# Patient Record
Sex: MALE | Race: WHITE | NOT HISPANIC OR LATINO | Employment: OTHER | URBAN - METROPOLITAN AREA
[De-identification: names, ages, dates, MRNs, and addresses within clinical notes are randomized per-mention and may not be internally consistent; named-entity substitution may affect disease eponyms.]

---

## 2017-02-06 DIAGNOSIS — F41.9 ANXIETY: Primary | ICD-10-CM

## 2017-02-07 DIAGNOSIS — I10 HYPERTENSION: ICD-10-CM

## 2017-02-07 DIAGNOSIS — E78.5 HYPERLIPIDEMIA: Primary | ICD-10-CM

## 2017-02-07 RX ORDER — ATORVASTATIN CALCIUM 40 MG/1
40 TABLET, FILM COATED ORAL DAILY
Qty: 90 TABLET | Refills: 3 | Status: SHIPPED | OUTPATIENT
Start: 2017-02-07 | End: 2017-12-28

## 2017-02-07 RX ORDER — DIAZEPAM 5 MG
5 TABLET ORAL EVERY 6 HOURS PRN
Qty: 30 TABLET | Refills: 3 | COMMUNITY
Start: 2017-02-07 | End: 2017-12-17

## 2017-02-07 RX ORDER — LISINOPRIL 5 MG/1
5 TABLET ORAL 2 TIMES DAILY
Qty: 180 TABLET | Refills: 3 | Status: ON HOLD | OUTPATIENT
Start: 2017-02-07 | End: 2017-02-27

## 2017-02-22 ENCOUNTER — CARE COORDINATION (OUTPATIENT)
Dept: SLEEP MEDICINE | Facility: CLINIC | Age: 82
End: 2017-02-22

## 2017-02-23 DIAGNOSIS — I25.10 CORONARY ARTERY DISEASE INVOLVING NATIVE CORONARY ARTERY OF NATIVE HEART WITHOUT ANGINA PECTORIS: ICD-10-CM

## 2017-02-23 DIAGNOSIS — I71.40 ABDOMINAL AORTIC ANEURYSM (AAA) WITHOUT RUPTURE (H): Primary | ICD-10-CM

## 2017-02-23 DIAGNOSIS — Z95.1 S/P CABG (CORONARY ARTERY BYPASS GRAFT): Primary | ICD-10-CM

## 2017-02-23 DIAGNOSIS — Z85.46 HISTORY OF PROSTATE CANCER: ICD-10-CM

## 2017-02-23 DIAGNOSIS — I71.40 ABDOMINAL AORTIC ANEURYSM (AAA) WITHOUT RUPTURE (H): ICD-10-CM

## 2017-02-23 NOTE — PROGRESS NOTES
I have called in to refill his temazepam 15mg po qhs.  He is doing well.  No side effects. Continued efficacy.

## 2017-02-26 ENCOUNTER — HOSPITAL ENCOUNTER (EMERGENCY)
Facility: CLINIC | Age: 82
Discharge: HOME | DRG: 854 | End: 2017-02-26
Attending: EMERGENCY MEDICINE | Admitting: EMERGENCY MEDICINE
Payer: COMMERCIAL

## 2017-02-26 VITALS
SYSTOLIC BLOOD PRESSURE: 121 MMHG | TEMPERATURE: 98.4 F | BODY MASS INDEX: 24.45 KG/M2 | HEIGHT: 63 IN | WEIGHT: 138 LBS | OXYGEN SATURATION: 96 % | DIASTOLIC BLOOD PRESSURE: 56 MMHG | RESPIRATION RATE: 20 BRPM

## 2017-02-26 DIAGNOSIS — K05.00 GINGIVITIS, ACUTE: ICD-10-CM

## 2017-02-26 RX ORDER — PENICILLIN V POTASSIUM 500 MG/1
500 TABLET, FILM COATED ORAL 4 TIMES DAILY
Qty: 40 TABLET | Refills: 0 | Status: ON HOLD | OUTPATIENT
Start: 2017-02-26 | End: 2017-03-10

## 2017-02-26 RX ORDER — HYDROCODONE BITARTRATE AND ACETAMINOPHEN 5; 325 MG/1; MG/1
1 TABLET ORAL EVERY 6 HOURS PRN
Qty: 15 TABLET | Refills: 0 | Status: ON HOLD | OUTPATIENT
Start: 2017-02-26 | End: 2017-02-27

## 2017-02-26 RX ORDER — PENICILLIN V POTASSIUM 500 MG/1
500 TABLET, FILM COATED ORAL 4 TIMES DAILY
Qty: 40 TABLET | Refills: 0 | Status: SHIPPED | OUTPATIENT
Start: 2017-02-26 | End: 2017-02-26

## 2017-02-26 ASSESSMENT — ENCOUNTER SYMPTOMS
FEVER: 0
TROUBLE SWALLOWING: 1
FACIAL SWELLING: 1

## 2017-02-26 NOTE — ED PROVIDER NOTES
"  History     Chief Complaint:  Facial Swelling    HPI:    Oscar Chaudhary is a 81 year old male with a history of CAD, CABG surgery, abdominal aortic aneurysm, and prostate cancer who presents with left-sided facial swelling. The patient reports that he was eating popcorn last night when he got a couple kernels stuck behind his back left molar. Although he tried to remove it, he was not able to secondary to pain and inability to see it himself. He used floss, water under pressure, and his tooth brush.This morning, he awoke with increased pain, significant swelling to the area, and difficulty swallowing. Secondary to pain, he has been unable to sleep, prompting his ED visit. He left a message with his dentist, but his dentist does not work on weekends. He denies any recent tooth fracture, dental intervention, chills or fever.     Allergies:  No known drug allergies      Medications:    Norco  Veetid  Valium  Lipitor  Lisinopril  Atenolol  Aldactone  Restoril  Aspirin  Omega-3 Fatty Acids  Multivitamin  Vitamin D    Past Medical History:    CABG  Myocardial infarction  Prostate cancer  Abdominal aortic aneurysm  CAD    Past Surgical History:    History reviewed. No pertinent surgical history.     Family History:    History reviewed. No pertinent family history.      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     Review of Systems   Constitutional: Negative for fever.   HENT: Positive for facial swelling and trouble swallowing. Negative for dental problem.    All other systems reviewed and are negative.      Physical Exam     Patient Vitals for the past 24 hrs:   BP Temp Temp src Heart Rate Resp SpO2 Height Weight   02/26/17 1058 121/56 98.4  F (36.9  C) Oral 107 20 96 % 1.6 m (5' 3\") 62.6 kg (138 lb)        Physical Exam   HENT:   Mouth/Throat:       :  Gen:  Pleasant, appears stated age.    Eye:   Sclera non-injected.  "     Face:  Obvious swelling of the left check and mandible.  Left cheek and mandible tender and slightly warm.   Submandibular swelling and tender lymphadenopathy.    Mouth: Minimal trismus.  Normal tongue elevation.  Sublingual tissue soft, non-tender.   Swelling posterior to tooth 17, no palpable abscess, tooth fracture.  There is a small amount of white material posterior to the  tooth.    Musculoskeletal:     Normal movement of all extremities without evidence for deficit.    Extremities:    No edema.  Atraumatic.      Skin:   Warm and dry.  No rash.    Neurologic:    Non-focal exam without asymmetric weakness or numbness.    Fluent speech.    Psychiatric:     Normal affect with appropriate interaction with examiner.        Emergency Department Course     Procedures:  Left inferior alveolar dental block  Performed by Angelina Farnsworth MD  Indication: pain control for severe dental pain  Procedure: after verbal informed consent was obtained 3cc of marcaine with epi was instilled at the angle of the mandible.  No complications and well tolerated.  Pain was mildly improved.    Emergency Department Course:  Past medical records, nursing notes, and vitals reviewed.  1218: I performed an exam of the patient and obtained history, as documented above.    1239: A dental block procedure was performed as noted above and I was able to clean the site.  I extracted a small amount of partially digested tissue/foot from behind the tooth.  I cleaned the area using 4-0 ethilon suture as floss, but did not notice any other foreign material.      1254: I rechecked the patient. Findings and plan explained to the Patient. Patient discharged home with instructions regarding supportive care, medications, and reasons to return. The importance of close follow-up was reviewed.      Impression & Plan      Medical Decision Making:  Oscar Chaudhary is a 81 year old male with a history of coronary artery disease and aortic aneurysm who presents  with painful left-sided facial swelling with pain behind the left, back molar. On examination, he is afebrile, though slightly tachycardia. He describes a foreign body sensation in the back of his left molar. I explored the area with using a suture as floss and also with forceps. I was able to get a small amount of partially digested material from behind the tooth. He tolerated that well. At this point, there does not appear to be a drainable abscess. At this point, he does some significant lymphadenopathy and facial swelling. I have recommended starting Penicillin and pain medication as needed. He plans on following up with his dentist tomorrow.     Diagnosis:    ICD-10-CM   1. Gingivitis, acute K05.00       Disposition:  Discharged to home with [place].    Discharge Medications:   Details   Hydrocodone-acetaminophen (NORCO) 5-325 MG per tablet Take 1 tablet by mouth every 6 hours as needed for moderate to severe pain or pain, Disp-15 tablet, R-0, Local Print     Veronica Conroy  2/26/2017    EMERGENCY DEPARTMENT    I, Veronica Conroy, am serving as a scribe at 12:18 PM on 2/26/2017 to document services personally performed by Angelina Farnsworth MD based on my observations and the provider's statements to me.       Angelina Farnsworth MD  02/26/17 8391

## 2017-02-26 NOTE — DISCHARGE INSTRUCTIONS
Discharge Instructions  Dental Pain    You have been seen today for a toothache. Your pain may be caused by an exposed nerve, an infection (pulpitis), a root abscess, or other problems. You will need to see a dentist for a solution to your tooth problem. Emergency Department care is only to help control your problem until you can see a dentist.  Today, we did not find any sign that your toothache was caused by a serious condition, but sometimes symptoms develop over time and cannot be found during an emergency visit, so it is very important that you follow up with your dentist.      Return to the Emergency Department if:    You develop a fever over 101 degrees Fahrenheit.    You can t open your mouth normally, can t move your tongue well, or can t swallow.    You have new or increased swelling of your face or neck.    You develop drainage of pus or foul smelling material from around your tooth.  What can I do to help myself?    Take any antibiotic the doctor may have prescribed for you today.    Avoid very hot or very cold foods as both can cause pain.    Make an appointment to see a dentist as soon as possible. If you wish, we can provide you with a list of low-cost dental clinics.   If you were given a prescription for medicine here today, be sure to read all of the information (including the package insert) that comes with your prescription.  This will include important information about the medicine, its side effects, and any warnings that you need to know about.  The pharmacist who fills the prescription can provide more information and answer questions you may have about the medicine.  If you have questions or concerns that the pharmacist cannot address, please call or return to the Emergency Department.   Opioid Medication Information    Pain medications are among the most commonly prescribed medicines, so we are including this information for all our patients. If you did not receive pain medication or get  a prescription for pain medicine, you can ignore it.     You may have been given a prescription for an opioid (narcotic) pain medicine and/or have received a pain medicine while here in the Emergency Department. These medicines can make you drowsy or impaired. You must not drive, operate dangerous equipment, or engage in any other dangerous activities while taking these medications. If you drive while taking these medications, you could be arrested for DUI, or driving under the influence. Do not drink any alcohol while you are taking these medications.     Opioid pain medications can cause addiction. If you have a history of chemical dependency of any type, you are at a higher risk of becoming addicted to pain medications.  Only take these prescribed medications to treat your pain when all other options have been tried. Take it for as short a time and as few doses as possible. Store your pain pills in a secure place, as they are frequently stolen and provide a dangerous opportunity for children or visitors in your house to start abusing these powerful medications. We will not replace any lost or stolen medicine.  As soon as your pain is better, you should flush all your remaining medication.     Many prescription pain medications contain Tylenol  (acetaminophen), including Vicodin , Tylenol #3 , Norco , Lortab , and Percocet .  You should not take any extra pills of Tylenol  if you are using these prescription medications or you can get very sick.  Do not ever take more than 3000 mg of acetaminophen in any 24 hour period.    All opioids tend to cause constipation. Drink plenty of water and eat foods that have a lot of fiber, such as fruits, vegetables, prune juice, apple juice and high fiber cereal.  Take a laxative if you don t move your bowels at least every other day. Miralax , Milk of Magnesia, Colace , or Senna  can be used to keep you regular.      Remember that you can always come back to the Emergency  Department if you are not able to see your regular doctor in the amount of time listed above, if you get any new symptoms, or if there is anything that worries you.

## 2017-02-26 NOTE — ED AVS SNAPSHOT
Emergency Department    [address] [place] [address]    Phone:  [phone]    Fax:  [phone]                                       Oscar Chaudhary   MRN: [id number]    Department:   Emergency Department   Date of Visit:  2/26/2017           After Visit Summary Signature Page     I have received my discharge instructions, and my questions have been answered. I have discussed any challenges I see with this plan with the nurse or doctor.    ..........................................................................................................................................  Patient/Patient Representative Signature      ..........................................................................................................................................  Patient Representative Print Name and Relationship to Patient    ..................................................               ................................................  Date                                            Time    ..........................................................................................................................................  Reviewed by Signature/Title    ...................................................              ..............................................  Date                                                            Time

## 2017-02-26 NOTE — ED AVS SNAPSHOT
Emergency Department    [address] [facility] [address]    Phone:  [phone]    Fax:  [phone]                                       Oscar Chaudhary   MRN: [id number]    Department:   Emergency Department   Date of Visit:  2/26/2017           Patient Information     Date Of Birth          1935        Your diagnoses for this visit were:     Gingivitis, acute        You were seen by Angelina Farnsworth MD.      Follow-up Information     Follow up with  Emergency Department.    Specialty:  EMERGENCY MEDICINE    Why:  immediately , If symptoms worsen    Contact information:    [address] [facility] [address] [phone]        Follow up with Milton Hollins MD.    Specialty:  Internal Medicine    Why:  As needed    Contact information:    [facility]  [address] [address] [phone]          Follow up with Your dentist In 1 day.    Why:  for a recheck of your symptoms        Discharge Instructions         Discharge Instructions  Dental Pain    You have been seen today for a toothache. Your pain may be caused by an exposed nerve, an infection (pulpitis), a root abscess, or other problems. You will need to see a dentist for a solution to your tooth problem. Emergency Department care is only to help control your problem until you can see a dentist.  Today, we did not find any sign that your toothache was caused by a serious condition, but sometimes symptoms develop over time and cannot be found during an emergency visit, so it is very important that you follow up with your dentist.      Return to the Emergency Department if:    You develop a fever over 101 degrees Fahrenheit.    You can t open your mouth normally, can t move your tongue well, or can t swallow.    You have new or increased swelling of your face or neck.    You develop drainage of pus or foul smelling material from around your tooth.  What can I do to help  myself?    Take any antibiotic the doctor may have prescribed for you today.    Avoid very hot or very cold foods as both can cause pain.    Make an appointment to see a dentist as soon as possible. If you wish, we can provide you with a list of low-cost dental clinics.   If you were given a prescription for medicine here today, be sure to read all of the information (including the package insert) that comes with your prescription.  This will include important information about the medicine, its side effects, and any warnings that you need to know about.  The pharmacist who fills the prescription can provide more information and answer questions you may have about the medicine.  If you have questions or concerns that the pharmacist cannot address, please call or return to the Emergency Department.   Opioid Medication Information    Pain medications are among the most commonly prescribed medicines, so we are including this information for all our patients. If you did not receive pain medication or get a prescription for pain medicine, you can ignore it.     You may have been given a prescription for an opioid (narcotic) pain medicine and/or have received a pain medicine while here in the Emergency Department. These medicines can make you drowsy or impaired. You must not drive, operate dangerous equipment, or engage in any other dangerous activities while taking these medications. If you drive while taking these medications, you could be arrested for DUI, or driving under the influence. Do not drink any alcohol while you are taking these medications.     Opioid pain medications can cause addiction. If you have a history of chemical dependency of any type, you are at a higher risk of becoming addicted to pain medications.  Only take these prescribed medications to treat your pain when all other options have been tried. Take it for as short a time and as few doses as possible. Store your pain pills in a secure place, as  they are frequently stolen and provide a dangerous opportunity for children or visitors in your house to start abusing these powerful medications. We will not replace any lost or stolen medicine.  As soon as your pain is better, you should flush all your remaining medication.     Many prescription pain medications contain Tylenol  (acetaminophen), including Vicodin , Tylenol #3 , Norco , Lortab , and Percocet .  You should not take any extra pills of Tylenol  if you are using these prescription medications or you can get very sick.  Do not ever take more than 3000 mg of acetaminophen in any 24 hour period.    All opioids tend to cause constipation. Drink plenty of water and eat foods that have a lot of fiber, such as fruits, vegetables, prune juice, apple juice and high fiber cereal.  Take a laxative if you don t move your bowels at least every other day. Miralax , Milk of Magnesia, Colace , or Senna  can be used to keep you regular.      Remember that you can always come back to the Emergency Department if you are not able to see your regular doctor in the amount of time listed above, if you get any new symptoms, or if there is anything that worries you.        Future Appointments        Provider Department Dept Phone Center    6/13/2017 8:30 AM [facility] VASCULAR ULTRASOUND ROOM 1 [facility] [phone] [facility]    6/13/2017 9:30 AM Jerrod Rangel MD [facility] [phone] [facility]      24 Hour Appointment Hotline       To make an appointment at any [facility], call [phone] ([phone]). If you don't have a family doctor or clinic, we will help you find one. [facility] clinics are conveniently located to serve the needs of you and your family.             Review of your medicines      START taking        Dose / Directions Last dose taken    HYDROcodone-acetaminophen 5-325 MG per tablet   Commonly known as:  NORCO   Dose:  1 tablet   Quantity:  15 tablet         Take 1 tablet by mouth every 6 hours as needed for moderate to severe pain or pain   Refills:  0        penicillin V potassium 500 MG tablet   Commonly known as:  VEETID   Dose:  500 mg   Quantity:  40 tablet        Take 1 tablet (500 mg) by mouth 4 times daily for 10 days   Refills:  0          Our records show that you are taking the medicines listed below. If these are incorrect, please call your family doctor or clinic.        Dose / Directions Last dose taken    aspirin 81 MG tablet   Dose:  1 tablet        Take 1 tablet by mouth daily.   Refills:  0        atenolol 25 MG tablet   Commonly known as:  TENORMIN   Dose:  25 mg   Quantity:  90 tablet        Take 1 tablet (25 mg) by mouth daily   Refills:  3        atorvastatin 40 MG tablet   Commonly known as:  LIPITOR   Dose:  40 mg   Quantity:  90 tablet        Take 1 tablet (40 mg) by mouth daily   Refills:  3        diazepam 5 MG tablet   Commonly known as:  VALIUM   Dose:  5 mg   Quantity:  30 tablet        Take 1 tablet (5 mg) by mouth every 6 hours as needed for anxiety   Refills:  3        fish oil-omega-3 fatty acids 1000 MG capsule   Dose:  1 g        Take 1 g by mouth daily.   Refills:  0        lisinopril 5 MG tablet   Commonly known as:  PRINIVIL/ZESTRIL   Dose:  5 mg   Quantity:  180 tablet        Take 1 tablet (5 mg) by mouth 2 times daily   Refills:  3        MULTIVITAL PO   Dose:  1 tablet        Take 1 tablet by mouth daily.   Refills:  0        spironolactone 25 MG tablet   Commonly known as:  ALDACTONE   Dose:  12.5 mg   Quantity:  45 tablet        Take 0.5 tablets (12.5 mg) by mouth daily   Refills:  3        temazepam 15 MG capsule   Commonly known as:  RESTORIL   Dose:  15 mg   Quantity:  30 capsule        Take 1 capsule (15 mg) by mouth nightly as needed   Refills:  3        vitamin D 1000 UNITS capsule   Dose:  1 capsule        Take 1 capsule by mouth daily.   Refills:  0                Prescriptions were sent or printed at these  locations (2 Prescriptions)                   [facility] [address] [facility] [employer] [facility] [place] - [address]   [address] [place] [address]    Telephone:  [phone]   Fax:  [phone]   Hours:                  E-Prescribed (1 of 2)         penicillin V potassium (VEETID) 500 MG tablet                 Printed at Department/Unit printer (1 of 2)         HYDROcodone-acetaminophen (NORCO) 5-325 MG per tablet                Orders Needing Specimen Collection     None      Pending Results     No orders found from 2/24/2017 to 2/27/2017.            Pending Culture Results     No orders found from 2/24/2017 to 2/27/2017.             Test Results from your hospital stay            Clinical Quality Measure: Blood Pressure Screening     Your blood pressure was checked while you were in the emergency department today. The last reading we obtained was  BP: 121/56 . Please read the guidelines below about what these numbers mean and what you should do about them.  If your systolic blood pressure (the top number) is less than 120 and your diastolic blood pressure (the bottom number) is less than 80, then your blood pressure is normal. There is nothing more that you need to do about it.  If your systolic blood pressure (the top number) is 120-139 or your diastolic blood pressure (the bottom number) is 80-89, your blood pressure may be higher than it should be. You should have your blood pressure rechecked within a year by a primary care provider.  If your systolic blood pressure (the top number) is 140 or greater or your diastolic blood pressure (the bottom number) is 90 or greater, you may have high blood pressure. High blood pressure is treatable, but if left untreated over time it can put you at risk for heart attack, stroke, or kidney failure. You should have your blood pressure rechecked by a primary care provider within the next 4 weeks.  If your provider in the emergency department today gave you specific instructions to follow-up  with your doctor or provider even sooner than that, you should follow that instruction and not wait for up to 4 weeks for your follow-up visit.        Thank you for choosing [facility]       Thank you for choosing [facility] for your care. Our goal is always to provide you with excellent care. Hearing back from our patients is one way we can continue to improve our services. Please take a few minutes to complete the written survey that you may receive in the mail after you visit with us. Thank you!        [employer] Information     [employer] gives you secure access to your electronic health record. If you see a primary care provider, you can also send messages to your care team and make appointments. If you have questions, please call your primary care clinic.  If you do not have a primary care provider, please call [phone] and they will assist you.        Care EveryWhere ID     This is your Care EveryWhere ID. This could be used by other organizations to access your [facility] medical records  [id number]        After Visit Summary       This is your record. Keep this with you and show to your community pharmacist(s) and doctor(s) at your next visit.

## 2017-02-27 ENCOUNTER — ANESTHESIA EVENT (OUTPATIENT)
Dept: SURGERY | Facility: CLINIC | Age: 82
DRG: 854 | End: 2017-02-27
Payer: COMMERCIAL

## 2017-02-27 ENCOUNTER — APPOINTMENT (OUTPATIENT)
Dept: GENERAL RADIOLOGY | Facility: CLINIC | Age: 82
DRG: 854 | End: 2017-02-27
Attending: ANESTHESIOLOGY
Payer: COMMERCIAL

## 2017-02-27 ENCOUNTER — APPOINTMENT (OUTPATIENT)
Dept: CT IMAGING | Facility: CLINIC | Age: 82
DRG: 854 | End: 2017-02-27
Attending: HOSPITALIST
Payer: COMMERCIAL

## 2017-02-27 ENCOUNTER — ANESTHESIA (OUTPATIENT)
Dept: SURGERY | Facility: CLINIC | Age: 82
DRG: 854 | End: 2017-02-27
Payer: COMMERCIAL

## 2017-02-27 ENCOUNTER — HOSPITAL ENCOUNTER (INPATIENT)
Facility: CLINIC | Age: 82
LOS: 11 days | Discharge: HOME | DRG: 854 | End: 2017-03-10
Attending: HOSPITALIST | Admitting: HOSPITALIST
Payer: COMMERCIAL

## 2017-02-27 ENCOUNTER — APPOINTMENT (OUTPATIENT)
Dept: GENERAL RADIOLOGY | Facility: CLINIC | Age: 82
DRG: 854 | End: 2017-02-27
Attending: INTERNAL MEDICINE
Payer: COMMERCIAL

## 2017-02-27 DIAGNOSIS — A49.1 INFECTION DUE TO SPECIES OF STREPTOCOCCUS MILLERI GROUP: ICD-10-CM

## 2017-02-27 DIAGNOSIS — A49.8 FUSOBACTERIUM INFECTION: Primary | ICD-10-CM

## 2017-02-27 DIAGNOSIS — L02.91 ABSCESS: ICD-10-CM

## 2017-02-27 LAB
ALBUMIN SERPL-MCNC: 3.5 G/DL (ref 3.4–5)
ALP SERPL-CCNC: 72 U/L (ref 40–150)
ALT SERPL W P-5'-P-CCNC: 24 U/L (ref 0–70)
ANION GAP SERPL CALCULATED.3IONS-SCNC: 8 MMOL/L (ref 3–14)
AST SERPL W P-5'-P-CCNC: 21 U/L (ref 0–45)
BACTERIA SPEC CULT: NORMAL
BASE DEFICIT BLDA-SCNC: 2.9 MMOL/L
BASOPHILS # BLD AUTO: 0 10E9/L (ref 0–0.2)
BASOPHILS NFR BLD AUTO: 0.1 %
BILIRUB SERPL-MCNC: 1.1 MG/DL (ref 0.2–1.3)
BUN SERPL-MCNC: 13 MG/DL (ref 7–30)
CALCIUM SERPL-MCNC: 8.9 MG/DL (ref 8.5–10.1)
CHLORIDE SERPL-SCNC: 96 MMOL/L (ref 94–109)
CO2 SERPL-SCNC: 25 MMOL/L (ref 20–32)
CREAT SERPL-MCNC: 0.74 MG/DL (ref 0.66–1.25)
DIFFERENTIAL METHOD BLD: ABNORMAL
EOSINOPHIL # BLD AUTO: 0 10E9/L (ref 0–0.7)
EOSINOPHIL NFR BLD AUTO: 0 %
ERYTHROCYTE [DISTWIDTH] IN BLOOD BY AUTOMATED COUNT: 15.7 % (ref 10–15)
GFR SERPL CREATININE-BSD FRML MDRD: ABNORMAL ML/MIN/1.7M2
GLUCOSE SERPL-MCNC: 104 MG/DL (ref 70–99)
HCO3 BLD-SCNC: 22 MMOL/L (ref 21–28)
HCT VFR BLD AUTO: 38 % (ref 40–53)
HGB BLD-MCNC: 13.9 G/DL (ref 13.3–17.7)
IMM GRANULOCYTES # BLD: 0.1 10E9/L (ref 0–0.4)
IMM GRANULOCYTES NFR BLD: 0.4 %
LACTATE BLD-SCNC: 1.5 MMOL/L (ref 0.7–2.1)
LYMPHOCYTES # BLD AUTO: 1.3 10E9/L (ref 0.8–5.3)
LYMPHOCYTES NFR BLD AUTO: 6.5 %
Lab: NORMAL
Lab: NORMAL
MCH RBC QN AUTO: 33.2 PG (ref 26.5–33)
MCHC RBC AUTO-ENTMCNC: 36.6 G/DL (ref 31.5–36.5)
MCV RBC AUTO: 91 FL (ref 78–100)
MICRO REPORT STATUS: NORMAL
MONOCYTES # BLD AUTO: 1.2 10E9/L (ref 0–1.3)
MONOCYTES NFR BLD AUTO: 5.7 %
NEUTROPHILS # BLD AUTO: 17.8 10E9/L (ref 1.6–8.3)
NEUTROPHILS NFR BLD AUTO: 87.3 %
NRBC # BLD AUTO: 0 10*3/UL
NRBC BLD AUTO-RTO: 0 /100
OXYHGB MFR BLD: 95 % (ref 92–100)
PCO2 BLD: 40 MM HG (ref 35–45)
PH BLD: 7.36 PH (ref 7.35–7.45)
PLATELET # BLD AUTO: 234 10E9/L (ref 150–450)
PO2 BLD: 82 MM HG (ref 80–105)
POTASSIUM SERPL-SCNC: 4 MMOL/L (ref 3.4–5.3)
PROT SERPL-MCNC: 7.4 G/DL (ref 6.8–8.8)
RBC # BLD AUTO: 4.19 10E12/L (ref 4.4–5.9)
SODIUM SERPL-SCNC: 129 MMOL/L (ref 133–144)
SPECIMEN SOURCE: NORMAL
WBC # BLD AUTO: 20.4 10E9/L (ref 4–11)

## 2017-02-27 PROCEDURE — S0028 INJECTION, FAMOTIDINE, 20 MG: HCPCS | Performed by: HOSPITALIST

## 2017-02-27 PROCEDURE — 25000132 ZZH RX MED GY IP 250 OP 250 PS 637: Performed by: ANESTHESIOLOGY

## 2017-02-27 PROCEDURE — 94002 VENT MGMT INPAT INIT DAY: CPT

## 2017-02-27 PROCEDURE — 99223 1ST HOSP IP/OBS HIGH 75: CPT | Mod: AI | Performed by: HOSPITALIST

## 2017-02-27 PROCEDURE — 88305 TISSUE EXAM BY PATHOLOGIST: CPT | Performed by: DENTIST

## 2017-02-27 PROCEDURE — 93010 ELECTROCARDIOGRAM REPORT: CPT | Performed by: INTERNAL MEDICINE

## 2017-02-27 PROCEDURE — 87076 CULTURE ANAEROBE IDENT EACH: CPT | Performed by: HOSPITALIST

## 2017-02-27 PROCEDURE — 87040 BLOOD CULTURE FOR BACTERIA: CPT | Performed by: HOSPITALIST

## 2017-02-27 PROCEDURE — 36415 COLL VENOUS BLD VENIPUNCTURE: CPT | Performed by: HOSPITALIST

## 2017-02-27 PROCEDURE — 00000146 ZZHCL STATISTIC GLUCOSE BY METER IP

## 2017-02-27 PROCEDURE — 88305 TISSUE EXAM BY PATHOLOGIST: CPT | Mod: 26 | Performed by: DENTIST

## 2017-02-27 PROCEDURE — 40000940 XR CHEST PORT 1 VW

## 2017-02-27 PROCEDURE — 25000128 H RX IP 250 OP 636: Performed by: NURSE ANESTHETIST, CERTIFIED REGISTERED

## 2017-02-27 PROCEDURE — 36000093 ZZH SURGERY LEVEL 4 1ST 30 MIN: Performed by: DENTIST

## 2017-02-27 PROCEDURE — 25000125 ZZHC RX 250: Performed by: INTERNAL MEDICINE

## 2017-02-27 PROCEDURE — 27210794 ZZH OR GENERAL SUPPLY STERILE: Performed by: DENTIST

## 2017-02-27 PROCEDURE — 87075 CULTR BACTERIA EXCEPT BLOOD: CPT | Performed by: HOSPITALIST

## 2017-02-27 PROCEDURE — 82805 BLOOD GASES W/O2 SATURATION: CPT | Performed by: INTERNAL MEDICINE

## 2017-02-27 PROCEDURE — 5A1955Z RESPIRATORY VENTILATION, GREATER THAN 96 CONSECUTIVE HOURS: ICD-10-PCS | Performed by: ANESTHESIOLOGY

## 2017-02-27 PROCEDURE — 87076 CULTURE ANAEROBE IDENT EACH: CPT | Performed by: DENTIST

## 2017-02-27 PROCEDURE — 85025 COMPLETE CBC W/AUTO DIFF WBC: CPT | Performed by: HOSPITALIST

## 2017-02-27 PROCEDURE — 25000128 H RX IP 250 OP 636: Performed by: HOSPITALIST

## 2017-02-27 PROCEDURE — 0CB40ZX EXCISION OF BUCCAL MUCOSA, OPEN APPROACH, DIAGNOSTIC: ICD-10-PCS | Performed by: DENTIST

## 2017-02-27 PROCEDURE — 25500064 ZZH RX 255 OP 636: Performed by: HOSPITALIST

## 2017-02-27 PROCEDURE — 40000275 ZZH STATISTIC RCP TIME EA 10 MIN

## 2017-02-27 PROCEDURE — 25000566 ZZH SEVOFLURANE, EA 15 MIN: Performed by: DENTIST

## 2017-02-27 PROCEDURE — 83605 ASSAY OF LACTIC ACID: CPT | Performed by: HOSPITALIST

## 2017-02-27 PROCEDURE — 37000009 ZZH ANESTHESIA TECHNICAL FEE, EACH ADDTL 15 MIN: Performed by: DENTIST

## 2017-02-27 PROCEDURE — 25000125 ZZHC RX 250: Performed by: HOSPITALIST

## 2017-02-27 PROCEDURE — 87070 CULTURE OTHR SPECIMN AEROBIC: CPT | Performed by: HOSPITALIST

## 2017-02-27 PROCEDURE — 71000015 ZZH RECOVERY PHASE 1 LEVEL 2 EA ADDTL HR: Performed by: DENTIST

## 2017-02-27 PROCEDURE — 25800025 ZZH RX 258: Performed by: ANESTHESIOLOGY

## 2017-02-27 PROCEDURE — 93005 ELECTROCARDIOGRAM TRACING: CPT

## 2017-02-27 PROCEDURE — 40000008 ZZH STATISTIC AIRWAY CARE

## 2017-02-27 PROCEDURE — 87186 SC STD MICRODIL/AGAR DIL: CPT | Performed by: DENTIST

## 2017-02-27 PROCEDURE — 87181 SC STD AGAR DILUTION PER AGT: CPT | Performed by: DENTIST

## 2017-02-27 PROCEDURE — 25000128 H RX IP 250 OP 636: Performed by: INTERNAL MEDICINE

## 2017-02-27 PROCEDURE — 87077 CULTURE AEROBIC IDENTIFY: CPT | Performed by: HOSPITALIST

## 2017-02-27 PROCEDURE — 70487 CT MAXILLOFACIAL W/DYE: CPT

## 2017-02-27 PROCEDURE — 87077 CULTURE AEROBIC IDENTIFY: CPT | Performed by: DENTIST

## 2017-02-27 PROCEDURE — 36600 WITHDRAWAL OF ARTERIAL BLOOD: CPT

## 2017-02-27 PROCEDURE — 25000132 ZZH RX MED GY IP 250 OP 250 PS 637: Performed by: DENTIST

## 2017-02-27 PROCEDURE — 87075 CULTR BACTERIA EXCEPT BLOOD: CPT | Performed by: DENTIST

## 2017-02-27 PROCEDURE — 37000008 ZZH ANESTHESIA TECHNICAL FEE, 1ST 30 MIN: Performed by: DENTIST

## 2017-02-27 PROCEDURE — 87070 CULTURE OTHR SPECIMN AEROBIC: CPT | Performed by: DENTIST

## 2017-02-27 PROCEDURE — 40000169 ZZH STATISTIC PRE-PROCEDURE ASSESSMENT I: Performed by: DENTIST

## 2017-02-27 PROCEDURE — 71000014 ZZH RECOVERY PHASE 1 LEVEL 2 FIRST HR: Performed by: DENTIST

## 2017-02-27 PROCEDURE — 80053 COMPREHEN METABOLIC PANEL: CPT | Performed by: HOSPITALIST

## 2017-02-27 PROCEDURE — 12000007 ZZH R&B INTERMEDIATE

## 2017-02-27 PROCEDURE — 36000063 ZZH SURGERY LEVEL 4 EA 15 ADDTL MIN: Performed by: DENTIST

## 2017-02-27 PROCEDURE — 25000125 ZZHC RX 250: Performed by: NURSE ANESTHETIST, CERTIFIED REGISTERED

## 2017-02-27 PROCEDURE — 0W9500Z DRAINAGE OF LOWER JAW WITH DRAINAGE DEVICE, OPEN APPROACH: ICD-10-PCS | Performed by: DENTIST

## 2017-02-27 PROCEDURE — 27210995 ZZH RX 272: Performed by: DENTIST

## 2017-02-27 RX ORDER — LIDOCAINE 40 MG/G
CREAM TOPICAL
Status: DISCONTINUED | OUTPATIENT
Start: 2017-02-27 | End: 2017-03-10 | Stop reason: HOSPADM

## 2017-02-27 RX ORDER — CHLORHEXIDINE GLUCONATE ORAL RINSE 1.2 MG/ML
10 SOLUTION DENTAL ONCE
Status: DISCONTINUED | OUTPATIENT
Start: 2017-02-27 | End: 2017-02-27 | Stop reason: HOSPADM

## 2017-02-27 RX ORDER — HYDROCODONE BITARTRATE AND ACETAMINOPHEN 5; 325 MG/1; MG/1
1-2 TABLET ORAL EVERY 4 HOURS PRN
Status: DISCONTINUED | OUTPATIENT
Start: 2017-02-27 | End: 2017-03-10 | Stop reason: HOSPADM

## 2017-02-27 RX ORDER — ONDANSETRON 4 MG/1
4 TABLET, ORALLY DISINTEGRATING ORAL EVERY 6 HOURS PRN
Status: DISCONTINUED | OUTPATIENT
Start: 2017-02-27 | End: 2017-03-10 | Stop reason: HOSPADM

## 2017-02-27 RX ORDER — POLYETHYLENE GLYCOL 3350 17 G/17G
17 POWDER, FOR SOLUTION ORAL DAILY PRN
Status: DISCONTINUED | OUTPATIENT
Start: 2017-02-27 | End: 2017-03-10 | Stop reason: HOSPADM

## 2017-02-27 RX ORDER — HYDROMORPHONE HYDROCHLORIDE 1 MG/ML
.3-.5 INJECTION, SOLUTION INTRAMUSCULAR; INTRAVENOUS; SUBCUTANEOUS EVERY 30 MIN PRN
Status: DISCONTINUED | OUTPATIENT
Start: 2017-02-27 | End: 2017-02-28

## 2017-02-27 RX ORDER — HYDROMORPHONE HYDROCHLORIDE 1 MG/ML
.3-.5 INJECTION, SOLUTION INTRAMUSCULAR; INTRAVENOUS; SUBCUTANEOUS
Status: DISCONTINUED | OUTPATIENT
Start: 2017-02-27 | End: 2017-02-27

## 2017-02-27 RX ORDER — ERTAPENEM 1 G/1
1 INJECTION, POWDER, LYOPHILIZED, FOR SOLUTION INTRAMUSCULAR; INTRAVENOUS EVERY 24 HOURS
Status: DISCONTINUED | OUTPATIENT
Start: 2017-02-27 | End: 2017-02-27

## 2017-02-27 RX ORDER — IOPAMIDOL 755 MG/ML
90 INJECTION, SOLUTION INTRAVASCULAR ONCE
Status: COMPLETED | OUTPATIENT
Start: 2017-02-27 | End: 2017-02-27

## 2017-02-27 RX ORDER — BENZOCAINE/MENTHOL 6 MG-10 MG
LOZENGE MUCOUS MEMBRANE PRN
Status: DISCONTINUED | OUTPATIENT
Start: 2017-02-27 | End: 2017-02-28 | Stop reason: HOSPADM

## 2017-02-27 RX ORDER — PIPERACILLIN SODIUM, TAZOBACTAM SODIUM 3; .375 G/15ML; G/15ML
3.38 INJECTION, POWDER, LYOPHILIZED, FOR SOLUTION INTRAVENOUS EVERY 6 HOURS
Status: DISCONTINUED | OUTPATIENT
Start: 2017-02-27 | End: 2017-03-06

## 2017-02-27 RX ORDER — FENTANYL CITRATE 50 UG/ML
INJECTION, SOLUTION INTRAMUSCULAR; INTRAVENOUS PRN
Status: DISCONTINUED | OUTPATIENT
Start: 2017-02-27 | End: 2017-02-27

## 2017-02-27 RX ORDER — ONDANSETRON 2 MG/ML
4 INJECTION INTRAMUSCULAR; INTRAVENOUS EVERY 6 HOURS PRN
Status: DISCONTINUED | OUTPATIENT
Start: 2017-02-27 | End: 2017-03-10 | Stop reason: HOSPADM

## 2017-02-27 RX ORDER — MAGNESIUM HYDROXIDE 1200 MG/15ML
LIQUID ORAL PRN
Status: DISCONTINUED | OUTPATIENT
Start: 2017-02-27 | End: 2017-02-28 | Stop reason: HOSPADM

## 2017-02-27 RX ORDER — ALBUTEROL SULFATE 90 UG/1
6 AEROSOL, METERED RESPIRATORY (INHALATION) EVERY 4 HOURS PRN
Status: DISCONTINUED | OUTPATIENT
Start: 2017-02-27 | End: 2017-03-10 | Stop reason: HOSPADM

## 2017-02-27 RX ORDER — PROPOFOL 10 MG/ML
5-50 INJECTION, EMULSION INTRAVENOUS CONTINUOUS
Status: DISCONTINUED | OUTPATIENT
Start: 2017-02-27 | End: 2017-02-28

## 2017-02-27 RX ORDER — PROPOFOL 10 MG/ML
5-75 INJECTION, EMULSION INTRAVENOUS CONTINUOUS
Status: DISCONTINUED | OUTPATIENT
Start: 2017-02-27 | End: 2017-02-27

## 2017-02-27 RX ORDER — PROPOFOL 10 MG/ML
INJECTION, EMULSION INTRAVENOUS PRN
Status: DISCONTINUED | OUTPATIENT
Start: 2017-02-27 | End: 2017-02-27

## 2017-02-27 RX ORDER — FENTANYL CITRATE 50 UG/ML
25-50 INJECTION, SOLUTION INTRAMUSCULAR; INTRAVENOUS
Status: DISCONTINUED | OUTPATIENT
Start: 2017-02-27 | End: 2017-03-02

## 2017-02-27 RX ORDER — CLINDAMYCIN PHOSPHATE 900 MG/50ML
900 INJECTION, SOLUTION INTRAVENOUS EVERY 8 HOURS
Status: DISCONTINUED | OUTPATIENT
Start: 2017-02-27 | End: 2017-02-27

## 2017-02-27 RX ORDER — SODIUM CHLORIDE, SODIUM LACTATE, POTASSIUM CHLORIDE, CALCIUM CHLORIDE 600; 310; 30; 20 MG/100ML; MG/100ML; MG/100ML; MG/100ML
INJECTION, SOLUTION INTRAVENOUS CONTINUOUS
Status: DISCONTINUED | OUTPATIENT
Start: 2017-02-27 | End: 2017-02-27 | Stop reason: HOSPADM

## 2017-02-27 RX ORDER — AMOXICILLIN 250 MG
1-2 CAPSULE ORAL 2 TIMES DAILY PRN
Status: DISCONTINUED | OUTPATIENT
Start: 2017-02-27 | End: 2017-03-10 | Stop reason: HOSPADM

## 2017-02-27 RX ORDER — CHLORHEXIDINE GLUCONATE ORAL RINSE 1.2 MG/ML
15 SOLUTION DENTAL EVERY 12 HOURS
Status: DISCONTINUED | OUTPATIENT
Start: 2017-02-27 | End: 2017-03-09

## 2017-02-27 RX ORDER — NALOXONE HYDROCHLORIDE 0.4 MG/ML
.1-.4 INJECTION, SOLUTION INTRAMUSCULAR; INTRAVENOUS; SUBCUTANEOUS
Status: DISCONTINUED | OUTPATIENT
Start: 2017-02-27 | End: 2017-03-10 | Stop reason: HOSPADM

## 2017-02-27 RX ORDER — SODIUM CHLORIDE 9 MG/ML
INJECTION, SOLUTION INTRAVENOUS CONTINUOUS
Status: DISCONTINUED | OUTPATIENT
Start: 2017-02-27 | End: 2017-03-01

## 2017-02-27 RX ORDER — NALOXONE HYDROCHLORIDE 0.4 MG/ML
.1-.4 INJECTION, SOLUTION INTRAMUSCULAR; INTRAVENOUS; SUBCUTANEOUS
Status: DISCONTINUED | OUTPATIENT
Start: 2017-02-27 | End: 2017-02-28

## 2017-02-27 RX ORDER — ACETAMINOPHEN 650 MG/1
650 SUPPOSITORY RECTAL EVERY 4 HOURS PRN
Status: DISCONTINUED | OUTPATIENT
Start: 2017-02-27 | End: 2017-03-10 | Stop reason: HOSPADM

## 2017-02-27 RX ORDER — ACETAMINOPHEN 325 MG/1
650 TABLET ORAL EVERY 4 HOURS PRN
Status: DISCONTINUED | OUTPATIENT
Start: 2017-02-27 | End: 2017-03-10 | Stop reason: HOSPADM

## 2017-02-27 RX ORDER — HYDROMORPHONE HYDROCHLORIDE 1 MG/ML
.3-.5 INJECTION, SOLUTION INTRAMUSCULAR; INTRAVENOUS; SUBCUTANEOUS
Status: DISCONTINUED | OUTPATIENT
Start: 2017-02-27 | End: 2017-02-28

## 2017-02-27 RX ORDER — BISACODYL 10 MG
10 SUPPOSITORY, RECTAL RECTAL DAILY PRN
Status: DISCONTINUED | OUTPATIENT
Start: 2017-02-27 | End: 2017-03-10 | Stop reason: HOSPADM

## 2017-02-27 RX ADMIN — PHENYLEPHRINE HYDROCHLORIDE 100 MCG: 10 INJECTION, SOLUTION INTRAMUSCULAR; INTRAVENOUS; SUBCUTANEOUS at 17:43

## 2017-02-27 RX ADMIN — FENTANYL CITRATE 50 MCG: 50 INJECTION, SOLUTION INTRAMUSCULAR; INTRAVENOUS at 18:02

## 2017-02-27 RX ADMIN — ERTAPENEM SODIUM 1 G: 1 INJECTION, POWDER, LYOPHILIZED, FOR SOLUTION INTRAMUSCULAR; INTRAVENOUS at 14:40

## 2017-02-27 RX ADMIN — PHENYLEPHRINE HYDROCHLORIDE 100 MCG: 10 INJECTION, SOLUTION INTRAMUSCULAR; INTRAVENOUS; SUBCUTANEOUS at 18:43

## 2017-02-27 RX ADMIN — PROPOFOL 200 MG: 10 INJECTION, EMULSION INTRAVENOUS at 17:34

## 2017-02-27 RX ADMIN — ROCURONIUM BROMIDE 30 MG: 10 INJECTION INTRAVENOUS at 18:03

## 2017-02-27 RX ADMIN — FENTANYL CITRATE 50 MCG: 50 INJECTION, SOLUTION INTRAMUSCULAR; INTRAVENOUS at 18:01

## 2017-02-27 RX ADMIN — PROPOFOL 30 MG: 10 INJECTION, EMULSION INTRAVENOUS at 19:33

## 2017-02-27 RX ADMIN — PROPOFOL 50 MCG/KG/MIN: 10 INJECTION, EMULSION INTRAVENOUS at 23:56

## 2017-02-27 RX ADMIN — SODIUM CHLORIDE, POTASSIUM CHLORIDE, SODIUM LACTATE AND CALCIUM CHLORIDE: 600; 310; 30; 20 INJECTION, SOLUTION INTRAVENOUS at 17:01

## 2017-02-27 RX ADMIN — PHENYLEPHRINE HYDROCHLORIDE 100 MCG: 10 INJECTION, SOLUTION INTRAMUSCULAR; INTRAVENOUS; SUBCUTANEOUS at 18:52

## 2017-02-27 RX ADMIN — FENTANYL CITRATE 50 MCG/HR: 50 INJECTION, SOLUTION INTRAMUSCULAR; INTRAVENOUS at 20:56

## 2017-02-27 RX ADMIN — MIDAZOLAM HYDROCHLORIDE 2 MG: 1 INJECTION, SOLUTION INTRAMUSCULAR; INTRAVENOUS at 19:33

## 2017-02-27 RX ADMIN — SODIUM CHLORIDE 60 ML: 9 INJECTION, SOLUTION INTRAVENOUS at 16:03

## 2017-02-27 RX ADMIN — PHENYLEPHRINE HYDROCHLORIDE 100 MCG: 10 INJECTION, SOLUTION INTRAMUSCULAR; INTRAVENOUS; SUBCUTANEOUS at 17:41

## 2017-02-27 RX ADMIN — SODIUM CHLORIDE, POTASSIUM CHLORIDE, SODIUM LACTATE AND CALCIUM CHLORIDE: 600; 310; 30; 20 INJECTION, SOLUTION INTRAVENOUS at 18:06

## 2017-02-27 RX ADMIN — PHENYLEPHRINE HYDROCHLORIDE 100 MCG: 10 INJECTION, SOLUTION INTRAMUSCULAR; INTRAVENOUS; SUBCUTANEOUS at 19:30

## 2017-02-27 RX ADMIN — SODIUM CHLORIDE: 9 INJECTION, SOLUTION INTRAVENOUS at 12:08

## 2017-02-27 RX ADMIN — PIPERACILLIN AND TAZOBACTAM 3.38 G: 3; .375 INJECTION, POWDER, FOR SOLUTION INTRAVENOUS at 21:20

## 2017-02-27 RX ADMIN — SODIUM CHLORIDE, POTASSIUM CHLORIDE, SODIUM LACTATE AND CALCIUM CHLORIDE: 600; 310; 30; 20 INJECTION, SOLUTION INTRAVENOUS at 17:24

## 2017-02-27 RX ADMIN — PROPOFOL 50 MCG/KG/MIN: 10 INJECTION, EMULSION INTRAVENOUS at 19:37

## 2017-02-27 RX ADMIN — LIDOCAINE HYDROCHLORIDE 3 ML: 40 INJECTION, SOLUTION RETROBULBAR; TOPICAL at 17:23

## 2017-02-27 RX ADMIN — IOPAMIDOL 90 ML: 755 INJECTION, SOLUTION INTRAVENOUS at 16:04

## 2017-02-27 RX ADMIN — SODIUM CHLORIDE 1000 ML: 9 INJECTION, SOLUTION INTRAVENOUS at 20:17

## 2017-02-27 RX ADMIN — FAMOTIDINE 20 MG: 10 INJECTION, SOLUTION INTRAVENOUS at 23:26

## 2017-02-27 RX ADMIN — FENTANYL CITRATE 100 MCG: 50 INJECTION, SOLUTION INTRAMUSCULAR; INTRAVENOUS at 17:46

## 2017-02-27 RX ADMIN — PHENYLEPHRINE HYDROCHLORIDE 100 MCG: 10 INJECTION, SOLUTION INTRAMUSCULAR; INTRAVENOUS; SUBCUTANEOUS at 17:49

## 2017-02-27 NOTE — CONSULTS
INFECTIOUS DISEASE CONSULTATION       DATE OF CONSULTATION:  02/27/2017       REQUESTING PHYSICIAN:  Dr. Ramos.      REASON FOR CONSULTATION:  I was asked by Dr. Ramos to assess odontogenic infection.      IMPRESSION:   1.  A 81-year-old male with coronary artery disease   2.  Carcinoma of the prostate.   3.  Admitted on this occasion with left facial swelling and possible early dental abscess.      RECOMMENDATIONS:   1.  Will switch to IV ertapenem for broad coverage.   2.  The patient is going to the operating room for I&D later today.      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  Two nights ago the patient was eating popcorn when he felt as though he got part of a kernel lodged in the back left molar.  He tried removing it using dental floss and water under pressure as well as tooth brushing, but was unsuccessful.  Yesterday he awakened with increasing pain and swelling in the left lower dental area.  He had difficulty sleeping and thus came to the Emergency Department.  There he underwent a dental block and was discharged home on oral Pen-Vee K.  This morning he saw Dr. Ramos in the office and had further left submandibular swelling without fever or chills.  He underwent a Panorex film which reportedly did not show any osteomyelitis but was admitted to the hospital for surgical debridement and IV antibiotic therapy.      PAST MEDICAL HISTORY:   1.  Coronary artery disease with previous bypass grafting.   2.  Abdominal aortic aneurysm.   3.  Carcinoma of the prostate.      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      ALLERGIES:  None known.      FAMILY HISTORY:  Noncontributory.      REVIEW OF SYSTEMS:  Completely reviewed and negative other than that described above.      PHYSICAL  EXAMINATION:   VITAL SIGNS:  Temperature 98, blood pressure 117/56, heart rate 107 and regular.   GENERAL:  Well-developed, well-nourished, alert and oriented, does not look acutely ill.   SKIN:  No rashes or nodules.   HEENT:  Eyes:  No subconjunctival hemorrhages or scleral icterus.  Oropharynx without erythema or exudate.  There is mild trismus.  There is mild to moderate left submandibular swelling with induration and tenderness.  No purulent drainage.   NECK:  Supple.   LUNGS:  Clear to auscultation, no use of accessory muscles of respiration.   COR:  S1, S2 normal, no S3, S4 or murmur.   ABDOMEN:  Soft, nontender, no mass or hepatosplenomegaly.   EXTREMITIES:  No calf tenderness or pedal edema.      For further details of the patient's history, please refer to the chart.  Thank you very much for this consultation.         MIRZA GARCIA MD             D: 2017 13:21   T: 2017 13:58   MT: NICK      Name:     ACRINE DOSHI   MRN:      [id number]        Account:       [id number]   :      1935           Consult Date:  2017      Document: [id number]       cc: Adam Ramos DDS

## 2017-02-27 NOTE — ANESTHESIA PREPROCEDURE EVALUATION
Procedure: Procedure(s):  COMBINED INCISION AND DRAINAGE MANDIBLE  OPEN REDUCTION INTERNAL FIXATION MANDIBLE  Preop diagnosis: [place]    Allergies   Allergen Reactions     Epinephrine-Lidocaine-Na Metabisulfite      Patient stated he is not allergic to lidocaine rather he cannot tolerate epinephrine     Local [Lidocaine]      Past Medical History   Diagnosis Date     AAA (abdominal aortic aneurysm) (H) 7/9/2012     CAD (coronary artery disease) 7/9/2012     h/o myocardial infarction 1976 5/31/2015     History of prostate cancer 2001 10/7/2013     S/P CABG (coronary artery bypass graft) 6/21/2001     S/P CABG (coronary artery bypass graft); 6/29/01 5/31/2015     History reviewed. No pertinent past surgical history.  Prior to Admission medications    Medication Sig Start Date End Date Taking? Authorizing Provider   LISINOPRIL PO Take 2.5 mg by mouth 2 times daily   Yes Unknown, Entered By History   penicillin V potassium (VEETID) 500 MG tablet Take 1 tablet (500 mg) by mouth 4 times daily for 10 days 2/26/17 3/8/17 Yes Angelina Farnsworth MD   diazepam (VALIUM) 5 MG tablet Take 1 tablet (5 mg) by mouth every 6 hours as needed for anxiety 2/7/17  Yes Jerrod Rangel MD   atorvastatin (LIPITOR) 40 MG tablet Take 1 tablet (40 mg) by mouth daily 2/7/17  Yes Jerrod Rangel MD   atenolol (TENORMIN) 25 MG tablet Take 1 tablet (25 mg) by mouth daily 7/6/16  Yes Jerrod Rangel MD   spironolactone (ALDACTONE) 25 MG tablet Take 0.5 tablets (12.5 mg) by mouth daily 6/8/16  Yes Jerrod Rangel MD   temazepam (RESTORIL) 15 MG capsule Take 1 capsule (15 mg) by mouth nightly as needed 8/17/15  Yes Dennys Healy MD   aspirin 81 MG tablet Take 1 tablet by mouth daily.   Yes Reported, Patient   fish oil-omega-3 fatty acids (FISH OIL) 1000 MG capsule Take 1 g by mouth daily.   Yes Reported, Patient   Multiple Vitamins-Minerals (MULTIVITAL PO) Take 1 tablet by mouth daily.   Yes Reported,  Patient   Cholecalciferol (VITAMIN D) 1000 UNITS capsule Take 1 capsule by mouth daily.   Yes Reported, Patient     Current Facility-Administered Medications Ordered in Epic   Medication Dose Route Frequency Last Rate Last Dose     [Auto Hold] naloxone (NARCAN) injection 0.1-0.4 mg  0.1-0.4 mg Intravenous Q2 Min PRN         [Auto Hold] lidocaine 1 % 1 mL  1 mL Other Q1H PRN         [Auto Hold] lidocaine (LMX4) cream   Topical Q1H PRN         [Auto Hold] sodium chloride (PF) 0.9% PF flush 3 mL  3 mL Intracatheter Q1H PRN         [Auto Hold] sodium chloride (PF) 0.9% PF flush 3 mL  3 mL Intracatheter Q8H   3 mL at 02/27/17 1220     0.9% sodium chloride infusion   Intravenous Continuous 125 mL/hr at 02/27/17 1632       [Auto Hold] acetaminophen (TYLENOL) tablet 650 mg  650 mg Oral Q4H PRN         [Auto Hold] acetaminophen (TYLENOL) Suppository 650 mg  650 mg Rectal Q4H PRN         [Auto Hold] HYDROcodone-acetaminophen (NORCO) 5-325 MG per tablet 1-2 tablet  1-2 tablet Oral Q4H PRN         [Auto Hold] HYDROmorphone (PF) (DILAUDID) injection 0.3-0.5 mg  0.3-0.5 mg Intravenous Q2H PRN         [Auto Hold] senna-docusate (SENOKOT-S;PERICOLACE) 8.6-50 MG per tablet 1-2 tablet  1-2 tablet Oral BID PRN         [Auto Hold] polyethylene glycol (MIRALAX/GLYCOLAX) Packet 17 g  17 g Oral Daily PRN         [Auto Hold] bisacodyl (DULCOLAX) Suppository 10 mg  10 mg Rectal Daily PRN         [Auto Hold] ondansetron (ZOFRAN-ODT) ODT tab 4 mg  4 mg Oral Q6H PRN        Or     [Auto Hold] ondansetron (ZOFRAN) injection 4 mg  4 mg Intravenous Q6H PRN         chlorhexidine (PERIDEX) 0.12 % solution 10 mL  10 mL Swish & Spit Once         No Pre Procedure Antibiotic Needed  1 each As instructed Continuous         lactated ringers infusion   Intravenous Continuous         [Auto Hold] ertapenem (INVanz) 1 g vial to attach to  mL bag  1 g Intravenous Q24H   1 g at 02/27/17 1440     No current [facility]-ordered outpatient prescriptions on file.      Wt Readings from Last 1 Encounters:   02/26/17 62.6 kg (138 lb)     Temp Readings from Last 1 Encounters:   02/27/17 36.2  C (97.1  F) (Temporal)     BP Readings from Last 6 Encounters:   02/27/17 (!) 133/124   02/26/17 121/56   06/14/16 114/67   06/02/15 97/57   05/12/14 116/63   04/25/14 109/59     Pulse Readings from Last 4 Encounters:   02/27/17 91   06/14/16 64   06/02/15 70   05/12/14 62     Resp Readings from Last 1 Encounters:   02/27/17 16     SpO2 Readings from Last 1 Encounters:   02/27/17 96%     Recent Labs   Lab Test  02/27/17   1228  06/14/16   0919   NA  129*  133   POTASSIUM  4.0  4.6   CHLORIDE  96  100   CO2  25  28   ANIONGAP  8  5   GLC  104*  100*   BUN  13  14   CR  0.74  0.82   MICKIE  8.9  8.8     Recent Labs   Lab Test  02/27/17   1228  06/14/16   0919   WBC  20.4*  9.7   HGB  13.9  13.1*   PLT  234  230       Anesthesia Evaluation     . Pt has had prior anesthetic.     No history of anesthetic complications     ROS/MED HX    ENT/Pulmonary:      (-) sleep apnea   Neurologic:       Cardiovascular:     (+) --CAD, -past MI,CABG-. : . CHF . . :. .       METS/Exercise Tolerance:     Hematologic:         Musculoskeletal:         GI/Hepatic:     (+) GERD      (-) liver disease   Renal/Genitourinary:      (-) renal disease   Endo:         Psychiatric:         Infectious Disease:         Malignancy:   (+) Malignancy History of Prostate          Other:               Physical Exam  Normal systems: dental    Airway   Mallampati: III  Neck ROM: limited  Comment: Significant airway swelling    Dental     Cardiovascular   Rhythm and rate: regular      Pulmonary    breath sounds clear to auscultation                    Anesthesia Plan      History & Physical Review  History and physical reviewed and following examination; no interval change.    ASA Status:  4 emergent.        Plan for General, ETT and Awake Technique          Postoperative Care      Consents  Anesthetic plan, risks, benefits and  alternatives discussed with:  Patient..                          .

## 2017-02-27 NOTE — IP AVS SNAPSHOT
MRN:[id number]                      After Visit Summary   2/27/2017    Oscar Chaudhary    MRN: [id number]           Thank you!     Thank you for choosing [facility] for your care. Our goal is always to provide you with excellent care. Hearing back from our patients is one way we can continue to improve our services. Please take a few minutes to complete the written survey that you may receive in the mail after you visit with us. Thank you!        Patient Information     Date Of Birth          1935        About your hospital stay     You were admitted on:  February 27, 2017 You last received care in the:  [place] [address] [facility]    You were discharged on:  March 10, 2017        Reason for your hospital stay       For treatment of a left neck abscess and infection.                  Who to Call     For medical emergencies, please call 911.  For non-urgent questions about your medical care, please call your primary care provider or clinic, [phone]  For questions related to your surgery, please call your surgery clinic        Attending Provider     Provider Carlos Campbell,  Internal Medicine       Primary Care Provider Office Phone # Fax #    Milton Hollins [phone]       [address] [address]        After Care Instructions     Activity       Your activity upon discharge: activity as tolerated            Diet       Follow this diet upon discharge: Orders Placed This Encounter      Combination Diet Mechanical/Dental Soft Diet; Thin Liquids (water, ice chips, juice, milk, gelatin, ice cream, etc) (NO STRAWS)  No restrictions for brushing teeth or oral cares.            Discharge Instructions                 Follow-up Appointments     Follow-up and recommended labs and tests        Follow up with primary care provider, Milton Hollins, within 7 days for hospital follow- up.  The following labs/tests  are recommended: CBC, BMP.                  Your next 10 appointments already scheduled     Mar 11, 2017  1:30 PM CST   Level 1 with SH INFUSION CHAIR 16   [facility] ([facility])    [facility] [place] [address]  [place] [address]   [phone]            Mar 12, 2017  1:30 PM CDT   Level 1 with SH INFUSION CHAIR 14   [facility] ([facility])    [facility] [place] [address]  [place] [address]   [phone]            Mar 13, 2017  2:30 PM CDT   Level 1 with SH INFUSION CHAIR 15   [facility] ([facility])    [facility] [place]  [address]  [place] [address]   [phone]            Mar 14, 2017  2:30 PM CDT   Level 1 with SH INFUSION CHAIR 17   [facility] ([facility])    [facility] [place]  [address]  [place] [address]   [phone]            Mar 15, 2017  3:00 PM CDT   Level 1 with  INFUSION CHAIR 7   [facility] ([facility])    [facility] [place]  [address]  [place] [address]   [phone]            Jun 13, 2017  8:30 AM CDT   US ABDOMINAL AORTIC LIMITED with [id number]   [facility] ([facility])    [address] [place] [address] [phone]           Please bring a list of your medicines (including vitamins, minerals and over-the-counter drugs). Also, tell your doctor about any allergies you may have. Wear comfortable clothes and leave your valuables at home.  Adults: No eating or drinking for 8 hours before the exam. You may take medicine with a small sip of water.  Children: - Children 6+ years: No food or drink for 6 hours before exam. - Children  1-5 years: No food or drink for 4 hours before exam. - Infants, breast-fed: may have breast milk up to 2 hours before exam. - Infants, formula: may have bottle until 4 hours before exam.  Please call the Imaging Department at your exam site with any questions.            Jun 13, 2017  9:30 AM CDT   (Arrive by 9:15 AM)   RETURN HEART FAILURE with Jerrod Rangel MD   [facility] ([facility])    [address]  3rd Floor  [facility] [address] [phone]              Further instructions from your care team       Placido Escalante DDS     [facility]  [address], [address]  [place] [address]  Clinic/On-call Phone: [phone]  Clinic Fax: [phone]     You have a post hospital clinic visit visit with Dr. Escalante on Monday March 13th @ 10:50 AM.     Pending Results     No orders found from 2/25/2017 to 2/28/2017.            Statement of Approval     Ordered          03/10/17 1045  I have reviewed and agree with all the recommendations and orders detailed in this document.  EFFECTIVE NOW     Approved and electronically signed by:  Jaime Tamayo MD           03/10/17 7167  I have reviewed and agree with all the recommendations and orders detailed in this document.     Approved and electronically signed by:  Laz Cooley MD             Admission Information     Date & Time Provider Department Dept. Phone    2/27/2017 Carlos Stahl [place] [address] [facility] [phone]      Your Vitals Were     Blood Pressure Pulse Temperature Respirations Weight Pulse Oximetry    110/52 74 96.8  F (36  C) (Oral) 18 60.8 kg (134 lb) 97%    BMI (Body Mass Index)                   23.74 kg/m2           MyChart Information     [employer] gives you secure access to your electronic health record. If you see a primary care provider, you can also send messages to your care team and make appointments. If you have  questions, please call your primary care clinic.  If you do not have a primary care provider, please call [phone] and they will assist you.        Care EveryWhere ID     This is your Care EveryWhere ID. This could be used by other organizations to access your [place] medical records  [id number]           Review of your medicines      START taking        Dose / Directions    acetaminophen 325 MG tablet   Commonly known as:  TYLENOL        Dose:  650 mg   Take 2 tablets (650 mg) by mouth every 4 hours as needed for mild pain   Quantity:  100 tablet   Refills:  0       ertapenem 1 GM injection   Commonly known as:  INVanz        Dose:  1 g   Inject 1 g into the vein every 24 hours for 14 days CBC with differential, creatinine, SGOT weekly while on this medication to be faxed to Dr. Philippe office.   Quantity:  140 mL   Refills:  0         CONTINUE these medicines which have NOT CHANGED        Dose / Directions    aspirin 81 MG tablet        Dose:  1 tablet   Take 1 tablet by mouth daily.   Refills:  0       atenolol 25 MG tablet   Commonly known as:  TENORMIN   Used for:  Other secondary hypertension        Dose:  25 mg   Take 1 tablet (25 mg) by mouth daily   Quantity:  90 tablet   Refills:  3       atorvastatin 40 MG tablet   Commonly known as:  LIPITOR   Used for:  Hyperlipidemia        Dose:  40 mg   Take 1 tablet (40 mg) by mouth daily   Quantity:  90 tablet   Refills:  3       diazepam 5 MG tablet   Commonly known as:  VALIUM   Used for:  Anxiety        Dose:  5 mg   Take 1 tablet (5 mg) by mouth every 6 hours as needed for anxiety   Quantity:  30 tablet   Refills:  3       fish oil-omega-3 fatty acids 1000 MG capsule        Dose:  1 g   Take 1 g by mouth daily.   Refills:  0       LISINOPRIL PO        Dose:  2.5 mg   Take 2.5 mg by mouth 2 times daily   Refills:  0       MULTIVITAL PO        Dose:  1 tablet   Take 1 tablet by mouth daily.   Refills:  0       spironolactone 25 MG tablet   Commonly known  as:  ALDACTONE   Used for:  LV dysfunction        Dose:  12.5 mg   Take 0.5 tablets (12.5 mg) by mouth daily   Quantity:  45 tablet   Refills:  3       temazepam 15 MG capsule   Commonly known as:  RESTORIL   Used for:  Insomnia, unspecified        Dose:  15 mg   Take 1 capsule (15 mg) by mouth nightly as needed   Quantity:  30 capsule   Refills:  3       vitamin D 1000 UNITS capsule        Dose:  1 capsule   Take 1 capsule by mouth daily.   Refills:  0         STOP taking     penicillin V potassium 500 MG tablet   Commonly known as:  VEETID                Where to get your medicines      These medications were sent to [facility] [place]  [place], [place] - [address]  [address] [facility] [address], [place] [address]     Phone:  [phone]     acetaminophen 325 MG tablet         Some of these will need a paper prescription and others can be bought over the counter. Ask your nurse if you have questions.     Bring a paper prescription for each of these medications     ertapenem 1 GM injection                Protect others around you: Learn how to safely use, store and throw away your medicines at www.disposemymeds.org.             Medication List: This is a list of all your medications and when to take them. Check marks below indicate your daily home schedule. Keep this list as a reference.      Medications           Morning Afternoon Evening Bedtime As Needed    acetaminophen 325 MG tablet   Commonly known as:  TYLENOL   Take 2 tablets (650 mg) by mouth every 4 hours as needed for mild pain   Last time this was given:  650 mg on 3/8/2017 11:10 PM                                aspirin 81 MG tablet   Take 1 tablet by mouth daily.                                atenolol 25 MG tablet   Commonly known as:  TENORMIN   Take 1 tablet (25 mg) by mouth daily                                atorvastatin 40 MG tablet   Commonly known as:  LIPITOR   Take 1 tablet (40 mg) by mouth daily   Last time this was given:  40 mg on  3/10/2017  9:23 AM                                diazepam 5 MG tablet   Commonly known as:  VALIUM   Take 1 tablet (5 mg) by mouth every 6 hours as needed for anxiety                                ertapenem 1 GM injection   Commonly known as:  INVanz   Inject 1 g into the vein every 24 hours for 14 days CBC with differential, creatinine, SGOT weekly while on this medication to be faxed to Dr. Philippe office.                                fish oil-omega-3 fatty acids 1000 MG capsule   Take 1 g by mouth daily.                                LISINOPRIL PO   Take 2.5 mg by mouth 2 times daily   Last time this was given:  2.5 mg on 3/10/2017  9:22 AM                                MULTIVITAL PO   Take 1 tablet by mouth daily.                                spironolactone 25 MG tablet   Commonly known as:  ALDACTONE   Take 0.5 tablets (12.5 mg) by mouth daily                                temazepam 15 MG capsule   Commonly known as:  RESTORIL   Take 1 capsule (15 mg) by mouth nightly as needed                                vitamin D 1000 UNITS capsule   Take 1 capsule by mouth daily.

## 2017-02-27 NOTE — PHARMACY-ADMISSION MEDICATION HISTORY
Admission medication history interview status for the 2/27/2017  admission is complete. See EPIC admission navigator for prior to admission medications     Medication history source reliability:Good    Actions taken by pharmacist (provider contacted, etc): interview with patient     Additional medication history information not noted on PTA med list :None    Medication reconciliation/reorder completed by provider prior to medication history? No    Time spent in this activity: 15 min    Prior to Admission medications    Medication Sig Last Dose Taking? Auth Provider   LISINOPRIL PO Take 2.5 mg by mouth 2 times daily 2/27/2017 at am Yes Unknown, Entered By History   penicillin V potassium (VEETID) 500 MG tablet Take 1 tablet (500 mg) by mouth 4 times daily for 10 days 2/27/2017 at am Yes Angelina Farnsworth MD   diazepam (VALIUM) 5 MG tablet Take 1 tablet (5 mg) by mouth every 6 hours as needed for anxiety prn Yes Jerrod Rangel MD   atorvastatin (LIPITOR) 40 MG tablet Take 1 tablet (40 mg) by mouth daily 2/26/2017 at hs Yes Jerrod Rangel MD   atenolol (TENORMIN) 25 MG tablet Take 1 tablet (25 mg) by mouth daily 2/27/2017 at am Yes Jerrod Rangel MD   spironolactone (ALDACTONE) 25 MG tablet Take 0.5 tablets (12.5 mg) by mouth daily 2/27/2017 at am Yes Jerrod Rangel MD   temazepam (RESTORIL) 15 MG capsule Take 1 capsule (15 mg) by mouth nightly as needed prn Yes Dennys Healy MD   aspirin 81 MG tablet Take 1 tablet by mouth daily. 2/27/2017 at am Yes Reported, Patient   fish oil-omega-3 fatty acids (FISH OIL) 1000 MG capsule Take 1 g by mouth daily. 2/27/2017 at am Yes Reported, Patient   Multiple Vitamins-Minerals (MULTIVITAL PO) Take 1 tablet by mouth daily. 2/27/2017 at am Yes Reported, Patient   Cholecalciferol (VITAMIN D) 1000 UNITS capsule Take 1 capsule by mouth daily. 2/27/2017 at am Yes Reported, Patient

## 2017-02-27 NOTE — PLAN OF CARE
Problem: Goal Outcome Summary  Goal: Goal Outcome Summary  Outcome: No Change  Patient moving well, cheek swollen, using swabs to help dry mouth.  Awaiting CT scan.

## 2017-02-27 NOTE — H&P
[facility]    History and Physical  Hospitalist       Date of Admission:  2/27/2017  Date of Service (when I saw the patient): 02/27/17    Assessment & Plan   Oscar Chaudhary is a 81 year old male who presents with abscess. Admitted for further evaluation and treatment.     Oral fracture and abscess: patient presenting with clinic with report of fracture as well as abscess formation.   - Ertapenem   - NS at 100cc/h   - Oral surgery consulted.    - ID consulted.    - Pain control.     Coronary artery disease and history of coronary artery bypass grafting: Patient with history of myocardial infarction as well as coronary artery disease aand abdominal aortic aneurysm.   - PTA Lipitor   - PTA Lisinopril.    - PTA Atenolol.    - PTA Spironolactone.    - PTA ASA 81mg QD.     DVT Prophylaxis: Pneumatic Compression Devices    Code: Full Code    Disposition: Inpatient.    Dr. Carlos Stahl D.O.  [facility] Hospitalist  Pager [phone]    Primary Care Physician   Milton Hollins    Chief Complaint   Oral abscess.     History is obtained from the patient and medical records.     History of Present Illness   Oscar Chaudhary is a 81 year old male who presents with abscess. Admitted for further evaluation and treatment. 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  Patient now with abscess formation seen in clinic and directly admitted.  Patient denies chest pain, shortness breath, abdominal pain, vomiting, however, does endorse left-sided facial pain.      Past Medical History    I have reviewed this patient's medical history and updated it with pertinent information if needed.   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  No past surgical history on file.    Prior to Admission Medications   Prior to Admission Medications   Prescriptions Last Dose Informant Patient Reported? Taking?   Cholecalciferol (VITAMIN D) 1000 UNITS capsule   Yes No   Sig: Take 1 capsule by mouth daily.   HYDROcodone-acetaminophen (NORCO) 5-325 MG per tablet   No No   Sig: Take 1 tablet by mouth every 6 hours as needed for moderate to severe pain or pain   Multiple Vitamins-Minerals (MULTIVITAL PO)   Yes No   Sig: Take 1 tablet by mouth daily.   aspirin 81 MG tablet   Yes No   Sig: Take 1 tablet by mouth daily.   atenolol (TENORMIN) 25 MG tablet   No No   Sig: Take 1 tablet (25 mg) by mouth daily   atorvastatin (LIPITOR) 40 MG tablet   No No   Sig: Take 1 tablet (40 mg) by mouth daily   diazepam (VALIUM) 5 MG tablet   Yes No   Sig: Take 1 tablet (5 mg) by mouth every 6 hours as needed for anxiety   fish oil-omega-3 fatty acids (FISH OIL) 1000 MG capsule   Yes No   Sig: Take 1 g by mouth daily.   lisinopril (PRINIVIL/ZESTRIL) 5 MG tablet   No No   Sig: Take 1 tablet (5 mg) by mouth 2 times daily   penicillin V potassium (VEETID) 500 MG tablet   No No   Sig: Take 1 tablet (500 mg) by mouth 4 times daily for 10 days   spironolactone (ALDACTONE) 25 MG tablet   No No   Sig: Take 0.5 tablets (12.5 mg) by mouth daily   temazepam (RESTORIL) 15 MG capsule   No No   Sig: Take 1 capsule (15 mg) by mouth nightly as needed      Facility-Administered Medications: None     Allergies   No Known Allergies    Social History   I have reviewed this patient's social history and updated it with pertinent information if needed. Oscar Chaudhary  reports that he has quit smoking. His smoking use included Cigarettes. He quit  after 15.00 years of use. He does not have any smokeless tobacco history on file. He reports that he does not drink alcohol or use illicit drugs.    Family History   I have reviewed this patient's family history and updated it with pertinent information if needed.   No family history on file.    Review of Systems   The 10 point Review of Systems is negative other than noted in the HPI or here.     Physical Exam   Temp: 98  F (36.7  C) Temp src: Axillary BP: 117/56 Pulse: 87   Resp: 16 SpO2: 95 % O2 Device: None (Room air)    Vital Signs with Ranges  Temp:  [98  F (36.7  C)] 98  F (36.7  C)  Pulse:  [87] 87  Resp:  [16] 16  BP: (117)/(56) 117/56  SpO2:  [95 %] 95 %  0 lbs 0 oz    GENERAL: Alert and oriented. NAD. Handling secretions at this time.   HEENT: Normocephalic. EOMI. No icterus or injection. Nares normal. Swelling to left side of face and cheek.   LUNGS: Clear to auscultation. No dyspnea at rest.   HEART: Regular rate. Extremities perfused.   ABDOMEN: Soft, nontender, and nondistended. Positive bowel sounds.   EXTREMITIES: No LE edema noted.   NEUROLOGIC: Moves extremities x4 on command. No acute focal neurologic abnormalities noted.     Data   Data reviewed today:  I personally reviewed both laboratory and imaging data.   No lab results found in last 7 days.    No results found for this or any previous visit (from the past 24 hour(s)).

## 2017-02-27 NOTE — IP AVS SNAPSHOT
[address], [address]    [facility] [address]    Phone:  [phone]                                       After Visit Summary   2/27/2017    Oscar Chaudhary    MRN: [id number]           After Visit Summary Signature Page     I have received my discharge instructions, and my questions have been answered. I have discussed any challenges I see with this plan with the nurse or doctor.    ..........................................................................................................................................  Patient/Patient Representative Signature      ..........................................................................................................................................  Patient Representative Print Name and Relationship to Patient    ..................................................               ................................................  Date                                            Time    ..........................................................................................................................................  Reviewed by Signature/Title    ...................................................              ..............................................  Date                                                            Time

## 2017-02-28 ENCOUNTER — APPOINTMENT (OUTPATIENT)
Dept: GENERAL RADIOLOGY | Facility: CLINIC | Age: 82
DRG: 854 | End: 2017-02-28
Attending: INTERNAL MEDICINE
Payer: COMMERCIAL

## 2017-02-28 ENCOUNTER — APPOINTMENT (OUTPATIENT)
Dept: PHYSICAL THERAPY | Facility: CLINIC | Age: 82
DRG: 854 | End: 2017-02-28
Attending: INTERNAL MEDICINE
Payer: COMMERCIAL

## 2017-02-28 LAB
ANION GAP SERPL CALCULATED.3IONS-SCNC: 8 MMOL/L (ref 3–14)
BUN SERPL-MCNC: 10 MG/DL (ref 7–30)
CALCIUM SERPL-MCNC: 7.4 MG/DL (ref 8.5–10.1)
CHLORIDE SERPL-SCNC: 105 MMOL/L (ref 94–109)
CO2 SERPL-SCNC: 24 MMOL/L (ref 20–32)
CREAT SERPL-MCNC: 0.75 MG/DL (ref 0.66–1.25)
ERYTHROCYTE [DISTWIDTH] IN BLOOD BY AUTOMATED COUNT: 15.9 % (ref 10–15)
GFR SERPL CREATININE-BSD FRML MDRD: ABNORMAL ML/MIN/1.7M2
GLUCOSE BLDC GLUCOMTR-MCNC: 108 MG/DL (ref 70–99)
GLUCOSE BLDC GLUCOMTR-MCNC: 80 MG/DL (ref 70–99)
GLUCOSE BLDC GLUCOMTR-MCNC: 83 MG/DL (ref 70–99)
GLUCOSE BLDC GLUCOMTR-MCNC: 95 MG/DL (ref 70–99)
GLUCOSE BLDC GLUCOMTR-MCNC: 95 MG/DL (ref 70–99)
GLUCOSE SERPL-MCNC: 85 MG/DL (ref 70–99)
HCT VFR BLD AUTO: 30.8 % (ref 40–53)
HGB BLD-MCNC: 11 G/DL (ref 13.3–17.7)
MCH RBC QN AUTO: 32.3 PG (ref 26.5–33)
MCHC RBC AUTO-ENTMCNC: 35.7 G/DL (ref 31.5–36.5)
MCV RBC AUTO: 90 FL (ref 78–100)
MRSA DNA SPEC QL NAA+PROBE: NORMAL
PLATELET # BLD AUTO: 159 10E9/L (ref 150–450)
POTASSIUM SERPL-SCNC: 3.8 MMOL/L (ref 3.4–5.3)
RBC # BLD AUTO: 3.41 10E12/L (ref 4.4–5.9)
SODIUM SERPL-SCNC: 137 MMOL/L (ref 133–144)
SPECIMEN SOURCE: NORMAL
WBC # BLD AUTO: 13.2 10E9/L (ref 4–11)

## 2017-02-28 PROCEDURE — 87640 STAPH A DNA AMP PROBE: CPT | Performed by: INTERNAL MEDICINE

## 2017-02-28 PROCEDURE — 25000132 ZZH RX MED GY IP 250 OP 250 PS 637: Performed by: INTERNAL MEDICINE

## 2017-02-28 PROCEDURE — 85027 COMPLETE CBC AUTOMATED: CPT | Performed by: HOSPITALIST

## 2017-02-28 PROCEDURE — 99291 CRITICAL CARE FIRST HOUR: CPT | Performed by: INTERNAL MEDICINE

## 2017-02-28 PROCEDURE — 97162 PT EVAL MOD COMPLEX 30 MIN: CPT | Mod: GP | Performed by: PHYSICAL THERAPIST

## 2017-02-28 PROCEDURE — 80048 BASIC METABOLIC PNL TOTAL CA: CPT | Performed by: HOSPITALIST

## 2017-02-28 PROCEDURE — 27210429 ZZH NUTRITION PRODUCT INTERMEDIATE LITER

## 2017-02-28 PROCEDURE — 25000125 ZZHC RX 250: Performed by: INTERNAL MEDICINE

## 2017-02-28 PROCEDURE — 40000008 ZZH STATISTIC AIRWAY CARE

## 2017-02-28 PROCEDURE — 40000275 ZZH STATISTIC RCP TIME EA 10 MIN

## 2017-02-28 PROCEDURE — 00000146 ZZHCL STATISTIC GLUCOSE BY METER IP

## 2017-02-28 PROCEDURE — 25000125 ZZHC RX 250: Performed by: HOSPITALIST

## 2017-02-28 PROCEDURE — 25000128 H RX IP 250 OP 636: Performed by: INTERNAL MEDICINE

## 2017-02-28 PROCEDURE — S0028 INJECTION, FAMOTIDINE, 20 MG: HCPCS | Performed by: HOSPITALIST

## 2017-02-28 PROCEDURE — 97530 THERAPEUTIC ACTIVITIES: CPT | Mod: GP | Performed by: PHYSICAL THERAPIST

## 2017-02-28 PROCEDURE — 40000193 ZZH STATISTIC PT WARD VISIT: Performed by: PHYSICAL THERAPIST

## 2017-02-28 PROCEDURE — 36415 COLL VENOUS BLD VENIPUNCTURE: CPT | Performed by: HOSPITALIST

## 2017-02-28 PROCEDURE — 94003 VENT MGMT INPAT SUBQ DAY: CPT

## 2017-02-28 PROCEDURE — 87641 MR-STAPH DNA AMP PROBE: CPT | Performed by: INTERNAL MEDICINE

## 2017-02-28 PROCEDURE — 20000003 ZZH R&B ICU

## 2017-02-28 PROCEDURE — 40000940 XR ABDOMEN PORT F1 VW

## 2017-02-28 RX ORDER — METOCLOPRAMIDE HYDROCHLORIDE 5 MG/ML
10 INJECTION INTRAMUSCULAR; INTRAVENOUS ONCE
Status: COMPLETED | OUTPATIENT
Start: 2017-02-28 | End: 2017-02-28

## 2017-02-28 RX ORDER — SODIUM CHLORIDE AND POTASSIUM CHLORIDE 150; 900 MG/100ML; MG/100ML
INJECTION, SOLUTION INTRAVENOUS CONTINUOUS
Status: DISCONTINUED | OUTPATIENT
Start: 2017-02-28 | End: 2017-03-01

## 2017-02-28 RX ORDER — PROPOFOL 10 MG/ML
5-50 INJECTION, EMULSION INTRAVENOUS CONTINUOUS
Status: DISCONTINUED | OUTPATIENT
Start: 2017-02-28 | End: 2017-03-06

## 2017-02-28 RX ADMIN — CHLORHEXIDINE GLUCONATE 15 ML: 1.2 RINSE ORAL at 19:38

## 2017-02-28 RX ADMIN — PIPERACILLIN AND TAZOBACTAM 3.38 G: 3; .375 INJECTION, POWDER, FOR SOLUTION INTRAVENOUS at 08:35

## 2017-02-28 RX ADMIN — PIPERACILLIN AND TAZOBACTAM 3.38 G: 3; .375 INJECTION, POWDER, FOR SOLUTION INTRAVENOUS at 03:57

## 2017-02-28 RX ADMIN — PROPOFOL 40 MCG/KG/MIN: 10 INJECTION, EMULSION INTRAVENOUS at 05:53

## 2017-02-28 RX ADMIN — PIPERACILLIN AND TAZOBACTAM 3.38 G: 3; .375 INJECTION, POWDER, FOR SOLUTION INTRAVENOUS at 14:56

## 2017-02-28 RX ADMIN — METOCLOPRAMIDE 10 MG: 5 INJECTION, SOLUTION INTRAMUSCULAR; INTRAVENOUS at 17:48

## 2017-02-28 RX ADMIN — PROPOFOL 10 MCG/KG/MIN: 10 INJECTION, EMULSION INTRAVENOUS at 18:45

## 2017-02-28 RX ADMIN — CHLORHEXIDINE GLUCONATE 15 ML: 1.2 RINSE ORAL at 11:50

## 2017-02-28 RX ADMIN — POTASSIUM CHLORIDE AND SODIUM CHLORIDE: 900; 150 INJECTION, SOLUTION INTRAVENOUS at 10:19

## 2017-02-28 RX ADMIN — PROPOFOL 20 MCG/KG/MIN: 10 INJECTION, EMULSION INTRAVENOUS at 10:20

## 2017-02-28 RX ADMIN — FAMOTIDINE 20 MG: 10 INJECTION, SOLUTION INTRAVENOUS at 10:48

## 2017-02-28 RX ADMIN — PIPERACILLIN AND TAZOBACTAM 3.38 G: 3; .375 INJECTION, POWDER, FOR SOLUTION INTRAVENOUS at 19:38

## 2017-02-28 RX ADMIN — CHLORHEXIDINE GLUCONATE 15 ML: 1.2 RINSE ORAL at 03:56

## 2017-02-28 NOTE — PLAN OF CARE
Problem: Goal Outcome Summary  Goal: Goal Outcome Summary  PT: Evaluation completed, treatment initiated. 82 yo male POD# 1 I & D of a submandibular abscess intubated for airway protection.  Pt is independent with all functional mobility at baseline, walks and does floor pilates for exercise, plays the piano, is very active. He resides in a house with multiple stairs. Daughter and Son at bedside during evaluation and offer subjective history. Today the pt demonstrates ability to follow 100% of commands, demonstrate antigravity strength with B UEs and LEs, completes sup<>sit with Max Ax2, plus one for vent management. Once upright able to adjust and scoot to the EOB with feet flat. Dangled ~ 5 minutes, stood and completed bedside gait with Min A x2. RN present to complete suctioning as pt with secretions and coughing with movement. At this time, pt may benefit from an Acute Rehab stay at discharge in order to improve activity tolerance, strength balance and independence with mobility. However, once the ET tube is removed he may recover quite quickly.

## 2017-02-28 NOTE — PROGRESS NOTES
Patient is stable intubated  on jelani transport vent due to no air supply in the PACU.ET-tube is pulled back 2 cm per  MD. It is now on 24@lip    Ventilation Mode: SIMV  FiO2 (%): 40 %  Rate Set (breaths/minute): 16 breaths/min  Tidal Volume Set (mL): 500 mL  PEEP (cm H2O): 5 cmH2O  Pressure Support (cm H2O): 0 cmH2O  Oxygen Concentration (%): 40 %  Resp: 16  WT will constitute to monitor   2/28/2017  Yasmany Anderson

## 2017-02-28 NOTE — PROGRESS NOTES
Report from Marcela FATIMA . Cares taken over at 0530. Pt is comfortable see vital signs.   WALKER FATIMA

## 2017-02-28 NOTE — PROGRESS NOTES
[facility]  Infectious Disease Progress Note          Assessment and Plan:   IMPRESSION:   1. A 81-year-old male with coronary artery disease   2. Carcinoma of the prostate.   3. Admitted on this occasion with left facial swelling.  S/p I and D 2/27 with drainage of L submandibular and lateral pharyngeal space abscesses.      RECOMMENDATIONS:   1. Cont  IV Zosyn for broad coverage.   2. Will f/u on surgical cxs.  No growth to date.  3. On vent for airway protection.            Interval History:   I and D yesterday.  Surgical findings noted.  Intubated for airway protection.  Afebrile.  Surgical cxs neg thus far.              Medications:       sodium chloride (PF)  3 mL Intracatheter Q8H     chlorhexidine  15 mL Mouth/Throat Q12H     piperacillin-tazobactam  3.375 g Intravenous Q6H     famotidine  20 mg Intravenous Q12H                  Physical Exam:   Blood pressure 105/50, pulse 91, temperature 97.5  F (36.4  C), temperature source Temporal, resp. rate 16, SpO2 97 %.  [unfilled]  Vital Signs with Ranges  Temp:  [97.1  F (36.2  C)-99.4  F (37.4  C)] 97.5  F (36.4  C)  Pulse:  [87-91] 91  Heart Rate:  [68-90] 68  Resp:  [10-16] 16  BP: ()/() 105/50  FiO2 (%):  [40 %-60 %] 40 %  SpO2:  [95 %-100 %] 97 %    Constitutional: Awake, alert, cooperative, no apparent distress   Lungs: Clear to auscultation bilaterally, no crackles or wheezing   Cardiovascular: Regular rate and rhythm, normal S1 and S2, and no murmur noted   Abdomen: Normal bowel sounds, soft, non-distended, non-tender   Skin: No rashes, no cyanosis, no edema   Other:           Data:   All microbiology laboratory data reviewed.  Recent Labs   Lab Test  02/28/17   0625  02/27/17   1228  06/14/16   0919   WBC  13.2*  20.4*  9.7   HGB  11.0*  13.9  13.1*   HCT  30.8*  38.0*  37.2*   MCV  90  91  91   PLT  159  234  230     Recent Labs   Lab Test  02/28/17   0625  02/27/17   1228  06/14/16   0919   CR  0.75  0.74  0.82     No  lab results found.

## 2017-02-28 NOTE — OP NOTE
DATE OF PROCEDURE:  02/27/2017       ASSISTANT:  Adam Ramos DDS       PREOPERATIVE DIAGNOSES:   1.  Left submandibular space abscess/cellulitis.   2.  Left lateral pharyngeal space cellulitis/abscess.   3.  Cystic lesion, left mandibular body associated with impacted tooth #17.      POSTOPERATIVE DIAGNOSES:     1.  Left submandibular space abscess/cellulitis.   2.  Left lateral pharyngeal space cellulitis/abscess.   3.  Cystic lesion, left mandibular body associated with impacted tooth #17.      PROCEDURES:   1.  Incision and drainage, left submandibular space abscess/cellulitis.   2.  Incision and drainage, left lateral pharyngeal space abscess/cellulitis.   3.  Incisional biopsy, lesion left mandibular body.      ANESTHESIA:  General anesthetic via oral endotracheal tube.      ESTIMATED BLOOD LOSS:  Less than 25 mL.       FLUID RECEIVED:  See anesthesia record.      COMPLICATIONS:  None.      INDICATIONS FOR PROCEDURE:  Oscar Chaudhary is a 81-year-old male who presented to Dr. Adam Ramos this morning for evaluation of left facial swelling, dysphasia, dystonia.  Upon clinical evaluation, he was found to have a left submandibular and left pharyngeal space abscess/cellulitis.  On radiographic evaluation, it was also noted that there was a possible fracture of the left mandibular body secondary to an existing cystic lesion associated with an impacted tooth #17.  Further CT study at [facility] revealed no evidence of mandibular fracture, however, confirmed left submandibular space abscess/cellulitis and left lateral pharyngeal space abscess/cellulitis.  It was decided that the most appropriate treatment would be exploration and biopsy of the lesion, left mandibular body, as well as incision and drainage of the involved spaces.  This was discussed with the patient in detail.  All questions were answered.  Written informed consent was obtained.      DESCRIPTION OF PROCEDURE:  The patient was taken to  [facility] operating room in obvious respiratory distress.  He underwent an awake fiberoptic oral intubation.  The endotracheal tube was secured in the usual fashion.  The patient was then prepped and draped in the standard fashion for oral and maxillofacial procedures.  A moistened throat pack was placed.  Utilizing a #15 blade, a full-thickness mucoperiosteal incision was incised and elevated along the distal buccal aspect of tooth #18 and extending posterior along the anterior aspect of the ascending ramus.  Flap was raised in a subperiosteal plane both on the buccal and lingual aspects of the mandible.  Upon evaluation, it was found that the cystic lesion in the left mandibular body was not the primary source of the infection.  It was decided that the most likely source of the infection was an acute pericoronitis associated with the impacted tooth.  A surgical handpiece was utilized to unroof the cystic lesion.  The cystic lesion was curetted and sent for histopathologic evaluation.  The area was irrigated thoroughly and a gauze dressing was placed.        Next, attention was turned to incision and drainage of the involved spaces.  A #15 blade was utilized to make a skin incision through platysma approximately 2 cm inferior to the inferior border of the left mandible.  Blunt dissection was carried to the inferior border of the mandible and then medially into the submandibular space.  Blunt dissection was then carried up into the lateral pharyngeal space.  Purulent drainage was encountered.  This was sent for Gram stain and culture with sensitivities.  An additional incision was made anterior to the first incision, again approximately 2 cm inferior to the inferior border of the mandible.  Incision was carried into skin and platysma.  Blunt dissection to the inferior border of the mandible and into the submandibular space was achieved.  Quarter-inch Penrose drains were placed into the submandibular  space and lateral pharyngeal space from both sites.  These were secured to the skin utilizing a 3-0 silk suture.  The sites were irrigated thoroughly with normal saline.  The oropharynx was suctioned thoroughly.  There was noted to be significant swelling within the pharynx, and it was decided that the patient would remain intubated and transported to the Surgical Intensive Care Unit for further observation and potential future treatment.  The patient was turned over to Anesthesia for transportation.         ARABELLA REDMAN DDS             D: 2017 20:08   T: 2017 10:22   MT: AMELIA#[id number]      Name:     CARINE DOSHI   MRN:      [id number]        Account:        [id number]   :      1935           Procedure Date: 2017      Document: [id number]

## 2017-02-28 NOTE — CONSULTS
[facility] [facility]  Comprehensive Daily ICU Note        Oscar Chaudhary MRN# [id number]   Age: 81 year old YOB: 1935     Date of Admission: 2017    Primary care provider: Milton Hollins     CODE STATUS: FULL      Problem List:   Left sided facial abscess - s/p i and d  Cystic lesion left mandibular body  Severe oropharyngeal swelling  Low UOP - improved         Treatment goals for next 24 hours:   Keep comfortable but as awake as possible  Spontaneous mode ventilation during the day    Awa Kingsley MD      Subjective/ Last 24 hours:   Events: Patient admitted for treatment of submandibular abscess from clinic.  Sent to OR for I and D.  Per anesthesiologist, his oropharyngeal space was extremely swollen.  Oral and Maxillofacial surgeon feels this swelling will continue for 3 to 4 days so will be intubated for the near future.  This morning looks comfortable and very heavily sedated.         Mechanical Ventilation/Vitalsigns/IsandOs:   Temp: 97.2  F (36.2  C) Temp  Min: 97.1  F (36.2  C)  Max: 99.4  F (37.4  C)  Resp: 15 Resp  Min: 10  Max: 16  SpO2: 97 % SpO2  Min: 95 %  Max: 100 %  Pulse: 91 Pulse  Min: 87  Max: 91  Heart Rate: 68 Heart Rate  Min: 68  Max: 90  BP: 104/56 Systolic (24hrs), Av , Min:91 , Max:135   Diastolic (24hrs), Av, Min:49, Max:124      Intake/Output Summary (Last 24 hours) at 17 0944  Last data filed at 17 0800   Gross per 24 hour   Intake          5544.41 ml   Output              870 ml   Net          4674.41 ml   Net for hospital stay: +1.3 L    Ventilation Mode: CMV/AC  FiO2 (%): 40 %  Rate Set (breaths/minute): 16 breaths/min  Tidal Volume Set (mL): 500 mL  PEEP (cm H2O): 5 cmH2O  Pressure Support (cm H2O): 0 cmH2O  Oxygen Concentration (%): 40 %  Resp: 15    Day since     Peak airway pressure: 20s  Auto-PEEP:  none         Physical Examination:     General: Stated age  HEENT: ET tube, bandage under chin with some serosanguinous  discharge, drains in place  Lungs: LCTAB  CVS: sternotomy scar  Abdomen: +BS, soft and non tender  Extremities/musculoskeletal: no edema  Neurology: sedated and unresponsive to voice  Skin: no abnormalities  Psychiatry: unable  Exam of Line sites:  No lines       Hospital Procedures   Maxillofacial CT scan  I and D left sided facial abscess  Intubation and mechanical ventilation  XRAYs         Feeding/Glucose:   IVF    Blood glucose/Insulin requirement last 24 hours: none         Medications:       sodium chloride (PF)  3 mL Intracatheter Q8H     chlorhexidine  15 mL Mouth/Throat Q12H     piperacillin-tazobactam  3.375 g Intravenous Q6H     famotidine  20 mg Intravenous Q12H        NaCl 100 mL/hr at 02/28/17 0817     fentaNYL 25 mcg/hr (02/28/17 0818)     propofol (DIPRIVAN) infusion 25 mcg/kg/min (02/28/17 0931)              Labs/Diagnostic studies:     EKG:  sinus    Echo:  Mildly reduced EF in 2014    ROUTINE ICU LABS (Last four results)  CMP    Recent Labs  Lab 02/28/17  0625 02/27/17  1228    129*   POTASSIUM 3.8 4.0   CHLORIDE 105 96   CO2 24 25   ANIONGAP 8 8   GLC 85 104*   BUN 10 13   CR 0.75 0.74   GFRESTIMATED >90Non  GFR Calc >90Non  GFR Calc   GFRESTBLACK >90African American GFR Calc >90African American GFR Calc   MICKIE 7.4* 8.9   PROTTOTAL  --  7.4   ALBUMIN  --  3.5   BILITOTAL  --  1.1   ALKPHOS  --  72   AST  --  21   ALT  --  24     CBC    Recent Labs  Lab 02/28/17  0625 02/27/17  1228   WBC 13.2* 20.4*   RBC 3.41* 4.19*   HGB 11.0* 13.9   HCT 30.8* 38.0*   MCV 90 91   MCH 32.3 33.2*   MCHC 35.7 36.6*   RDW 15.9* 15.7*    234     INRNo lab results found in last 7 days.  Arterial Blood Gas    Recent Labs  Lab 02/27/17 2117   PH 7.36   PCO2 40   PO2 82   HCO3 22       Other Lab Data:  Lactic acid: 1.5    Cultures:    Recent Labs  Lab 02/27/17 2000 02/27/17  1827 02/27/17  1814 02/27/17  1238 02/27/17  1228   CULT Canceled, Test credited Duplicate request   Canceled, Test creditedDuplicate request Canceled, Test credited Test reordered as correct code REORDERED AS AN ABSCESS CULTURE Canceled, Test credited Test reordered as correct code REORDERED AS AN ABSCESS CULTURE No growth after 17 hours No growth after 17 hours     Blood culture:  Invalid input(s): BC   Urine culture:  No results for input(s): URC in the last 168 hours.            Imaging:     CXR:  pending    CT:  Left sided abscess as noted         Assessment and Plan:     Summary:  Oscar Chaudhary is a 81 year old male admitted on 2/27/2017 for facial abscess and swelling.  I have personally reviewed the daily labs, imaging studies, cultures and discussed the case with referring physician and consulting physicians. My assessment and plan as follows:    Neurology and Psychiatry:  Sedated post surgery.  Expect he will have pain but is very heavily sedated right now  - Keep as awake as possible   Change Fentanyl infusion to 25 mcg/hour plus bumps.     Propofol infusion.  Goal RASS -1.      Pulmonary:   Intubated for airway protection.  63 inches tall.  Airway pressures good and pH normal on current settings.   - Pressure support during day as much as tolerated.    Cardiovascular system:   Known CAD with mildly reduced EF on TTE 3 years ago.  Looks to be euvolemic to dry right now.   - Hold BP meds for now. Hold ASA.  Resume when able    Renal/Electrolytes:  Low UOP improved..    - Maintenance IVF until on tube feeds.      ID:  Abscess. WBC much improved from yesterday. ID following along.  No culture results as of yet.   - Piperacillin/Tazobactam while awaiting cultures    GI/:  No acute issues  - Feeding tube placement.  RNs will check with oral surgeons first to see if this is OK.    Nutrition:   Tube feeds tomorrow    Musculoskeletal/Rheumatology:  No acute issues but at high risk of becoming deconditioned.   - PT, up in chair    Endocrine:   No acute issues  - Check glucoses x 48 hours    Heme/Onc:  No  acute issues    ICU prophylaxis:      DVT: mechanical for now     VAP: As above     Stress ulcer: Ranitidine     Restraints needed: n     Lines   Central Line/PICC - placed n needed: n   Arterial line - placed n needed: n   Zuniga catheter.  Needed: n       Prognosis:    guarded      Family update: by surgeons    Billing: total time spend providing critical care was 40 min, excluding procedure time.    Awa Kingsley MD  [phone]

## 2017-02-28 NOTE — PROVIDER NOTIFICATION
Called to surgeon; per  it is okay for nursing to do Peridex oral cares gently with soft swabs; if a feeding tube is indicated nursing may attempt placement of a nasoenteric tube but to be aware that the swelling may make this difficult/impossible at this time.

## 2017-02-28 NOTE — PROGRESS NOTES
02/28/17 1726   Quick Adds   Type of Visit Initial PT Evaluation   Living Environment   Living Arrangements house   Home Accessibility stairs within home;stairs to enter home   Transportation Available car   Self-Care   Usual Activity Tolerance excellent   Current Activity Tolerance moderate   Regular Exercise yes   Activity/Exercise Type walking  ([place]ates)   Equipment Currently Used at Home none   Functional Level Prior   Ambulation 0-->independent   Transferring 0-->independent   Toileting 0-->independent   Bathing 0-->independent   Dressing 0-->independent   Eating 0-->independent   Communication 0-->understands/communicates without difficulty   Swallowing 0-->swallows foods/liquids without difficulty   Cognition 0 - no cognition issues reported   Fall history within last six months no   Which of the above functional risks had a recent onset or change? eating;swallowing;dressing;bathing;transferring;ambulation  (functional activity tolerance)   General Information   Onset of Illness/Injury or Date of Surgery - Date 02/27/17   Referring Physician Awa Kingsley MD   Patient/Family Goals Statement to get home   Pertinent History of Current Problem (include personal factors and/or comorbidities that impact the POC) 82 yo male POD# 1 I & D of a submandibular abscess intubated for airway protection. PMH includes open heart surgery-per family   Precautions/Limitations fall precautions;oxygen therapy device and L/min  (Vented via ETT)   General Observations Supine in bed, agreeable to OOB activity trial.    Cognitive Status Examination   Orientation orientation to person, place and time   Level of Consciousness alert   Follows Commands and Answers Questions 100% of the time;able to follow multistep instructions   Personal Safety and Judgment intact   Cognitive Comment Unable to assess, no obvious impairment   Integumentary/Edema   Integumentary/Edema Comments L sided face and neck swelling, gauze/bandage  "around entire neck. ETT is very thin and small-required fiberoptic guidance per RN.   Posture    Posture Forward head position;Protracted shoulders   Range of Motion (ROM)   ROM Comment B LEs and UEs WFL, stiffenss in the trunk and shoulders noted with general movement, ? possible guarding secondary to presence of ETT   Strength   Strength Comments Demonstrates antigravity strength.   Bed Mobility   Bed Mobility Comments Sup>long sit able to do with Min A, sup>sit A x 3   Transfer Skills   Transfer Comments Sit<>Stand Min A x2   Gait   Gait Comments Bedside gait Min A x 2, L knee buckled x1 with Min A to recover.    Balance   Balance Comments Sitting balance good, able to support self without LOB, preference for forward lean-elbows on knees    Sensory Examination   Sensory Perception Comments Denies n/t   General Therapy Interventions   Planned Therapy Interventions balance training;bed mobility training;gait training;neuromuscular re-education;ROM;strengthening;stretching;transfer training;progressive activity/exercise   Clinical Impression   Criteria for Skilled Therapeutic Intervention yes, treatment indicated   PT Diagnosis Impaired functional activity tolerance   Influenced by the following impairments Faituge, breathing assist(vent), generalized weakness and impaired standing balance   Functional limitations due to impairments Decreased functional independence   Clinical Presentation Evolving/Changing   Clinical Presentation Rationale PLOF, current level of function, close medical management and requirement of the vent   Clinical Decision Making (Complexity) Moderate complexity   Therapy Frequency` daily   Predicted Duration of Therapy Intervention (days/wks) 1 week   Anticipated Discharge Disposition Acute Rehabilitation Facility   Risk & Benefits of therapy have been explained Yes   Patient, Family & other staff in agreement with plan of care Yes   [facility] AM-PAC TM \"6 Clicks\"   2016, Trustees of " "[facility], under license to [employer].  All rights reserved.   6 Clicks Short Forms Basic Mobility Inpatient Short Form   [facility] AM-PAC  \"6 Clicks\" V.2 Basic Mobility Inpatient Short Form   1. Turning from your back to your side while in a flat bed without using bedrails? 2 - A Lot   2. Moving from lying on your back to sitting on the side of a flat bed without using bedrails? 2 - A Lot   3. Moving to and from a bed to a chair (including a wheelchair)? 3 - A Little   4. Standing up from a chair using your arms (e.g., wheelchair, or bedside chair)? 3 - A Little   5. To walk in hospital room? 2 - A Lot   6. Climbing 3-5 steps with a railing? 2 - A Lot   Basic Mobility Raw Score (Score out of 24.Lower scores equate to lower levels of function) 14   Total Evaluation Time   Total Evaluation Time (Minutes) 10     "

## 2017-02-28 NOTE — BRIEF OP NOTE
[facility] Brief Operative Note    Pre-operative diagnosis: Left Submandibular, Left Lateral Pharyngeal Space Abscess/ Cellulitis;  Cystic lesion Left Mandibular body   Post-operative diagnosis same   Procedure: Incision and drainage left submandibular/Lateral pharyngeal space abcess/cellulitis, Incisional biopsy lesion left mandibular body.   Surgeon(s): Surgeon(s) and Role:     * Adam Ramos DDS - Primary     * Placido Escalante MD - Assisting   Estimated blood loss: 25cc   Specimens:   ID Type Source Tests Collected by Time Destination   A : LEFT SUBMANDIBULAR SPACE ABCESS Fluid Mandible ANAEROBIC BACTERIAL CULTURE, FLUID CULTURE AEROBIC BACTERIAL, CYTOLOGY NON GYN Adam Ramos DDS 2/27/2017  6:14 PM    B : LEFT SUBMANDIBULAR SPACE ABCESS # 2 Fluid Mandible ANAEROBIC BACTERIAL CULTURE, FLUID CULTURE AEROBIC BACTERIAL, CYTOLOGY NON GYN Adam Ramos DDS 2/27/2017  6:27 PM    C : CYST ON THE LEFT MANDIBLE Tissue Mandible SURGICAL PATHOLOGY EXAM Adam Ramos DDS 2/27/2017  6:55 PM       Findings: See dictation

## 2017-02-28 NOTE — ANESTHESIA CARE TRANSFER NOTE
Patient: Oscar Chaudhary    Procedure(s):  INCISIONAL BIOPSY AND DRAINAGE LEFT SUBMANDIBULAR ABCESS, OPEN DEBRIDEMENT, OPEN REDUCTION INTERNAL REDUCTION FIXATION OF LEFT MANDIBLE - Wound Class: II-Clean Contaminated   - Wound Class: II-Clean Contaminated    Diagnosis: Unnkown  Diagnosis Additional Information: No value filed.    Anesthesia Type:   General, ETT, Awake Technique     Note:  Airway :ETT  Patient transferred to:PACU        Vitals: (Last set prior to Anesthesia Care Transfer)    CRNA VITALS  2/27/2017 1903 - 2/27/2017 1944      2/27/2017             NIBP: (!)  87/50    Pulse: 91    NIBP Mean: 61    SpO2: 99 %    Resp Rate (observed): 22                Electronically Signed By: YIFAN Olivares CRNA  February 27, 2017  7:44 PM

## 2017-02-28 NOTE — ANESTHESIA POSTPROCEDURE EVALUATION
Patient: Oscar Chaudhary    Procedure(s):  INCISIONAL BIOPSY AND DRAINAGE LEFT SUBMANDIBULAR ABCESS, OPEN DEBRIDEMENT, OPEN REDUCTION INTERNAL REDUCTION FIXATION OF LEFT MANDIBLE - Wound Class: II-Clean Contaminated   - Wound Class: II-Clean Contaminated    Diagnosis:Unnkown  Diagnosis Additional Information: No value filed.    Anesthesia Type:  General, ETT, Awake Technique    Note:  Anesthesia Post Evaluation    Patient location during evaluation: PACU and Bedside  Patient participation: Unable to participate in evaluation secondary to underlying medical condition  Post-procedure mental status: asleep.  Pain management: unable to assess  Airway patency: patent  Cardiovascular status: acceptable  Respiratory status: acceptable and intubated  Hydration status: acceptable  PONV: unable to assess             Last vitals:  Vitals:    02/27/17 1500 02/27/17 1634 02/1935   BP: 135/68 (!) 133/124    Pulse: 91     Resp: 16     Temp: 37.4  C (99.4  F) 36.2  C (97.1  F) 36.3  C (97.4  F)   SpO2: 95% 96%          Electronically Signed By: Danica Nuñez  February 27, 2017  8:22 PM

## 2017-02-28 NOTE — CONSULTS
[facility]  Comprehensive Daily ICU Note        Oscar Chaudhary MRN# [id number]   Age: 81 year old YOB: 1935     Date of Admission: 2017    Primary care provider: Milton Hollins     CODE STATUS: FULL      Problem List:   Left sided facial abscess - s/p i and d  Cystic lesion left mandibular body  Severe oropharyngeal swelling         Treatment goals for next 24 hours:   Keep comfortable  Lung protective ventilation    Awa Kingsley MD        Subjective/ Last 24 hours:   Events: Patient admitted for treatment of submandibular abscess from clinic.  Sent to OR for I and D.  Per anesthesiologist, his oropharyngeal space was extremely swollen.  Oral and Maxillofacial surgeon feels this swelling will continue for 3 to 4 days so will be intubated for the near future.  Patient sedated and unable to provide history.   Past Medical History   Diagnosis Date     AAA (abdominal aortic aneurysm) (H) 2012     CAD (coronary artery disease) 2012     h/o myocardial infarction 1976 2015     History of prostate cancer 2001 10/7/2013     S/P CABG (coronary artery bypass graft) 2001     S/P CABG (coronary artery bypass graft); 01     Social History   Substance Use Topics     Smoking status: Former Smoker     Years: 15.00     Types: Cigarettes     Smokeless tobacco: Not on file      Comment: quit in the 70's/ 1-2 packs per day     Alcohol use No     Family History: non contributor  ROS: unable           Mechanical Ventilation/Vitalsigns/IsandOs:   Temp: 97.4  F (36.3  C) Temp  Min: 97.1  F (36.2  C)  Max: 99.4  F (37.4  C)  Resp: 16 Resp  Min: 16  Max: 16  SpO2: 96 % SpO2  Min: 95 %  Max: 100 %  Pulse: 91 Pulse  Min: 87  Max: 91  Heart Rate: 90 Heart Rate  Min: 90  Max: 90  BP: (!) 133/124   Systolic (24hrs), Av , Min:117 , Max:135   Diastolic (24hrs), Av, Min:56, Max:124      Intake/Output Summary (Last 24 hours) at 17  Last data filed at  02/27/17 1938   Gross per 24 hour   Intake             1500 ml   Output              200 ml   Net             1300 ml   Net for hospital stay: +1.3 L    Ventilation Mode: CMV/AC  FiO2 (%): 40 %  Rate Set (breaths/minute): 12 breaths/min  Tidal Volume Set (mL): 650 mL  PEEP (cm H2O): 5 cmH2O  Oxygen Concentration (%): 60 %  Resp: 16    Day since 2/27    Peak airway pressure: 20s  Auto-PEEP:  none         Physical Examination:     General: Stated age  HEENT: ET tube, drains  Lungs: LCTAB  CVS: sternotomy scar  Abdomen: +BS, soft and non tender  Extremities/musculoskeletal: no edema  Neurology: sedated  Skin: no abnormalities  Psychiatry: unable  Exam of Line sites:  No lines       Hospital Procedures   Maxillofacial CT scan  I and D left sided facial abscess  Intubation and mechanical ventilation         Feeding/Glucose:   IVF    Blood glucose/Insulin requirement last 24 hours: none         Medications:       [Auto Hold] sodium chloride (PF)  3 mL Intracatheter Q8H     [Auto Hold] ertapenem (INVanz) IV  1 g Intravenous Q24H     sodium chloride 0.9%  1,000 mL Intravenous Once     chlorhexidine  15 mL Mouth/Throat Q12H             Labs/Diagnostic studies:     EKG:  sinus    Echo:  Mildly reduced EF in 2014    ROUTINE ICU LABS (Last four results)  CMP  Recent Labs  Lab 02/27/17  1228   *   POTASSIUM 4.0   CHLORIDE 96   CO2 25   ANIONGAP 8   *   BUN 13   CR 0.74   GFRESTIMATED >90Non  GFR Calc   GFRESTBLACK >90African American GFR Calc   MICKIE 8.9   PROTTOTAL 7.4   ALBUMIN 3.5   BILITOTAL 1.1   ALKPHOS 72   AST 21   ALT 24     CBC  Recent Labs  Lab 02/27/17  1228   WBC 20.4*   RBC 4.19*   HGB 13.9   HCT 38.0*   MCV 91   MCH 33.2*   MCHC 36.6*   RDW 15.7*        INRNo lab results found in last 7 days.  Arterial Blood GasNo lab results found in last 7 days.    Other Lab Data:  Lactic acid: 1.5    Cultures:    Recent Labs  Lab 02/27/17  1238 02/27/17  1228   CULT Pending Pending     Blood  culture:  Invalid input(s): BC   Urine culture:  No results for input(s): URC in the last 168 hours.            Imaging:     CXR:  pending    CT:  Left sided abscess as noted         Assessment and Plan:     Summary:  Oscar Chaudhary is a 81 year old male admitted on 2/27/2017 for facial abscess and swelling.  I have personally reviewed the daily labs, imaging studies, cultures and discussed the case with referring physician and consulting physicians. My assessment and plan as follows:    Neurology and Psychiatry:  Sedated post surgery.  Expect he will have a lot of pain  - Fentanyl infusion with bumps as needed  - Propofol infusion. Goal RAAS -2    Pulmonary:   Intubated for airway protection.  63 inches tall  - Adjust ventilator to account for IBW.  Check ABG and adjust ventilator as necessary.     Cardiovascular system:   Known CAD with mildly reduced EF on TTE 3 years ago.  Looks to be euvolemic to dry right now.   - Hold BP meds for now. Hold ASA.  Resume when able    Renal/Electrolytes:  Low UOP.    - Bolus IVF and maintenance.      ID:  Abscess.  High WBC but not currently febrile and doesn't appear toxic.  - Piperacillin/Tazobactam  - Culture and broaden if necessary    GI/:  No acute issues  - Feeding tube tomorrow    Nutrition:   Tube feeds tomorrow    Musculoskeletal/Rheumatology:  No acute issues  - PT if able while on ventilator    Endocrine:   No acute issues  - Check glucoses x 48 hours    Heme/Onc:  No acute issues    ICU prophylaxis:      DVT: mechanical for now     VAP: As above     Stress ulcer: Ranitidine     Restraints needed: n     Lines   Central Line/PICC - placed n needed: n   Arterial line - placed n needed: n   Zuniga catheter.  Needed: n       Prognosis:    guarded      Family update: by surgeons    Billing: total time spend providing critical care was 50 min, excluding procedure time.    Awa Kingsley MD  [phone]

## 2017-02-28 NOTE — CONSULTS
"CLINICAL NUTRITION SERVICES  -  ASSESSMENT NOTE      Recommendations Ordered by Registered Dietitian (RD): Promote with Fiber at 60 mL/hr   Malnutrition: Patient does not meet two of the above criteria necessary for diagnosing malnutrition        REASON FOR ASSESSMENT  Oscar Chaudhary is a 81 year old male seen by Registered Dietitian for Provider Order - Registered Dietitian to Assess and Order TF per Medical Nutrition protocol  Screen - Reduced oral intake over the last month        NUTRITION HISTORY  - Information obtained from family member (Rojas).  - He states that patient has been eating well on a regular diet up until about two days prior to admit (when he developed the abscess).  No other nutritional concerns prior to admit.  No supplements prior to admit.   Patient was awake and alert during interview (despite being intubated) and was able to nod \"yes\" to above statements indicating confirmation/agreement.      CURRENT NUTRITION ORDERS  Diet Order:     NPO     Current Intake/Tolerance:  N/A      PHYSICAL FINDINGS  Observed  Severe facial swelling   Obtained from Chart/Interdisciplinary Team  None noted    ANTHROPOMETRICS  Height: 5'3\"  Weight: 62.6 kg or 138#(2/26)  BMI:  24.45 kg/m^2  Weight Status:  Normal BMI  IBW: 56.4 kg   % IBW: 111%  Weight History: Patient indicated that weight has been stable     LABS  Labs reviewed    MEDICATIONS  Propofol at 3.8 mL/hr= 100 kcal   IVF at 75 mL/hr     Dosing Weight 62.6 kg     ASSESSED NUTRITION NEEDS:  Estimated Energy Needs: 4960-1650 kcals (25-30 Kcal/Kg)  Justification: maintenance  Estimated Protein Needs: 75-95 grams protein (1.2-1.5 g pro/Kg)  Justification: post-op and hypercatabolism with acute illness  Estimated Fluid Needs: 3974-3241 mL (1 mL/Kcal)  Justification: maintenance    MALNUTRITION:  % Weight Loss:  None noted  % Intake:  Decreased intake does not meet criteria for malnutrition  Subcutaneous Fat Loss:  None observed  Muscle Loss:  None " observed  Fluid Retention:  None noted    Malnutrition Diagnosis: Patient does not meet two of the above criteria necessary for diagnosing malnutrition    NUTRITION DIAGNOSIS:  Inadequate protein-energy intake related to NPO, TF to start today as evidenced by meeting 0% protein and 6% energy needs via Propofol       NUTRITION INTERVENTIONS  Recommendations / Nutrition Prescription  Promote with Fiber at 60 mL/hr to provide:  1440 kcal (23 kcal per kg), 91 g protein (1.4 g per kg), 199 g CHO, 20 g fiber, 1195 mL H2O  Flushes 60 mL every 4 hours   Total with Propofol = 1540 kcal (25 kcal per kg)  .      Implementation  Nutrition education: Not appropriate at this time due to patient condition  EN Composition, EN Schedule, Feeding Tube Flush - TF orders entered into EPIC --> Begin Promote with Fiber at 20 mL/hr and increase every 8 hours by 20 mL to goal.  Flushes as above.   Collaboration and Referral of Nutrition care - Patient discussed today during interdisciplinary bedside rounds.  .      Nutrition Goals  Promote with Fiber at 60 mL/hr + Propofol will meet % needs  .      MONITORING AND EVALUATION:  Progress towards goals will be monitored and evaluated per protocol and Practice Guidelines    Chandrika Thibodeaux RD, LD, CNSC   Clinical Dietitian - [facility]

## 2017-02-28 NOTE — PROGRESS NOTES
Xcover,    Post surgery, px now intubated and sedated.  Likely duration of intubation will be more than 24 hours.  Intensivist aware who will take over care.  Will then sign off.  Will take back over care when extubated.      David Delatorre MD  [facility]   Pager: [phone]

## 2017-02-28 NOTE — OR NURSING
ABG result from 2117 after vent changes by Dr. Bassett reported to tele ICU RN and UOP of 280cc since going in to surgery at 1719.  Received at 1L bolus of 0.9NS at 2100.  UOP 75cc total after bolus.

## 2017-03-01 ENCOUNTER — APPOINTMENT (OUTPATIENT)
Dept: PHYSICAL THERAPY | Facility: CLINIC | Age: 82
DRG: 854 | End: 2017-03-01
Attending: INTERNAL MEDICINE
Payer: COMMERCIAL

## 2017-03-01 LAB
ANION GAP SERPL CALCULATED.3IONS-SCNC: 9 MMOL/L (ref 3–14)
BUN SERPL-MCNC: 11 MG/DL (ref 7–30)
CALCIUM SERPL-MCNC: 7.4 MG/DL (ref 8.5–10.1)
CHLORIDE SERPL-SCNC: 108 MMOL/L (ref 94–109)
CO2 SERPL-SCNC: 23 MMOL/L (ref 20–32)
COPATH REPORT: NORMAL
CREAT SERPL-MCNC: 0.73 MG/DL (ref 0.66–1.25)
ERYTHROCYTE [DISTWIDTH] IN BLOOD BY AUTOMATED COUNT: 15.8 % (ref 10–15)
GFR SERPL CREATININE-BSD FRML MDRD: ABNORMAL ML/MIN/1.7M2
GLUCOSE BLDC GLUCOMTR-MCNC: 118 MG/DL (ref 70–99)
GLUCOSE BLDC GLUCOMTR-MCNC: 132 MG/DL (ref 70–99)
GLUCOSE BLDC GLUCOMTR-MCNC: 149 MG/DL (ref 70–99)
GLUCOSE BLDC GLUCOMTR-MCNC: 149 MG/DL (ref 70–99)
GLUCOSE BLDC GLUCOMTR-MCNC: 153 MG/DL (ref 70–99)
GLUCOSE BLDC GLUCOMTR-MCNC: 156 MG/DL (ref 70–99)
GLUCOSE SERPL-MCNC: 114 MG/DL (ref 70–99)
HBA1C MFR BLD: 5.2 % (ref 4.3–6)
HCT VFR BLD AUTO: 30.4 % (ref 40–53)
HGB BLD-MCNC: 10.9 G/DL (ref 13.3–17.7)
INTERPRETATION ECG - MUSE: NORMAL
MAGNESIUM SERPL-MCNC: 2.3 MG/DL (ref 1.6–2.3)
MCH RBC QN AUTO: 32.4 PG (ref 26.5–33)
MCHC RBC AUTO-ENTMCNC: 35.9 G/DL (ref 31.5–36.5)
MCV RBC AUTO: 91 FL (ref 78–100)
PHOSPHATE SERPL-MCNC: 2.1 MG/DL (ref 2.5–4.5)
PLATELET # BLD AUTO: 179 10E9/L (ref 150–450)
POTASSIUM SERPL-SCNC: 3.8 MMOL/L (ref 3.4–5.3)
RBC # BLD AUTO: 3.36 10E12/L (ref 4.4–5.9)
SODIUM SERPL-SCNC: 140 MMOL/L (ref 133–144)
WBC # BLD AUTO: 9.9 10E9/L (ref 4–11)

## 2017-03-01 PROCEDURE — 36415 COLL VENOUS BLD VENIPUNCTURE: CPT | Performed by: INTERNAL MEDICINE

## 2017-03-01 PROCEDURE — 83735 ASSAY OF MAGNESIUM: CPT | Performed by: INTERNAL MEDICINE

## 2017-03-01 PROCEDURE — 97110 THERAPEUTIC EXERCISES: CPT | Mod: GP | Performed by: PHYSICAL THERAPIST

## 2017-03-01 PROCEDURE — 99291 CRITICAL CARE FIRST HOUR: CPT | Performed by: INTERNAL MEDICINE

## 2017-03-01 PROCEDURE — 83036 HEMOGLOBIN GLYCOSYLATED A1C: CPT | Performed by: INTERNAL MEDICINE

## 2017-03-01 PROCEDURE — 97116 GAIT TRAINING THERAPY: CPT | Mod: GP | Performed by: PHYSICAL THERAPIST

## 2017-03-01 PROCEDURE — 25000132 ZZH RX MED GY IP 250 OP 250 PS 637: Performed by: INTERNAL MEDICINE

## 2017-03-01 PROCEDURE — S0028 INJECTION, FAMOTIDINE, 20 MG: HCPCS | Performed by: HOSPITALIST

## 2017-03-01 PROCEDURE — 25000125 ZZHC RX 250: Performed by: HOSPITALIST

## 2017-03-01 PROCEDURE — 97530 THERAPEUTIC ACTIVITIES: CPT | Mod: GP | Performed by: PHYSICAL THERAPIST

## 2017-03-01 PROCEDURE — 27210429 ZZH NUTRITION PRODUCT INTERMEDIATE LITER

## 2017-03-01 PROCEDURE — 40000275 ZZH STATISTIC RCP TIME EA 10 MIN

## 2017-03-01 PROCEDURE — 40000193 ZZH STATISTIC PT WARD VISIT: Performed by: PHYSICAL THERAPIST

## 2017-03-01 PROCEDURE — 25000125 ZZHC RX 250: Performed by: INTERNAL MEDICINE

## 2017-03-01 PROCEDURE — 25000132 ZZH RX MED GY IP 250 OP 250 PS 637: Performed by: HOSPITALIST

## 2017-03-01 PROCEDURE — 20000003 ZZH R&B ICU

## 2017-03-01 PROCEDURE — 00000146 ZZHCL STATISTIC GLUCOSE BY METER IP

## 2017-03-01 PROCEDURE — 94003 VENT MGMT INPAT SUBQ DAY: CPT

## 2017-03-01 PROCEDURE — 25000128 H RX IP 250 OP 636: Performed by: INTERNAL MEDICINE

## 2017-03-01 PROCEDURE — 80048 BASIC METABOLIC PNL TOTAL CA: CPT | Performed by: INTERNAL MEDICINE

## 2017-03-01 PROCEDURE — 85027 COMPLETE CBC AUTOMATED: CPT | Performed by: INTERNAL MEDICINE

## 2017-03-01 PROCEDURE — 84100 ASSAY OF PHOSPHORUS: CPT | Performed by: INTERNAL MEDICINE

## 2017-03-01 PROCEDURE — 40000008 ZZH STATISTIC AIRWAY CARE

## 2017-03-01 RX ORDER — CARVEDILOL 3.12 MG/1
3.12 TABLET ORAL 2 TIMES DAILY WITH MEALS
Status: DISCONTINUED | OUTPATIENT
Start: 2017-03-01 | End: 2017-03-10 | Stop reason: HOSPADM

## 2017-03-01 RX ORDER — NICOTINE POLACRILEX 4 MG
15-30 LOZENGE BUCCAL
Status: DISCONTINUED | OUTPATIENT
Start: 2017-03-01 | End: 2017-03-10 | Stop reason: HOSPADM

## 2017-03-01 RX ORDER — LISINOPRIL 2.5 MG/1
2.5 TABLET ORAL DAILY
Status: DISCONTINUED | OUTPATIENT
Start: 2017-03-01 | End: 2017-03-10 | Stop reason: HOSPADM

## 2017-03-01 RX ORDER — HEPARIN SODIUM 5000 [USP'U]/.5ML
5000 INJECTION, SOLUTION INTRAVENOUS; SUBCUTANEOUS EVERY 8 HOURS
Status: DISCONTINUED | OUTPATIENT
Start: 2017-03-01 | End: 2017-03-10

## 2017-03-01 RX ORDER — FUROSEMIDE 10 MG/ML
10 INJECTION INTRAMUSCULAR; INTRAVENOUS ONCE
Status: COMPLETED | OUTPATIENT
Start: 2017-03-01 | End: 2017-03-01

## 2017-03-01 RX ORDER — DEXTROSE MONOHYDRATE 25 G/50ML
25-50 INJECTION, SOLUTION INTRAVENOUS
Status: DISCONTINUED | OUTPATIENT
Start: 2017-03-01 | End: 2017-03-10 | Stop reason: HOSPADM

## 2017-03-01 RX ADMIN — CHLORHEXIDINE GLUCONATE 15 ML: 1.2 RINSE ORAL at 20:07

## 2017-03-01 RX ADMIN — HEPARIN SODIUM 5000 UNITS: 10000 INJECTION, SOLUTION INTRAVENOUS; SUBCUTANEOUS at 18:44

## 2017-03-01 RX ADMIN — LISINOPRIL 2.5 MG: 2.5 TABLET ORAL at 16:43

## 2017-03-01 RX ADMIN — PIPERACILLIN AND TAZOBACTAM 3.38 G: 3; .375 INJECTION, POWDER, FOR SOLUTION INTRAVENOUS at 14:20

## 2017-03-01 RX ADMIN — CHLORHEXIDINE GLUCONATE 15 ML: 1.2 RINSE ORAL at 08:00

## 2017-03-01 RX ADMIN — FENTANYL CITRATE 25 MCG/HR: 50 INJECTION, SOLUTION INTRAMUSCULAR; INTRAVENOUS at 13:03

## 2017-03-01 RX ADMIN — PIPERACILLIN AND TAZOBACTAM 3.38 G: 3; .375 INJECTION, POWDER, FOR SOLUTION INTRAVENOUS at 08:16

## 2017-03-01 RX ADMIN — RANITIDINE HYDROCHLORIDE 150 MG: 150 SOLUTION ORAL at 11:39

## 2017-03-01 RX ADMIN — PROPOFOL 10 MCG/KG/MIN: 10 INJECTION, EMULSION INTRAVENOUS at 07:07

## 2017-03-01 RX ADMIN — POTASSIUM CHLORIDE AND SODIUM CHLORIDE: 900; 150 INJECTION, SOLUTION INTRAVENOUS at 02:34

## 2017-03-01 RX ADMIN — PIPERACILLIN AND TAZOBACTAM 3.38 G: 3; .375 INJECTION, POWDER, FOR SOLUTION INTRAVENOUS at 02:34

## 2017-03-01 RX ADMIN — FAMOTIDINE 20 MG: 10 INJECTION, SOLUTION INTRAVENOUS at 00:30

## 2017-03-01 RX ADMIN — FUROSEMIDE 10 MG: 10 INJECTION, SOLUTION INTRAVENOUS at 16:43

## 2017-03-01 RX ADMIN — RANITIDINE HYDROCHLORIDE 150 MG: 150 SOLUTION ORAL at 20:07

## 2017-03-01 RX ADMIN — PIPERACILLIN AND TAZOBACTAM 3.38 G: 3; .375 INJECTION, POWDER, FOR SOLUTION INTRAVENOUS at 20:07

## 2017-03-01 RX ADMIN — CARVEDILOL 3.12 MG: 3.12 TABLET, FILM COATED ORAL at 18:43

## 2017-03-01 RX ADMIN — PROPOFOL 10 MCG/KG/MIN: 10 INJECTION, EMULSION INTRAVENOUS at 19:00

## 2017-03-01 NOTE — PROGRESS NOTES
[facility]  Comprehensive Daily ICU Note        Oscar Chaudhary MRN# [id number]   Age: 81 year old YOB: 1935     Date of Admission: 2017    Primary care provider: Milton Hollins     CODE STATUS: FULL      Problem List:   Left sided facial abscess - s/p i and d  Cystic lesion left mandibular body  Severe oropharyngeal swelling  Low UOP - improved  Hyperglycemia         Treatment goals for next 24 hours:   Keep comfortable but as awake as possible  Spontaneous mode ventilation during the day.  Trial of extubation when OK'd by orgal surgeons    Awa Kingsley MD      Subjective/ Last 24 hours:   Events: Patient admitted for treatment of submandibular abscess from clinic.  Sent to OR for I and D.  Per anesthesiologist, his oropharyngeal space was extremely swollen.  Af of , Oral and Maxillofacial surgeon felt this swelling would continue for 3 to 4 days so would be intubated for a number of days. Note  was similar.            Mechanical Ventilation/Vitalsigns/IsandOs:   Temp: 98.2  F (36.8  C) Temp  Min: 98  F (36.7  C)  Max: 98.2  F (36.8  C)  Resp: 14 Resp  Min: 10  Max: 23  SpO2: 96 % SpO2  Min: 95 %  Max: 100 %    No Data Recorded  Heart Rate: 70 Heart Rate  Min: 67  Max: 94  BP: 126/56 Systolic (24hrs), Av , Min:102 , Max:137   Diastolic (24hrs), Av, Min:46, Max:64    Intake/Output Summary (Last 24 hours) at 17 1056  Last data filed at 17 0800   Gross per 24 hour   Intake           2384.5 ml   Output             1100 ml   Net           1284.5 ml   Net for hospital stay: +6.2 L    Ventilation Mode: PS  FiO2 (%): 30 %  Rate Set (breaths/minute): 16 breaths/min  Tidal Volume Set (mL): 500 mL  PEEP (cm H2O): 5 cmH2O  Pressure Support (cm H2O): 7 cmH2O  Oxygen Concentration (%): 30 %  Resp: 14    Day since     Peak airway pressure: 20s  Auto-PEEP:  none         Physical Examination:   General: Stated age, awake  HEENT: ET tube, bandage under chin  with some serosanguinous discharge, drains in place  Lungs: LCTAB  CVS: sternotomy scar  Abdomen: +BS, soft and non tender  Extremities/musculoskeletal: no edema  Neurology: awake and alert  Skin: no abnormalities  Psychiatry: unable  Exam of Line sites:  No lines   Hospital Procedures   Maxillofacial CT scan  I and D left sided facial abscess  Intubation and mechanical ventilation  XRAYs         Feeding/Glucose:   IVF    Blood glucose/Insulin requirement last 24 hours: none       Medications:       rantidine  150 mg Oral BID     insulin aspart  1-6 Units Subcutaneous Q4H     lidocaine   Topical Once     sodium chloride (PF)  3 mL Intracatheter Q8H     chlorhexidine  15 mL Mouth/Throat Q12H     piperacillin-tazobactam  3.375 g Intravenous Q6H        0.9% sodium chloride + KCl 20 mEq/L 75 mL/hr at 03/01/17 0234     propofol (DIPRIVAN) infusion 10 mcg/kg/min (03/01/17 0707)     IV fluid REPLACEMENT ONLY       NaCl 10 mL/hr at 02/28/17 2000     fentaNYL 25 mcg/hr (03/01/17 0600)              Labs/Diagnostic studies:     EKG:  sinus    Echo:  Mildly reduced EF in 2014    ROUTINE ICU LABS (Last four results)  CMP    Recent Labs  Lab 03/01/17  0425 02/28/17  0625 02/27/17  1228    137 129*   POTASSIUM 3.8 3.8 4.0   CHLORIDE 108 105 96   CO2 23 24 25   ANIONGAP 9 8 8   * 85 104*   BUN 11 10 13   CR 0.73 0.75 0.74   GFRESTIMATED >90Non  GFR Calc >90Non  GFR Calc >90Non  GFR Calc   GFRESTBLACK >90African American GFR Calc >90African American GFR Calc >90African American GFR Calc   MICKIE 7.4* 7.4* 8.9   MAG 2.3  --   --    PHOS 2.1*  --   --    PROTTOTAL  --   --  7.4   ALBUMIN  --   --  3.5   BILITOTAL  --   --  1.1   ALKPHOS  --   --  72   AST  --   --  21   ALT  --   --  24     CBC    Recent Labs  Lab 03/01/17  0425 02/28/17  0625 02/27/17  1228   WBC 9.9 13.2* 20.4*   RBC 3.36* 3.41* 4.19*   HGB 10.9* 11.0* 13.9   HCT 30.4* 30.8* 38.0*   MCV 91 90 91   MCH 32.4  32.3 33.2*   MCHC 35.9 35.7 36.6*   RDW 15.8* 15.9* 15.7*    159 234     INRNo lab results found in last 7 days.  Arterial Blood Gas    Recent Labs  Lab 02/27/17 2117   PH 7.36   PCO2 40   PO2 82   HCO3 22       Other Lab Data:  Lactic acid: 1.5    Cultures:    Recent Labs  Lab 02/27/17  2000 02/27/17  1827 02/27/17  1814 02/27/17  1238 02/27/17  1228   CULT Canceled, Test credited Duplicate request  Canceled, Test creditedDuplicate request Culture negative monitoring continues  Culture negative monitoring continues  Canceled, Test credited Test reordered as correct code REORDERED AS AN ABSCESS CULTURE Light growth Coagulase negative Staphylococcus Susceptibility testing not routinely doneCulture in progress*  Culture negative monitoring continues  Canceled, Test credited Test reordered as correct code REORDERED AS AN ABSCESS CULTURE No growth after 2 days No growth after 2 days     Blood culture:  Invalid input(s): BC   Urine culture:  No results for input(s): URC in the last 168 hours.            Imaging:     CXR:  pending    CT:  Left sided abscess as noted         Assessment and Plan:     Summary:  Oscar Chaudhary is a 81 year old male admitted on 2/27/2017 for facial abscess and swelling.  I have personally reviewed the daily labs, imaging studies, cultures and discussed the case with referring physician and consulting physicians. My assessment and plan as follows:    Neurology and Psychiatry:  Looks comfortable  - Keep as awake as possible   Decrease Fentanyl to 15 mcg/hr give bumps as necessary   Continue Propofol infusion at low dose      Pulmonary:   Intubated for airway protection - no acute respiratory issues.  63 inches tall.  Airway pressures good and pH normal on current settings.   - Pressure support during day as much as tolerated. Doing very well.   - Consideration of extubation when OK with surgeons.  Expect that he will have no problems from a pulmonary standpint.      Cardiovascular system:   Known CAD with mildly reduced EF on TTE 3 years ago. Now 6 liters up.    - Restart ACE at low dose.  Start Carvedilol at very low dose for now (instead of ACE)  - One dose Furosemide    Renal/Electrolytes:  Low UOP but appropriate for weight. Significantly volume up.    - Change to free water  - One dose Furosemide    ID:  Abscess. WBC much improved from yesterday. ID following along.  Staph and Strep in cultures.   - Piperacillin/Tazobactam while awaiting final culture results.     GI/:  No acute issues  - Feeding tube placement.  RNs will check with oral surgeons first to see if this is OK.    Nutrition:   Tube feeds and free water    Musculoskeletal/Rheumatology:  No acute issues but at high risk of becoming deconditioned.   - PT, up in chair    Endocrine:   Some small amount of hyperglycemia.  Hemoglobin A1C normal.    - Insulin sliding scale    Heme/Onc:  No acute issues  - Check CBC QOD    ICU prophylaxis:      DVT: will start heparin      VAP: As above     Stress ulcer: Ranitidine     Restraints needed: n     Lines   Central Line/PICC - placed n needed: n   Arterial line - placed n needed: n   Zuniga catheter.  Needed: n       Prognosis:  guarded      Family update: son bedside.    Billing: total time spend providing critical care was 40 min, excluding procedure time.    Awa Kingsley MD  [phone]

## 2017-03-01 NOTE — PROGRESS NOTES
[facility] ICU RESPIRATORY NOTE  Date of Admission: 2/27/17  Date of Intubation (most recent): 2/27/17  Reason for Mechanical Ventilation: Abcess  Number of Days on Mechanical Ventilation: 2  Met Criteria for Pressure Support Trial: Yes  Length of Pressure Support Trial: 7 hrs  Reason for Stopping Pressure Support Trial: Rest overnight  Reason for No Pressure Support Trial:    Significant Events Today:    ABG Results:     Recent Labs  Lab 02/27/17 2117   PH 7.36   PCO2 40   PO2 82   HCO3 22     Ventilation Mode: CMV/AC  FiO2 (%): 30 %  Rate Set (breaths/minute): 16 breaths/min  Tidal Volume Set (mL): 500 mL  PEEP (cm H2O): 5 cmH2O  Pressure Support (cm H2O): 7 cmH2O  Oxygen Concentration (%): 30 %  Resp: 16      ETT appearance on chest x-ray: in good position     Plan:  Continue weaning trials    Jamaal WADE

## 2017-03-01 NOTE — PROGRESS NOTES
[facility]  Infectious Disease Progress Note          Assessment and Plan:   IMPRESSION:   1. A 81-year-old male with coronary artery disease   2. Carcinoma of the prostate.   3. Admitted on this occasion with left facial swelling.  S/p I and D 2/27 with drainage of L submandibular and lateral pharyngeal space abscesses.      RECOMMENDATIONS:   1. Cont  IV Zosyn for broad coverage.   2. Will f/u on surgical cxs.  No growth to date.  3. On vent for airway protection.            Interval History:   Remains on vent.  Afebrile.  Blood and surgical cxs neg to date.              Medications:       lidocaine   Topical Once     sodium chloride (PF)  3 mL Intracatheter Q8H     chlorhexidine  15 mL Mouth/Throat Q12H     piperacillin-tazobactam  3.375 g Intravenous Q6H     famotidine  20 mg Intravenous Q12H                  Physical Exam:   Blood pressure 124/54, pulse 91, temperature 98.2  F (36.8  C), temperature source Oral, resp. rate 15, SpO2 97 %.  [unfilled]  Vital Signs with Ranges  Temp:  [97.2  F (36.2  C)-98.2  F (36.8  C)] 98.2  F (36.8  C)  Heart Rate:  [58-94] 76  Resp:  [10-20] 15  BP: (102-137)/(47-63) 124/54  FiO2 (%):  [30 %-40 %] 30 %  SpO2:  [97 %-100 %] 97 %    Constitutional: Sedated.  On vent.   Lungs: Clear to auscultation bilaterally, no crackles or wheezing   Cardiovascular: Regular rate and rhythm, normal S1 and S2, and no murmur noted   Abdomen: Normal bowel sounds, soft, non-distended, non-tender   Skin: No rashes, no cyanosis, no edema   Other: L submandibular swelling.          Data:   All microbiology laboratory data reviewed.  Recent Labs   Lab Test  03/01/17 0425 02/28/17   0625 02/27/17   1228   WBC  9.9  13.2*  20.4*   HGB  10.9*  11.0*  13.9   HCT  30.4*  30.8*  38.0*   MCV  91  90  91   PLT  179  159  234     Recent Labs   Lab Test  03/01/17 0425 02/28/17   0625  02/27/17   1228   CR  0.73  0.75  0.74     No lab results found.

## 2017-03-01 NOTE — PLAN OF CARE
Pt calm and cooperative with cares overnight.  No changes to vent, VSS.  Restraints removed, pt compliant.  Minimal bloody drainage suctioned during oral cares.  Family updated at bedside.  Will continue to monitor.

## 2017-03-01 NOTE — PROGRESS NOTES
[facility] ICU RESPIRATORY NOTE  Date of Admission:  2/27/17  Date of Intubation (most recent): 2/28/17  Reason for Mechanical Ventilation:  AW protection   Number of Days on Mechanical Ventilation: 2  Met Criteria for Pressure Support Trial:  Yes   Length of Pressure Support Trial:  PS 7/5 since 0750  Reason for Stopping Pressure Support Trial:  Pt still on PS  Reason for No Pressure Support Trial:     Significant Events Today:  None    ABG Results:    ETT appearance on chest x-ray:    Plan:  Pt to remain on full vent support

## 2017-03-01 NOTE — PROGRESS NOTES
ORAL AND MAXILLOFACIAL SURGERY PROGRESS NOTE      Oscar is seen today on a.m. rounds for evaluation status post multiple space head and neck drainage by my partners, Dr. Placido Escalante and Dr. Adam Ramos.  This was completed yesterday evening.        HISTORY OF PRESENT ILLNESS:  Oscar was initially seen by us for assessment of a cystic lesion back in 2010.  He elected against treatment.  Unfortunately, over the few days he developed infection in the oral cavity.  It is difficult to say whether this is coming from the cyst or perhaps soft tissue superficial to it near the distal aspect of the lower left second molar.  At any rate, there are periodontal pathogens, and I believe these were inadvertently irrigated, perhaps by his home irrigating device, into the submandibular and lateral pharyngeal compartment.  He was seen and assessed in the ER on Sunday and discharged for evaluation with OMS.  He was seen in our office yesterday and given the dysphasia and dysphonia.  We elected to take him directly to the OR for intubation and assessment of infection.  CT with contrast showed significant deviation of the airway, soft tissue edema, cellulitis and air emphysema, which is likely from the irrigating device that was used.        HOSPITAL COURSE:  He was taken to the operating room for incision.  This was approached transcutaneously.  The left submandibular and sublingual spaces were identified in conjunction with the lateral  pharyngeal compartment.  Multiple drains were placed and secured.  Exploration intraorally was then completed.  Upon flap reflection, the cystic cavity was not readily identifiable, and no pathologic fracture was known.  Biopsy was completed of the cyst as continued infection in the area certainly will compromise this region.  Pathology is pending.  This does emanate from an impacted wisdom tooth, but the tooth left in position for fear of creating inadvertent intraoperative fracture.   Typically, we prefer to control the infectious aspect of things first and then treat definitive treatment can be planned later.        He was admitted to the ICU postoperatively for airway protection.  Per report, This was a difficult intubation.  Dr. Joaquin Damon from Thoracic Surgery was present during intubation, but it was determined tracheostomy was not needed as intubation was successful.  Surgery went smoothly and without complication.  Unfortunately, they did not have availability in the ICU overnight, and thus he was recovering in PACU until this morning, which is his current condition.        PHYSICAL EXAMINATION:   GENERAL:  Intubated and sedated.  He is comfortably on sedation.  Chest x-ray confirms tube position, and that has been viewed overnight.     HEAD AND NECK:  Moderate to severe left-sided facial edema and submandibular swelling as noted.  Drains were secured in place.  The dressing is in place, catching serosanguineous fluid, which is beginning to put out.  Drains appear to be secure.     ORAL EXAM:  Occlusion is difficult to assess as the breathing tube is in place, but I would not expect any change given what was done.  Intraoral sutures remain in place.  The lateral pharyngeal aspect is completely edematous, and I would almost assume the soft tissue edema from I&D will create airway compromise would extubation occur.     CARDIOVASCULAR:  Regular rate and rhythm.    RESPIRATORY:  Lungs are clear and inflating to audible mechanical ventilation.        The remainder of his systemic exam is deferred to the ICU team.        LABORATORY VALUES:  His white count appears to be down this morning; I believe it is at 13 from 20.  Hemoglobin and platelets are normal.        ASSESSMENT:  Oscar Chaudhary is a 81-year-old white man with a severe facial infection encompassing primarily the lateral pharyngeal compartment.  This was the left submandibular compartment.  This is thought to be from an  odontogenic source, which typically is a combination of bacteria, but primarily anaerobic bacteria seated into a deep fascial compartment, which creates a potential space infection.       PLAN:  From our standpoint, it will be important to keep Oscar intubated for airway protection until the edema resolves.  These are always difficult situations, especially given the age and medical comorbidities.  Overall, I would recommend intubation for at least a few days and likely repeat CT scan with IV contrast in 2-3 days.  Clinically, it was felt as though the drains were in the proper location, and we do need a few days of IV antibiotics to see if any further loculations present.  Dr. Escalante will make the call on the timing of the CT, but I suspect we will likely wait until Friday morning.  If edema is down and drains are secure, we can consider extubation, but this should not be done until consultation with us as reintubation in this scenario is extremely problematic.  Should it take beyond 7 days, we will likely then look at elective tracheostomy so we can improve feeding and overall cares.  I do think we have 4-5 days to see how things go, and I would recommend we continue taking a conservative course.        We will continue following Oscar very closely and keep his family up to date.  Our entire service will continue rounding on a daily basis with Dr. Placido Escalante likely being the primary OMS provider, making surgical decisions and timing decisions.       Lots more to come for Oscar.  We will stay in touch with the ICU team as well as Infectious Disease.         NOBLE RICCI DDS             D: 2017 16:38   T: 2017 11:11   MT: [id number]      Name:     OSCAR DOSHI   MRN:      [id number]        Account:      [id number]   :      1935           Service Date: 2017      Document: [id number]

## 2017-03-01 NOTE — PLAN OF CARE
Problem: Individualization  Goal: Patient Preferences  Outcome: Improving  Patient tolerated pressure support trial x8 hours. Sedation lightened and patient appropriate, able to dangle/stand with PT. Denies pain.

## 2017-03-01 NOTE — PLAN OF CARE
Problem: Goal Outcome Summary  Goal: Goal Outcome Summary  PT: Pt ambulated 80' x 2 with WW, Min A for balance and walker navigation. RT, RN and aide to assist for equipment management. Pt reported dizziness at about 60' at which time VS were stable, RT transitioned pt from pressure support to CMV. HR did elevate to 128 at end of walking bout. Reviewed LE exercises and recommended sitting in the chair a few times per day. At this time recommend Acute Rehab at discharge to improve strength, activity tolerance, independence and safety with functional mobility.

## 2017-03-02 ENCOUNTER — APPOINTMENT (OUTPATIENT)
Dept: PHYSICAL THERAPY | Facility: CLINIC | Age: 82
DRG: 854 | End: 2017-03-02
Attending: HOSPITALIST
Payer: COMMERCIAL

## 2017-03-02 LAB
ANION GAP SERPL CALCULATED.3IONS-SCNC: 7 MMOL/L (ref 3–14)
BACTERIA SPEC CULT: ABNORMAL
BUN SERPL-MCNC: 14 MG/DL (ref 7–30)
CALCIUM SERPL-MCNC: 7.4 MG/DL (ref 8.5–10.1)
CHLORIDE SERPL-SCNC: 109 MMOL/L (ref 94–109)
CO2 SERPL-SCNC: 25 MMOL/L (ref 20–32)
CREAT SERPL-MCNC: 0.66 MG/DL (ref 0.66–1.25)
ERYTHROCYTE [DISTWIDTH] IN BLOOD BY AUTOMATED COUNT: 15.6 % (ref 10–15)
GFR SERPL CREATININE-BSD FRML MDRD: ABNORMAL ML/MIN/1.7M2
GLUCOSE BLDC GLUCOMTR-MCNC: 131 MG/DL (ref 70–99)
GLUCOSE BLDC GLUCOMTR-MCNC: 139 MG/DL (ref 70–99)
GLUCOSE BLDC GLUCOMTR-MCNC: 144 MG/DL (ref 70–99)
GLUCOSE BLDC GLUCOMTR-MCNC: 146 MG/DL (ref 70–99)
GLUCOSE BLDC GLUCOMTR-MCNC: 152 MG/DL (ref 70–99)
GLUCOSE BLDC GLUCOMTR-MCNC: 177 MG/DL (ref 70–99)
GLUCOSE SERPL-MCNC: 148 MG/DL (ref 70–99)
HCT VFR BLD AUTO: 28.3 % (ref 40–53)
HGB BLD-MCNC: 10 G/DL (ref 13.3–17.7)
Lab: ABNORMAL
MCH RBC QN AUTO: 31.6 PG (ref 26.5–33)
MCHC RBC AUTO-ENTMCNC: 35.3 G/DL (ref 31.5–36.5)
MCV RBC AUTO: 90 FL (ref 78–100)
MICRO REPORT STATUS: ABNORMAL
PHOSPHATE SERPL-MCNC: 2.1 MG/DL (ref 2.5–4.5)
PLATELET # BLD AUTO: 207 10E9/L (ref 150–450)
POTASSIUM SERPL-SCNC: 4 MMOL/L (ref 3.4–5.3)
RBC # BLD AUTO: 3.16 10E12/L (ref 4.4–5.9)
SODIUM SERPL-SCNC: 141 MMOL/L (ref 133–144)
SPECIMEN SOURCE: ABNORMAL
WBC # BLD AUTO: 7.9 10E9/L (ref 4–11)

## 2017-03-02 PROCEDURE — 40000008 ZZH STATISTIC AIRWAY CARE

## 2017-03-02 PROCEDURE — 97116 GAIT TRAINING THERAPY: CPT | Mod: GP | Performed by: PHYSICAL THERAPIST

## 2017-03-02 PROCEDURE — 94003 VENT MGMT INPAT SUBQ DAY: CPT

## 2017-03-02 PROCEDURE — 40000193 ZZH STATISTIC PT WARD VISIT: Performed by: PHYSICAL THERAPIST

## 2017-03-02 PROCEDURE — 20000003 ZZH R&B ICU

## 2017-03-02 PROCEDURE — 40000281 ZZH STATISTIC TRANSPORT TIME EA 15 MIN

## 2017-03-02 PROCEDURE — 84100 ASSAY OF PHOSPHORUS: CPT | Performed by: INTERNAL MEDICINE

## 2017-03-02 PROCEDURE — 25000128 H RX IP 250 OP 636: Performed by: INTERNAL MEDICINE

## 2017-03-02 PROCEDURE — 25000125 ZZHC RX 250: Performed by: INTERNAL MEDICINE

## 2017-03-02 PROCEDURE — 40000275 ZZH STATISTIC RCP TIME EA 10 MIN

## 2017-03-02 PROCEDURE — 00000146 ZZHCL STATISTIC GLUCOSE BY METER IP

## 2017-03-02 PROCEDURE — 27210429 ZZH NUTRITION PRODUCT INTERMEDIATE LITER

## 2017-03-02 PROCEDURE — 36415 COLL VENOUS BLD VENIPUNCTURE: CPT | Performed by: INTERNAL MEDICINE

## 2017-03-02 PROCEDURE — 80048 BASIC METABOLIC PNL TOTAL CA: CPT | Performed by: INTERNAL MEDICINE

## 2017-03-02 PROCEDURE — 25000132 ZZH RX MED GY IP 250 OP 250 PS 637: Performed by: INTERNAL MEDICINE

## 2017-03-02 PROCEDURE — 85027 COMPLETE CBC AUTOMATED: CPT | Performed by: INTERNAL MEDICINE

## 2017-03-02 PROCEDURE — 99291 CRITICAL CARE FIRST HOUR: CPT | Performed by: INTERNAL MEDICINE

## 2017-03-02 PROCEDURE — 25000132 ZZH RX MED GY IP 250 OP 250 PS 637: Performed by: HOSPITALIST

## 2017-03-02 RX ORDER — FUROSEMIDE 10 MG/ML
40 INJECTION INTRAMUSCULAR; INTRAVENOUS ONCE
Status: COMPLETED | OUTPATIENT
Start: 2017-03-02 | End: 2017-03-02

## 2017-03-02 RX ORDER — FENTANYL CITRATE 50 UG/ML
25 INJECTION, SOLUTION INTRAMUSCULAR; INTRAVENOUS
Status: DISCONTINUED | OUTPATIENT
Start: 2017-03-02 | End: 2017-03-09

## 2017-03-02 RX ADMIN — RANITIDINE HYDROCHLORIDE 150 MG: 150 SOLUTION ORAL at 08:30

## 2017-03-02 RX ADMIN — FUROSEMIDE 40 MG: 10 INJECTION, SOLUTION INTRAVENOUS at 10:50

## 2017-03-02 RX ADMIN — PIPERACILLIN AND TAZOBACTAM 3.38 G: 3; .375 INJECTION, POWDER, FOR SOLUTION INTRAVENOUS at 08:30

## 2017-03-02 RX ADMIN — PIPERACILLIN AND TAZOBACTAM 3.38 G: 3; .375 INJECTION, POWDER, FOR SOLUTION INTRAVENOUS at 20:03

## 2017-03-02 RX ADMIN — CARVEDILOL 3.12 MG: 3.12 TABLET, FILM COATED ORAL at 08:30

## 2017-03-02 RX ADMIN — PIPERACILLIN AND TAZOBACTAM 3.38 G: 3; .375 INJECTION, POWDER, FOR SOLUTION INTRAVENOUS at 03:03

## 2017-03-02 RX ADMIN — HEPARIN SODIUM 5000 UNITS: 10000 INJECTION, SOLUTION INTRAVENOUS; SUBCUTANEOUS at 00:20

## 2017-03-02 RX ADMIN — PIPERACILLIN AND TAZOBACTAM 3.38 G: 3; .375 INJECTION, POWDER, FOR SOLUTION INTRAVENOUS at 14:46

## 2017-03-02 RX ADMIN — CHLORHEXIDINE GLUCONATE 15 ML: 1.2 RINSE ORAL at 08:24

## 2017-03-02 RX ADMIN — CHLORHEXIDINE GLUCONATE 15 ML: 1.2 RINSE ORAL at 20:03

## 2017-03-02 RX ADMIN — HEPARIN SODIUM 5000 UNITS: 10000 INJECTION, SOLUTION INTRAVENOUS; SUBCUTANEOUS at 16:41

## 2017-03-02 RX ADMIN — HEPARIN SODIUM 5000 UNITS: 10000 INJECTION, SOLUTION INTRAVENOUS; SUBCUTANEOUS at 08:30

## 2017-03-02 RX ADMIN — RANITIDINE HYDROCHLORIDE 150 MG: 150 SOLUTION ORAL at 22:07

## 2017-03-02 RX ADMIN — SODIUM PHOSPHATE, MONOBASIC, MONOHYDRATE 15 MMOL: 276; 142 INJECTION, SOLUTION INTRAVENOUS at 11:18

## 2017-03-02 RX ADMIN — CARVEDILOL 3.12 MG: 3.12 TABLET, FILM COATED ORAL at 18:25

## 2017-03-02 RX ADMIN — LISINOPRIL 2.5 MG: 2.5 TABLET ORAL at 08:30

## 2017-03-02 NOTE — PLAN OF CARE
Pt resting comfortably and able to make needs known overnight.  VSS.  Off propofol this morning.  Will continue to monitor.

## 2017-03-02 NOTE — DISCHARGE INSTRUCTIONS
Placido Escalante, JT     [facility]  [address], [address]  [place] [address]  Clinic/On-call Phone: [phone]  Clinic Fax: [phone]     You have a post hospital clinic visit visit with Dr. Escalante on Monday March 13th @ 10:50 AM.

## 2017-03-02 NOTE — PROGRESS NOTES
Oral & Maxillofacial Surgery Progress Note    Date/Time:  3/2/2017      Location:    [facility] / Unit ICU    Patient Information:  Patient Name: Oscar Chaudhary  MRN: [id number]   : 1935  _______________________________________________________________    Subjective:  Patient found in bed resting comfortably intubated.  No complaints.    Alert & Oriented x3: yes  PO Intake: NG present    Objective/Physical Exam:  Vitals:   BP:  121/57   HR:  91   Tmax: Temp (24hrs), Av.1  F (36.7  C), Min:98  F (36.7  C), Max:98.3  F (36.8  C)     RR:  22    HEENT: Moderate Left submandibular swelling, drains in place with serous drainage.  Oral: Intubated, FOM soft, cannot visualize pharynx.  Labs:  WBC 7.9  Cultures:  Strep  Constellatus  Histopathology:  Dentigerous cyst left [place] body    Assessment:  Oscar Chaudhary is a 81 year old male , ASA 3 - Severe systemic disease, but not incapacitating, POD 3 s/p incision and drainage left submandibular and lateral pharyngeal space abscess/cellulitus; biopsy lesion left mandibular body-- good course    Plan/Recommendation(s):  1. I discussed the procedures performed and biopsy results from the left mandible.  2. Plan repeat CT tomorrow to evaluate for new abscess formation and airway swelling and deviation.    3. OMFS will begin advancing drains and provide recommendation on extubation.    Signature:  Placido Escalante DDS    [facility]  [address], [address]  [place] [address]  Clinic/On-call Phone: [phone]  Clinic Fax: [phone]

## 2017-03-02 NOTE — PROGRESS NOTES
[facility] ICU RESPIRATORY NOTE    Date of Admission: 2/27/17  Date of Intubation (most recent): 2/28/17  Reason for Mechanical Ventilation: AW protection   Number of Days on Mechanical Ventilation: 3  Met Criteria for Pressure Support Trial: Yes   Length of Pressure Support Trial: PS 7/5 from 0525-7127  Reason for Stopping Pressure Support Trial: To rest overnight    Ventilation Mode: CMV/AC  FiO2 (%): 30 %  Rate Set (breaths/minute): 16 breaths/min  Tidal Volume Set (mL): 500 mL  PEEP (cm H2O): 5 cmH2O  Pressure Support (cm H2O): 7 cmH2O  Oxygen Concentration (%): 30 %  Resp: 16    Significant Events Today:None overnight    ABG Results: No new results    ETT appearance on chest x-ray: In good position.    Plan:  Will assess for daily PS trial this morning.  3/2/2017  Yesy Shah RRT

## 2017-03-02 NOTE — PLAN OF CARE
Problem: Individualization  Goal: Patient Preferences  Outcome: Improving  Patient on pressure support throughout shift and tolerated well. LS clear. Patient with strong productive cough.  Able to ambulate in ramirez with PT. TF at goal rate and pt tolerating well.   Urine output marginal, 10 mg Lasix given with adequate response.

## 2017-03-02 NOTE — PROGRESS NOTES
[facility]  Infectious Disease Progress Note          Assessment and Plan:   IMPRESSION:   1. A 81-year-old male with coronary artery disease   2. Carcinoma of the prostate.   3. Admitted on this occasion with left facial swelling.  S/p I and D 2/27 with drainage of L submandibular and lateral pharyngeal space abscesses.  Surgical cx with Strep constallatus and coag neg Staph.  Suspect the formal is the significant organism.  Clinically improving      RECOMMENDATIONS:   1. Cont  IV Zosyn for broad coverage, including the Strep   3. On vent for airway protection.            Interval History:   Remains on vent.  Afebrile.  Blood and surgical cxs neg to date.  Decreased swelling and discomfort.  Cxs as above.              Medications:       rantidine  150 mg Oral BID     insulin aspart  1-6 Units Subcutaneous Q4H     lisinopril  2.5 mg Oral Daily     carvedilol  3.125 mg Oral BID w/meals     heparin  5,000 Units Subcutaneous Q8H     lidocaine   Topical Once     sodium chloride (PF)  3 mL Intracatheter Q8H     chlorhexidine  15 mL Mouth/Throat Q12H     piperacillin-tazobactam  3.375 g Intravenous Q6H                  Physical Exam:   Blood pressure 118/65, pulse 91, temperature 98.3  F (36.8  C), temperature source Axillary, resp. rate 18, weight 67 kg (147 lb 11.3 oz), SpO2 98 %.  [unfilled]  Vital Signs with Ranges  Temp:  [98  F (36.7  C)-98.3  F (36.8  C)] 98.3  F (36.8  C)  Heart Rate:  [62-95] 70  Resp:  [15-21] 18  BP: (108-137)/() 118/65  FiO2 (%):  [30 %] 30 %  SpO2:  [95 %-99 %] 98 %    Constitutional: Sedated.  On vent.   Lungs: Clear to auscultation bilaterally, no crackles or wheezing   Cardiovascular: Regular rate and rhythm, normal S1 and S2, and no murmur noted   Abdomen: Normal bowel sounds, soft, non-distended, non-tender   Skin: No rashes, no cyanosis, no edema   Other: L submandibular swelling.          Data:   All microbiology laboratory data reviewed.  Recent Labs   Lab  Test  03/02/17   0520  03/01/17   0425  02/28/17   0625   WBC  7.9  9.9  13.2*   HGB  10.0*  10.9*  11.0*   HCT  28.3*  30.4*  30.8*   MCV  90  91  90   PLT  207  179  159     Recent Labs   Lab Test  03/02/17   0520  03/01/17   0425  02/28/17   0625   CR  0.66  0.73  0.75     No lab results found.

## 2017-03-02 NOTE — PLAN OF CARE
Problem: Goal Outcome Summary  Goal: Goal Outcome Summary  Outcome: Improving  Pt is A/O x 4.  Follows all commands.  PERRLA.  Pt is not on any sedation.  Fentanyl drip stopped this am.  Pt denies pain.  AVSS.  Tele SR.  ST with activity.  Sating % on pressure support all day. LS are clear.  Has intermittent wheezing/rhonchi that clears with suctioning.  Phosphorus 2.1 this am.  Replaced as a one time dose-no protocol ordered.  Lasix 40 mg one time given with large diuresis.  Up to bedside commode with assist of 2 to manage lines/vent.  Plan to have CT scan 3/3 am to evaluate for new abscess formation/decrease in airway swelling and potential extubation in next couple days.

## 2017-03-02 NOTE — PROGRESS NOTES
[facility]  Comprehensive Daily ICU Note        Oscar Chaudhary MRN# [id number]   Age: 81 year old YOB: 1935     Date of Admission: 2017    Primary care provider: Milton Hollins     CODE STATUS: FULL      Problem List:   Left sided facial abscess - s/p i and d  Cystic lesion left mandibular body  Severe oropharyngeal swelling  Low UOP - improved  Hyperglycemia  Hypophosphatemia         Treatment goals for next 24 hours:   Keep comfortable but as awake as possible  Spontaneous mode ventilation during the day.  Volume/electrolyte management.   Trial of extubation when OK'd by orgal surgeons    Awa Kingsley MD      Subjective/ Last 24 hours:   Events: Patient admitted for treatment of submandibular abscess from clinic.  Sent to OR for I and D.  Per anesthesiologist, his oropharyngeal space was extremely swollen.  Af of , Oral and Maxillofacial surgeon felt this swelling would continue for 3 to 4 days so would be intubated for a number of days. Spoke with Dr. Sanderson on phone yesterday who agreed with this.         Mechanical Ventilation/Vitalsigns/IsandOs:   Temp: 98.3  F (36.8  C) Temp  Min: 98  F (36.7  C)  Max: 98.3  F (36.8  C)  Resp: 19 Resp  Min: 14  Max: 27  SpO2: 97 % SpO2  Min: 95 %  Max: 99 %    No Data Recorded  Heart Rate: 72 Heart Rate  Min: 59  Max: 124  BP: 114/53 Systolic (24hrs), Av , Min:108 , Max:184   Diastolic (24hrs), Av, Min:52, Max:151    Intake/Output Summary (Last 24 hours) at 17 1034  Last data filed at 17 1000   Gross per 24 hour   Intake          2265.43 ml   Output             1510 ml   Net           755.43 ml     Net for hospital stay: +7.1 L    Ventilation Mode: CPAP/PS  FiO2 (%): 30 %  Rate Set (breaths/minute): 16 breaths/min  Tidal Volume Set (mL): 500 mL  PEEP (cm H2O): 5 cmH2O  Pressure Support (cm H2O): 7 cmH2O  Oxygen Concentration (%): 30 %  Resp: 19    Day since     Peak airway pressure: 20s  Auto-PEEP:   none         Physical Examination:   General: Stated age, awake  HEENT: ET tube, bandage under chin with some serosanguinous discharge, drains in place  Lungs: LCTAB  CVS: sternotomy scar  Abdomen: +BS, soft and non tender  Extremities/musculoskeletal: no edema  Neurology: awake and alert  Skin: no abnormalities  Psychiatry: unable  Exam of Line sites:  No lines   Hospital Procedures   Maxillofacial CT scan  I and D left sided facial abscess  Intubation and mechanical ventilation  XRAYs         Feeding/Glucose:   Tube feeds with free water      Blood glucose/Insulin requirement last 24 hours: none       Medications:       rantidine  150 mg Oral BID     insulin aspart  1-6 Units Subcutaneous Q4H     lisinopril  2.5 mg Oral Daily     carvedilol  3.125 mg Oral BID w/meals     heparin  5,000 Units Subcutaneous Q8H     lidocaine   Topical Once     sodium chloride (PF)  3 mL Intracatheter Q8H     chlorhexidine  15 mL Mouth/Throat Q12H     piperacillin-tazobactam  3.375 g Intravenous Q6H        fentaNYL Stopped (03/02/17 0900)     propofol (DIPRIVAN) infusion Stopped (03/02/17 0600)     IV fluid REPLACEMENT ONLY                Labs/Diagnostic studies:     EKG:  sinus    Echo:  Mildly reduced EF in 2014    ROUTINE ICU LABS (Last four results)  CMP    Recent Labs  Lab 03/02/17  0520 03/01/17  0425 02/28/17  0625 02/27/17  1228    140 137 129*   POTASSIUM 4.0 3.8 3.8 4.0   CHLORIDE 109 108 105 96   CO2 25 23 24 25   ANIONGAP 7 9 8 8   * 114* 85 104*   BUN 14 11 10 13   CR 0.66 0.73 0.75 0.74   GFRESTIMATED >90Non  GFR Calc >90Non  GFR Calc >90Non  GFR Calc >90Non  GFR Calc   GFRESTBLACK >90African American GFR Calc >90African American GFR Calc >90African American GFR Calc >90African American GFR Calc   MICKIE 7.4* 7.4* 7.4* 8.9   MAG  --  2.3  --   --    PHOS 2.1* 2.1*  --   --    PROTTOTAL  --   --   --  7.4   ALBUMIN  --   --   --  3.5   BILITOTAL  --    --   --  1.1   ALKPHOS  --   --   --  72   AST  --   --   --  21   ALT  --   --   --  24     CBC    Recent Labs  Lab 03/02/17  0520 03/01/17  0425 02/28/17  0625 02/27/17  1228   WBC 7.9 9.9 13.2* 20.4*   RBC 3.16* 3.36* 3.41* 4.19*   HGB 10.0* 10.9* 11.0* 13.9   HCT 28.3* 30.4* 30.8* 38.0*   MCV 90 91 90 91   MCH 31.6 32.4 32.3 33.2*   MCHC 35.3 35.9 35.7 36.6*   RDW 15.6* 15.8* 15.9* 15.7*    179 159 234     INRNo lab results found in last 7 days.  Arterial Blood Gas    Recent Labs  Lab 02/27/17  2117   PH 7.36   PCO2 40   PO2 82   HCO3 22       Other Lab Data:  Lactic acid: 1.5    Cultures:    Recent Labs  Lab 02/27/17 2000 02/27/17  1827 02/27/17  1814 02/27/17  1238 02/27/17  1228   CULT Canceled, Test credited Duplicate request  Canceled, Test creditedDuplicate request Light growth beta hemolytic  Streptococcus constellatus Susceptibility testing not routinely doneModerate growth Normal oral marbella*  Culture negative monitoring continues  Canceled, Test credited Test reordered as correct code REORDERED AS AN ABSCESS CULTURE Light growth Coagulase negative Staphylococcus Susceptibility testing not routinely doneLight growth beta hemolytic  Streptococcus constellatus Susceptibility testing not routinely doneThese bacteria are part of normal skin marbella, but on occasion, may be true pathogens.  Clinical correlation must be applied to interpreting this microbiology result.*  Culture negative monitoring continues  Canceled, Test credited Test reordered as correct code REORDERED AS AN ABSCESS CULTURE No growth after 3 days No growth after 3 days     Blood culture:  Invalid input(s): BC   Urine culture:  No results for input(s): URC in the last 168 hours.            Imaging:     CXR:  pending    CT:  Left sided abscess as noted         Assessment and Plan:     Summary:  Oscar Chaudhary is a 81 year old male admitted on 2/27/2017 for facial abscess and swelling.  I have personally reviewed the daily  labs, imaging studies, cultures and discussed the case with referring physician and consulting physicians. My assessment and plan as follows:    Neurology and Psychiatry:  Looks comfortable and says he has no significant pain.   - Keep as awake as possible.     Stop Fentanyl infusion and give bumps as necessary   Continue Propofol infusion at low dose at night     Pulmonary:   Intubated for airway protection - no acute respiratory issues.  63 inches tall.  Airway pressures good and pH normal on current settings.   - Pressure support during day as much as tolerated. Doing very well.   - Extubate when OK with surgeons and airway swelling has resolved (or alternatively move towards a surgical airway).  Expect that he will have no problems from a pulmonary standpint.     Cardiovascular system:   Known CAD with mildly reduced EF on TTE 3 years ago. Now 7 liters up despite stopping IVF and giving one dose of diuretics..    - Continue low dose ACE and beta blocker.  - One dose Furosemide 40    Renal/Electrolytes:  Low UOP but appropriate for weight. Significantly volume up.    - Change to free water  - Re dose Furosemide    ID:  Abscess. WBC much improved from yesterday. ID following along.  Staph and Strep in cultures.   - Piperacillin/Tazobactam while awaiting final culture results.  Will defer antibiotic choices to ID doctors.     GI/:  No acute issues    Nutrition:   Tube feeds and free water    Musculoskeletal/Rheumatology:  No acute issues but at high risk of becoming deconditioned.   - PT, up in chair    Endocrine:   Some small amount of hyperglycemia.  Hemoglobin A1C normal.    - Insulin sliding scale    Heme/Onc:  No acute issues  - Check CBC QOD    ICU prophylaxis:      DVT: will start heparin      VAP: As above     Stress ulcer: Ranitidine     Restraints needed: n     Lines   Central Line/PICC - placed n needed: n   Arterial line - placed n needed: n   Zuniga catheter.  Needed: n       Prognosis:  guarded       Family update: patient bedside.    Billing: total time spend providing critical care was 40 min, excluding procedure time.    Awa iKngsley MD  [phone]

## 2017-03-02 NOTE — PLAN OF CARE
Problem: Goal Outcome Summary  Goal: Goal Outcome Summary  PT-Patient very motivated. Son present and very supportive. Patient able to transfer sup to/from sit with just use of rail. Amb with mobility cart 275 feet. No seated or standing rest needed and moves quickly. Currently recommend ARU to improve activity tolerance, strength and independence but may be able to discharge to home with assist as needed depending on length of stay as he already needs little assist for mobility.

## 2017-03-03 ENCOUNTER — APPOINTMENT (OUTPATIENT)
Dept: PHYSICAL THERAPY | Facility: CLINIC | Age: 82
DRG: 854 | End: 2017-03-03
Attending: HOSPITALIST
Payer: COMMERCIAL

## 2017-03-03 ENCOUNTER — APPOINTMENT (OUTPATIENT)
Dept: CT IMAGING | Facility: CLINIC | Age: 82
DRG: 854 | End: 2017-03-03
Attending: INTERNAL MEDICINE
Payer: COMMERCIAL

## 2017-03-03 LAB
ANION GAP SERPL CALCULATED.3IONS-SCNC: 9 MMOL/L (ref 3–14)
BUN SERPL-MCNC: 15 MG/DL (ref 7–30)
C DIFF TOX B STL QL: NORMAL
CALCIUM SERPL-MCNC: 7.4 MG/DL (ref 8.5–10.1)
CHLORIDE SERPL-SCNC: 104 MMOL/L (ref 94–109)
CO2 SERPL-SCNC: 27 MMOL/L (ref 20–32)
CREAT SERPL-MCNC: 0.68 MG/DL (ref 0.66–1.25)
GFR SERPL CREATININE-BSD FRML MDRD: ABNORMAL ML/MIN/1.7M2
GLUCOSE BLDC GLUCOMTR-MCNC: 115 MG/DL (ref 70–99)
GLUCOSE BLDC GLUCOMTR-MCNC: 122 MG/DL (ref 70–99)
GLUCOSE BLDC GLUCOMTR-MCNC: 126 MG/DL (ref 70–99)
GLUCOSE BLDC GLUCOMTR-MCNC: 135 MG/DL (ref 70–99)
GLUCOSE BLDC GLUCOMTR-MCNC: 138 MG/DL (ref 70–99)
GLUCOSE SERPL-MCNC: 125 MG/DL (ref 70–99)
PHOSPHATE SERPL-MCNC: 2.9 MG/DL (ref 2.5–4.5)
POTASSIUM SERPL-SCNC: 3.4 MMOL/L (ref 3.4–5.3)
SODIUM SERPL-SCNC: 140 MMOL/L (ref 133–144)
SPECIMEN SOURCE: NORMAL

## 2017-03-03 PROCEDURE — 40000008 ZZH STATISTIC AIRWAY CARE

## 2017-03-03 PROCEDURE — 70491 CT SOFT TISSUE NECK W/DYE: CPT

## 2017-03-03 PROCEDURE — 25000132 ZZH RX MED GY IP 250 OP 250 PS 637: Performed by: HOSPITALIST

## 2017-03-03 PROCEDURE — 25000132 ZZH RX MED GY IP 250 OP 250 PS 637: Performed by: INTERNAL MEDICINE

## 2017-03-03 PROCEDURE — 27210429 ZZH NUTRITION PRODUCT INTERMEDIATE LITER

## 2017-03-03 PROCEDURE — 84100 ASSAY OF PHOSPHORUS: CPT | Performed by: INTERNAL MEDICINE

## 2017-03-03 PROCEDURE — 25000128 H RX IP 250 OP 636: Performed by: INTERNAL MEDICINE

## 2017-03-03 PROCEDURE — 25000125 ZZHC RX 250: Performed by: HOSPITALIST

## 2017-03-03 PROCEDURE — 40000193 ZZH STATISTIC PT WARD VISIT: Performed by: PHYSICAL THERAPIST

## 2017-03-03 PROCEDURE — 40000281 ZZH STATISTIC TRANSPORT TIME EA 15 MIN

## 2017-03-03 PROCEDURE — 94003 VENT MGMT INPAT SUBQ DAY: CPT

## 2017-03-03 PROCEDURE — 25500064 ZZH RX 255 OP 636: Performed by: HOSPITALIST

## 2017-03-03 PROCEDURE — 97116 GAIT TRAINING THERAPY: CPT | Mod: GP | Performed by: PHYSICAL THERAPIST

## 2017-03-03 PROCEDURE — 36415 COLL VENOUS BLD VENIPUNCTURE: CPT | Performed by: INTERNAL MEDICINE

## 2017-03-03 PROCEDURE — 20000003 ZZH R&B ICU

## 2017-03-03 PROCEDURE — 00000146 ZZHCL STATISTIC GLUCOSE BY METER IP

## 2017-03-03 PROCEDURE — 97110 THERAPEUTIC EXERCISES: CPT | Mod: GP | Performed by: PHYSICAL THERAPIST

## 2017-03-03 PROCEDURE — 40000275 ZZH STATISTIC RCP TIME EA 10 MIN

## 2017-03-03 PROCEDURE — 87493 C DIFF AMPLIFIED PROBE: CPT | Performed by: INTERNAL MEDICINE

## 2017-03-03 PROCEDURE — 99291 CRITICAL CARE FIRST HOUR: CPT | Performed by: INTERNAL MEDICINE

## 2017-03-03 PROCEDURE — 80048 BASIC METABOLIC PNL TOTAL CA: CPT | Performed by: INTERNAL MEDICINE

## 2017-03-03 RX ORDER — IOPAMIDOL 755 MG/ML
80 INJECTION, SOLUTION INTRAVASCULAR ONCE
Status: COMPLETED | OUTPATIENT
Start: 2017-03-03 | End: 2017-03-03

## 2017-03-03 RX ORDER — FUROSEMIDE 10 MG/ML
40 INJECTION INTRAMUSCULAR; INTRAVENOUS ONCE
Status: COMPLETED | OUTPATIENT
Start: 2017-03-03 | End: 2017-03-03

## 2017-03-03 RX ADMIN — PIPERACILLIN AND TAZOBACTAM 3.38 G: 3; .375 INJECTION, POWDER, FOR SOLUTION INTRAVENOUS at 20:00

## 2017-03-03 RX ADMIN — CHLORHEXIDINE GLUCONATE 15 ML: 1.2 RINSE ORAL at 20:01

## 2017-03-03 RX ADMIN — RANITIDINE HYDROCHLORIDE 150 MG: 150 SOLUTION ORAL at 08:19

## 2017-03-03 RX ADMIN — RANITIDINE HYDROCHLORIDE 150 MG: 150 SOLUTION ORAL at 20:01

## 2017-03-03 RX ADMIN — PIPERACILLIN AND TAZOBACTAM 3.38 G: 3; .375 INJECTION, POWDER, FOR SOLUTION INTRAVENOUS at 02:58

## 2017-03-03 RX ADMIN — HEPARIN SODIUM 5000 UNITS: 10000 INJECTION, SOLUTION INTRAVENOUS; SUBCUTANEOUS at 00:34

## 2017-03-03 RX ADMIN — HEPARIN SODIUM 5000 UNITS: 10000 INJECTION, SOLUTION INTRAVENOUS; SUBCUTANEOUS at 08:17

## 2017-03-03 RX ADMIN — HEPARIN SODIUM 5000 UNITS: 10000 INJECTION, SOLUTION INTRAVENOUS; SUBCUTANEOUS at 17:49

## 2017-03-03 RX ADMIN — CARVEDILOL 3.12 MG: 3.12 TABLET, FILM COATED ORAL at 20:01

## 2017-03-03 RX ADMIN — CARVEDILOL 3.12 MG: 3.12 TABLET, FILM COATED ORAL at 08:18

## 2017-03-03 RX ADMIN — LISINOPRIL 2.5 MG: 2.5 TABLET ORAL at 08:18

## 2017-03-03 RX ADMIN — PIPERACILLIN AND TAZOBACTAM 3.38 G: 3; .375 INJECTION, POWDER, FOR SOLUTION INTRAVENOUS at 07:56

## 2017-03-03 RX ADMIN — SODIUM CHLORIDE 60 ML: 9 INJECTION, SOLUTION INTRAVENOUS at 09:23

## 2017-03-03 RX ADMIN — IOPAMIDOL 80 ML: 755 INJECTION, SOLUTION INTRAVENOUS at 09:22

## 2017-03-03 RX ADMIN — FUROSEMIDE 40 MG: 10 INJECTION, SOLUTION INTRAVENOUS at 14:55

## 2017-03-03 RX ADMIN — CHLORHEXIDINE GLUCONATE 15 ML: 1.2 RINSE ORAL at 08:03

## 2017-03-03 RX ADMIN — PIPERACILLIN AND TAZOBACTAM 3.38 G: 3; .375 INJECTION, POWDER, FOR SOLUTION INTRAVENOUS at 15:38

## 2017-03-03 NOTE — PLAN OF CARE
Problem: Goal Outcome Summary  Goal: Goal Outcome Summary  Outcome: No Change  Pt communicating needs well via writing. Able to make needs known. Using call light independently. Family at bedside. Frequent oral sx by staff. Able to shift weight indep. In bed but turning pt for large turns q2 hours. Cont to monitor.

## 2017-03-03 NOTE — PROGRESS NOTES
[facility]  Comprehensive Daily ICU Note        Oscar Chaudhary MRN# [id number]   Age: 81 year old YOB: 1935     Date of Admission: 2017    Primary care provider: Milton Hollins     CODE STATUS: FULL      Problem List:   Left sided facial abscess - s/p i and d  Cystic lesion left mandibular body  Severe oropharyngeal swelling - still present as of 3/3  Low UOP - improved  Hyperglycemia  Hypophosphatemia         Treatment goals for next 24 hours:   Keep comfortable but as awake as possible  Spontaneous mode ventilation during the day.  Volume/electrolyte management.   Intermittent labs  Defer to oral surgeons regarding plan for airway.  Trach was previously mentioned.     Awa Kingsley MD      Subjective/ Last 24 hours:   Events: Patient admitted for treatment of submandibular abscess from clinic.  Sent to OR for I and D.  Per anesthesiologist, his oropharyngeal space was extremely swollen.  Af of , Oral and Maxillofacial surgeon felt this swelling would continue for 3 to 4 days so would be intubated for a number of days. Today, 3/3, CT scan shows continued displacement of airway and oropharyngeal swelling.   Has just had a significant bout of coughing and looks frustrated.         Mechanical Ventilation/Vitalsigns/IsandOs:   Temp: 99.6  F (37.6  C) Temp  Min: 97.1  F (36.2  C)  Max: 99.6  F (37.6  C)  Resp: 18 Resp  Min: 9  Max: 31  SpO2: 98 % SpO2  Min: 95 %  Max: 100 %    No Data Recorded  Heart Rate: 78 Heart Rate  Min: 59  Max: 93  BP: 123/50 Systolic (24hrs), Av , Min:100 , Max:140   Diastolic (24hrs), Av, Min:50, Max:97    Intake/Output Summary (Last 24 hours) at 17 1053  Last data filed at 17 1000   Gross per 24 hour   Intake             3320 ml   Output             3165 ml   Net              155 ml   Net for hospital stay: +7.3 L    Ventilation Mode: CPAP/PS  FiO2 (%): 30 %  Rate Set (breaths/minute): 16 breaths/min  Tidal Volume Set (mL):  500 mL  PEEP (cm H2O): 5 cmH2O  Pressure Support (cm H2O): 7 cmH2O  Oxygen Concentration (%): 30 %  Resp: 18    Day since 2/27    Peak airway pressure: 20s  Auto-PEEP:  none         Physical Examination:   General: Stated age, awake  HEENT: ET tube, bandage under chin with some serosanguinous discharge, drains in place  Lungs: LCTAB  CVS: sternotomy scar  Abdomen: +BS, soft and non tender  Extremities/musculoskeletal: no edema, warm, good pulses  Neurology: awake and alert  Skin: no abnormalities  Psychiatry: unable  Exam of Line sites:  No lines   Hospital Procedures   Maxillofacial CT scan  I and D left sided facial abscess  Intubation and mechanical ventilation  XRAYs         Feeding/Glucose:   Tube feeds with free water      Blood glucose/Insulin requirement last 24 hours: none       Medications:       rantidine  150 mg Oral BID     insulin aspart  1-6 Units Subcutaneous Q4H     lisinopril  2.5 mg Oral Daily     carvedilol  3.125 mg Oral BID w/meals     heparin  5,000 Units Subcutaneous Q8H     lidocaine   Topical Once     sodium chloride (PF)  3 mL Intracatheter Q8H     chlorhexidine  15 mL Mouth/Throat Q12H     piperacillin-tazobactam  3.375 g Intravenous Q6H        propofol (DIPRIVAN) infusion Stopped (03/02/17 0600)     IV fluid REPLACEMENT ONLY                Labs/Diagnostic studies:     EKG:  sinus    Echo:  Mildly reduced EF in 2014    ROUTINE ICU LABS (Last four results)  CMP    Recent Labs  Lab 03/03/17  0415 03/02/17  0520 03/01/17  0425 02/28/17  0625 02/27/17  1228    141 140 137 129*   POTASSIUM 3.4 4.0 3.8 3.8 4.0   CHLORIDE 104 109 108 105 96   CO2 27 25 23 24 25   ANIONGAP 9 7 9 8 8   * 148* 114* 85 104*   BUN 15 14 11 10 13   CR 0.68 0.66 0.73 0.75 0.74   GFRESTIMATED >90Non  GFR Calc >90Non  GFR Calc >90Non  GFR Calc >90Non  GFR Calc >90Non  GFR Calc   GFRESTBLACK >90African American GFR Calc >90African  American GFR Calc >90African American GFR Calc >90African American GFR Calc >90African American GFR Calc   MICKIE 7.4* 7.4* 7.4* 7.4* 8.9   MAG  --   --  2.3  --   --    PHOS 2.9 2.1* 2.1*  --   --    PROTTOTAL  --   --   --   --  7.4   ALBUMIN  --   --   --   --  3.5   BILITOTAL  --   --   --   --  1.1   ALKPHOS  --   --   --   --  72   AST  --   --   --   --  21   ALT  --   --   --   --  24     CBC    Recent Labs  Lab 03/02/17  0520 03/01/17  0425 02/28/17  0625 02/27/17  1228   WBC 7.9 9.9 13.2* 20.4*   RBC 3.16* 3.36* 3.41* 4.19*   HGB 10.0* 10.9* 11.0* 13.9   HCT 28.3* 30.4* 30.8* 38.0*   MCV 90 91 90 91   MCH 31.6 32.4 32.3 33.2*   MCHC 35.3 35.9 35.7 36.6*   RDW 15.6* 15.8* 15.9* 15.7*    179 159 234     INRNo lab results found in last 7 days.  Arterial Blood Gas    Recent Labs  Lab 02/27/17  2117   PH 7.36   PCO2 40   PO2 82   HCO3 22       Other Lab Data:  Lactic acid: 1.5    Cultures:    Recent Labs  Lab 02/27/17 2000 02/27/17  1827 02/27/17  1814 02/27/17  1238 02/27/17  1228   CULT Canceled, Test credited Duplicate request  Canceled, Test creditedDuplicate request Light growth beta hemolytic  Streptococcus constellatus Susceptibility testing not routinely doneModerate growth Normal oral marbella*  Culture negative monitoring continues  Canceled, Test credited Test reordered as correct code REORDERED AS AN ABSCESS CULTURE Light growth Coagulase negative Staphylococcus Susceptibility testing not routinely doneLight growth beta hemolytic  Streptococcus constellatus Susceptibility testing not routinely doneThese bacteria are part of normal skin marbella, but on occasion, may be true pathogens.  Clinical correlation must be applied to interpreting this microbiology result.*  Culture negative monitoring continues  Canceled, Test credited Test reordered as correct code REORDERED AS AN ABSCESS CULTURE No growth after 4 days No growth after 4 days     Blood culture:  Invalid input(s): BC   Urine culture:  No  results for input(s): URC in the last 168 hours.            Imaging:     CXR:  pending    CT:  Left sided abscess as noted         Assessment and Plan:     Summary:  Oscar Chaudhary is a 81 year old male admitted on 2/27/2017 for facial abscess and swelling.  I have personally reviewed the daily labs, imaging studies, cultures and discussed the case with referring physician and consulting physicians. My assessment and plan as follows:    Neurology and Psychiatry:  Looks comfortable and says he has no significant pain but is having trouble with coughing.   - Keep as awake as possible.    - Consider lidocaine spray if continues to have difficulty coughing.     Pulmonary:   Intubated for airway protection - no acute respiratory issues.  63 inches tall.  Airway pressures good and pH normal on current settings.  Is having more coughing today.  CT scan didn't show any big differences.   - Pressure support during day as much as tolerated. Doing very well.   - Extubate if/when OK with surgeons. If no improvement soon may need a temporary tracheostomy.    Cardiovascular system:   Known CAD with mildly reduced EF on TTE 3 years ago. Now 7 liters up despite stopping IVF and giving one dose of diuretics. Net even after Furosemide yesterday. BP normal.  - Continue low dose ACE and beta blocker.  - Redose Furosemide 40    Renal/Electrolytes:  Low UOP but appropriate for weight. Significantly volume up.  Net even after one dose Furosemide yesterday. Sodium stable and normal.   - Continue free water  - Re dose Furosemide    ID:  Abscess. WBC has normalized and is now afebrile. ID following along.  Staph and Strep in cultures.   - Piperacillin/Tazobactam while awaiting final culture results.  Will defer antibiotic choices to ID doctors.     GI/:  No acute issues    Nutrition:   Tube feeds and free water    Musculoskeletal/Rheumatology:  No acute issues but at high risk of becoming deconditioned.   - PT, up in  chair    Endocrine:   Some small amount of hyperglycemia.  Hemoglobin A1C normal.    - Insulin sliding scale    Heme/Onc:  No acute issues  - Check CBC intermittently    ICU prophylaxis:      DVT: will start heparin      VAP: As above     Stress ulcer: Ranitidine     Restraints needed: n     Lines   Central Line/PICC - placed n needed: n   Arterial line - placed n needed: n   Zuniga catheter.  Needed: n     Prognosis:  guarded      Family update: patient bedside. Left message w one of his daughters.     Billing: total time spend providing critical care was 40 min, excluding procedure time.    Awa Kingsley MD  [phone]

## 2017-03-03 NOTE — PROGRESS NOTES
Oral & Maxillofacial Surgery Progress Note    Oscar Chaduhary  [id number]  1935    Today's Date: 03/03/17    S: Patient resting in bed, able to communicate via pen and paper. Reports that he feels he is improving. Ambulating on the floor.     O:  Blood pressure 123/50, pulse 91, temperature 99.6  F (37.6  C), temperature source Oral, resp. rate 18, weight 67 kg (147 lb 11.3 oz), SpO2 98 %.    Gen: Comfortably resting in bed, intubated, alert  HEENT: Left submandibular swelling, firm, drainage in place with continued serosanguinous drainage. Endotrancheal tube in place, unable to visualize posteriorly    CBC RESULTS:   Recent Labs   Lab Test  03/02/17   0520   WBC  7.9   RBC  3.16*   HGB  10.0*   HCT  28.3*   MCV  90   MCH  31.6   MCHC  35.3   RDW  15.6*   PLT  207     Histopathology: Dentigerous cyst, left mandibular body  Cultures: Strep constellatus    Assessment/Plan:   The patient is a 80 y/o male POD #4 s/p incision and drainage of the left submandibular and lateral pharyngeal space abscess/cellulitis. Biopsy of left mandibular body consistent with dentigerous cyst.     Appreciate ICU cares  Appreciate ID recommendations  Repeat CT today for evaluation for abscess formation and airway evaluation  Continue drains at this time  Will continue to monitor airway and evaluate for extubation    Gerardo Tao DDS  [facility]  [address], [address]  [place] [address]  Clinic/On-call Phone: [phone]  Clinic Fax: [phone]

## 2017-03-03 NOTE — PROGRESS NOTES
[facility] ICU RESPIRATORY NOTE     Date of Admission: 2/27/17  Date of Intubation (most recent): 2/28/17  Reason for Mechanical Ventilation: AW protection   Number of Days on Mechanical Ventilation: 4  Met Criteria for Pressure Support Trial: Pt on full support for over night    Ventilation Mode: CMV/AC  FiO2 (%): 30 %  Rate Set (breaths/minute): 16 breaths/min  Tidal Volume Set (mL): 500 mL  PEEP (cm H2O): 5 cmH2O  Pressure Support (cm H2O): 7 cmH2O  Oxygen Concentration (%): 30 %  Resp: 16      Recent Labs  Lab 02/27/17  2117   PH 7.36   PCO2 40   PO2 82   HCO3 22     Significant events this shift/plan: Pressure support during the day. Will cont to follow.     3/3/2017  Sarah Blancas, RRT

## 2017-03-03 NOTE — PLAN OF CARE
Problem: Goal Outcome Summary  Goal: Goal Outcome Summary  Outcome: No Change  [id number]  Neuro: Appropiate, oriented,   Penrose: small to mod amt serosanguinous, dressing changed X 2  Lungs: Suctioned oral mod amts, clear scant ETT secretions  U/O adequate.  Dtr, Ashwini updated.    Pt had mod amt loose stool.  States he has loose stools at home.  I: sent for C diff. Enteric isolation begun. Pt updated as well as son.

## 2017-03-03 NOTE — PROGRESS NOTES
CLINICAL NUTRITION SERVICES - REASSESSMENT NOTE      Recommendations Ordered by Registered Dietitian (RD):   Increase TF slightly Promote with Fiber to 65 mL/hr = 1560 kcals (25 kcal/kg), 98 gm pro (1.6 gm/kg and 103% pro needs), 1295 mL H20, 22 gm fiber        EVALUATION OF PROGRESS TOWARD GOALS   Diet:  NPO on vent    Nutrition Support:  The TF was started on 2/28 at 20 mL/hr and increased every 8 hrs by 20 mL to goal 60 mL/hr (achieved 3/2 at 1400).      Nutrition Support Enteral:  Type of Feeding Tube:  NG  Enteral Frequency:  Continuous  Enteral Regimen:  Promote with Fiber at 60 mL/hr   Total Enteral Provisions:  1440 kcal (23 kcal per kg and 93% energy needs), 91 g protein (1.4 g per kg), 199 g CHO, 20 g fiber, 1195 mL H2O  Free Water Flush:  200 mL every 4 hrs    The Propofol was discontinued on 3/2.    Intake/Tolerance:    BM x2 on 3/2.  GRV = 0.  Labs noted, acceptable.  BGM:  177, 131, 139, 135, 125, 126 - acceptable.  Pt on Med Res SSI for glycemic control.  Wt:  67 kg (3/2) - Up 4.4 kg since admission.    Dosing Weight 62.6 kg (admit wt)     ASSESSED NUTRITION NEEDS:  Estimated Energy Needs: 9903-0269 kcals (25-30 Kcal/Kg)  Justification: maintenance  Estimated Protein Needs: 75-95 grams protein (1.2-1.5 g pro/Kg)  Justification: post-op and hypercatabolism with acute illness    NEW FINDINGS:   The IVF at 75 mL/hr was discontinued on 3/1.  Per MD, extubate if/when OK with surgeons. If no improvement soon may need a temporary tracheostomy.  Plan repeat CT today.    Previous Goals (2/28):   Promote with Fiber at 60 mL/hr + Propofol will meet % needs   Evaluation: Met    Previous Nutrition Diagnosis (2/28):   Inadequate protein-energy intake related to NPO, TF to start today as evidenced by meeting 0% protein and 6% energy needs via Propofol    Evaluation:  Resolved    CURRENT NUTRITION DIAGNOSIS  Inadequate energy intake related to low TF rate as evidenced by TF meeting lower end of range (93%) for  estimated energy needs    INTERVENTIONS  Recommendations / Nutrition Prescription  Increase TF Promote with Fiber to 65 mL/hr = 1560 kcals (25 kcal/kg), 98 gm pro (1.6 gm/kg and 103% pro needs), 1295 mL H20, 22 gm fiber     Implementation  Collaboration and Referral of Nutrition care - Discussed pt during ICU interdisciplinary rounds this morning.  EN Schedule - Entered order in EPIC to increase TF rate slightly as above.    Goals  TF Promote with Fiber at 65 mL/hr will meet % estimated needs.    MONITORING AND EVALUATION:  Progress towards goals will be monitored and evaluated per protocol and Practice Guidelines    Elizabeth Ga, RD, LD, CNSC

## 2017-03-03 NOTE — PROGRESS NOTES
[facility]  Infectious Disease Progress Note          Assessment and Plan:   IMPRESSION:   1. A 81-year-old male with coronary artery disease   2. Carcinoma of the prostate.   3. Admitted on this occasion with left facial swelling.  S/p I and D 2/27 with drainage of L submandibular and lateral pharyngeal space abscesses.  Surgical cx with Strep constallatus and coag neg Staph.  Suspect the formal is the significant organism.  Clinically improving.  Blood cxs improving.      RECOMMENDATIONS:   1. Cont  IV Zosyn for broad coverage, including the Strep   2. On vent for airway protection.  3. To have repeat CT today.            Interval History:   Remains on vent.  Afebrile.  Blood and surgical cxs as above.  Decreased swelling and discomfort.  Cxs as above.              Medications:       iopamidol  80 mL Intravenous Once     sodium chloride 0.9 %  60 mL Intravenous Once     rantidine  150 mg Oral BID     insulin aspart  1-6 Units Subcutaneous Q4H     lisinopril  2.5 mg Oral Daily     carvedilol  3.125 mg Oral BID w/meals     heparin  5,000 Units Subcutaneous Q8H     lidocaine   Topical Once     sodium chloride (PF)  3 mL Intracatheter Q8H     chlorhexidine  15 mL Mouth/Throat Q12H     piperacillin-tazobactam  3.375 g Intravenous Q6H                  Physical Exam:   Blood pressure 127/61, pulse 91, temperature 99.6  F (37.6  C), temperature source Oral, resp. rate 19, weight 67 kg (147 lb 11.3 oz), SpO2 97 %.  [unfilled]  Vital Signs with Ranges  Temp:  [97.1  F (36.2  C)-99.6  F (37.6  C)] 99.6  F (37.6  C)  Heart Rate:  [] 73  Resp:  [9-31] 19  BP: (100-153)/(51-97) 127/61  FiO2 (%):  [30 %] 30 %  SpO2:  [95 %-100 %] 97 %    Constitutional: Sedated.  On vent.   Lungs: Clear to auscultation bilaterally, no crackles or wheezing   Cardiovascular: Regular rate and rhythm, normal S1 and S2, and no murmur noted   Abdomen: Normal bowel sounds, soft, non-distended, non-tender   Skin: No rashes,  no cyanosis, no edema   Other: L submandibular swelling.          Data:   All microbiology laboratory data reviewed.  Recent Labs   Lab Test  03/02/17   0520  03/01/17   0425  02/28/17   0625   WBC  7.9  9.9  13.2*   HGB  10.0*  10.9*  11.0*   HCT  28.3*  30.4*  30.8*   MCV  90  91  90   PLT  207  179  159     Recent Labs   Lab Test  03/03/17   0415  03/02/17   0520  03/01/17   0425   CR  0.68  0.66  0.73     No lab results found.

## 2017-03-04 ENCOUNTER — APPOINTMENT (OUTPATIENT)
Dept: PHYSICAL THERAPY | Facility: CLINIC | Age: 82
DRG: 854 | End: 2017-03-04
Attending: HOSPITALIST
Payer: COMMERCIAL

## 2017-03-04 PROBLEM — A49.8 FUSOBACTERIUM INFECTION: Status: ACTIVE | Noted: 2017-03-04

## 2017-03-04 PROBLEM — A49.1 INFECTION DUE TO SPECIES OF STREPTOCOCCUS MILLERI GROUP: Status: ACTIVE | Noted: 2017-03-04

## 2017-03-04 LAB
ANION GAP SERPL CALCULATED.3IONS-SCNC: 8 MMOL/L (ref 3–14)
BUN SERPL-MCNC: 16 MG/DL (ref 7–30)
CALCIUM SERPL-MCNC: 7.9 MG/DL (ref 8.5–10.1)
CHLORIDE SERPL-SCNC: 100 MMOL/L (ref 94–109)
CO2 SERPL-SCNC: 30 MMOL/L (ref 20–32)
CREAT SERPL-MCNC: 0.66 MG/DL (ref 0.66–1.25)
GFR SERPL CREATININE-BSD FRML MDRD: ABNORMAL ML/MIN/1.7M2
GLUCOSE BLDC GLUCOMTR-MCNC: 114 MG/DL (ref 70–99)
GLUCOSE BLDC GLUCOMTR-MCNC: 116 MG/DL (ref 70–99)
GLUCOSE BLDC GLUCOMTR-MCNC: 127 MG/DL (ref 70–99)
GLUCOSE BLDC GLUCOMTR-MCNC: 129 MG/DL (ref 70–99)
GLUCOSE BLDC GLUCOMTR-MCNC: 144 MG/DL (ref 70–99)
GLUCOSE SERPL-MCNC: 115 MG/DL (ref 70–99)
POTASSIUM SERPL-SCNC: 3.6 MMOL/L (ref 3.4–5.3)
SODIUM SERPL-SCNC: 138 MMOL/L (ref 133–144)

## 2017-03-04 PROCEDURE — 25000128 H RX IP 250 OP 636: Performed by: INTERNAL MEDICINE

## 2017-03-04 PROCEDURE — 94003 VENT MGMT INPAT SUBQ DAY: CPT

## 2017-03-04 PROCEDURE — 25000132 ZZH RX MED GY IP 250 OP 250 PS 637: Performed by: INTERNAL MEDICINE

## 2017-03-04 PROCEDURE — 40000193 ZZH STATISTIC PT WARD VISIT: Performed by: PHYSICAL THERAPIST

## 2017-03-04 PROCEDURE — 80048 BASIC METABOLIC PNL TOTAL CA: CPT | Performed by: INTERNAL MEDICINE

## 2017-03-04 PROCEDURE — 27210429 ZZH NUTRITION PRODUCT INTERMEDIATE LITER

## 2017-03-04 PROCEDURE — 00000146 ZZHCL STATISTIC GLUCOSE BY METER IP

## 2017-03-04 PROCEDURE — 99291 CRITICAL CARE FIRST HOUR: CPT | Performed by: INTERNAL MEDICINE

## 2017-03-04 PROCEDURE — 97530 THERAPEUTIC ACTIVITIES: CPT | Mod: GP | Performed by: PHYSICAL THERAPIST

## 2017-03-04 PROCEDURE — 20000003 ZZH R&B ICU

## 2017-03-04 PROCEDURE — 40000275 ZZH STATISTIC RCP TIME EA 10 MIN

## 2017-03-04 PROCEDURE — 36415 COLL VENOUS BLD VENIPUNCTURE: CPT | Performed by: INTERNAL MEDICINE

## 2017-03-04 PROCEDURE — 97116 GAIT TRAINING THERAPY: CPT | Mod: GP | Performed by: PHYSICAL THERAPIST

## 2017-03-04 PROCEDURE — 25000132 ZZH RX MED GY IP 250 OP 250 PS 637: Performed by: HOSPITALIST

## 2017-03-04 RX ORDER — FUROSEMIDE 10 MG/ML
20 INJECTION INTRAMUSCULAR; INTRAVENOUS DAILY
Status: DISCONTINUED | OUTPATIENT
Start: 2017-03-04 | End: 2017-03-09

## 2017-03-04 RX ORDER — ATORVASTATIN CALCIUM 20 MG/1
40 TABLET, FILM COATED ORAL DAILY
Status: DISCONTINUED | OUTPATIENT
Start: 2017-03-04 | End: 2017-03-10 | Stop reason: HOSPADM

## 2017-03-04 RX ADMIN — RANITIDINE HYDROCHLORIDE 150 MG: 150 SOLUTION ORAL at 20:44

## 2017-03-04 RX ADMIN — CHLORHEXIDINE GLUCONATE 15 ML: 1.2 RINSE ORAL at 08:55

## 2017-03-04 RX ADMIN — CARVEDILOL 3.12 MG: 3.12 TABLET, FILM COATED ORAL at 08:55

## 2017-03-04 RX ADMIN — HEPARIN SODIUM 5000 UNITS: 10000 INJECTION, SOLUTION INTRAVENOUS; SUBCUTANEOUS at 01:30

## 2017-03-04 RX ADMIN — RANITIDINE HYDROCHLORIDE 150 MG: 150 SOLUTION ORAL at 08:55

## 2017-03-04 RX ADMIN — PIPERACILLIN AND TAZOBACTAM 3.38 G: 3; .375 INJECTION, POWDER, FOR SOLUTION INTRAVENOUS at 15:01

## 2017-03-04 RX ADMIN — ATORVASTATIN CALCIUM 40 MG: 20 TABLET, FILM COATED ORAL at 12:27

## 2017-03-04 RX ADMIN — PIPERACILLIN AND TAZOBACTAM 3.38 G: 3; .375 INJECTION, POWDER, FOR SOLUTION INTRAVENOUS at 20:30

## 2017-03-04 RX ADMIN — PIPERACILLIN AND TAZOBACTAM 3.38 G: 3; .375 INJECTION, POWDER, FOR SOLUTION INTRAVENOUS at 08:53

## 2017-03-04 RX ADMIN — FUROSEMIDE 20 MG: 10 INJECTION, SOLUTION INTRAVENOUS at 10:09

## 2017-03-04 RX ADMIN — CARVEDILOL 3.12 MG: 3.12 TABLET, FILM COATED ORAL at 18:23

## 2017-03-04 RX ADMIN — LISINOPRIL 2.5 MG: 2.5 TABLET ORAL at 08:55

## 2017-03-04 RX ADMIN — PIPERACILLIN AND TAZOBACTAM 3.38 G: 3; .375 INJECTION, POWDER, FOR SOLUTION INTRAVENOUS at 01:30

## 2017-03-04 RX ADMIN — CHLORHEXIDINE GLUCONATE 15 ML: 1.2 RINSE ORAL at 20:30

## 2017-03-04 RX ADMIN — HEPARIN SODIUM 5000 UNITS: 10000 INJECTION, SOLUTION INTRAVENOUS; SUBCUTANEOUS at 18:19

## 2017-03-04 RX ADMIN — HEPARIN SODIUM 5000 UNITS: 10000 INJECTION, SOLUTION INTRAVENOUS; SUBCUTANEOUS at 10:10

## 2017-03-04 NOTE — PLAN OF CARE
Problem: Goal Outcome Summary  Goal: Goal Outcome Summary  PT: SBA and line management required for bed mobility and sit<>stand transfers. Pt ambulated on pressure support, use of mobility cart-fast pace, multiple cues to slow for safety. Ambulated 300+ feet, Min A for a single LOB-again cues for slowing pace provided.Currently recommend ARU to improve activity tolerance, strength and independence but may be able to discharge to home with assist as needed depending on length of stay as he already needs little assist for mobility.

## 2017-03-04 NOTE — PROGRESS NOTES
[facility] ICU RESPIRATORY NOTE      Date of Admission: 2/27/17  Date of Intubation (most recent): 2/28/17  Reason for Mechanical Ventilation: AW protection   Number of Days on Mechanical Ventilation: 5  Met Criteria for Pressure Support Trial: Yes  Significant events this shift: No resp complication overnight, pt was on PS till 2:00 am.  Plan: RT will continue to monitor the pt and assess daily.     Millre Cao RT.

## 2017-03-04 NOTE — PROGRESS NOTES
[facility]: Comprehensive   Intensive Care Daily Note          Assessment and Plan:     Summary Statement:  Oscar Chaudhary is a 81 year old male admitted on 2017 for left facial abscess. 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    Bowel regimen     Renal, Fluid and Electrolytes:   FLORENCE resolved    Electrolyte replacement protocol     Infectious Disease:   Abscess; OMFS following.     Fusobacterium, strep constellatus, CNS, beta hemolytic strep. on pip/tazo. appropriate but awaiting final susceptibility testing on fusobacterium.    ID previously following; will re-contact with final culture results.     Hematology and Oncology:   Repeat CBC tomorrow       Endocrinology:   Glucose OK, not needing supplemental insulin.     Intensive Care: Central line: No central line present  Arterial line: No arterial line present  Restraint:pt calm and cooperative, restraints if needed   Others:   Skin: dressings in place. Defer management of dressings and drains to OMFS.    FASTHUG addressed as appropriate     Top 3 main goals for today Walk in halls  Drain removal per OMFS  Pressure support during the day       Billin min spent for critical care exclusive of time for procedure           Hospital Course: date:  procedure, complication, diagnosis, treatment       Admitted  with L facial abscess, started on Pip/Tazo    I&D on  2/27, left intubated following procedure    Low UOP initially, cr stable    Repeat CT soft tissues 3/3 with persistent swelling          Key events/ Interval history - last 24 hours:    CT repeated with persistent swelling            Problem list:     Facial abscess    CAD    Cystic lesion Left Mandibular body    Severe oropharyngeal swelling            Medications:     I have reviewed this patient's current medications  Current Facility-Administered Medications Ordered in Epic   Medication Dose Route Frequency Last Rate Last Dose     furosemide (LASIX) injection 20 mg  20 mg Intravenous Daily   20 mg at 03/04/17 1009     atorvastatin (LIPITOR) tablet 40 mg  40 mg Oral Daily         fentaNYL Citrate (PF) (SUBLIMAZE) injection 25 mcg  25 mcg Intravenous Q1H PRN         ranitidine (Zantac) syrup 150 mg  150 mg Oral BID   150 mg at 03/04/17 0855     glucose 40 % gel 15-30 g  15-30 g Oral Q15 Min PRN        Or     dextrose 50 % injection 25-50 mL  25-50 mL Intravenous Q15 Min PRN        Or     glucagon injection 1 mg  1 mg Subcutaneous Q15 Min PRN         insulin aspart (NovoLOG) inj (RAPID ACTING)  1-6 Units Subcutaneous Q4H   1 Units at 03/01/17 1247     lisinopril (PRINIVIL/Zestril) tablet 2.5 mg  2.5 mg Oral Daily   2.5 mg at 03/04/17 0855     carvedilol (COREG) tablet 3.125 mg  3.125 mg Oral BID w/meals   3.125 mg at 03/04/17 0855     heparin sodium PF injection 5,000 Units  5,000 Units Subcutaneous Q8H   5,000 Units at 03/04/17 1010     propofol (DIPRIVAN) infusion  5-50 mcg/kg/min Intravenous Continuous   Stopped at 03/02/17 0600     dextrose 10 % 1,000 mL infusion   Intravenous Continuous PRN         lidocaine (XYLOCAINE) 2 % topical gel   Topical Once         lidocaine 1 % 1 mL  1 mL Other Q1H PRN         lidocaine (LMX4) cream   Topical Q1H PRN         sodium chloride (PF) 0.9% PF flush 3 mL  3 mL Intracatheter Q1H PRN         sodium chloride (PF) 0.9% PF flush 3 mL  3 mL Intracatheter Q8H   3 mL at 03/01/17  0405     acetaminophen (TYLENOL) tablet 650 mg  650 mg Oral Q4H PRN         acetaminophen (TYLENOL) Suppository 650 mg  650 mg Rectal Q4H PRN         HYDROcodone-acetaminophen (NORCO) 5-325 MG per tablet 1-2 tablet  1-2 tablet Oral Q4H PRN         senna-docusate (SENOKOT-S;PERICOLACE) 8.6-50 MG per tablet 1-2 tablet  1-2 tablet Oral BID PRN         polyethylene glycol (MIRALAX/GLYCOLAX) Packet 17 g  17 g Oral Daily PRN         bisacodyl (DULCOLAX) Suppository 10 mg  10 mg Rectal Daily PRN         ondansetron (ZOFRAN-ODT) ODT tab 4 mg  4 mg Oral Q6H PRN        Or     ondansetron (ZOFRAN) injection 4 mg  4 mg Intravenous Q6H PRN         naloxone (NARCAN) injection 0.1-0.4 mg  0.1-0.4 mg Intravenous Q2 Min PRN         chlorhexidine (PERIDEX) 0.12 % solution 15 mL  15 mL Mouth/Throat Q12H   15 mL at 03/04/17 0855     albuterol (PROAIR HFA/PROVENTIL HFA/VENTOLIN HFA) Inhaler 6 puff  6 puff Inhalation Q4H PRN         piperacillin-tazobactam (ZOSYN) 3.375 g vial to attach to  mL bag  3.375 g Intravenous Q6H   3.375 g at 03/04/17 0853     No current [facility]-ordered outpatient prescriptions on file.               Physical Exam:     Patient Vitals for the past 24 hrs:   BP Temp Temp src Heart Rate Resp SpO2 Weight   03/04/17 1000 116/56 - - 63 21 97 % -   03/04/17 0900 130/58 - - 75 11 97 % -   03/04/17 0800 117/53 99.1  F (37.3  C) Oral 71 22 97 % -   03/04/17 0730 131/58 - - 65 - 97 % -   03/04/17 0645 118/56 - - 62 19 97 % -   03/04/17 0600 (!) 87/62 - - 64 16 96 % -   03/04/17 0500 (!) 103/36 - - 60 16 98 % -   03/04/17 0400 102/40 98  F (36.7  C) - 59 16 98 % 64.6 kg (142 lb 6.7 oz)   03/04/17 0300 118/53 - - 65 16 97 % -   03/04/17 0200 116/54 - - 61 11 97 % -   03/04/17 0100 129/58 - - 68 21 97 % -   03/04/17 0000 116/60 - - 67 12 97 % -   03/03/17 2300 118/60 - - 77 24 - -   03/03/17 2200 126/57 - - 71 19 - -   03/03/17 2100 118/57 98.8  F (37.1  C) Oral 77 20 97 % -   03/03/17 2000 127/63 - - 74 20 98 % -    17 1900 139/63 - - 73 28 98 % -   17 1800 127/73 - - 69 19 96 % -   17 1700 122/79 - - 76 30 98 % -   17 1600 123/58 - - 69 (!) 31 99 % -   17 1500 (!) 137/107 - - 79 23 99 % -   17 1400 120/51 - - 63 22 98 % -   17 1300 133/64 - - 75 20 99 % -   17 1200 130/66 99.7  F (37.6  C) Oral 71 (!) 42 97 % -   17 1114 - - - - - 98 % -   17 1100 132/65 100.7  F (38.2  C) Oral 78 25 100 % -       Vital Sign Ranges  Temperature Temp  Av.3  F (37.4  C)  Min: 98  F (36.7  C)  Max: 100.7  F (38.2  C)   Blood pressure Systolic (24hrs), Av , Min:87 , Max:139        Diastolic (24hrs), Av, Min:36, Max:107      Pulse No Data Recorded   Respirations Resp  Av.3  Min: 11  Max: 42   Pulse oximetry SpO2  Av.7 %  Min: 96 %  Max: 100 %         Intake/Output Summary (Last 24 hours) at 17 0902  Last data filed at 17 0800   Gross per 24 hour   Intake             2180 ml   Output             2800 ml   Net             -620 ml         General Appearance:   Comfortable, no pain or respiratory distress   HEENT:   Endotrachael tube is present  Feeding tube is present  Dressing in place over left mandible and upper neck   Chest and Lungs:   Symmetrical chest shape and movements with each tidal breath  Symmetrical and normal breath sounds, no wheeze, ronchi, crackles, rub or bronchial breath sounds   Cardiovascular:   Regular rate and rhythm  Normal S1,S2, and no gallop, rub or murmur   Abdomen:   Non-distended, soft, normal bowel sounds   :   Zuniga in place   Lymphatics:   Lymph nodes not enlarged   Musculoskeletal:   Full range of motion  No edema   Extremities and Skin:   Skin is intact  Warm, well perfused   Neuro:   Alert and oriented x 3   Dressings:   Wound covered by dressing: left jaw/neck   Drains and Tubes:   Drains in place    Intravascular Access and Device:   No central lines or devices in place   Additional exam findings:   None              Data:   All lab results reviewed past 24 hours  All imaging results reviewed past 24 hours  All cardiac studies reviewed by me.  All imaging studies reviewed by me.

## 2017-03-04 NOTE — PLAN OF CARE
Problem: Goal Outcome Summary  Goal: Goal Outcome Summary  Outcome: Improving  Remains vented with no sedation. Lungs are diminished bibasilar. Writes very legible notes. Diuresed well from lasix.  Walked length of ramirez on port vent w RT, PT and nurse.  Vaibhav well.  Also sat up in chair this afternoon for 1 hour.  VSS.  Await swelling to go down so can be extubated.

## 2017-03-04 NOTE — PROGRESS NOTES
Patient walk a round trip in ICU with a ventilator at 10:30 aM. We use transport vent for a walk and tolerated well.  3/4/2017  Cuba Albert

## 2017-03-04 NOTE — PLAN OF CARE
Problem: Individualization  Goal: Patient Preferences  Outcome: No Change  Pt a/o, calm and cooperative and denies pain. No sedative meds or restraints necessary. VSS. On PS until 0230, then to CMV 30%, will flip back this morning for SBT with goal of possible extubation. Ls clear/coarse, moderate thin/clear oral secretions. +BS, TF at goal. 2 soft brown BM. Good UO. Shifts weight in bed, helps turn/reposition.

## 2017-03-04 NOTE — PROGRESS NOTES
ROUNDING NOTE      DATE OF VISIT:  2017      HISTORY OF PRESENT ILLNESS:  I reviewed previous notes and updated by myself on his current condition.      SUBJECTIVE:  Oscar Doshi communicates with me through writing and states that he feels great.  He feels as though it is easier to open his mouth and easier to swallow.  He has been up walking in the halls with the respirator, which is truly remarkable.      OBJECTIVE:  Vital signs and labs have been reviewed.  I did not listen to his heart and lungs today, as the intensivist has.  His neck drainage is continuing in the posterior drain and has not stopped in the anterior drain.  His left submandibular region does not feel board hard or indurated and is fairly soft, which is a very good sign.  Intraorally, I am not able to evaluate visually his pharyngeal region, but the patient describes things feel less tight.      ASSESSMENT:  The patient is making very good progress at this time.      PLAN:  From the infection perspective, it appears as though he is continuing to resolve steadily.  The concern of course is how much swelling does he have medial to the mandible and lateral to the trachea on the left side.  My recommendation would be to do a CT with contrast on Monday morning and I will predict that it will show that he will be able to be extubated on Monday.       Thank you very much.  I will continue to follow.         MARIOLA DAVE DDS             D: 2017 14:58   T: 2017 15:37   MT: TANYA      Name:     OSCAR DOSHI   MRN:      [id number]        Account:      [id number]   :      1935           Service Date: 2017      Document: [id number]

## 2017-03-05 ENCOUNTER — APPOINTMENT (OUTPATIENT)
Dept: PHYSICAL THERAPY | Facility: CLINIC | Age: 82
DRG: 854 | End: 2017-03-05
Attending: HOSPITALIST
Payer: COMMERCIAL

## 2017-03-05 LAB
BACTERIA SPEC CULT: NO GROWTH
BACTERIA SPEC CULT: NO GROWTH
ERYTHROCYTE [DISTWIDTH] IN BLOOD BY AUTOMATED COUNT: 15.2 % (ref 10–15)
GLUCOSE BLDC GLUCOMTR-MCNC: 134 MG/DL (ref 70–99)
HCT VFR BLD AUTO: 30.7 % (ref 40–53)
HGB BLD-MCNC: 10.9 G/DL (ref 13.3–17.7)
Lab: NORMAL
Lab: NORMAL
MCH RBC QN AUTO: 32.1 PG (ref 26.5–33)
MCHC RBC AUTO-ENTMCNC: 35.5 G/DL (ref 31.5–36.5)
MCV RBC AUTO: 90 FL (ref 78–100)
MICRO REPORT STATUS: NORMAL
MICRO REPORT STATUS: NORMAL
PLATELET # BLD AUTO: 278 10E9/L (ref 150–450)
RBC # BLD AUTO: 3.4 10E12/L (ref 4.4–5.9)
SPECIMEN SOURCE: NORMAL
SPECIMEN SOURCE: NORMAL
WBC # BLD AUTO: 11.1 10E9/L (ref 4–11)

## 2017-03-05 PROCEDURE — 40000275 ZZH STATISTIC RCP TIME EA 10 MIN

## 2017-03-05 PROCEDURE — 36415 COLL VENOUS BLD VENIPUNCTURE: CPT | Performed by: INTERNAL MEDICINE

## 2017-03-05 PROCEDURE — 25000132 ZZH RX MED GY IP 250 OP 250 PS 637: Performed by: HOSPITALIST

## 2017-03-05 PROCEDURE — 27210429 ZZH NUTRITION PRODUCT INTERMEDIATE LITER

## 2017-03-05 PROCEDURE — 25000128 H RX IP 250 OP 636: Performed by: INTERNAL MEDICINE

## 2017-03-05 PROCEDURE — 20000003 ZZH R&B ICU

## 2017-03-05 PROCEDURE — 00000146 ZZHCL STATISTIC GLUCOSE BY METER IP

## 2017-03-05 PROCEDURE — 40000008 ZZH STATISTIC AIRWAY CARE

## 2017-03-05 PROCEDURE — 97116 GAIT TRAINING THERAPY: CPT | Mod: GP | Performed by: PHYSICAL THERAPIST

## 2017-03-05 PROCEDURE — 40000193 ZZH STATISTIC PT WARD VISIT: Performed by: PHYSICAL THERAPIST

## 2017-03-05 PROCEDURE — 99291 CRITICAL CARE FIRST HOUR: CPT | Performed by: INTERNAL MEDICINE

## 2017-03-05 PROCEDURE — 25000132 ZZH RX MED GY IP 250 OP 250 PS 637: Performed by: INTERNAL MEDICINE

## 2017-03-05 PROCEDURE — 85027 COMPLETE CBC AUTOMATED: CPT | Performed by: INTERNAL MEDICINE

## 2017-03-05 PROCEDURE — 94003 VENT MGMT INPAT SUBQ DAY: CPT

## 2017-03-05 RX ADMIN — PIPERACILLIN AND TAZOBACTAM 3.38 G: 3; .375 INJECTION, POWDER, FOR SOLUTION INTRAVENOUS at 02:00

## 2017-03-05 RX ADMIN — HEPARIN SODIUM 5000 UNITS: 10000 INJECTION, SOLUTION INTRAVENOUS; SUBCUTANEOUS at 01:05

## 2017-03-05 RX ADMIN — HEPARIN SODIUM 5000 UNITS: 10000 INJECTION, SOLUTION INTRAVENOUS; SUBCUTANEOUS at 18:10

## 2017-03-05 RX ADMIN — PIPERACILLIN AND TAZOBACTAM 3.38 G: 3; .375 INJECTION, POWDER, FOR SOLUTION INTRAVENOUS at 14:46

## 2017-03-05 RX ADMIN — CARVEDILOL 3.12 MG: 3.12 TABLET, FILM COATED ORAL at 08:39

## 2017-03-05 RX ADMIN — ATORVASTATIN CALCIUM 40 MG: 20 TABLET, FILM COATED ORAL at 08:39

## 2017-03-05 RX ADMIN — FUROSEMIDE 20 MG: 10 INJECTION, SOLUTION INTRAVENOUS at 08:39

## 2017-03-05 RX ADMIN — PIPERACILLIN AND TAZOBACTAM 3.38 G: 3; .375 INJECTION, POWDER, FOR SOLUTION INTRAVENOUS at 08:59

## 2017-03-05 RX ADMIN — LISINOPRIL 2.5 MG: 2.5 TABLET ORAL at 08:39

## 2017-03-05 RX ADMIN — PIPERACILLIN AND TAZOBACTAM 3.38 G: 3; .375 INJECTION, POWDER, FOR SOLUTION INTRAVENOUS at 21:18

## 2017-03-05 RX ADMIN — HEPARIN SODIUM 5000 UNITS: 10000 INJECTION, SOLUTION INTRAVENOUS; SUBCUTANEOUS at 12:16

## 2017-03-05 RX ADMIN — CHLORHEXIDINE GLUCONATE 15 ML: 1.2 RINSE ORAL at 08:38

## 2017-03-05 RX ADMIN — RANITIDINE HYDROCHLORIDE 150 MG: 150 SOLUTION ORAL at 21:18

## 2017-03-05 RX ADMIN — CHLORHEXIDINE GLUCONATE 15 ML: 1.2 RINSE ORAL at 21:18

## 2017-03-05 RX ADMIN — CARVEDILOL 3.12 MG: 3.12 TABLET, FILM COATED ORAL at 18:09

## 2017-03-05 RX ADMIN — RANITIDINE HYDROCHLORIDE 150 MG: 150 SOLUTION ORAL at 08:38

## 2017-03-05 NOTE — PROGRESS NOTES
[facility] ICU RESPIRATORY NOTE  Date of Admission: 2/27/2017  Date of Intubation (most recent): 2/27/2017  Reason for Mechanical Ventilation: AW protection  Number of Days on Mechanical Ventilation: 6  Met Criteria for Pressure Support Trial: Yes  Length of Pressure Support Trial: currently on PS trial 7/5 30% since 07:30 Am    Significant Events Today: None  Ventilation Mode: CPAP/PS  FiO2 (%): 30 %  Rate Set (breaths/minute): 16 breaths/min  Tidal Volume Set (mL): 500 mL  PEEP (cm H2O): 5 cmH2O  Pressure Support (cm H2O): 7 cmH2O  Oxygen Concentration (%): 30 %  Resp: 26    ABG Results: No ABG result for today    ETT appearance on chest x-ray: ET tube in good position    Plan:  Continue PS trial as pt tolerated.  3/4/2017  Cuba Albert

## 2017-03-05 NOTE — PROGRESS NOTES
PROGRESS NOTE      DATE OF SERVICE:  2017      Chart reviewed.      SUBJECTIVE:  Oscar Doshi is feeling well.  He is not having any difficulty with jaw opening, i.e. no trismus.  He is improving his swallowing.  He feels less pressure against the trachea subjectively.      OBJECTIVE:  The left neck wound (incision and drainage site)  is still continuing to drain fairly copious serosanguineous and purulence.  There is no erythema and the submandibular space continues to soften and become less indurated all the time.      ASSESSMENT AND PLAN:  He continues to improve nicely. It is remarkable how well he is doing off of sedation with the endotracheal tube.  The decision on extubation will be made after the CT with contrast to tomorrow morning.        PLAN:  CT with contrast tomorrow morning and likely extubation at that time after discussion with the intensivist.         MARIOLA DAVE DDS             D: 2017 16:49   T: 2017 17:08   MT: NICK      Name:     OSCAR DOSHI   MRN:      [id number]        Account:      [id number]   :      1935           Service Date: 2017      Document: [id number]

## 2017-03-05 NOTE — PROGRESS NOTES
[facility] ICU RESPIRATORY NOTE  Date of Admission: 2/27/2017  Date of Intubation (most recent): 2/27/2017  Reason for Mechanical Ventilation: AW protection  Number of Days on Mechanical Ventilation: 7  Met Criteria for Pressure Support Trial: Yes  Length of Pressure Support Trial: currently on PS trial 7/5 30% since 07:30 Am 3/4/17     Significant Events Today: None  Ventilation Mode: CPAP/PS  FiO2 (%): 30 %  Rate Set (breaths/minute): 16 breaths/min  Tidal Volume Set (mL): 500 mL  PEEP (cm H2O): 5 cmH2O  Pressure Support (cm H2O): 7 cmH2O  Oxygen Concentration (%): 30 %  Resp: 26     ABG Results: No ABG result for today     ETT appearance on chest x-ray: ET tube in good position     Plan: Continue PS trial as pt tolerated.    3/5/2017  Sarah Blancas RRT

## 2017-03-05 NOTE — PROGRESS NOTES
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   Known to have a cyst in neck  Admitted now w complex infection of cyst / about neck  Intubated, s/p surgical drainage  Now doing quite well  3.4 Still on vent, but alert seems free of distress    ROS  No pain  Fully awake    Remainder of 14-point ROS was negative except as listed above    PFSH:  Personal / Family / Social Histories were reviewed and updated  nothing new      CURRENT MED REVIEWD    Prescription Medications as of 3/4/2017             LISINOPRIL PO Take 2.5 mg by mouth 2 times daily    penicillin V potassium (VEETID) 500 MG tablet Take 1 tablet (500 mg) by mouth 4 times daily for 10 days    diazepam (VALIUM) 5 MG tablet Take 1 tablet (5 mg) by mouth every 6 hours as needed for anxiety    atorvastatin (LIPITOR) 40 MG tablet Take 1 tablet (40 mg) by mouth daily    atenolol (TENORMIN) 25 MG tablet Take 1 tablet (25 mg) by mouth daily    spironolactone (ALDACTONE) 25 MG tablet Take 0.5 tablets (12.5 mg) by mouth daily    temazepam (RESTORIL) 15 MG capsule Take 1 capsule (15 mg) by mouth nightly as needed    aspirin 81 MG tablet Take 1 tablet by mouth daily.    fish oil-omega-3 fatty acids (FISH OIL) 1000 MG capsule Take 1 g by mouth daily.    Multiple Vitamins-Minerals (MULTIVITAL PO) Take 1 tablet by mouth daily.    Cholecalciferol (VITAMIN D) 1000 UNITS capsule Take 1 capsule by mouth daily.      Facility Administered Medications as of 3/4/2017             furosemide (LASIX) injection 20 mg Inject 2 mLs (20 mg) into the vein daily    atorvastatin (LIPITOR) tablet 40 mg Take 2 tablets (40 mg) by mouth daily    fentaNYL Citrate (PF) (SUBLIMAZE) injection 25 mcg Inject 0.5 mLs (25 mcg) into the vein  "every hour as needed for moderate to severe pain    ranitidine (Zantac) syrup 150 mg Take 10 mLs (150 mg) by mouth 2 times daily    glucose 40 % gel 15-30 g Take 15-30 g by mouth every 15 minutes as needed for low blood sugar    Linked Group 1:  \"Or\" Linked Group Details     dextrose 50 % injection 25-50 mL Inject 25-50 mLs into the vein every 15 minutes as needed for low blood sugar    Linked Group 1:  \"Or\" Linked Group Details     glucagon injection 1 mg Inject 1 mg Subcutaneous every 15 minutes as needed for low blood sugar (May repeat x 1 only)    Linked Group 1:  \"Or\" Linked Group Details     insulin aspart (NovoLOG) inj (RAPID ACTING) Inject 1-6 Units Subcutaneous every 4 hours    lisinopril (PRINIVIL/Zestril) tablet 2.5 mg Take 1 tablet (2.5 mg) by mouth daily    carvedilol (COREG) tablet 3.125 mg Take 1 tablet (3.125 mg) by mouth 2 times daily (with meals)    heparin sodium PF injection 5,000 Units Inject 0.5 mLs (5,000 Units) Subcutaneous every 8 hours    propofol (DIPRIVAN) infusion Inject 313-3,130 mcg/min into the vein continuous    dextrose 10 % 1,000 mL infusion Inject into the vein continuous prn (Hypoglycemia prevention)    lidocaine (XYLOCAINE) 2 % topical gel Apply topically once    lidocaine 1 % 1 mL 1 mL by Other route every hour as needed (mild pain with VAD insertion or accessing implanted port)    lidocaine (LMX4) cream Apply topically every hour as needed for pain (with VAD insertion or accessing implanted port.)    sodium chloride (PF) 0.9% PF flush 3 mL 3 mLs by Intracatheter route every hour as needed for line flush (for peripheral IV flush post IV meds)    sodium chloride (PF) 0.9% PF flush 3 mL 3 mLs by Intracatheter route every 8 hours    acetaminophen (TYLENOL) tablet 650 mg Take 2 tablets (650 mg) by mouth every 4 hours as needed for mild pain    acetaminophen (TYLENOL) Suppository 650 mg Place 1 suppository (650 mg) rectally every 4 hours as needed for mild pain    " "HYDROcodone-acetaminophen (NORCO) 5-325 MG per tablet 1-2 tablet Take 1-2 tablets by mouth every 4 hours as needed for moderate to severe pain    senna-docusate (SENOKOT-S;PERICOLACE) 8.6-50 MG per tablet 1-2 tablet Take 1-2 tablets by mouth 2 times daily as needed (constipation )    polyethylene glycol (MIRALAX/GLYCOLAX) Packet 17 g Take 17 g by mouth daily as needed for constipation    bisacodyl (DULCOLAX) Suppository 10 mg Place 1 suppository (10 mg) rectally daily as needed for constipation    ondansetron (ZOFRAN-ODT) ODT tab 4 mg Take 1 tablet (4 mg) by mouth every 6 hours as needed for nausea    Linked Group 2:  \"Or\" Linked Group Details     ondansetron (ZOFRAN) injection 4 mg Inject 2 mLs (4 mg) into the vein every 6 hours as needed for nausea or vomiting    Linked Group 2:  \"Or\" Linked Group Details     naloxone (NARCAN) injection 0.1-0.4 mg Inject 0.25-1 mLs (0.1-0.4 mg) into the vein every 2 minutes as needed for opioid reversal    chlorhexidine (PERIDEX) 0.12 % solution 15 mL Take 15 mLs by mouth every 12 hours    albuterol (PROAIR HFA/PROVENTIL HFA/VENTOLIN HFA) Inhaler 6 puff Inhale 6 puffs into the lungs every 4 hours as needed for shortness of breath / dyspnea    piperacillin-tazobactam (ZOSYN) 3.375 g vial to attach to  mL bag Inject 3.375 g into the vein every 6 hours          Vital Signs: /68  Pulse 91  Temp 97.2  F (36.2  C) (Axillary)  Resp 26  Wt 64.6 kg (142 lb 6.7 oz)  SpO2 97%  BMI 25.23 kg/m2  EXAM    general   In bed / on vent watching TV     neuro   Animated  Able to write legibly on paper  Moves all limbs  Appropriate communcations     lungs   clear     HEENT   Wrap under jaw  Oral ETT  NG     skin   normal     abd   soft       Data  Cultures    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    LABS  Lab Results   Component Value Date/Time    WBC 7.9 03/02/2017 05:20 AM    WBC 9.9 03/01/2017 04:25 AM    WBC 13.2 (H) 02/28/2017 06:25 AM    WBC 20.4 (H) " "02/27/2017 12:28 PM    AST 21 02/27/2017 12:28 PM    AST 27 06/14/2016 09:19 AM    AST 16 05/11/2015 08:47 AM    AST 27 10/07/2013 01:38 PM    ALT 24 02/27/2017 12:28 PM    ALT 26 06/14/2016 09:19 AM    ALT 24 05/11/2015 08:47 AM    ALT 39 10/07/2013 01:38 PM    ALKPHOS 72 02/27/2017 12:28 PM    ALKPHOS 56 06/14/2016 09:19 AM    ALKPHOS 61 05/11/2015 08:47 AM    ALKPHOS 50 10/07/2013 01:38 PM           ASSESSMENT & SUGGESTIONS    (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

## 2017-03-05 NOTE — PLAN OF CARE
Problem: Individualization  Goal: Patient Preferences  Outcome: No Change  Pt remains a/o, denies pain. VSS, on PS 7/5 through night without complaint. Good UO, TF at goal via keofeed tube. Up to BSC with assist of 1 for BM. Steady with RN and walker.

## 2017-03-05 NOTE — PROGRESS NOTES
[facility]: Comprehensive   Intensive Care Daily Note          Assessment and Plan:     Summary Statement:  Oscar Chaudhary is a 81 year old male admitted on 2017 for Left Facial abscess. My assessment and plan for this patient is as follows:    Neurology:   Doing very well off all sedation, RASS 0      Propofol available if needed    Encourage sleep/wake cycle (no reported issues currently but takes temazepam at home)     Cardiovascular/Hemodynamics:   CAD and known AAA (4.2x4.3)    Holding home ASA    On Lisinopril; home atenolol changed to carvedilol while in-house)    Atorvastatin    Furosemide 20IV yesterday with I/O even, will increase to BID with goal net negative 500mL.     Pulmonary:   PS during day, can resume AC at night    Not a candidate for extubation given persistent oropharyngeal edema    Repeat CT neck on Monday to assess      GI and Nutrition:   On TF    Bowel Regimen     Renal, Fluid and Electrolytes:   FLORENCE resolved    Continue furosemide, increase dose to BID with goal net negative 500mL    Electrolyte replacement protocol.     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     ID following; continue current antibiotics (?change to amox/clav in the future)    Source control/management per OMS    Repeat CT neck/soft tissue on Monday (with contrast).      Hematology and Oncology:   Persistent/mild leukocytosis; cell lines stable.       Endocrinology:   Glucose within normal limits, d/c capillary blood glucose checks.     Intensive Care: Central line: No central line present  Arterial line: No arterial line present  Restraint:Not needed   Others:   Skin: wound/abscess management as above    FASTHUG addressed as appropriate. H2 blocker for stress ulcer prophylaxis   Top 3 main goals for today Walk in halls  Coffee per pt wishes  Continue PS during the day       Billin min spent for critical care exclusive of time for procedure            Hospital Course: date:  procedure, complication, diagnosis, treatment       Admitted  with L facial/submandibular abscess    Started on pip/tazo    I&D , left intubated following procedure    Low UOP, resolved    Repeat CT soft tissue 3/3 with persistent displacement and airway edema    Walked in hallway 3/4         Key events/ Interval history - last 24 hours:    Walked in hallway  Was appreciative of coffee down NGT            Problem list:     Facial abscess    CAD    AAA    Cystic lesion left mandibular body    Severe oropharyngeal swelling.            Medications:   I have reviewed this patient's current medications            Physical Exam:   Blood pressure 127/85, pulse 91, temperature 99.5  F (37.5  C), temperature source Oral, resp. rate 20, weight 64.6 kg (142 lb 6.7 oz), SpO2 98 %.      Vital Sign Ranges  Temperature Temp  Av.6  F (37  C)  Min: 97.2  F (36.2  C)  Max: 99.5  F (37.5  C)   Blood pressure Systolic (24hrs), Av , Min:105 , Max:141        Diastolic (24hrs), Av, Min:49, Max:91      Pulse No Data Recorded   Respirations Resp  Av.1  Min: 12  Max: 33   Pulse oximetry SpO2  Av.8 %  Min: 95 %  Max: 98 %         Intake/Output Summary (Last 24 hours) at 17 0926  Last data filed at 17 0800   Gross per 24 hour   Intake             3205 ml   Output             4055 ml   Net             -850 ml         General Appearance:   Comfortable, no pain or respiratory distress   HEENT:   Endotrachael tube is present  Nasogastic tube is present  Improvement in left facial swelling compared to yesterday   Chest and Lungs:   Symmetrical chest shape and movements with each tidal breath  Symmetrical and normal breath sounds, no wheeze, ronchi, crackles, rub or bronchial breath sounds   Cardiovascular:   Regular rate and rhythm  Normal S1,S2, and no gallop, rub or murmur   Abdomen:   Non-distended, soft, normal bowel sounds   :   Zuniga in place   Lymphatics:   Lymph nodes not  enlarged   Musculoskeletal:   Full range of motion   Extremities and Skin:   Skin is intact  Warm, well perfused   Neuro:   Alert and oriented x 3  Moves all extremities spontaneously, symmetrically and appropriately   Dressings:   Wound covered by dressing: left submandibular/neck   Drains and Tubes:   No drains or tubes present   Intravascular Access and Device:   No central lines or devices in place   Additional exam findings:   None             Data:   All lab results reviewed past 24 hours  All imaging results reviewed past 24 hours  All cardiac studies reviewed by me.  All imaging studies reviewed by me.

## 2017-03-05 NOTE — PLAN OF CARE
Problem: Goal Outcome Summary  Goal: Goal Outcome Summary  Outcome: Improving  Remains alert and oriented.  Vented per ETTube. On PS/CPAP trial.  No sedation & denies pain.  Left neck still slightly edematous.  Good diuresis from lasix.  Walked in ramirez on portable vent w RN, PT, RT.  Vaibhav well.  Plan to have CT scan of neck in AM to decide when to dc ETT.

## 2017-03-06 ENCOUNTER — APPOINTMENT (OUTPATIENT)
Dept: PHYSICAL THERAPY | Facility: CLINIC | Age: 82
DRG: 854 | End: 2017-03-06
Attending: HOSPITALIST
Payer: COMMERCIAL

## 2017-03-06 ENCOUNTER — APPOINTMENT (OUTPATIENT)
Dept: CT IMAGING | Facility: CLINIC | Age: 82
DRG: 854 | End: 2017-03-06
Attending: INTERNAL MEDICINE
Payer: COMMERCIAL

## 2017-03-06 LAB
ANION GAP SERPL CALCULATED.3IONS-SCNC: 5 MMOL/L (ref 3–14)
BUN SERPL-MCNC: 16 MG/DL (ref 7–30)
CALCIUM SERPL-MCNC: 8.3 MG/DL (ref 8.5–10.1)
CHLORIDE SERPL-SCNC: 97 MMOL/L (ref 94–109)
CO2 SERPL-SCNC: 31 MMOL/L (ref 20–32)
CREAT SERPL-MCNC: 0.79 MG/DL (ref 0.66–1.25)
ERYTHROCYTE [DISTWIDTH] IN BLOOD BY AUTOMATED COUNT: 15.4 % (ref 10–15)
GFR SERPL CREATININE-BSD FRML MDRD: ABNORMAL ML/MIN/1.7M2
GLUCOSE SERPL-MCNC: 125 MG/DL (ref 70–99)
HCT VFR BLD AUTO: 30.9 % (ref 40–53)
HGB BLD-MCNC: 11 G/DL (ref 13.3–17.7)
MAGNESIUM SERPL-MCNC: 2.5 MG/DL (ref 1.6–2.3)
MCH RBC QN AUTO: 32.3 PG (ref 26.5–33)
MCHC RBC AUTO-ENTMCNC: 35.6 G/DL (ref 31.5–36.5)
MCV RBC AUTO: 91 FL (ref 78–100)
PHOSPHATE SERPL-MCNC: 3.6 MG/DL (ref 2.5–4.5)
PLATELET # BLD AUTO: 295 10E9/L (ref 150–450)
POTASSIUM SERPL-SCNC: 4.1 MMOL/L (ref 3.4–5.3)
RBC # BLD AUTO: 3.41 10E12/L (ref 4.4–5.9)
SODIUM SERPL-SCNC: 133 MMOL/L (ref 133–144)
WBC # BLD AUTO: 11.5 10E9/L (ref 4–11)

## 2017-03-06 PROCEDURE — 25000132 ZZH RX MED GY IP 250 OP 250 PS 637: Performed by: HOSPITALIST

## 2017-03-06 PROCEDURE — 40000008 ZZH STATISTIC AIRWAY CARE

## 2017-03-06 PROCEDURE — 25500064 ZZH RX 255 OP 636: Performed by: HOSPITALIST

## 2017-03-06 PROCEDURE — 25000128 H RX IP 250 OP 636: Performed by: INTERNAL MEDICINE

## 2017-03-06 PROCEDURE — 25000132 ZZH RX MED GY IP 250 OP 250 PS 637: Performed by: INTERNAL MEDICINE

## 2017-03-06 PROCEDURE — 85027 COMPLETE CBC AUTOMATED: CPT | Performed by: HOSPITALIST

## 2017-03-06 PROCEDURE — 27210429 ZZH NUTRITION PRODUCT INTERMEDIATE LITER

## 2017-03-06 PROCEDURE — 25000125 ZZHC RX 250: Performed by: HOSPITALIST

## 2017-03-06 PROCEDURE — 70491 CT SOFT TISSUE NECK W/DYE: CPT

## 2017-03-06 PROCEDURE — 94003 VENT MGMT INPAT SUBQ DAY: CPT

## 2017-03-06 PROCEDURE — 84100 ASSAY OF PHOSPHORUS: CPT | Performed by: HOSPITALIST

## 2017-03-06 PROCEDURE — 99291 CRITICAL CARE FIRST HOUR: CPT | Performed by: INTERNAL MEDICINE

## 2017-03-06 PROCEDURE — 25000125 ZZHC RX 250: Performed by: INTERNAL MEDICINE

## 2017-03-06 PROCEDURE — 40000193 ZZH STATISTIC PT WARD VISIT: Performed by: PHYSICAL THERAPIST

## 2017-03-06 PROCEDURE — 36415 COLL VENOUS BLD VENIPUNCTURE: CPT | Performed by: HOSPITALIST

## 2017-03-06 PROCEDURE — 97110 THERAPEUTIC EXERCISES: CPT | Mod: GP | Performed by: PHYSICAL THERAPIST

## 2017-03-06 PROCEDURE — 40000281 ZZH STATISTIC TRANSPORT TIME EA 15 MIN

## 2017-03-06 PROCEDURE — 40000275 ZZH STATISTIC RCP TIME EA 10 MIN

## 2017-03-06 PROCEDURE — 80048 BASIC METABOLIC PNL TOTAL CA: CPT | Performed by: HOSPITALIST

## 2017-03-06 PROCEDURE — 83735 ASSAY OF MAGNESIUM: CPT | Performed by: HOSPITALIST

## 2017-03-06 PROCEDURE — 20000003 ZZH R&B ICU

## 2017-03-06 RX ORDER — IOPAMIDOL 755 MG/ML
80 INJECTION, SOLUTION INTRAVASCULAR ONCE
Status: COMPLETED | OUTPATIENT
Start: 2017-03-06 | End: 2017-03-06

## 2017-03-06 RX ORDER — ERTAPENEM 1 G/1
1 INJECTION, POWDER, LYOPHILIZED, FOR SOLUTION INTRAMUSCULAR; INTRAVENOUS EVERY 24 HOURS
Status: DISCONTINUED | OUTPATIENT
Start: 2017-03-06 | End: 2017-03-10 | Stop reason: HOSPADM

## 2017-03-06 RX ADMIN — RANITIDINE HYDROCHLORIDE 150 MG: 150 SOLUTION ORAL at 21:15

## 2017-03-06 RX ADMIN — LISINOPRIL 2.5 MG: 2.5 TABLET ORAL at 08:47

## 2017-03-06 RX ADMIN — ERTAPENEM SODIUM 1 G: 1 INJECTION, POWDER, LYOPHILIZED, FOR SOLUTION INTRAMUSCULAR; INTRAVENOUS at 15:49

## 2017-03-06 RX ADMIN — CARVEDILOL 3.12 MG: 3.12 TABLET, FILM COATED ORAL at 18:57

## 2017-03-06 RX ADMIN — HEPARIN SODIUM 5000 UNITS: 10000 INJECTION, SOLUTION INTRAVENOUS; SUBCUTANEOUS at 11:16

## 2017-03-06 RX ADMIN — SODIUM CHLORIDE 60 ML: 9 INJECTION, SOLUTION INTRAVENOUS at 04:42

## 2017-03-06 RX ADMIN — METRONIDAZOLE 500 MG: 500 INJECTION, SOLUTION INTRAVENOUS at 21:15

## 2017-03-06 RX ADMIN — CHLORHEXIDINE GLUCONATE 15 ML: 1.2 RINSE ORAL at 10:35

## 2017-03-06 RX ADMIN — FUROSEMIDE 20 MG: 10 INJECTION, SOLUTION INTRAVENOUS at 08:52

## 2017-03-06 RX ADMIN — CARVEDILOL 3.12 MG: 3.12 TABLET, FILM COATED ORAL at 08:47

## 2017-03-06 RX ADMIN — PIPERACILLIN AND TAZOBACTAM 3.38 G: 3; .375 INJECTION, POWDER, FOR SOLUTION INTRAVENOUS at 08:47

## 2017-03-06 RX ADMIN — CHLORHEXIDINE GLUCONATE 15 ML: 1.2 RINSE ORAL at 21:14

## 2017-03-06 RX ADMIN — HEPARIN SODIUM 5000 UNITS: 10000 INJECTION, SOLUTION INTRAVENOUS; SUBCUTANEOUS at 02:14

## 2017-03-06 RX ADMIN — METRONIDAZOLE 500 MG: 500 INJECTION, SOLUTION INTRAVENOUS at 14:27

## 2017-03-06 RX ADMIN — ATORVASTATIN CALCIUM 40 MG: 20 TABLET, FILM COATED ORAL at 08:47

## 2017-03-06 RX ADMIN — RANITIDINE HYDROCHLORIDE 150 MG: 150 SOLUTION ORAL at 08:49

## 2017-03-06 RX ADMIN — PIPERACILLIN AND TAZOBACTAM 3.38 G: 3; .375 INJECTION, POWDER, FOR SOLUTION INTRAVENOUS at 02:14

## 2017-03-06 RX ADMIN — HEPARIN SODIUM 5000 UNITS: 10000 INJECTION, SOLUTION INTRAVENOUS; SUBCUTANEOUS at 19:10

## 2017-03-06 RX ADMIN — IOPAMIDOL 80 ML: 755 INJECTION, SOLUTION INTRAVENOUS at 04:42

## 2017-03-06 NOTE — PLAN OF CARE
Problem: Goal Outcome Summary  Goal: Goal Outcome Summary  Outcome: Improving  Slight improvement, swelling less but still no audible air leak.  Comfortable on PS, ambulated hallway without effort or SOB.  Lo grade temp slowly rising, Dr. Hugo aware.

## 2017-03-06 NOTE — PROGRESS NOTES
Pt still remains intubated and on PS 7/5 all day today. Pt did walk today without resp complication, BBS diminished.     Current ventilator setting;  Ventilation Mode: CPAP/PS  FiO2 (%): 30 %  PEEP (cm H2O): 5 cmH2O  Pressure Support (cm H2O): 7 cmH2O  Oxygen Concentration (%): 30 %    Plan: RT will continue to monitor the pt and assess daily

## 2017-03-06 NOTE — PROGRESS NOTES
CLINICAL NUTRITION SERVICES - REASSESSMENT NOTE    Recommendations Ordered by Registered Dietitian (RD):   Continue Promote with Fiber @ 65 mL/hr     EVALUATION OF PROGRESS TOWARD GOALS   Diet: NPO, intubated  Nutrition Support:  Pt continues on TF at goal as follows -   Nutrition Support Enteral:  Type of Feeding Tube: NG  Enteral Frequency:  Continuous  Enteral Regimen: Promote with Fiber @ 65 mL/hr   Total Enteral Provisions: 1560 kcals (25 kcal/kg), 98 gm pro (1.6 gm/kg and 103% pro needs), 1295 mL H20, 22 gm fiber   Free Water Flush: 200 mL every 4 hrs    Pt now getting 16 oz coffee down NGT BID per pt request    TF tolerance:  Labs - Mg 2.5 (H), Phos 3.6 (WNL), K 4.1 (WNL)  BGs - 125 (acceptable)  BM x1 3/5  Wt trending back down - 63.7 kg today.     Dosing Weight 62.6 kg (admit wt)      ASSESSED NUTRITION NEEDS:  Estimated Energy Needs: 1602-4085 kcals (25-30 Kcal/Kg)  Justification: maintenance  Estimated Protein Needs: 75-95 grams protein (1.2-1.5 g pro/Kg)  Justification: post-op and hypercatabolism with acute illness    NEW FINDINGS:   - Pt remains intubated for airway protection, off sedation. He is feeling well, able to walk on unit.   - CT this am --> No significant change from prior study. No plan for extubation this morning, possible trach if unable to extubate in the next few days.     Previous Goals:   TF Promote with Fiber at 65 mL/hr will meet % estimated needs  Evaluation: Met    Previous Nutrition Diagnosis:   Inadequate energy intake related to low TF rate as evidenced by TF meeting lower end of range (93%) for estimated energy needs  Evaluation: Completed    CURRENT NUTRITION DIAGNOSIS  No nutrition diagnosis identified at this time    INTERVENTIONS  Recommendations / Nutrition Prescription  Continue TF Promote with Fiber @ 65 ml/hr (1560 mL) to provide 1560 kcals (25 kcal/kg), 98 gm pro (1.6 gm/kg and 103% pro needs), 1295 mL H20, 22 gm fiber    Implementation  Collaboration and  Referral of Nutrition care: Pt discussed during interdisciplinary rounds.     Goals  TF to meet % of assessed needs while intubated. \    MONITORING AND EVALUATION:  Progress towards goals will be monitored and evaluated per protocol and Practice Guidelines    Flores Simpson RD, LD

## 2017-03-06 NOTE — PROGRESS NOTES
[facility]  Infectious Disease Progress Note          Assessment and Plan:   IMPRESSION:   1. A 81-year-old male with coronary artery disease   2. Carcinoma of the prostate.   3. Admitted on this occasion with left facial swelling.  S/p I and D 2/27 with drainage of L submandibular and lateral pharyngeal space abscesses.  Surgical cx with Strep constallatus and Fusobacterium.    Clinically improving.  Blood cxs improving.      RECOMMENDATIONS:   1. Cont  IV Zosyn for broad coverage  2. On vent for airway protection.              Interval History:   Remains on vent.  Temp 100.1.  Blood and surgical cxs as above.  Decreased swelling and discomfort.  Cxs as above.  Feels that he is improving.              Medications:       furosemide  20 mg Intravenous Daily     atorvastatin  40 mg Oral Daily     rantidine  150 mg Oral BID     lisinopril  2.5 mg Oral Daily     carvedilol  3.125 mg Oral BID w/meals     heparin  5,000 Units Subcutaneous Q8H     lidocaine   Topical Once     sodium chloride (PF)  3 mL Intracatheter Q8H     chlorhexidine  15 mL Mouth/Throat Q12H     piperacillin-tazobactam  3.375 g Intravenous Q6H                  Physical Exam:   Blood pressure 116/65, pulse 91, temperature 100.1  F (37.8  C), temperature source Oral, resp. rate 18, weight 63.7 kg (140 lb 6.9 oz), SpO2 100 %.  [unfilled]  Vital Signs with Ranges  Temp:  [98.8  F (37.1  C)-100.1  F (37.8  C)] 100.1  F (37.8  C)  Heart Rate:  [64-76] 74  Resp:  [15-29] 18  BP: (109-136)/() 116/65  FiO2 (%):  [30 %] 30 %  SpO2:  [96 %-100 %] 100 %    Constitutional: Sedated.  On vent.   Lungs: Clear to auscultation bilaterally, no crackles or wheezing   Cardiovascular: Regular rate and rhythm, normal S1 and S2, and no murmur noted   Abdomen: Normal bowel sounds, soft, non-distended, non-tender   Skin: No rashes, no cyanosis, no edema   Other: L submandibular swelling.          Data:   All microbiology laboratory data  reviewed.  Recent Labs   Lab Test  03/06/17   0605  03/05/17   0425  03/02/17   0520   WBC  11.5*  11.1*  7.9   HGB  11.0*  10.9*  10.0*   HCT  30.9*  30.7*  28.3*   MCV  91  90  90   PLT  295  278  207     Recent Labs   Lab Test  03/06/17   0605  03/04/17   0435  03/03/17   0415   CR  0.79  0.66  0.68     No lab results found.

## 2017-03-06 NOTE — PROGRESS NOTES
[facility]: Comprehensive   Intensive Care Daily Note  March 6, 2017            Assessment and Plan:     Summary Statement:  Oscar Chaudhary is a 81 year old male admitted on 2/27/2017 for Left Facial abscess. My assessment and plan for this patient is as follows:    Neurology:   Doing very well off all sedation, RASS 0      Propofol available if needed    Encourage sleep/wake cycle (no reported issues currently but takes temazepam at home)     Cardiovascular/Hemodynamics:   CAD and known AAA (4.2x4.3)    Holding home ASA    On Lisinopril; home atenolol changed to carvedilol while in-house)    Atorvastatin    Furosemide 20IV 3/5 with I/O even, will continue BID with goal net negative 500mL.     Pulmonary:   PS during day, can resume AC at night if needed    Not a candidate for extubation given persistent oropharyngeal edema    Repeat CT neck on 3/6 with improved tracheal shift, now nearly midline. Still with considerable edema. Cuff leak with cough today. Continue intubation with daily re-evaluation      GI and Nutrition:   On TF    Bowel Regimen     Renal, Fluid and Electrolytes:   FLORENCE resolved    Continue furosemide, increased dose to BID with goal net negative 500mL    Electrolyte replacement protocol.     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     ID following; continue current antibiotics (?change to amox/clav in the future)    Source control/management per OMS    Repeat CT neck/soft tissue on Monday done. See report below.     Hematology and Oncology:   Persistent/mild leukocytosis; cell lines stable.        Endocrinology:   Glucose within normal limits, d/c capillary blood glucose checks.     Intensive Care: Central line: No central line present  Arterial line: No arterial line present  Restraint:Not needed   Others:   Skin: wound/abscess management as above    FASTHUG addressed as appropriate. H2 blocker for stress ulcer prophylaxis   Top 3 main  "goals for today Walk in halls  Coffee per pt wishes  Continue PS during the day       Billin min spent for critical care exclusive of time for procedure           Hospital Course: date:  procedure, complication, diagnosis, treatment       Admitted  with L facial/submandibular abscess    Started on pip/tazo    I&D , left intubated following procedure    Low UOP, resolved    Repeat CT soft tissue 3/3 with persistent displacement and airway edema, improved on CT of 3/6, but with continued edema with soft tissue around ET tube     Walking in hallway daily.         Key events/ Interval history - last 24 hours:    Walked in hallway  Was appreciative of coffee down NGT. Helps BM            Problem list:     Facial abscess    CAD    AAA    Cystic lesion left mandibular body    Severe oropharyngeal swelling.            Medications:   I have reviewed this patient's current medications            Physical Exam:   Blood pressure 116/65, pulse 91, temperature 100.1  F (37.8  C), temperature source Oral, resp. rate 18, weight 63.7 kg (140 lb 6.9 oz), SpO2 100 %.    Vital signs:  Temp: 100.1  F (37.8  C) Temp src: Oral BP: 116/65   Heart Rate: 74 Resp: 18 SpO2: 100 % O2 Device: Mechanical Ventilator     Weight: 63.7 kg (140 lb 6.9 oz)  Estimated body mass index is 24.88 kg/(m^2) as calculated from the following:    Height as of 17: 1.6 m (5' 3\").    Weight as of this encounter: 63.7 kg (140 lb 6.9 oz).       Intake/Output Summary (Last 24 hours) at 17 1229  Last data filed at 17 1215   Gross per 24 hour   Intake             2750 ml   Output             3870 ml   Net            -1120 ml                     General Appearance:   Comfortable, no pain or respiratory distress   HEENT:   Endotrachael tube is present  Nasogastic tube is present  Improvement in left facial swelling compared to yesterday   Chest and Lungs:   Symmetrical chest shape and movements with each tidal breath  Symmetrical and normal " breath sounds, no wheeze, ronchi, crackles, rub or bronchial breath sounds   Cardiovascular:   Regular rate and rhythm  Normal S1,S2, and no gallop, rub or murmur   Abdomen:   Non-distended, soft, normal bowel sounds   :   Zuniga in place   Lymphatics:   Lymph nodes not enlarged   Musculoskeletal:   Full range of motion   Extremities and Skin:   Skin is intact  Warm, well perfused   Neuro:   Alert and oriented x 3  Moves all extremities spontaneously, symmetrically and appropriately   Dressings:   Wound covered by dressing: left submandibular/neck   Drains and Tubes:   No drains or tubes present   Intravascular Access and Device:   No central lines or devices in place   Additional exam findings:   None             Data:     Lab Results   Component Value Date    WBC 11.5 (H) 03/06/2017    HGB 11.0 (L) 03/06/2017    HCT 30.9 (L) 03/06/2017     03/06/2017     03/06/2017    POTASSIUM 4.1 03/06/2017    CHLORIDE 97 03/06/2017    CO2 31 03/06/2017    BUN 16 03/06/2017    CR 0.79 03/06/2017     (H) 03/06/2017    NTBNP 718 (H) 10/23/2009    AST 21 02/27/2017    ALT 24 02/27/2017    ALKPHOS 72 02/27/2017    BILITOTAL 1.1 02/27/2017     Recent Results (from the past 48 hour(s))   CT Soft Tissue Neck w Contrast    Narrative    CT SCAN OF THE NECK WITH CONTRAST March 6, 2017 4:45 AM     HISTORY: Left submandibular abscess with airway compromise.    TECHNIQUE: Axial images and coronal reformations. 80mL Isovue-370 IV.  Radiation dose for this scan was reduced using automated exposure  control, adjustment of the mA and/or kV according to patient size, or  iterative reconstruction technique.    COMPARISON: 3/3/2017, 2/27/2017.    FINDINGS: Three left-sided submandibular abscess drains remain in  place. There is no significant residual hypodense fluid collection.  The left submandibular gland remains enlarged and somewhat inflamed.  The patient is intubated and has a nasogastric tube through the right  nares.  The airway remains slightly deviated to the right. A small  amount of air is seen in the left submandibular space subcutaneous  soft tissues inferiorly. The right submandibular gland appears to be  within normal limits. There is no evidence for floor of mouth abscess  on this exam. Cystic lesion in the left mandible associated with last  mandibular tooth is again noted. Minimal degenerative changes are seen  in the spine. Lung apices are clear. Mucosal thickening is noted in  both maxillary sinuses and visualized right ethmoid air cells. No  air-fluid levels are seen in the maxillary sinuses. Extensive soft  tissue swelling is remaining in the left neck.      Impression    IMPRESSION: No significant change from prior study. Three left-sided  drains remain in place without any definable hypodense abscesses  remaining. There is persistent left facial's swelling and  submandibular sialoadenitis with some deviation of the airway to the  right. The patient remains intubated.    ZANDER LUKE MD       Addendum:    Patient will need antibiotic for several weeks, and ertapenem can be given once a day, and has excellent activity against the organisms cultured. Also will add metronidazole which can be taken orally once extubated. Will ask lab to run sensitivities on Strep organism cultured.

## 2017-03-06 NOTE — PROGRESS NOTES
[facility] ICU RESPIRATORY NOTE    Date of Admission: 2/27/17  Date of Intubation (most recent): 2/27/17  Reason for Mechanical Ventilation: AW protection  Number of Days on Mechanical Ventilation: 8  Met Criteria for Pressure Support Trial: Yes  Length of Pressure Support Trial: Ongoing, patient has been on PS 7/5 since 0730 on 3/4    Ventilation Mode: PS  FiO2 (%): 30 %  PEEP (cm H2O): 5 cmH2O  Pressure Support (cm H2O): 7 cmH2O  Oxygen Concentration (%): 30 %  Resp: 25    Significant Events Today:   Cuff leak test done this am, slight leak heard when patient coughed.  Patient walked with PT today.      ABG Results:      Recent Labs  Lab 02/27/17  2117   PH 7.36   PCO2 40   PO2 82   HCO3 22       ETT appearance on chest x-ray: in good position     Plan:  Continue vent support.  PS as tolerated.  Assess for cuff leak daily.

## 2017-03-06 NOTE — PROGRESS NOTES
[facility] ICU RESPIRATORY NOTE  Date of Admission: 2/27/2017  Date of Intubation (most recent): 2/27/2017  Reason for Mechanical Ventilation: AW protection  Number of Days on Mechanical Ventilation: 8  Met Criteria for Pressure Support Trial: Yes  Length of Pressure Support Trial: currently on PS trial 7/5 30% since 07:30 Am 3/4/17      Significant Events Today: None    Ventilation Mode: CPAP/PS  FiO2 (%): 30 %  PEEP (cm H2O): 5 cmH2O  Pressure Support (cm H2O): 7 cmH2O  Oxygen Concentration (%): 30 %  Resp: 22    Plan: waiting for CT results from this AM to determine ability to extubate.     3/6/2017  Sarah Blancas RT

## 2017-03-06 NOTE — PROGRESS NOTES
Oral & Maxillofacial Surgery Progress Note    Date/Time:    3/6/2017  06:30    Location:    [facility] / Unit ICU    Patient Information:  Patient Name: Oscar Chaudhary  MRN: [id number]   : 1935  _______________________________________________________________    Subjective:  Patient found in bed resting comfortably.  Patient is intubated. Repeat CT done this a.m.    Objective/Physical Exam:  Vitals:   BP:  116/75   HR:  91   Tmax: Temp (24hrs), Av.6  F (37.6  C), Min:99  F (37.2  C), Max:100.3  F (37.9  C)     RR:  25  Labs:  WBC 11.5  Cultures:  Strep constallatus, Fuso nucleatum  HEENT: Significant improvement in Left submandibular swelling, drains in place with purulence.  Oral: Intubated, difficult to exam posterior pharynx   Radiographic:  Neck CT reveals improvement in pharyngeal swelling, trachea near midline, no new abscess formations.    Assessment:  Oscar Chaudhary is a 81 year old male POD 7 s/p Incision and drainage left submandibular and lateral pharyngeal abscess/cellutitus. Good course.    Plan/Recommendation(s):  1. Patient to remain intubated.  Discussed tracheostomy options with patient and Intensivist.  Will defer at this time.  2. I advanced the left submandibular drains.    Signature:  Placido Escalante DDS    [facility]  [address], [address]  [place] [address]  Clinic/On-call Phone: [phone]  Clinic Fax: [phone]

## 2017-03-06 NOTE — PLAN OF CARE
Problem: Goal Outcome Summary  Goal: Goal Outcome Summary  PT: Pt able to increase gait distance today, added an additional 125' to his route. Moderate pace, improved safety awareness with turns. Single standing rest at~300' secondary to need to cough. Completed proximal exercise in supine and in standing prior to OOB. Anticipate discharge home once able to extubate.

## 2017-03-06 NOTE — PLAN OF CARE
Problem: Individualization  Goal: Patient Preferences  Outcome: Improving  Pt vss, remains on cpap/ps through night 7/5 and 30%. A/o and denies pain and appears to be in good spirits. LS clear anterior, diminished posterior. +BS, up to BSC x2 with soft/loose brown stools. TF at goal, tolerating well. Up with assist of 1, to manage vent tubing. Pt able to shift weight in bed and moves without difficulty. To CT this morning, waiting results.

## 2017-03-07 ENCOUNTER — APPOINTMENT (OUTPATIENT)
Dept: PHYSICAL THERAPY | Facility: CLINIC | Age: 82
DRG: 854 | End: 2017-03-07
Attending: HOSPITALIST
Payer: COMMERCIAL

## 2017-03-07 LAB
BACTERIA SPEC CULT: ABNORMAL
BACTERIA SPEC CULT: ABNORMAL
Lab: ABNORMAL
Lab: ABNORMAL
MICRO REPORT STATUS: ABNORMAL
MICRO REPORT STATUS: ABNORMAL
SPECIMEN SOURCE: ABNORMAL
SPECIMEN SOURCE: ABNORMAL

## 2017-03-07 PROCEDURE — 25000128 H RX IP 250 OP 636: Performed by: INTERNAL MEDICINE

## 2017-03-07 PROCEDURE — 40000193 ZZH STATISTIC PT WARD VISIT: Performed by: PHYSICAL THERAPIST

## 2017-03-07 PROCEDURE — 94003 VENT MGMT INPAT SUBQ DAY: CPT

## 2017-03-07 PROCEDURE — 40000275 ZZH STATISTIC RCP TIME EA 10 MIN

## 2017-03-07 PROCEDURE — 25000125 ZZHC RX 250: Performed by: INTERNAL MEDICINE

## 2017-03-07 PROCEDURE — 97530 THERAPEUTIC ACTIVITIES: CPT | Mod: GP | Performed by: PHYSICAL THERAPIST

## 2017-03-07 PROCEDURE — 25000132 ZZH RX MED GY IP 250 OP 250 PS 637: Performed by: INTERNAL MEDICINE

## 2017-03-07 PROCEDURE — 25000132 ZZH RX MED GY IP 250 OP 250 PS 637: Performed by: HOSPITALIST

## 2017-03-07 PROCEDURE — 99291 CRITICAL CARE FIRST HOUR: CPT | Performed by: INTERNAL MEDICINE

## 2017-03-07 PROCEDURE — 40000008 ZZH STATISTIC AIRWAY CARE

## 2017-03-07 PROCEDURE — 97110 THERAPEUTIC EXERCISES: CPT | Mod: GP | Performed by: PHYSICAL THERAPIST

## 2017-03-07 PROCEDURE — 20000003 ZZH R&B ICU

## 2017-03-07 RX ADMIN — ERTAPENEM SODIUM 1 G: 1 INJECTION, POWDER, LYOPHILIZED, FOR SOLUTION INTRAMUSCULAR; INTRAVENOUS at 15:25

## 2017-03-07 RX ADMIN — ATORVASTATIN CALCIUM 40 MG: 20 TABLET, FILM COATED ORAL at 09:07

## 2017-03-07 RX ADMIN — FUROSEMIDE 20 MG: 10 INJECTION, SOLUTION INTRAVENOUS at 09:07

## 2017-03-07 RX ADMIN — CHLORHEXIDINE GLUCONATE 15 ML: 1.2 RINSE ORAL at 19:32

## 2017-03-07 RX ADMIN — HEPARIN SODIUM 5000 UNITS: 10000 INJECTION, SOLUTION INTRAVENOUS; SUBCUTANEOUS at 19:05

## 2017-03-07 RX ADMIN — METRONIDAZOLE 500 MG: 500 INJECTION, SOLUTION INTRAVENOUS at 21:18

## 2017-03-07 RX ADMIN — CARVEDILOL 3.12 MG: 3.12 TABLET, FILM COATED ORAL at 09:06

## 2017-03-07 RX ADMIN — RANITIDINE HYDROCHLORIDE 150 MG: 150 SOLUTION ORAL at 21:18

## 2017-03-07 RX ADMIN — METRONIDAZOLE 500 MG: 500 INJECTION, SOLUTION INTRAVENOUS at 06:36

## 2017-03-07 RX ADMIN — CHLORHEXIDINE GLUCONATE 15 ML: 1.2 RINSE ORAL at 09:08

## 2017-03-07 RX ADMIN — HEPARIN SODIUM 5000 UNITS: 10000 INJECTION, SOLUTION INTRAVENOUS; SUBCUTANEOUS at 09:07

## 2017-03-07 RX ADMIN — CARVEDILOL 3.12 MG: 3.12 TABLET, FILM COATED ORAL at 19:32

## 2017-03-07 RX ADMIN — METRONIDAZOLE 500 MG: 500 INJECTION, SOLUTION INTRAVENOUS at 14:14

## 2017-03-07 RX ADMIN — LISINOPRIL 2.5 MG: 2.5 TABLET ORAL at 09:06

## 2017-03-07 RX ADMIN — RANITIDINE HYDROCHLORIDE 150 MG: 150 SOLUTION ORAL at 09:07

## 2017-03-07 RX ADMIN — HEPARIN SODIUM 5000 UNITS: 10000 INJECTION, SOLUTION INTRAVENOUS; SUBCUTANEOUS at 01:31

## 2017-03-07 NOTE — PLAN OF CARE
Problem: Goal Outcome Summary  Goal: Goal Outcome Summary  PT: Pt participated in ambulation, aerobic step up/down and consecutive tranfers with close SBA to Min A for balance at times. Recommend discharge home with assist prn.      Five Times Sit to Stand Test: (FTSTST): The FTSTST measures functional LE strength and provides information about postural control during transitions to/from standing.      Overall findings: Pt demonstrates good LE strength, safety awareness with activity. Completed with no UE use and slight support of the bed behind the LEs.   Patient Score: 11 seconds     Performance is considered within normal limits for times:  <10 seconds for people aged < 60 years with balance and vestibular disorders  <14.2 seconds for people aged > 60 years with balance and vestibular disorders  >15 seconds for community-dwelling elderly individuals in their 80s has been correlated with an increased likelihood of a prior history of falls.      Minimal Detectable Change = 2.5 sec  Minimal Detectable Change = 8.4 sec (sit <> stand x 10 rep) (hemodialysis)   according to Dejan, Delia & Ruben 2009     Assessment (rationale for performing, application to patient s function & care plan): Score indicates low fall risk, good strength and safety awareness.

## 2017-03-07 NOTE — PROGRESS NOTES
[facility]: Comprehensive   Intensive Care Daily Note  March 7, 2017            Assessment and Plan:     Summary Statement:  Oscar Chaudhary is a 81 year old male admitted on 2/27/2017 for Left Facial abscess. My assessment and plan for this patient is as follows:    Neurology:   Doing very well off all sedation, RASS 0      Propofol available if needed    Encourage sleep/wake cycle (no reported issues currently but takes temazepam at home)     Cardiovascular/Hemodynamics:   CAD and known AAA (4.2x4.3)    Holding home ASA    On Lisinopril; home atenolol changed to carvedilol while in-house)    Atorvastatin    Furosemide 20IV 3/5 with I/O even, will continue BID with goal net negative 500mL.     Pulmonary:   PS 24/7    Not a candidate for extubation given persistent oropharyngeal edema, but much improved    Repeat CT neck on 3/6 with improved tracheal shift, now nearly midline. Still with considerable edema. Cuff leak with cough today. Continue intubation with daily re-evaluation. Plan to arrange anesthesia backup on 3/8 for extubation     GI and Nutrition:   On TF    Bowel Regimen     Renal, Fluid and Electrolytes:   FLORENCE resolved    Continue furosemide, increased dose to BID with goal net negative 500mL    Electrolyte replacement protocol.     Infectious Disease:   Changed to flagyl/ertapenem to simplify home regimen    Source control/management per OMS    Repeat CT neck/soft tissue on Monday done. See report below.     Hematology and Oncology:   Persistent/mild leukocytosis; cell lines stable.        Endocrinology:   Glucose within normal limits, d/c capillary blood glucose checks.     Intensive Care: Central line: No central line present  Arterial line: No arterial line present  Restraint:Not needed   Others:   Skin: wound/abscess management as above    FASTHUG addressed as appropriate. H2 blocker for stress ulcer prophylaxis   Top 3 main goals for today Walk in halls  Coffee per pt  "wishes  Continue PS during the day  Possible extubation 3/8       Billin min spent for critical care exclusive of time for procedure           Hospital Course: date:  procedure, complication, diagnosis, treatment       Admitted  with L facial/submandibular abscess    Started on pip/tazo    I&D , left intubated following procedure    Low UOP, resolved    Repeat CT soft tissue 3/3 with persistent displacement and airway edema, improved on CT of 3/6, but with continued edema with soft tissue around ET tube     Walking in hallway daily.    Antibiotics changed to ertapenem and flagyl in anticipation of extubation and discharge soon.         Key events/ Interval history - last 24 hours:    Walked in hallway  Was appreciative of coffee down NGT. Helps BM            Problem list:     Facial abscess    CAD    AAA    Cystic lesion left mandibular body    Severe oropharyngeal swelling.            Medications:   I have reviewed this patient's current medications            Physical Exam:   Blood pressure 126/70, pulse 84, temperature 99  F (37.2  C), temperature source Oral, resp. rate (!) 31, weight 61.6 kg (135 lb 12.9 oz), SpO2 96 %.    Vital signs:  Temp: 99  F (37.2  C) Temp src: Oral BP: 126/70 Pulse: 84 Heart Rate: 71 Resp: (!) 31 SpO2: 96 % O2 Device: Other (Comments) (pressure support on ventilator)     Weight: 61.6 kg (135 lb 12.9 oz)  Estimated body mass index is 24.06 kg/(m^2) as calculated from the following:    Height as of 17: 1.6 m (5' 3\").    Weight as of this encounter: 61.6 kg (135 lb 12.9 oz).         Intake/Output Summary (Last 24 hours) at 17 1001  Last data filed at 17 0600   Gross per 24 hour   Intake             2200 ml   Output             1055 ml   Net             1145 ml                       General Appearance:   Comfortable, no pain or respiratory distress   HEENT:   Endotrachael tube is present  Nasogastic tube is present  Improvement in left facial swelling compared " to yesterday   Chest and Lungs:   Symmetrical chest shape and movements with each tidal breath  Symmetrical and normal breath sounds, no wheeze, ronchi, crackles, rub or bronchial breath sounds   Cardiovascular:   Regular rate and rhythm  Normal S1,S2, and no gallop, rub or murmur   Abdomen:   Non-distended, soft, normal bowel sounds   :   Zuniga in place   Lymphatics:   Lymph nodes not enlarged   Musculoskeletal:   Full range of motion   Extremities and Skin:   Skin is intact  Warm, well perfused   Neuro:   Alert and oriented x 3  Moves all extremities spontaneously, symmetrically and appropriately   Dressings:   Wound covered by dressing: left submandibular/neck   Drains and Tubes:   No drains or tubes present   Intravascular Access and Device:   No central lines or devices in place   Additional exam findings:   None             Data:     Lab Results   Component Value Date    WBC 11.5 (H) 03/06/2017    HGB 11.0 (L) 03/06/2017    HCT 30.9 (L) 03/06/2017     03/06/2017     03/06/2017    POTASSIUM 4.1 03/06/2017    CHLORIDE 97 03/06/2017    CO2 31 03/06/2017    BUN 16 03/06/2017    CR 0.79 03/06/2017     (H) 03/06/2017    NTBNP 718 (H) 10/23/2009    AST 21 02/27/2017    ALT 24 02/27/2017    ALKPHOS 72 02/27/2017    BILITOTAL 1.1 02/27/2017     Recent Results (from the past 48 hour(s))   CT Soft Tissue Neck w Contrast    Narrative    CT SCAN OF THE NECK WITH CONTRAST March 6, 2017 4:45 AM     HISTORY: Left submandibular abscess with airway compromise.    TECHNIQUE: Axial images and coronal reformations. 80mL Isovue-370 IV.  Radiation dose for this scan was reduced using automated exposure  control, adjustment of the mA and/or kV according to patient size, or  iterative reconstruction technique.    COMPARISON: 3/3/2017, 2/27/2017.    FINDINGS: Three left-sided submandibular abscess drains remain in  place. There is no significant residual hypodense fluid collection.  The left submandibular gland  remains enlarged and somewhat inflamed.  The patient is intubated and has a nasogastric tube through the right  nares. The airway remains slightly deviated to the right. A small  amount of air is seen in the left submandibular space subcutaneous  soft tissues inferiorly. The right submandibular gland appears to be  within normal limits. There is no evidence for floor of mouth abscess  on this exam. Cystic lesion in the left mandible associated with last  mandibular tooth is again noted. Minimal degenerative changes are seen  in the spine. Lung apices are clear. Mucosal thickening is noted in  both maxillary sinuses and visualized right ethmoid air cells. No  air-fluid levels are seen in the maxillary sinuses. Extensive soft  tissue swelling is remaining in the left neck.      Impression    IMPRESSION: No significant change from prior study. Three left-sided  drains remain in place without any definable hypodense abscesses  remaining. There is persistent left facial's swelling and  submandibular sialoadenitis with some deviation of the airway to the  right. The patient remains intubated.    ZANDER LUKE MD           Patient will need antibiotic for several weeks, and ertapenem can be given once a day, and has excellent activity against the organisms cultured. Also added metronidazole which can be taken orally once extubated. Will ask lab to run sensitivities on Strep organism cultured.

## 2017-03-07 NOTE — PROGRESS NOTES
[facility] ICU RESPIRATORY NOTE  Date of Admission:2/27/17  Date of Intubation (most recent): 2/2717  Reason for Mechanical Ventilation: Airway protection  Number of Days on Mechanical Ventilation: 9  Met Criteria for Pressure Support Trial: yes  Length of Pressure Support Trial:Ongoing, patient has been on PS 7/5 since 0730 on 3/4  Significant Events Today: None    ABG Results:  No lab results found in last 7 days.      ETT appearance on chest x-ray: unchanged  Ventilation Mode: PS  FiO2 (%): 30 %  PEEP (cm H2O): 5 cmH2O  Pressure Support (cm H2O): 7 cmH2O  Oxygen Concentration (%): 30 %  Resp: 24      Plan:  Continue to monitor  3/7/2017  Yasmany Anderson

## 2017-03-07 NOTE — PROGRESS NOTES
[facility]  Infectious Disease Progress Note          Assessment and Plan:   IMPRESSION:   1. A 81-year-old male with coronary artery disease   2. Carcinoma of the prostate.   3. Admitted on this occasion with left facial swelling.  S/p I and D 2/27 with drainage of L submandibular and lateral pharyngeal space abscesses.  Surgical cx with Strep constallatus and Fusobacterium.    Clinically improving.  Blood cxs improving.  Repeat CT scans show resolving abscesses.      RECOMMENDATIONS:   1. Cont  IV Zosyn for broad coverage.  Day 9.  2. On vent for airway protection.              Interval History:   Remains on vent.  Tmax 100.3.  Afebrile this am.  Feels that he is improving.              Medications:       ertapenem (INVanz) IV  1 g Intravenous Q24H     metroNIDAZOLE  500 mg Intravenous Q8H     furosemide  20 mg Intravenous Daily     atorvastatin  40 mg Oral Daily     rantidine  150 mg Oral BID     lisinopril  2.5 mg Oral Daily     carvedilol  3.125 mg Oral BID w/meals     heparin  5,000 Units Subcutaneous Q8H     lidocaine   Topical Once     sodium chloride (PF)  3 mL Intracatheter Q8H     chlorhexidine  15 mL Mouth/Throat Q12H                  Physical Exam:   Blood pressure 126/70, pulse 84, temperature 97.9  F (36.6  C), temperature source Axillary, resp. rate 28, weight 61.6 kg (135 lb 12.9 oz), SpO2 97 %.  [unfilled]  Vital Signs with Ranges  Temp:  [97.9  F (36.6  C)-100.3  F (37.9  C)] 97.9  F (36.6  C)  Pulse:  [84] 84  Heart Rate:  [69-86] 70  Resp:  [12-28] 28  BP: (116-138)/(59-89) 126/70  FiO2 (%):  [30 %] 30 %  SpO2:  [95 %-100 %] 97 %    Constitutional: Sedated.  On vent.   Lungs: Clear to auscultation bilaterally, no crackles or wheezing   Cardiovascular: Regular rate and rhythm, normal S1 and S2, and no murmur noted   Abdomen: Normal bowel sounds, soft, non-distended, non-tender   Skin: No rashes, no cyanosis, no edema   Other: L submandibular swelling and induration  decreased.          Data:   All microbiology laboratory data reviewed.  Recent Labs   Lab Test  03/06/17   0605  03/05/17   0425  03/02/17   0520   WBC  11.5*  11.1*  7.9   HGB  11.0*  10.9*  10.0*   HCT  30.9*  30.7*  28.3*   MCV  91  90  90   PLT  295  278  207     Recent Labs   Lab Test  03/06/17   0605  03/04/17   0435  03/03/17   0415   CR  0.79  0.66  0.68     No lab results found.

## 2017-03-07 NOTE — PROGRESS NOTES
PROGRESS NOTE      SUBJECTIVE:  Carine Doshi is awake and comfortable this morning at 5:45 a.m.  He remains intubated.      OBJECTIVE/PHYSICAL EXAM:   VITAL SIGNS:  Stable.   HEENT:  Definite improvement in swelling, with a significant decrease in swelling, especially on palpating medially.  Induration has shrunk considerably.  The drainage continues extraorally, but it is definitely less.  The drains are nicely in place.  On oral exam, the patient is able to open almost normally.  It is difficult to get access to visualize his pharynx.      ASSESSMENT:  The patient is making very steady improvement from the oral surgery point of view.      PLAN:  I advanced his drains today.  We will continue to advance his drains.  I anticipate extubation soon as he is progressing very well.         MARIOLA DAVE DDS             D: 2017 06:08   T: 2017 06:19   MT: AMELIA#[id number]      Name:     CARINE DOSHI   MRN:      [id number]        Account:      [id number]   :      1935           Service Date: 2017      Document: [id number]       cc: Milton Hollins MD

## 2017-03-08 ENCOUNTER — APPOINTMENT (OUTPATIENT)
Dept: PHYSICAL THERAPY | Facility: CLINIC | Age: 82
DRG: 854 | End: 2017-03-08
Attending: HOSPITALIST
Payer: COMMERCIAL

## 2017-03-08 PROCEDURE — 20000003 ZZH R&B ICU

## 2017-03-08 PROCEDURE — 97530 THERAPEUTIC ACTIVITIES: CPT | Mod: GP | Performed by: PHYSICAL THERAPIST

## 2017-03-08 PROCEDURE — 25000128 H RX IP 250 OP 636: Performed by: INTERNAL MEDICINE

## 2017-03-08 PROCEDURE — 40000193 ZZH STATISTIC PT WARD VISIT: Performed by: PHYSICAL THERAPIST

## 2017-03-08 PROCEDURE — 25000132 ZZH RX MED GY IP 250 OP 250 PS 637: Performed by: INTERNAL MEDICINE

## 2017-03-08 PROCEDURE — 97116 GAIT TRAINING THERAPY: CPT | Mod: GP | Performed by: PHYSICAL THERAPIST

## 2017-03-08 PROCEDURE — 25000132 ZZH RX MED GY IP 250 OP 250 PS 637: Performed by: HOSPITALIST

## 2017-03-08 PROCEDURE — 94640 AIRWAY INHALATION TREATMENT: CPT | Mod: 76

## 2017-03-08 PROCEDURE — 40000275 ZZH STATISTIC RCP TIME EA 10 MIN

## 2017-03-08 PROCEDURE — 25000125 ZZHC RX 250: Performed by: INTERNAL MEDICINE

## 2017-03-08 PROCEDURE — 25000132 ZZH RX MED GY IP 250 OP 250 PS 637

## 2017-03-08 PROCEDURE — 99233 SBSQ HOSP IP/OBS HIGH 50: CPT | Performed by: INTERNAL MEDICINE

## 2017-03-08 PROCEDURE — 27210429 ZZH NUTRITION PRODUCT INTERMEDIATE LITER

## 2017-03-08 RX ADMIN — CARVEDILOL 3.12 MG: 3.12 TABLET, FILM COATED ORAL at 08:52

## 2017-03-08 RX ADMIN — METRONIDAZOLE 500 MG: 500 INJECTION, SOLUTION INTRAVENOUS at 05:33

## 2017-03-08 RX ADMIN — RANITIDINE HYDROCHLORIDE 150 MG: 150 SOLUTION ORAL at 08:52

## 2017-03-08 RX ADMIN — RANITIDINE HYDROCHLORIDE 150 MG: 150 SOLUTION ORAL at 21:26

## 2017-03-08 RX ADMIN — ATORVASTATIN CALCIUM 40 MG: 20 TABLET, FILM COATED ORAL at 08:52

## 2017-03-08 RX ADMIN — ERTAPENEM SODIUM 1 G: 1 INJECTION, POWDER, LYOPHILIZED, FOR SOLUTION INTRAMUSCULAR; INTRAVENOUS at 14:33

## 2017-03-08 RX ADMIN — HEPARIN SODIUM 5000 UNITS: 10000 INJECTION, SOLUTION INTRAVENOUS; SUBCUTANEOUS at 02:13

## 2017-03-08 RX ADMIN — HEPARIN SODIUM 5000 UNITS: 10000 INJECTION, SOLUTION INTRAVENOUS; SUBCUTANEOUS at 10:12

## 2017-03-08 RX ADMIN — HEPARIN SODIUM 5000 UNITS: 10000 INJECTION, SOLUTION INTRAVENOUS; SUBCUTANEOUS at 21:20

## 2017-03-08 RX ADMIN — CARVEDILOL 3.12 MG: 3.12 TABLET, FILM COATED ORAL at 21:26

## 2017-03-08 RX ADMIN — CHLORHEXIDINE GLUCONATE 15 ML: 1.2 RINSE ORAL at 21:26

## 2017-03-08 RX ADMIN — ACETAMINOPHEN 650 MG: 325 TABLET, FILM COATED ORAL at 23:10

## 2017-03-08 RX ADMIN — ACETAMINOPHEN 650 MG: 160 SOLUTION ORAL at 00:24

## 2017-03-08 RX ADMIN — METRONIDAZOLE 500 MG: 500 INJECTION, SOLUTION INTRAVENOUS at 13:26

## 2017-03-08 RX ADMIN — CHLORHEXIDINE GLUCONATE 15 ML: 1.2 RINSE ORAL at 08:52

## 2017-03-08 RX ADMIN — FUROSEMIDE 20 MG: 10 INJECTION, SOLUTION INTRAVENOUS at 08:53

## 2017-03-08 RX ADMIN — LISINOPRIL 2.5 MG: 2.5 TABLET ORAL at 08:52

## 2017-03-08 RX ADMIN — METRONIDAZOLE 500 MG: 500 INJECTION, SOLUTION INTRAVENOUS at 21:19

## 2017-03-08 NOTE — PROGRESS NOTES
[facility]: Comprehensive   Intensive Care Daily Note  2017            Assessment and Plan:     Summary Statement:  Oscar Chaudhary is a 81 year old male admitted on 2017 for Left Facial abscess. My assessment and plan for this patient is as follows:    Neurology:   Doing very well off all sedation, RASS 0      Encourage sleep/wake cycle (no reported issues currently but takes temazepam at home)     Cardiovascular/Hemodynamics:   CAD and known AAA (4.2x4.3)    Holding home ASA    On Lisinopril; home atenolol changed to carvedilol while in-house)    Atorvastatin    Furosemide 20IV 3/5 with I/O even, will continue BID with goal net negative 500mL.     Pulmonary: Repeat CT neck on 3/6 with improved tracheal shift, now nearly midline. Still with considerable edema, but much improved Extubated without difficulty 3/8   GI and Nutrition:   On TF    Bowel Regimen     Renal, Fluid and Electrolytes:   FLORENCE resolved    Continue furosemide, increased dose to BID with goal net negative 500mL    Electrolyte replacement protocol.     Infectious Disease:   Changed to flagyl/ertapenem to simplify home regimen    Source control/management per OMS    Repeat CT neck/soft tissue on Monday done. See report below.     Hematology and Oncology:   Persistent/mild leukocytosis; cell lines stable.        Endocrinology:   Glucose within normal limits, d/c capillary blood glucose checks.     Intensive Care: Central line: No central line present  Arterial line: No arterial line present  Restraint:Not needed   Others:   Skin: wound/abscess management as above    FASTHUG addressed as appropriate. H2 blocker for stress ulcer prophylaxis   Top 3 main goals for today Extubate  Swallow eval 3/9  Ambulate       Billin min spent for critical care exclusive of time for procedure           Hospital Course: date:  procedure, complication, diagnosis, treatment       Admitted  with L facial/submandibular  "abscess    Started on pip/tazo    I&D 2/27, left intubated following procedure    Low UOP, resolved    Repeat CT soft tissue 3/3 with persistent displacement and airway edema, improved on CT of 3/6, but with continued edema with soft tissue around ET tube     Walking in hallway daily.    Antibiotics changed to ertapenem and flagyl in anticipation of extubation and discharge soon.         Key events/ Interval history - last 24 hours:    Extubated            Problem list:     Facial abscess    CAD    AAA    Cystic lesion left mandibular body    Severe oropharyngeal swelling.            Medications:   I have reviewed this patient's current medications            Physical Exam:   Blood pressure 128/66, pulse 84, temperature 99  F (37.2  C), temperature source Oral, resp. rate 16, weight 67 kg (147 lb 11.3 oz), SpO2 98 %.    Vital signs:  Temp: 99  F (37.2  C) Temp src: Oral BP: 128/66   Heart Rate: 76 Resp: 16 SpO2: 98 % O2 Device: Aerosol mask     Weight: 67 kg (147 lb 11.3 oz)  Estimated body mass index is 26.17 kg/(m^2) as calculated from the following:    Height as of 2/26/17: 1.6 m (5' 3\").    Weight as of this encounter: 67 kg (147 lb 11.3 oz).         Intake/Output Summary (Last 24 hours) at 03/08/17 1421  Last data filed at 03/08/17 0956   Gross per 24 hour   Intake             1680 ml   Output              950 ml   Net              730 ml                           General Appearance:   Comfortable, no pain or respiratory distress   HEENT:   Endotrachael tube is present  Nasogastic tube is present  Improvement in left facial swelling compared to yesterday   Chest and Lungs:   Symmetrical chest shape and movements with each tidal breath  Symmetrical and normal breath sounds, no wheeze, ronchi, crackles, rub or bronchial breath sounds   Cardiovascular:   Regular rate and rhythm  Normal S1,S2, and no gallop, rub or murmur   Abdomen:   Non-distended, soft, normal bowel sounds   :   Zuniga in place   Lymphatics:   " Lymph nodes not enlarged   Musculoskeletal:   Full range of motion   Extremities and Skin:   Skin is intact  Warm, well perfused   Neuro:   Alert and oriented x 3  Moves all extremities spontaneously, symmetrically and appropriately   Dressings:   Wound covered by dressing: left submandibular/neck   Drains and Tubes:   No drains or tubes present   Intravascular Access and Device:   No central lines or devices in place   Additional exam findings:   None             Data:     Lab Results   Component Value Date    WBC 11.5 (H) 03/06/2017    HGB 11.0 (L) 03/06/2017    HCT 30.9 (L) 03/06/2017     03/06/2017     03/06/2017    POTASSIUM 4.1 03/06/2017    CHLORIDE 97 03/06/2017    CO2 31 03/06/2017    BUN 16 03/06/2017    CR 0.79 03/06/2017     (H) 03/06/2017    NTBNP 718 (H) 10/23/2009    AST 21 02/27/2017    ALT 24 02/27/2017    ALKPHOS 72 02/27/2017    BILITOTAL 1.1 02/27/2017     Recent Results (from the past 48 hour(s))   CT Soft Tissue Neck w Contrast    Narrative    CT SCAN OF THE NECK WITH CONTRAST March 6, 2017 4:45 AM     HISTORY: Left submandibular abscess with airway compromise.    TECHNIQUE: Axial images and coronal reformations. 80mL Isovue-370 IV.  Radiation dose for this scan was reduced using automated exposure  control, adjustment of the mA and/or kV according to patient size, or  iterative reconstruction technique.    COMPARISON: 3/3/2017, 2/27/2017.    FINDINGS: Three left-sided submandibular abscess drains remain in  place. There is no significant residual hypodense fluid collection.  The left submandibular gland remains enlarged and somewhat inflamed.  The patient is intubated and has a nasogastric tube through the right  nares. The airway remains slightly deviated to the right. A small  amount of air is seen in the left submandibular space subcutaneous  soft tissues inferiorly. The right submandibular gland appears to be  within normal limits. There is no evidence for floor of  mouth abscess  on this exam. Cystic lesion in the left mandible associated with last  mandibular tooth is again noted. Minimal degenerative changes are seen  in the spine. Lung apices are clear. Mucosal thickening is noted in  both maxillary sinuses and visualized right ethmoid air cells. No  air-fluid levels are seen in the maxillary sinuses. Extensive soft  tissue swelling is remaining in the left neck.      Impression    IMPRESSION: No significant change from prior study. Three left-sided  drains remain in place without any definable hypodense abscesses  remaining. There is persistent left facial's swelling and  submandibular sialoadenitis with some deviation of the airway to the  right. The patient remains intubated.    ZANDER LUKE MD           Patient will need antibiotic for several weeks, and ertapenem can be given once a day, and has excellent activity against the organisms cultured. Also added metronidazole which can be taken orally once extubated. Will ask lab to run sensitivities on Strep organism cultured.

## 2017-03-08 NOTE — PROGRESS NOTES
DATE OF SERVICE:  2017       IDENTIFICATION:  Carine Doshi is a 81-year-old gentleman who is being followed quite closely for management of a left odontogenic deep neck abscess.  He has undergone incision and drainage with supportive cares, intravenous antibiotics, incision and drainage, etc.  He remains intubated in the Intensive Care Unit.      PHYSICAL EXAMINATION:  On examination today he is in no apparent distress and intubated with a nasogastric tube feed in place.  His neck wound is inspected and noted to have 3 remaining drains in the posterior surgical access.  These are removed.  The drainage from the neck wound overnight is minimal and serosanguineous in its character.  Swelling of the posterior neck appears to have improved significantly.  Intraoral examination is challenging and difficult due to the presence of the endotracheal tube, but floor of mouth does feel soft.      ASSESSMENT:  Stable with satisfactory progress.      PLAN:  Extubation upon satisfactory resolution of airway edema and swelling.  Oral Surgery Service is happy to be present at the time of the extubation.        Contributing consulting services' notes are reviewed and contributions are greatly appreciated.  Will continue to follow closely.         RAVINDRA DALE DDS             D: 2017 07:37   T: 2017 07:56   MT: [email or web address]      Name:     CARINE DOSHI   MRN:      [id number]        Account:      [id number]   :      1935           Service Date: 2017      Document: [id number]

## 2017-03-08 NOTE — PROGRESS NOTES
"Pt. wrote note \"I really don't have pain but I feel twitchy\" Has been on PS all day, suggested placing back on full vent settings for the night & taking some Tylenol for generalized discomfort. Pt. Very agreeable, RT called. Liquid Tylenol down keofeed, back rub given.  "

## 2017-03-08 NOTE — PLAN OF CARE
Problem: Ventilation, Mechanical Invasive (Adult)  Goal: Signs and Symptoms of Listed Potential Problems Will be Absent or Manageable (Ventilation, Mechanical Invasive)  Signs and symptoms of listed potential problems will be absent or manageable by discharge/transition of care (reference Ventilation, Mechanical Invasive (Adult) CPG).   Outcome: No Change  Rested on full vent support through the night. Vss, slept on & off. Getting up to the commode with minimal assistance. Left neck drsg D&I

## 2017-03-08 NOTE — PLAN OF CARE
Problem: Individualization  Goal: Patient Preferences  Outcome: No Change  Pt continues to be alert and oriented. Ambulating to bedside commode despite being intubated. VSS. LS clear. Tolerating pressure support on ventilator. HR stable NSR with 1 degree AV block. Denies pain. Pt updated on POC and on possible extubation in AM. Continue current POC.

## 2017-03-08 NOTE — PLAN OF CARE
Problem: Individualization  Goal: Patient Preferences  Outcome: Improving  Patient on pressure support throughout shift. Tolerated well. Moderate white thin secretions from ET tube, primarily after activity.   Ambulated 2 laps around unit.  BM x2.

## 2017-03-08 NOTE — PROGRESS NOTES
Patient placed on PS 7/5 at 1040, tolerating well.  MD, REBECCA, RN at Bedside, cuff leak test done, audible leak heard.  Patient extubated at 1135 to 40% aerosol mask, SpO2 98%. Lung sounds clear and diminished, no stridor.  Will continue to monitor.

## 2017-03-08 NOTE — PLAN OF CARE
Problem: Goal Outcome Summary  Goal: Goal Outcome Summary  PT: Pt ambulating hallways, negotiating stairs with CGA to SBA, no AD. Recommend frequent short bouts of walking with staff to increase functional activity tolerance and general strength. Pt has essentially met all PT goals. Will benefit from one more visit to go over home program recommendations. Recommend discharge home.

## 2017-03-08 NOTE — PROGRESS NOTES
[facility] ICU RESPIRATORY NOTE  Date of Admission:2/27217  Date of Intubation (most recent):227/17  Reason for Mechanical Ventilation: Airway protection  Number of Days on Mechanical Ventilation: 10  Met Criteria for Pressure Support Trial:yes    Significant Events Today: return to full support per RN    ABG Results:No lab results found in last 7 days.      ETT appearance on chest x-ray: unchanged  Ventilation Mode: CMV/AC  FiO2 (%): 30 %  Rate Set (breaths/minute): 16 breaths/min  Tidal Volume Set (mL): 500 mL  PEEP (cm H2O): 5 cmH2O  Pressure Support (cm H2O): 7 cmH2O  Oxygen Concentration (%): 30 %  Resp: 16      Plan:  Continue to monitor  3/8/2017  Yasmany Anderson

## 2017-03-08 NOTE — PROGRESS NOTES
[facility]  Infectious Disease Progress Note          Assessment and Plan:   IMPRESSION:   1. A 81-year-old male with coronary artery disease   2. Carcinoma of the prostate.   3. Admitted on this occasion with left facial swelling.  S/p I and D 2/27 with drainage of L submandibular and lateral pharyngeal space abscesses.  Surgical cx with Strep constallatus and Fusobacterium.    Clinically improving.  Blood cxs improving.  Repeat CT scans show resolving abscesses.      RECOMMENDATIONS:   1. Currently on ertapenem and flagyl.  Would not need flagyl for anaerobic coverage as erta would have excellent anaerobic coverage.  2. On vent for airway protection.  Possible extubation today.              Interval History:   Remains on vent.    Afebrile this am.  No new pos cxs.              Medications:       ertapenem (INVanz) IV  1 g Intravenous Q24H     metroNIDAZOLE  500 mg Intravenous Q8H     furosemide  20 mg Intravenous Daily     atorvastatin  40 mg Oral Daily     rantidine  150 mg Oral BID     lisinopril  2.5 mg Oral Daily     carvedilol  3.125 mg Oral BID w/meals     heparin  5,000 Units Subcutaneous Q8H     lidocaine   Topical Once     sodium chloride (PF)  3 mL Intracatheter Q8H     chlorhexidine  15 mL Mouth/Throat Q12H                  Physical Exam:   Blood pressure 128/66, pulse 84, temperature 99  F (37.2  C), temperature source Oral, resp. rate 16, weight 67 kg (147 lb 11.3 oz), SpO2 98 %.  [unfilled]  Vital Signs with Ranges  Temp:  [97.5  F (36.4  C)-99  F (37.2  C)] 99  F (37.2  C)  Heart Rate:  [70-95] 82  Resp:  [9-38] 16  BP: (122-131)/(66-73) 128/66  FiO2 (%):  [30 %-40 %] 30 %  SpO2:  [95 %-100 %] 98 %    Constitutional: Sedated.  On vent.   Lungs: Clear to auscultation bilaterally, no crackles or wheezing   Cardiovascular: Regular rate and rhythm, normal S1 and S2, and no murmur noted   Abdomen: Normal bowel sounds, soft, non-distended, non-tender   Skin: No rashes, no cyanosis,  no edema   Other: L submandibular swelling and induration decreased.          Data:   All microbiology laboratory data reviewed.  Recent Labs   Lab Test  03/06/17   0605  03/05/17   0425  03/02/17   0520   WBC  11.5*  11.1*  7.9   HGB  11.0*  10.9*  10.0*   HCT  30.9*  30.7*  28.3*   MCV  91  90  90   PLT  295  278  207     Recent Labs   Lab Test  03/06/17   0605  03/04/17   0435  03/03/17   0415   CR  0.79  0.66  0.68     No lab results found.

## 2017-03-09 ENCOUNTER — APPOINTMENT (OUTPATIENT)
Dept: SPEECH THERAPY | Facility: CLINIC | Age: 82
DRG: 854 | End: 2017-03-09
Attending: INTERNAL MEDICINE
Payer: COMMERCIAL

## 2017-03-09 LAB — PROT SERPL-MCNC: 6.5 G/DL (ref 6.8–8.8)

## 2017-03-09 PROCEDURE — 36415 COLL VENOUS BLD VENIPUNCTURE: CPT | Performed by: INTERNAL MEDICINE

## 2017-03-09 PROCEDURE — 40000225 ZZH STATISTIC SLP WARD VISIT: Performed by: SPEECH-LANGUAGE PATHOLOGIST

## 2017-03-09 PROCEDURE — 25000128 H RX IP 250 OP 636: Performed by: INTERNAL MEDICINE

## 2017-03-09 PROCEDURE — 84155 ASSAY OF PROTEIN SERUM: CPT | Performed by: INTERNAL MEDICINE

## 2017-03-09 PROCEDURE — 92526 ORAL FUNCTION THERAPY: CPT | Mod: GN | Performed by: SPEECH-LANGUAGE PATHOLOGIST

## 2017-03-09 PROCEDURE — 92610 EVALUATE SWALLOWING FUNCTION: CPT | Mod: GN | Performed by: SPEECH-LANGUAGE PATHOLOGIST

## 2017-03-09 PROCEDURE — 25000132 ZZH RX MED GY IP 250 OP 250 PS 637: Performed by: INTERNAL MEDICINE

## 2017-03-09 PROCEDURE — 25000125 ZZHC RX 250: Performed by: INTERNAL MEDICINE

## 2017-03-09 PROCEDURE — 99232 SBSQ HOSP IP/OBS MODERATE 35: CPT | Performed by: HOSPITALIST

## 2017-03-09 PROCEDURE — 25000132 ZZH RX MED GY IP 250 OP 250 PS 637: Performed by: HOSPITALIST

## 2017-03-09 PROCEDURE — 12000000 ZZH R&B MED SURG/OB

## 2017-03-09 RX ADMIN — ATORVASTATIN CALCIUM 40 MG: 20 TABLET, FILM COATED ORAL at 08:03

## 2017-03-09 RX ADMIN — RANITIDINE HYDROCHLORIDE 150 MG: 150 SOLUTION ORAL at 21:38

## 2017-03-09 RX ADMIN — ERTAPENEM SODIUM 1 G: 1 INJECTION, POWDER, LYOPHILIZED, FOR SOLUTION INTRAMUSCULAR; INTRAVENOUS at 14:12

## 2017-03-09 RX ADMIN — METRONIDAZOLE 500 MG: 500 INJECTION, SOLUTION INTRAVENOUS at 21:38

## 2017-03-09 RX ADMIN — FUROSEMIDE 20 MG: 10 INJECTION, SOLUTION INTRAVENOUS at 08:04

## 2017-03-09 RX ADMIN — HEPARIN SODIUM 5000 UNITS: 10000 INJECTION, SOLUTION INTRAVENOUS; SUBCUTANEOUS at 17:55

## 2017-03-09 RX ADMIN — RANITIDINE HYDROCHLORIDE 150 MG: 150 SOLUTION ORAL at 08:04

## 2017-03-09 RX ADMIN — CARVEDILOL 3.12 MG: 3.12 TABLET, FILM COATED ORAL at 17:55

## 2017-03-09 RX ADMIN — LISINOPRIL 2.5 MG: 2.5 TABLET ORAL at 08:16

## 2017-03-09 RX ADMIN — METRONIDAZOLE 500 MG: 500 INJECTION, SOLUTION INTRAVENOUS at 12:59

## 2017-03-09 RX ADMIN — CARVEDILOL 3.12 MG: 3.12 TABLET, FILM COATED ORAL at 08:16

## 2017-03-09 RX ADMIN — HEPARIN SODIUM 5000 UNITS: 10000 INJECTION, SOLUTION INTRAVENOUS; SUBCUTANEOUS at 03:44

## 2017-03-09 RX ADMIN — METRONIDAZOLE 500 MG: 500 INJECTION, SOLUTION INTRAVENOUS at 06:13

## 2017-03-09 NOTE — PROGRESS NOTES
"   03/09/17 1258   General Information   Onset Date 02/27/17   Start of Care Date 03/09/17   Referring Physician Dr. Hugo   Patient Profile Review/OT: Additional Occupational Profile Info See Profile for full history and prior level of function   Patient/Family Goals Statement Pt's goal is to eat and go home today   Swallowing Evaluation Bedside swallow evaluation   Behaviorial Observations Alert;WFL (within functional limits)   Mode of current nutrition NPO;NG   Respiratory Status Room air   Comments Pt admitted on this occasion with left facial swelling.  S/p I and D 2/27 with drainage of L submandibular and lateral pharyngeal space abscesses.  Pt intubated 2/27-3/8.  Per MD, abscess is resolving and Pt has been ok'd for any texture to trial.  Pt motivated for eval today. Pt reports occasional baseline dysphagia which has been present since he was young which is described as \"random choking. \"   Clinical Swallow Evaluation   Oral Musculature generally intact   Structural Abnormalities none present   Dentition present and adequate   Mucosal Quality adequate   Mandibular Strength and Mobility (minimal reduced ROM )   Oral Labial Strength and Mobility WFL   Lingual Strength and Mobility WFL  (swelling noted off BOT on (L))   Velar Elevation other (see comments)  (redness noted on soft palate in center likely from ETT)   Buccal Strength and Mobility intact   Laryngeal Function Cough;Throat clear;Swallow;Voicing initiated;Dry swallow palpated   Additional Documentation Yes   Clinical Swallow Eval: Thin Liquid Texture Trial   Mode of Presentation, Thin Liquids cup;self-fed   Volume of Liquid or Food Presented 2oz   Oral Phase of Swallow WFL   Pharyngeal Phase of Swallow (mild reduced laryngeal elevation but adequate)   Diagnostic Statement Pt tolerating thin liquids w/o outward s/sx of aspiration. did have mild reduced laryngeal elevation   Clinical Swallow Eval: Puree Solid Texture Trial   Mode of Presentation, Puree " spoon;self-fed   Volume of Puree Presented pudding   Oral Phase, Puree WFL   Pharyngeal Phase, Puree intact   Diagnostic Statement Pt tolerated puree solids w/o difficulty   Clinical Swallow Eval: Semisolid Texture Trial   Mode of Presentation, Semisolid self-fed   Volume of Semisolid Food Presented bite sized crackers coated w/ pudding   Oral Phase, Semisolid WFL   Pharyngeal Phase, Semisolid intact   Diagnostic Statement Tolerated semisolids w/o outward s/sx of aspiration; no s/sx of pharyngeal residuals   Swallow Compensations   Swallow Compensations Reduce amounts   Esophageal Phase of Swallow   Patient reports or presents with symptoms of esophageal dysphagia No   General Therapy Interventions   Planned Therapy Interventions Dysphagia Treatment   Dysphagia treatment Compensatory strategies for swallowing;Instruction of safe swallow strategies;Modified diet education   Swallow Eval: Clinical Impressions   Skilled Criteria for Therapy Intervention Skilled criteria met.  Treatment indicated.   Functional Assessment Scale (FAS) 5   Treatment Diagnosis Adequate oral swallow phase, mild pharyngeal dysphagia w/ mild reduced laryngeal elevation likely due to ongoing swelling from abscess   Diet texture recommendations Thin liquids  (mechanical soft/dental soft w/ thin liquids. )   Recommended Feeding/Eating Techniques no straws;small sips/bites;maintain upright posture during/after eating for 30 mins;alternate between small bites and sips of food/liquid   Therapy Frequency 5 times/wk   Predicted Duration of Therapy Intervention (days/wks) 1 week   Anticipated Discharge Disposition home   Risks and Benefits of Treatment have been explained. Yes   Patient, family and/or staff in agreement with Plan of Care Yes   Clinical Impression Comments SLP: Pt seen for a bedside swallow evaluation to determine safe diet s/p extubation yesterday.  Pt intubated 2/27-3/8 s/p (L) submandibular and lateral pharyngeal space abscess due to  oral fx eating hard solid.  Pt had I&D and drainage of absess 2/27.  Per MD, there are no restrictions for diet textures.  Pt presents with adequate oral swallow phase and mild pharyngeal dysphagia. Swelling noted on (L) side off BOT which Pt indicate has mild pain associated.  Mild reduced laryngeal elevation w/ continued swelling noted on (L) side of neck. Per Oral surgery, Pt will likely have resolving swelling over the next few weeks. Pt tolerated thin liquids, puree and semisolids w/o outward s/sx of aspiration.  No oral residuals and no s/sx of pharyngeal residuals.  Recommended diet is dental/mechanical soft diet w/ thin liquids.  Softer diet is recommended at this time to reduce risk of compaction of solids at abscess site.  Medical team ok w/this diet. Recommend pt take small bites/sips, avoid all dry/hard textures, avoid straws, upright and alert for all PO.  ST will f/u for diet tolerance.  Pt will not need further swallow tx at discharge.     Total Evaluation Time   Total Evaluation Time (Minutes) 15

## 2017-03-09 NOTE — PLAN OF CARE
Problem: Goal Outcome Summary  Goal: Goal Outcome Summary  Outcome: Improving  Pt VSS, afebrile and can make his needs known.  Pt is up with SBA to the BR and had a swallow study done and is on a dental soft diet.  Pt has family at the bedside and his family was updated about POC and are on board with the plan.  Pt report was called to the floor and Rn had no new questions at this time.  Pt had his EVETTE feed removed per verbal order from Dr. Hugo.  Pt will be transferred to his new room with his belongings.  Pt tele was SR.

## 2017-03-09 NOTE — PROGRESS NOTES
PROGRESS NOTE      DATE OF SERVICE:  03/09/2017      HISTORY OF PRESENT ILLNESS:  Oscar Chaudhary (Spike) is evaluated in the ICU this morning.  Oscar has had quite a week secondary to a deep space neck infection.  He underwent incision and drainage.  He has made satisfactory progress and was extubated yesterday without complication.  He currently has a nasogastric tube.  His vital signs have been within the realms of normal overnight, and overall he is making good progress clinically and also from a laboratory standpoint.        PHYSICAL EXAMINATION:    HEENT:  This morning he has moderate left facial edema in the submandibular region.  PERRLA, EOMI.  The right naris is patent.  The left naris has a feeding tube secured.  His speech is audible.  He does not sound hoarse.  He does not describe dysphasia or difficulty with breathing.  Oral exam:  Maximum size opening is near 35 mm.  His occlusion is intact and unchanged.  The vestibule is soft bilaterally.  To deep palpation he does have soreness in the pharyngeal area, but this is fairly soft to palpation.        Again I also reviewed previous CT imaging in terms of comparison CTs as well as his white cell count.  He is being followed by multiple teams including Infectious Disease and the Hospitalist Service.      ASSESSMENT AND PLAN:  Oscar Chaudhary is progressing well status post extubation from associated airway edema and swelling from a deep space neck infection on the left.  Overall it would appear that Spike is making slow progress.  I would not expect complete edema resolved for the next 2-3 weeks, but he seems to be trending in the right direction.  Certainly it is a good sign that he has done well overnight.  Will continue following him closely while he is here in the hospital.  I would imagine that they may put him to step-down today.  They may even push him to discharge this weekend.  No doubt we will want to follow Spike closely in our office next  week.  He has some unresolved issues in regard to intraoral problems on the lower left.  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  Overall we just need to address the area and source of infection and make sure that it has definitively been treated.  We are a little too soon to be able to do this quite yet as we need the edema to resolve further.  Will continue following as stated.         NOBLE RICCI DDS             D: 2017 07:12   T: 2017 07:30   MT: [email or web address]      Name:     CARINE DOSHI   MRN:      [id number]        Account:      [id number]   :      1935           Service Date: 2017      Document: [id number]

## 2017-03-09 NOTE — PLAN OF CARE
Problem: Individualization  Goal: Patient Preferences  Outcome: Improving  Pt vss, extubated at 1040 to aerosol mask and transitioned to 2L NC. TF stopped per pt request secondary to upset stomach. Good UO, per urinal. Drain from posterior neck removed this AM by MD, site intact with scant drainage on bandage. Pt walked the full lap of ramirez and 1/2 flight stairs with help of PT. Pt up with SBA assist in room

## 2017-03-09 NOTE — PLAN OF CARE
"Problem: Goal Outcome Summary  Goal: Goal Outcome Summary  PT-Patient seen to review core stability ex. Patient reports \"these are easy\". \"These are nothing compared to what I usually do.\" Reports he does not need to review the ex. He is comfortable to discharging to home and agrees he has no further PT needs. He is very active at home.     Physical Therapy Discharge Summary    Patient discharged from PT as no further PT needs identified.         Progress towards therapy goal(s). See goals on Care Plan in [facility] electronic health record for goal details.  Goals met    Therapy recommendation(s):    Continue home exercise program. Patient has a home exercise program.       "

## 2017-03-09 NOTE — PLAN OF CARE
Problem: Goal Outcome Summary  Goal: Goal Outcome Summary  SLP: Pt seen for a bedside swallow evaluation to determine safe diet s/p extubation yesterday.  Pt intubated 2/27-3/8 s/p (L) submandibular and lateral pharyngeal space abscess due to oral fx eating hard solid.  Pt had I&D and drainage of absess 2/27.  Per MD, there are no restrictions for diet textures.  Pt presents with adequate oral swallow phase and mild pharyngeal dysphagia. Swelling noted on (L) side off BOT which Pt indicate has mild pain associated.  Mild reduced laryngeal elevation w/ continued swelling noted on (L) side of neck. Per Oral surgery, Pt will likely have resolving swelling over the next few weeks. Pt tolerated thin liquids, puree and semisolids w/o outward s/sx of aspiration.  No oral residuals and no s/sx of pharyngeal residuals.  Recommended diet is dental/mechanical soft diet w/ thin liquids.  Softer diet is recommended at this time to reduce risk of compaction of solids at abscess site.  Medical team ok w/this diet. Recommend pt take small bites/sips, avoid all dry/hard textures, avoid straws, upright and alert for all PO.  ST will f/u for diet tolerance.  Pt will not need further swallow tx at discharge.

## 2017-03-09 NOTE — PLAN OF CARE
Problem: Goal Outcome Summary  Goal: Goal Outcome Summary  Outcome: Improving  VSS. A/O x4. Extubated yesterday. Sating well on 2L NC. Drains removed from posterior neck yesterday. Standby assistance to restroom. Plan for swallow evaluation today. Will continue to monitor.

## 2017-03-09 NOTE — PROGRESS NOTES
[facility]  Infectious Disease Progress Note          Assessment and Plan:   IMPRESSION:   1. A 81-year-old male with coronary artery disease   2. Carcinoma of the prostate.   3. Admitted on this occasion with left facial swelling.  S/p I and D 2/27 with drainage of L submandibular and lateral pharyngeal space abscesses.  Surgical cx with Strep constallatus and Fusobacterium.    Clinically improving.  Blood cxs improving.  Repeat CT scans show resolving abscesses.      RECOMMENDATIONS:   1. Currently on ertapenem and flagyl.  Would not need flagyl for anaerobic coverage as erta would have excellent anaerobic coverage.  2. Perhaps keep on IV ab rx until Oral Surgery eval/rx complete.              Interval History:   Extubated.  Breathing comfortably.  Remains afebrile.  Oral Surgery plans noted.              Medications:       ertapenem (INVanz) IV  1 g Intravenous Q24H     metroNIDAZOLE  500 mg Intravenous Q8H     furosemide  20 mg Intravenous Daily     atorvastatin  40 mg Oral Daily     rantidine  150 mg Oral BID     lisinopril  2.5 mg Oral Daily     carvedilol  3.125 mg Oral BID w/meals     heparin  5,000 Units Subcutaneous Q8H     lidocaine   Topical Once     sodium chloride (PF)  3 mL Intracatheter Q8H     chlorhexidine  15 mL Mouth/Throat Q12H                  Physical Exam:   Blood pressure 114/59, pulse 84, temperature 98.4  F (36.9  C), temperature source Oral, resp. rate 21, weight 67 kg (147 lb 11.3 oz), SpO2 95 %.  [unfilled]  Vital Signs with Ranges  Temp:  [98.4  F (36.9  C)-98.6  F (37  C)] 98.4  F (36.9  C)  Heart Rate:  [71-91] 85  Resp:  [10-26] 21  BP: (111-137)/(59-80) 114/59  FiO2 (%):  [40 %] 40 %  SpO2:  [95 %-99 %] 95 %    Constitutional: Sedated.  On vent.   Lungs: Clear to auscultation bilaterally, no crackles or wheezing   Cardiovascular: Regular rate and rhythm, normal S1 and S2, and no murmur noted   Abdomen: Normal bowel sounds, soft, non-distended, non-tender    Skin: No rashes, no cyanosis, no edema   Other: L submandibular swelling and induration decreased.          Data:   All microbiology laboratory data reviewed.  Recent Labs   Lab Test  03/06/17   0605  03/05/17   0425  03/02/17   0520   WBC  11.5*  11.1*  7.9   HGB  11.0*  10.9*  10.0*   HCT  30.9*  30.7*  28.3*   MCV  91  90  90   PLT  295  278  207     Recent Labs   Lab Test  03/06/17   0605  03/04/17   0435  03/03/17   0415   CR  0.79  0.66  0.68     No lab results found.

## 2017-03-09 NOTE — PROGRESS NOTES
Attempted CPAP this evening per order, insufficient seal due to feeding tube still in place. Pt on O2 3 LPM, RN aware.     Jamaal Parry RT

## 2017-03-09 NOTE — PLAN OF CARE
Problem: Goal Outcome Summary  Goal: Goal Outcome Summary  Outcome: No Change  Pt arrived to floor at 1430 from ICU. A&O, calls appropriately. Up SBA. Settled in bed. IV patent with anx running.

## 2017-03-10 VITALS
WEIGHT: 134 LBS | OXYGEN SATURATION: 96 % | DIASTOLIC BLOOD PRESSURE: 52 MMHG | SYSTOLIC BLOOD PRESSURE: 110 MMHG | BODY MASS INDEX: 23.74 KG/M2 | TEMPERATURE: 96.8 F | HEART RATE: 74 BPM | RESPIRATION RATE: 18 BRPM

## 2017-03-10 DIAGNOSIS — L02.91 ABSCESS: Primary | ICD-10-CM

## 2017-03-10 LAB
ANION GAP SERPL CALCULATED.3IONS-SCNC: 7 MMOL/L (ref 3–14)
BACTERIA SPEC CULT: ABNORMAL
BUN SERPL-MCNC: 22 MG/DL (ref 7–30)
CALCIUM SERPL-MCNC: 8.7 MG/DL (ref 8.5–10.1)
CHLORIDE SERPL-SCNC: 99 MMOL/L (ref 94–109)
CO2 SERPL-SCNC: 28 MMOL/L (ref 20–32)
CREAT SERPL-MCNC: 0.88 MG/DL (ref 0.66–1.25)
ERYTHROCYTE [DISTWIDTH] IN BLOOD BY AUTOMATED COUNT: 15.8 % (ref 10–15)
GFR SERPL CREATININE-BSD FRML MDRD: 84 ML/MIN/1.7M2
GLUCOSE SERPL-MCNC: 103 MG/DL (ref 70–99)
HCT VFR BLD AUTO: 34.1 % (ref 40–53)
HGB BLD-MCNC: 12.1 G/DL (ref 13.3–17.7)
Lab: ABNORMAL
MCH RBC QN AUTO: 32.1 PG (ref 26.5–33)
MCHC RBC AUTO-ENTMCNC: 35.5 G/DL (ref 31.5–36.5)
MCV RBC AUTO: 91 FL (ref 78–100)
MICRO REPORT STATUS: ABNORMAL
MICROORGANISM SPEC CULT: ABNORMAL
MICROORGANISM SPEC CULT: ABNORMAL
PLATELET # BLD AUTO: 352 10E9/L (ref 150–450)
POTASSIUM SERPL-SCNC: 4.5 MMOL/L (ref 3.4–5.3)
RBC # BLD AUTO: 3.77 10E12/L (ref 4.4–5.9)
SODIUM SERPL-SCNC: 134 MMOL/L (ref 133–144)
SPECIMEN SOURCE: ABNORMAL
WBC # BLD AUTO: 10 10E9/L (ref 4–11)

## 2017-03-10 PROCEDURE — 25000132 ZZH RX MED GY IP 250 OP 250 PS 637: Performed by: INTERNAL MEDICINE

## 2017-03-10 PROCEDURE — 25000128 H RX IP 250 OP 636: Performed by: INTERNAL MEDICINE

## 2017-03-10 PROCEDURE — 36415 COLL VENOUS BLD VENIPUNCTURE: CPT | Performed by: HOSPITALIST

## 2017-03-10 PROCEDURE — 27211289 ZZ H KIT CATH IV 4FR 8 CM OR 10 CM, POWERWAND QUICK XL

## 2017-03-10 PROCEDURE — 85027 COMPLETE CBC AUTOMATED: CPT | Performed by: HOSPITALIST

## 2017-03-10 PROCEDURE — 80048 BASIC METABOLIC PNL TOTAL CA: CPT | Performed by: HOSPITALIST

## 2017-03-10 PROCEDURE — 25000132 ZZH RX MED GY IP 250 OP 250 PS 637: Performed by: HOSPITALIST

## 2017-03-10 PROCEDURE — 36569 INSJ PICC 5 YR+ W/O IMAGING: CPT

## 2017-03-10 PROCEDURE — 99239 HOSP IP/OBS DSCHRG MGMT >30: CPT | Performed by: HOSPITALIST

## 2017-03-10 PROCEDURE — 25000125 ZZHC RX 250: Performed by: INTERNAL MEDICINE

## 2017-03-10 RX ORDER — ACETAMINOPHEN 325 MG/1
650 TABLET ORAL EVERY 4 HOURS PRN
Qty: 100 TABLET | Refills: 0 | Status: SHIPPED | OUTPATIENT
Start: 2017-03-10

## 2017-03-10 RX ADMIN — RANITIDINE HYDROCHLORIDE 150 MG: 150 SOLUTION ORAL at 09:23

## 2017-03-10 RX ADMIN — LISINOPRIL 2.5 MG: 2.5 TABLET ORAL at 09:22

## 2017-03-10 RX ADMIN — ATORVASTATIN CALCIUM 40 MG: 20 TABLET, FILM COATED ORAL at 09:23

## 2017-03-10 RX ADMIN — ERTAPENEM SODIUM 1 G: 1 INJECTION, POWDER, LYOPHILIZED, FOR SOLUTION INTRAMUSCULAR; INTRAVENOUS at 12:50

## 2017-03-10 RX ADMIN — HEPARIN SODIUM 5000 UNITS: 10000 INJECTION, SOLUTION INTRAVENOUS; SUBCUTANEOUS at 01:58

## 2017-03-10 RX ADMIN — CARVEDILOL 3.12 MG: 3.12 TABLET, FILM COATED ORAL at 09:22

## 2017-03-10 RX ADMIN — HEPARIN SODIUM 5000 UNITS: 10000 INJECTION, SOLUTION INTRAVENOUS; SUBCUTANEOUS at 09:19

## 2017-03-10 RX ADMIN — METRONIDAZOLE 500 MG: 500 INJECTION, SOLUTION INTRAVENOUS at 05:49

## 2017-03-10 NOTE — PROGRESS NOTES
Oral & Maxillofacial Surgery Progress Note    Assessment:  Oscar Chaudhary is a 81 year old male , ASA 3 - Severe systemic disease, but not incapacitating, POD #9 s/p I&D Multiple Deep left sided neck spaces.  Progress: improving    Plan/Recommendation(s):  1. I think it is reasonable to make a push for discharge today (at least from OMS standpoint) as Spike has improved and tolerating PO--However, Infectious disease would need to arrange his abx plan prior, and I am not sure if they can get all this done by end of today.  Medically/surgically, we essentially need time with this infection to resolve further and I think this can be safely done outside the hospital  2. OMS would favor PICC line and 4-6 weeks ertapenum depending on ID thoughts.  This is a deep space neck infection.  These often appear to look better than they really are.   That being said, there is no doubt he is improving clinically.  3. We would request follow-up outpatient on Monday at our [facility] office.  Spike is to see Dr. Placido Escalante.  [phone].  4. All Drains have been removed.  The left neck wound will simply close by secondary intention and needs bi-daily dressing change till closed over.  5. Thanks to all teams assisting in management.  6. Please call us with any questions, especially related to d/c as the patient is strongly desiring to leave today.      Anticipated d/c date: Hopeful for today    Signature:  Landon Dick    [facility]  [address], [address]  [place] [address]  Clinic/On-call Phone: [phone]  Clinic Fax: [phone]  _______________________________________________________________    Subjective:  Patient found in bed resting comfortably.  Patient complains of poor sleep    Alert & Oriented x3: yes  Void: yes  Ambulation: yes  PO Intake: yes    Pain Level: 2 / 10  Pain Control: Good    Medications:    Current Facility-Administered Medications:      ertapenem  (INVanz) 1 g vial to attach to  mL bag, 1 g, Intravenous, Q24H, Shree Hugo MD, 1 g at 03/09/17 1412     metroNIDAZOLE (FLAGYL) infusion 500 mg, 500 mg, Intravenous, Q8H, Shree Hugo MD, 500 mg at 03/10/17 0549     atorvastatin (LIPITOR) tablet 40 mg, 40 mg, Oral, Daily, Laly Steward MD, 40 mg at 03/09/17 0803     ranitidine (Zantac) syrup 150 mg, 150 mg, Oral, BID, Carlos Stahl DO, 150 mg at 03/09/17 2138     glucose 40 % gel 15-30 g, 15-30 g, Oral, Q15 Min PRN **OR** dextrose 50 % injection 25-50 mL, 25-50 mL, Intravenous, Q15 Min PRN **OR** glucagon injection 1 mg, 1 mg, Subcutaneous, Q15 Min PRN, Awa Kingsley MD     lisinopril (PRINIVIL/Zestril) tablet 2.5 mg, 2.5 mg, Oral, Daily, Awa Kingsley MD, 2.5 mg at 03/09/17 0816     carvedilol (COREG) tablet 3.125 mg, 3.125 mg, Oral, BID w/meals, Awa Kingsley MD, 3.125 mg at 03/09/17 1755     heparin sodium PF injection 5,000 Units, 5,000 Units, Subcutaneous, Q8H, Awa Kingsley MD, 5,000 Units at 03/10/17 0158     dextrose 10 % 1,000 mL infusion, , Intravenous, Continuous PRN, Awa Kingsley MD     lidocaine (XYLOCAINE) 2 % topical gel, , Topical, Once, Awa Kingsley MD     lidocaine 1 % 1 mL, 1 mL, Other, Q1H PRN, Carlos Stahl DO     lidocaine (LMX4) cream, , Topical, Q1H PRN, Carlos Stahl DO     sodium chloride (PF) 0.9% PF flush 3 mL, 3 mL, Intracatheter, Q1H PRN, Carlos Stahl DO     sodium chloride (PF) 0.9% PF flush 3 mL, 3 mL, Intracatheter, Q8H, Carlos Stahl DO, 3 mL at 03/09/17 2314     acetaminophen (TYLENOL) tablet 650 mg, 650 mg, Oral, Q4H PRN, Carlos Stahl DO, 650 mg at 03/08/17 2310     acetaminophen (TYLENOL) Suppository 650 mg, 650 mg, Rectal, Q4H PRN, Carlos Stahl DO     HYDROcodone-acetaminophen (NORCO) 5-325 MG per tablet 1-2 tablet, 1-2 tablet, Oral, Q4H PRN, Favio  Carlos Coto DO     senna-docusate (SENOKOT-S;PERICOLACE) 8.6-50 MG per tablet 1-2 tablet, 1-2 tablet, Oral, BID PRN, Carlos Stahl DO     polyethylene glycol (MIRALAX/GLYCOLAX) Packet 17 g, 17 g, Oral, Daily PRN, Carlos Stahl DO     bisacodyl (DULCOLAX) Suppository 10 mg, 10 mg, Rectal, Daily PRN, Carlos Stahl DO     ondansetron (ZOFRAN-ODT) ODT tab 4 mg, 4 mg, Oral, Q6H PRN **OR** ondansetron (ZOFRAN) injection 4 mg, 4 mg, Intravenous, Q6H PRN, Carlos Stahl DO     naloxone (NARCAN) injection 0.1-0.4 mg, 0.1-0.4 mg, Intravenous, Q2 Min PRN, Awa Kingsley MD     albuterol (PROAIR HFA/PROVENTIL HFA/VENTOLIN HFA) Inhaler 6 puff, 6 puff, Inhalation, Q4H PRN, Awa Kingsley MD    Objective/Physical Exam:  Vitals:   BP:  105/58   HR:  74   Tmax: Temp (24hrs), Av.8  F (36.6  C), Min:96.9  F (36.1  C), Max:98.5  F (36.9  C)     RR:  18  Intake/Output (last 24hr):    Intake/Output Summary (Last 24 hours) at 03/10/17 0634  Last data filed at 03/10/17 0133   Gross per 24 hour   Intake             2700 ml   Output              500 ml   Net             2200 ml     Intake/output (this shift):   I/O this shift:  In: -   Out: 125 [Urine:125]      HEENT: extraocular movements are intact, pupils equal and reactive to light and accommodation, TMs clear, oropharynx clear, nasal passages clear, occlusion intact/reproducible, moderate left submandibular edema, mild left v3 hypoesthesia noted, left neck wound slowly closing by secondary intention.  Oral: Oropharynx clear, mucosa moist, occlusion reproducible and elastics/dental appliances in place.  No trismus.    Cardiovascular: Deferred  Respiratory: Deferred  Abdomen: Deferred  Extremities: Deferred  Neurological: Deferred

## 2017-03-10 NOTE — PLAN OF CARE
Problem: Goal Outcome Summary  Goal: Goal Outcome Summary  Outcome: Improving  Pt is alert and oriented X4.  VSS. Pt denied any pain or nausea.  SBA.  Ambulated X1.  Mechanical soft diet, tolerated well.  Continues on IV flagyl.  Dressing to left cheek, changed-small amount of serogang. Drainage.  Pt will probably discharge home tomorrow.

## 2017-03-10 NOTE — DISCHARGE SUMMARY
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     Hospital Course   Oscar Chaudhary was admitted on 2/27/2017.  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 Chaudhary had been started as an outpatient on Penicillin for suspected left mandible dental abscess; however his pain and swelling worsened so he was admitted here. He was ill appearing at admission. He was tachycardic and tachypneic and labs were notable for leukocytosis. Maxillofacial CT showed the above abscesses as well as a tumor. Blood cultures were sent and he was started on Zosyn. Dominik-maxillofacial Surgery was consulted and performed I and D. 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 Surgical cultures revealed the above polymicrobial pathogens. ID was consulted. He remained intubated for some time due to airway inflammation and was stabilized in the ICU, and eventually on 3/8/2017 he was successfully extubated. He received 8 days of Zosyn, and then was transitioned to Ertapenem and Flagyl. Now his pain has largely resolved. All drains have been removed and his  diet has been advanced to a modified form; per ID and Surgery a midline catheter will be placed today and he will continue several weeks of IV Ertapenem as an outpatient. He will follow up with his PCP in a week, and with OMFS as directed.     2) CAD: He is status post CABG. Aspirin, Lisinopril, statin and beta blocker to be continued at discharge.    3) Hypertension: He takes Spironolactone as an outpatient, resumed at discharge.     Jaime Tamayo MD    Code Status   Full Code       Primary Care Physician   Milton Hollins    Blood pressure 110/52, pulse 74, temperature 96.8  F (36  C), temperature source Oral, resp. rate 18, weight 60.8 kg (134 lb), SpO2 97 %.    Constitutional: Alert and oriented to person, place and time; no apparent distress  HEENT: left sub-mandibular swelling without erythema, moist mucous membranes  Respiratory: lungs clear to auscultation bilaterally  Cardiovascular: regular S1 S2 no murmurs rubs or gallops  GI: abdomen soft non tender non distended bowel sounds positive  Lymph/Hematologic: no pallor, no cervical lymphadenopathy  Skin: no rash, good turgor  Musculoskeletal: no clubbing, cyanosis or edema  Neurologic: extra-ocular muscles intact; moves all four extremities  Psychiatric: appropriate affect, insight and judgment    Discharge Disposition   Discharged to home  Condition at discharge: Good    Consultations This Hospital Stay   INFECTIOUS DISEASES IP CONSULT  ENT IP CONSULT  ENT IP CONSULT  ORAL SURGERY IP CONSULT  ORAL SURGERY IP CONSULT  NUTRITION SERVICES ADULT IP CONSULT  PHYSICAL THERAPY ADULT IP CONSULT  PHARMACY IP CONSULT  SWALLOW EVAL SPEECH PATH AT BEDSIDE IP CONSULT  SOCIAL WORK IP CONSULT  VASCULAR ACCESS ADULT IP CONSULT    Time Spent on this Encounter   I, Jaime Tamayo, personally saw the patient today and spent greater than 30 minutes discharging this patient.    Discharge Orders     Discharge Instructions     Reason for your hospital stay   For  treatment of a left neck abscess and infection.     Follow-up and recommended labs and tests    Follow up with primary care provider, Milton Hollins, within 7 days for hospital follow- up.  The following labs/tests are recommended: CBC, BMP.     Activity   Your activity upon discharge: activity as tolerated     Full Code     Diet   Follow this diet upon discharge: Orders Placed This Encounter     Combination Diet Mechanical/Dental Soft Diet; Thin Liquids (water, ice chips, juice, milk, gelatin, ice cream, etc) (NO STRAWS)       Discharge Medications   Current Discharge Medication List      START taking these medications    Details   ertapenem (INVANZ) 1 GM injection Inject 1 g into the vein every 24 hours for 14 days CBC with differential, creatinine, SGOT weekly while on this medication to be faxed to Dr. Philippe office.  Qty: 140 mL, Refills: 0    Associated Diagnoses: Abscess      acetaminophen (TYLENOL) 325 MG tablet Take 2 tablets (650 mg) by mouth every 4 hours as needed for mild pain  Qty: 100 tablet, Refills: 0    Associated Diagnoses: Fusobacterium infection         CONTINUE these medications which have NOT CHANGED    Details   LISINOPRIL PO Take 2.5 mg by mouth 2 times daily      diazepam (VALIUM) 5 MG tablet Take 1 tablet (5 mg) by mouth every 6 hours as needed for anxiety  Qty: 30 tablet, Refills: 3    Associated Diagnoses: Anxiety      atorvastatin (LIPITOR) 40 MG tablet Take 1 tablet (40 mg) by mouth daily  Qty: 90 tablet, Refills: 3    Associated Diagnoses: Hyperlipidemia      atenolol (TENORMIN) 25 MG tablet Take 1 tablet (25 mg) by mouth daily  Qty: 90 tablet, Refills: 3    Associated Diagnoses: Other secondary hypertension      spironolactone (ALDACTONE) 25 MG tablet Take 0.5 tablets (12.5 mg) by mouth daily  Qty: 45 tablet, Refills: 3    Associated Diagnoses: LV dysfunction      temazepam (RESTORIL) 15 MG capsule Take 1 capsule (15 mg) by mouth nightly as needed  Qty: 30 capsule, Refills: 3     Associated Diagnoses: Insomnia, unspecified      aspirin 81 MG tablet Take 1 tablet by mouth daily.      fish oil-omega-3 fatty acids (FISH OIL) 1000 MG capsule Take 1 g by mouth daily.      Multiple Vitamins-Minerals (MULTIVITAL PO) Take 1 tablet by mouth daily.      Cholecalciferol (VITAMIN D) 1000 UNITS capsule Take 1 capsule by mouth daily.         STOP taking these medications       penicillin V potassium (VEETID) 500 MG tablet Comments:   Reason for Stopping:             Allergies   Allergies   Allergen Reactions     Epinephrine-Lidocaine-Na Metabisulfite      Patient stated he is not allergic to lidocaine rather he cannot tolerate epinephrine     Local [Lidocaine]      Patient stated he is not allergic to lidocaine rather he cannot tolerate epinephrine       Data   Most Recent 3 CBC's:  Recent Labs   Lab Test  03/10/17   0721 03/06/17   0605  03/05/17   0425   WBC  10.0  11.5*  11.1*   HGB  12.1*  11.0*  10.9*   MCV  91  91  90   PLT  352  295  278      Most Recent 3 BMP's:  Recent Labs   Lab Test  03/10/17   0721 03/06/17   0605 03/04/17   0435   NA  134  133  138   POTASSIUM  4.5  4.1  3.6   CHLORIDE  99  97  100   CO2  28  31  30   BUN  22  16  16   CR  0.88  0.79  0.66   ANIONGAP  7  5  8   MICKIE  8.7  8.3*  7.9*   GLC  103*  125*  115*     Most Recent 2 LFT's:  Recent Labs   Lab Test  02/27/17   1228  06/14/16   0919   AST  21  27   ALT  24  26   ALKPHOS  72  56   BILITOTAL  1.1  0.5     Most Recent INR's and Anticoagulation Dosing History:  Anticoagulation Dose History     There is no flowsheet data to display.        Most Recent 3 Troponin's:No lab results found.  Most Recent Cholesterol Panel:  Recent Labs   Lab Test  06/14/16   0919   CHOL  118   LDL  45   HDL  58   TRIG  74     Most Recent 6 Bacteria Isolates From Any Culture (See EPIC Reports for Culture Details):  Recent Labs   Lab Test  02/27/17 2000 02/27/17   1827  02/27/17   1814  02/27/17   1238  02/27/17   1228   CULT  Canceled, Test  credited Duplicate request  Canceled, Test credited  Duplicate request    Moderate growth Fusobacterium nucleatum Susceptibility testing not routinely done  Heavy growth Parvimonas micra Susceptibility testing not routinely done  *  Light growth beta hemolytic  Streptococcus constellatus Susceptibility testing   not routinely done  Moderate growth Normal oral marbella  *  Canceled, Test credited Test reordered as correct code REORDERED AS AN ABSCESS   CULTURE    Light growth Coagulase negative Staphylococcus Susceptibility testing not   routinely done These bacteria are part of normal skin marbella, but on occasion,   may be true pathogens.  Clinical correlation must be applied to interpreting   this microbiology result.  Light growth beta hemolytic  Streptococcus constellatus  Susceptibility testing requested by Shree Lyman on Strep constellatus.   Please do usual sens and include Carbopenem, Metronidazole.3/7/17 at 1010.TV.  *  Moderate growth Parvimonas micra Susceptibility testing not routinely done*  Canceled, Test credited Test reordered as correct code REORDERED AS AN ABSCESS   CULTURE    No growth  No growth     Most Recent TSH, T4 and A1c Labs:  Recent Labs   Lab Test  03/01/17   0425   A1C  5.2     Results for orders placed or performed during the hospital encounter of 02/27/17   CT Maxillofacial w Contrast    Narrative    CT MAXILLOFACIAL WITH CONTRAST 2/27/2017 4:11 PM    HISTORY: Evaluate for fracture and abscess.    COMPARISON: None.    TECHNIQUE: CT face and upper neck with 90 mL Isovue-370. Radiation  dose for this scan was reduced using automated exposure control,  adjustment of the mA and/or kV according to patient size, or iterative  reconstruction technique.    FINDINGS: There is inflammatory-appearing abnormality in the left  submandibular region measuring approximately 5.0 x 2.6 cm. It has  central low density with a few flecks of air. No well-defined margins.  It extends from just  inferomedial to the posterior body and angle of  the mandible to the submandibular gland region and medially to the  midline where it effaces the airway to the right. This has the  appearance of an abscess that could be of submandibular origin  originating as sialoadenitis. There is, however, an odontogenic  abnormality associated with the left posterior most molar. This  consists of a primarily low density well-circumscribed  benign-appearing lesion associated with the root of the tooth and  measuring 2.0 x 1.3 cm in size. It has a small amount of calcified  matrix. This appears to be an odontogenic tumor, nonspecific. The  adjacent cortex appears intact and therefore this is not the typical  appearance of a periodontal abscess. Rather it does have the  appearance more suggestive of benign tumor.    No significant paranasal sinus disease. There is abnormal strand-like  density throughout the subcutaneous fat of the lower face, left  greater than right, and submandibular region consistent with edema or  cellulitis.    Fluid in the left mastoid air cells noted.      Impression    IMPRESSION:   1. Complex left submandibular space inflammatory-appearing process  consistent with abscess and adjacent cellulitis.  2. Odontogenic tumor left mandible as described. It has an overall  benign appearance.    OLIVIER ESQUIVEL MD   XR Chest Port 1 View    Narrative    PORTABLE CHEST ONE VIEW   2/27/2017   8:50 PM     HISTORY: ET tube placement.    COMPARISON: None.    FINDINGS: ET tube in the right main bronchus should be pulled back  probably 5 cm. Sternotomy. Heart is upper limits of normal in size.  Lungs clear.      Impression    IMPRESSION: ET tube is in the right bronchus. Information was called  to nurse in the ACU at 9:30 PM.    OLIVIER ESQUIVEL MD   XR Chest Port 1 View    Narrative    CHEST PORTABLE ONE VIEW    2/27/2017 9:56 PM     HISTORY: ETT repositioned, check placement.    COMPARISON: 2/27/2017 at  2052.    FINDINGS: ET tube has been repositioned and the tip is now just above  the jelani. Lungs clear.      Impression    IMPRESSION: ET tube tip is now just above the jelani.     OLIVIER ESQUIVEL MD   XR Abdomen Port 1 View    Narrative    XR ABDOMEN PORT F1 VW  2/28/2017 6:41 PM     HISTORY:  feeding tube placement    COMPARISON: None.    FINDINGS:  Feeding tube has been inserted. The tip is in the region of  the gastric antrum.    YADIEL VILLEGAS MD   CT Soft Tissue Neck w Contrast    Narrative    CT SCAN OF THE NECK WITH  CONTRAST  3/3/2017 9:35 AM     HISTORY: Follow up swelling.    TECHNIQUE:  Axial images and coronal reformations with 80 mL  Isovue-370 iv contrast material.  Radiation dose for this scan was  reduced using automated exposure control, adjustment of the mA and/or  kV according to patient size, or iterative reconstruction technique.     COMPARISON: CT dated 2/27/2017.    FINDINGS: Since the previous study, the complex lesion in the left  submandibular space has been drained. Surgical drains are in place. No  significant residual fluid is identified. There is a large lucency  associated with the tooth 25. There is now a small bubble of gas  within this lucency. This lesion could represent an odontogenic lesion  such as a dentigerous cyst. The presence of the gas within the lesion  could be due to postoperative change or it could be secondary to  communication with the drained abscess. The patient is intubated.  There is no significant soft tissue edema in the submandibular space  and in the paralaryngeal and parapharyngeal spaces. There is shift of  midline structures to the right of midline.      Impression    IMPRESSION:     1. The previously seen left submandibular fluid collection has been  drained. No significant residual low-dense fluid is identified.  2. Significant residual soft tissue edema causing displacement of the  airway to the right of midline.  3. Odontogenic lesion in the left  mandible associated with the  unerupted tooth 25. This may represent a dentigerous cyst. There is  now a small bubble of gas within this cystic lesion..    4. Mucosal thickening in the maxillary sinuses and ethmoid sinuses.  5. Patient is intubated.    YADIEL VILLEGAS MD   CT Soft Tissue Neck w Contrast    Narrative    CT SCAN OF THE NECK WITH CONTRAST March 6, 2017 4:45 AM     HISTORY: Left submandibular abscess with airway compromise.    TECHNIQUE: Axial images and coronal reformations. 80mL Isovue-370 IV.  Radiation dose for this scan was reduced using automated exposure  control, adjustment of the mA and/or kV according to patient size, or  iterative reconstruction technique.    COMPARISON: 3/3/2017, 2/27/2017.    FINDINGS: Three left-sided submandibular abscess drains remain in  place. There is no significant residual hypodense fluid collection.  The left submandibular gland remains enlarged and somewhat inflamed.  The patient is intubated and has a nasogastric tube through the right  nares. The airway remains slightly deviated to the right. A small  amount of air is seen in the left submandibular space subcutaneous  soft tissues inferiorly. The right submandibular gland appears to be  within normal limits. There is no evidence for floor of mouth abscess  on this exam. Cystic lesion in the left mandible associated with last  mandibular tooth is again noted. Minimal degenerative changes are seen  in the spine. Lung apices are clear. Mucosal thickening is noted in  both maxillary sinuses and visualized right ethmoid air cells. No  air-fluid levels are seen in the maxillary sinuses. Extensive soft  tissue swelling is remaining in the left neck.      Impression    IMPRESSION: No significant change from prior study. Three left-sided  drains remain in place without any definable hypodense abscesses  remaining. There is persistent left facial's swelling and  submandibular sialoadenitis with some deviation of the airway to  the  right. The patient remains intubated.    ZANDER LUKE MD

## 2017-03-10 NOTE — PROGRESS NOTES
[facility]  Infectious Disease Progress Note          Assessment and Plan:   IMPRESSION:   1. A 81-year-old male with coronary artery disease   2. Carcinoma of the prostate.   3. Admitted on this occasion with left facial swelling.  S/p I and D 2/27 with drainage of L submandibular and lateral pharyngeal space abscesses.  Surgical cx with Strep constallatus and Fusobacterium.    Clinically improving.  Blood cxs improving.  Repeat CT scans show resolving abscesses.      RECOMMENDATIONS:   1. Note Dr. Dick's rec for outpt IV ab.  Will order PICC line.  2. Orders written for outpt IV ertapenem.  3. I will see in office f/u.              Interval History:   Extubated.  Breathing comfortably.  Remains afebrile.  Oral Surgery plans noted.              Medications:       ertapenem (INVanz) IV  1 g Intravenous Q24H     metroNIDAZOLE  500 mg Intravenous Q8H     atorvastatin  40 mg Oral Daily     rantidine  150 mg Oral BID     lisinopril  2.5 mg Oral Daily     carvedilol  3.125 mg Oral BID w/meals     heparin  5,000 Units Subcutaneous Q8H     lidocaine   Topical Once     sodium chloride (PF)  3 mL Intracatheter Q8H                  Physical Exam:   Blood pressure 105/58, pulse 74, temperature 96.9  F (36.1  C), temperature source Oral, resp. rate 18, weight 60.8 kg (134 lb), SpO2 95 %.  [unfilled]  Vital Signs with Ranges  Temp:  [96.9  F (36.1  C)-98.5  F (36.9  C)] 96.9  F (36.1  C)  Pulse:  [74] 74  Heart Rate:  [72-86] 72  Resp:  [15-22] 18  BP: (102-128)/(55-81) 105/58  SpO2:  [95 %-97 %] 95 %    Constitutional: Sedated.  On vent.   Lungs: Clear to auscultation bilaterally, no crackles or wheezing   Cardiovascular: Regular rate and rhythm, normal S1 and S2, and no murmur noted   Abdomen: Normal bowel sounds, soft, non-distended, non-tender   Skin: No rashes, no cyanosis, no edema   Other: L submandibular swelling and induration decreased.          Data:   All microbiology laboratory data  reviewed.  Recent Labs   Lab Test  03/06/17   0605  03/05/17   0425  03/02/17   0520   WBC  11.5*  11.1*  7.9   HGB  11.0*  10.9*  10.0*   HCT  30.9*  30.7*  28.3*   MCV  91  90  90   PLT  295  278  207     Recent Labs   Lab Test  03/06/17   0605  03/04/17   0435  03/03/17   0415   CR  0.79  0.66  0.68     No lab results found.

## 2017-03-10 NOTE — PROGRESS NOTES
Met with the patient at the bedside regarding d/c planning.  Per [place] no benefits for IV antx (~14 days) Met with the patient to discuss non coverage vs infusion chair time.  Patient agreeable to IV infusion chair (scheduled in AVS).  Patient says he does not require additional services at home for dressing change BID.     Patient also had questions about oral cares at home.  Per DDS office no restrictions for brushing will put this on AVS as well.  Patient has no further questions and feels comfortable with plan of care going forward.  He is already scheduled for a follow up on Monday for DDS.

## 2017-03-10 NOTE — PLAN OF CARE
Problem: Goal Outcome Summary  Goal: Goal Outcome Summary  SLP: Attempted to see pt for dysphagia tx; however pt preparing for discharge. Would recommend pt continue on mechanical soft textures as swelling continues to decrease; introduce solid textures slowly following reduction of swelling to reduce risk of compaction of solids at abscess site.      Speech Language Therapy Discharge Summary     Reason for therapy discharge:    Discharged to home.     Progress towards therapy goal(s). See goals on Care Plan in [facility] electronic health record for goal details.  Goals met     Therapy recommendation(s):    No further therapy is recommended.        Recommended diet is dental/mechanical soft diet w/ thin liquids. Softer diet is recommended at this time to reduce risk of compaction of solids at abscess site.  Medical team ok w/this diet. Recommend pt take small bites/sips, avoid all dry/hard textures, avoid straws, upright and alert for all PO.

## 2017-03-10 NOTE — PLAN OF CARE
Problem: Goal Outcome Summary  Goal: Goal Outcome Summary  Outcome: No Change  Patient A&Ox4. VSS. Denies pain. Up with SBA. Dental soft diet. Mepelix to left neck c/d/i. Voiding adequately. Remains on IV flagyl. ID following. Will continue to monitor.

## 2017-03-11 ENCOUNTER — INFUSION THERAPY VISIT (OUTPATIENT)
Dept: INFUSION THERAPY | Facility: CLINIC | Age: 82
End: 2017-03-11
Attending: INTERNAL MEDICINE
Payer: COMMERCIAL

## 2017-03-11 VITALS — TEMPERATURE: 98.1 F | SYSTOLIC BLOOD PRESSURE: 105 MMHG | HEART RATE: 68 BPM | DIASTOLIC BLOOD PRESSURE: 56 MMHG

## 2017-03-11 DIAGNOSIS — L02.91 ABSCESS: Primary | ICD-10-CM

## 2017-03-11 PROCEDURE — 96365 THER/PROPH/DIAG IV INF INIT: CPT

## 2017-03-11 PROCEDURE — 25000128 H RX IP 250 OP 636: Performed by: INTERNAL MEDICINE

## 2017-03-11 RX ADMIN — SODIUM CHLORIDE 1 G: 9 INJECTION, SOLUTION INTRAVENOUS at 13:32

## 2017-03-11 ASSESSMENT — PAIN SCALES - GENERAL: PAINLEVEL: NO PAIN (0)

## 2017-03-11 NOTE — MR AVS SNAPSHOT
After Visit Summary   3/11/2017    Oscar Chaudhary    MRN: [id number]           Patient Information     Date Of Birth          1935        Visit Information        Provider Department      3/11/2017 1:30 PM SH INFUSION CHAIR 16 [facility]        Today's Diagnoses     Abscess    -  1       Follow-ups after your visit        Follow-up notes from your care team     Return in 1 day (on 3/12/2017).      Your next 10 appointments already scheduled     Mar 12, 2017  1:30 PM CDT   Level 1 with SH INFUSION CHAIR 14   [facility] ([facility])    [facility] [place]  [address]  [place] [address]   [phone]            Mar 13, 2017  2:30 PM CDT   Level 1 with SH INFUSION CHAIR 15   [facility] ([facility])    [facility]  [address]  [place] [address]   [phone]            Mar 14, 2017  2:30 PM CDT   Level 1 with SH INFUSION CHAIR 17   [facility] ([facility])    [facility] [place]  [address]  [place] [address]   [phone]            Mar 15, 2017  3:00 PM CDT   Level 1 with SH INFUSION CHAIR 7   [facility] ([facility])    [facility]  [address]  [place] [address]   [phone]            Mar 16, 2017  2:00 PM CDT   Level 1 with SH INFUSION CHAIR 13   [facility] ([facility])    [facility] [place]  [address]  [place] [address]   [phone]            Mar 17, 2017  2:00 PM CDT   Level 1 with SH INFUSION CHAIR 4   [facility] ([facility])    [facility] [place]  [address]  [place]  [address]   [phone]            Mar 18, 2017  1:00 PM CDT   Level 1 with SH INFUSION CHAIR 14   [facility] ([facility])    [facility] [place]  [address]  [place] [address]   [phone]            Mar 19, 2017  1:00 PM CDT   Level 1 with SH INFUSION CHAIR 13   [facility] ([facility])    [facility] [place] [address]  [place] [address]   [phone]            Mar 20, 2017  2:30 PM CDT   Level 1 with SH INFUSION CHAIR 16   [facility] ([facility])    [facility] [place]  [address]  [place] [address]   [phone]            Mar 21, 2017  2:00 PM CDT   Level 1 with SH INFUSION CHAIR 15   [facility] ([facility])    [facility] [place] [address]  [place] [address]   [phone]              Who to contact     If you have questions or need follow up information about today's clinic visit or your schedule please contact [facility]ION CENTER directly at [phone].  Normal or non-critical lab and imaging results will be communicated to you by ReDoc Softwarehart, letter or phone within 4 business days after the clinic has received the results. If you do not hear from us within 7 days, please contact the clinic through [employer] or phone. If you have a critical or abnormal lab result, we will notify you by phone as soon as possible.  Submit refill requests through [employer] or call your pharmacy and they will forward the refill request to us. Please allow 3 business days for your refill to be completed.          Additional Information About Your Visit        ReDoc Softwarehart Information     [employer] gives you secure access to your electronic health record. If you see a primary care provider, you can also  send messages to your care team and make appointments. If you have questions, please call your primary care clinic.  If you do not have a primary care provider, please call [phone] and they will assist you.        Care EveryWhere ID     This is your Care EveryWhere ID. This could be used by other organizations to access your [place] medical records  [id number]        Your Vitals Were     Pulse Temperature                68 98.1  F (36.7  C) (Oral)           Blood Pressure from Last 3 Encounters:   03/11/17 105/56   03/10/17 110/52   02/26/17 121/56    Weight from Last 3 Encounters:   03/10/17 60.8 kg (134 lb)   02/26/17 62.6 kg (138 lb)   06/14/16 66.3 kg (146 lb 3.2 oz)              Today, you had the following     No orders found for display       Primary Care Provider Office Phone # Fax #    Milton Hollins [phone]       [address] [address]        Thank you!     Thank you for choosing [facility] AND [facility]  for your care. Our goal is always to provide you with excellent care. Hearing back from our patients is one way we can continue to improve our services. Please take a few minutes to complete the written survey that you may receive in the mail after your visit with us. Thank you!             Your Updated Medication List - Protect others around you: Learn how to safely use, store and throw away your medicines at www.disposemymeds.org.          This list is accurate as of: 3/11/17  2:11 PM.  Always use your most recent med list.                   Brand Name Dispense Instructions for use    acetaminophen 325 MG tablet    TYLENOL    100 tablet    Take 2 tablets (650 mg) by mouth every 4 hours as needed for mild pain       aspirin 81 MG tablet      Take 1 tablet by mouth daily.       atenolol 25 MG tablet    TENORMIN    90 tablet    Take 1 tablet (25 mg) by mouth daily       atorvastatin 40 MG tablet    LIPITOR    90  tablet    Take 1 tablet (40 mg) by mouth daily       diazepam 5 MG tablet    VALIUM    30 tablet    Take 1 tablet (5 mg) by mouth every 6 hours as needed for anxiety       ertapenem 1 GM injection    INVanz    140 mL    Inject 1 g into the vein every 24 hours for 14 days CBC with differential, creatinine, SGOT weekly while on this medication to be faxed to Dr. Philippe office.       fish oil-omega-3 fatty acids 1000 MG capsule      Take 1 g by mouth daily.       LISINOPRIL PO      Take 2.5 mg by mouth 2 times daily       MULTIVITAL PO      Take 1 tablet by mouth daily.       spironolactone 25 MG tablet    ALDACTONE    45 tablet    Take 0.5 tablets (12.5 mg) by mouth daily       temazepam 15 MG capsule    RESTORIL    30 capsule    Take 1 capsule (15 mg) by mouth nightly as needed       vitamin D 1000 UNITS capsule      Take 1 capsule by mouth daily.

## 2017-03-11 NOTE — PROGRESS NOTES
Infusion Nursing Note:  Oscar Chaudhary presents today for Invanz  .    Patient seen by provider today: No   present during visit today: Not Applicable.    Note: Released from [facility] yesterday.    Intravenous Access:  Midline    Treatment Conditions:  Not Applicable.      Post Infusion Assessment:  Patient tolerated infusion without incident.  Blood return noted pre and post infusion.  Site patent and intact, free from redness, edema or discomfort.  No evidence of extravasations.    Discharge Plan:   Discharge instructions reviewed with: Patient.  Patient and/or family verbalized understanding of discharge instructions and all questions answered.  Copy of AVS reviewed with patient and/or family.  Patient will return 3/12/2017 for next appointment.  Patient discharged in stable condition accompanied by: self.  Departure Mode: Ambulatory.    Beatriz Waters RN

## 2017-03-12 ENCOUNTER — INFUSION THERAPY VISIT (OUTPATIENT)
Dept: INFUSION THERAPY | Facility: CLINIC | Age: 82
End: 2017-03-12
Attending: INTERNAL MEDICINE
Payer: COMMERCIAL

## 2017-03-12 VITALS
SYSTOLIC BLOOD PRESSURE: 103 MMHG | TEMPERATURE: 98 F | DIASTOLIC BLOOD PRESSURE: 47 MMHG | HEART RATE: 77 BPM | RESPIRATION RATE: 16 BRPM

## 2017-03-12 DIAGNOSIS — L02.91 ABSCESS: Primary | ICD-10-CM

## 2017-03-12 PROCEDURE — 25000128 H RX IP 250 OP 636: Performed by: INTERNAL MEDICINE

## 2017-03-12 PROCEDURE — 96365 THER/PROPH/DIAG IV INF INIT: CPT

## 2017-03-12 RX ADMIN — SODIUM CHLORIDE 1 G: 9 INJECTION, SOLUTION INTRAVENOUS at 13:12

## 2017-03-12 ASSESSMENT — PAIN SCALES - GENERAL: PAINLEVEL: NO PAIN (0)

## 2017-03-12 NOTE — PROGRESS NOTES
Infusion Nursing Note:  Oscar Chaudhary presents today for Invanz.    Patient seen by provider today: No   present during visit today: Not Applicable.    Note: No concerns or changes to report..    Intravenous Access:  Midline.    Treatment Conditions:  Not Applicable.      Post Infusion Assessment:  Patient tolerated infusion without incident.  Site patent and intact, free from redness, edema or discomfort.  No evidence of extravasations.    Discharge Plan:   Discharge instructions reviewed with: Patient and Family.  Patient and/or family verbalized understanding of discharge instructions and all questions answered.  Copy of AVS reviewed with patient and/or family.  Patient will return 3/13/2017 for next appointment.  Patient discharged in stable condition accompanied by: self, daughter and granddaughter.  Departure Mode: Ambulatory.    AKUA Stoll RN

## 2017-03-12 NOTE — MR AVS SNAPSHOT
After Visit Summary   3/12/2017    Oscar Chaudhary    MRN: [id number]           Patient Information     Date Of Birth          1935        Visit Information        Provider Department      3/12/2017 1:30 PM SH INFUSION CHAIR 14 [facility]        Today's Diagnoses     Abscess    -  1       Follow-ups after your visit        Follow-up notes from your care team     Return in 1 day (on 3/13/2017).      Your next 10 appointments already scheduled     Mar 13, 2017  2:30 PM CDT   Level 1 with SH INFUSION CHAIR 15   [facility] ([facility])    [facility] [place]  [address]  [place] [address]   [phone]            Mar 14, 2017  2:30 PM CDT   Level 1 with SH INFUSION CHAIR 17   [facility] ([facility])    [facility]  [address]  [place] [address]   [phone]            Mar 15, 2017  3:00 PM CDT   Level 1 with SH INFUSION CHAIR 7   [facility] ([facility])    [facility] [place]  [address]  [place] [address]   [phone]            Mar 16, 2017  2:00 PM CDT   Level 1 with SH INFUSION CHAIR 13   [facility] ([facility])    [facility]  [address]  [place] [address]   [phone]            Mar 17, 2017  2:00 PM CDT   Level 1 with SH INFUSION CHAIR 4   [facility] ([facility])    [facility] [place]  [address]  [place] [address]   [phone]            Mar 18, 2017  1:00 PM CDT   Level 1 with SH INFUSION CHAIR 14   [facility] ([facility])    [facility] [place]  [address]  [place]  [address]   [phone]            Mar 19, 2017  1:00 PM CDT   Level 1 with SH INFUSION CHAIR 13   [facility] ([facility])    [facility] [place]  [address]  [place] [address]   [phone]            Mar 20, 2017  2:30 PM CDT   Level 1 with SH INFUSION CHAIR 16   [facility] and Infusion Center ([facility])    Critical access hospital [place]  [address]  [place] [address]   [phone]            Mar 21, 2017  2:00 PM CDT   Level 1 with SH INFUSION CHAIR 15   [facility] ([facility])    Critical access hospital [place]  [address]  [place] [address]   [phone]            Mar 22, 2017  2:30 PM CDT   Level 1 with SH INFUSION CHAIR 18   [facility] and Infusion Center ([facility])    Critical access hospital [place]  [address]  [place] [address]   [phone]              Who to contact     If you have questions or need follow up information about today's clinic visit or your schedule please contact [facility] INF[facility]ION CENTER directly at [phone].  Normal or non-critical lab and imaging results will be communicated to you by PubNativehart, letter or phone within 4 business days after the clinic has received the results. If you do not hear from us within 7 days, please contact the clinic through LE[employer] or phone. If you have a critical or abnormal lab result, we will notify you by phone as soon as possible.  Submit refill requests through [employer] or call your pharmacy and they will forward the refill request to us. Please allow 3 business days for your refill to be completed.          Additional Information About Your Visit        PubNativehart Information     [employer] gives you secure access to your electronic health record. If you see a primary care provider, you can also  send messages to your care team and make appointments. If you have questions, please call your primary care clinic.  If you do not have a primary care provider, please call [phone] and they will assist you.        Care EveryWhere ID     This is your Care EveryWhere ID. This could be used by other organizations to access your [facility] medical records  [id number]        Your Vitals Were     Pulse Temperature Respirations             77 98  F (36.7  C) (Oral) 16          Blood Pressure from Last 3 Encounters:   03/12/17 103/47   03/11/17 105/56   03/10/17 110/52    Weight from Last 3 Encounters:   03/10/17 60.8 kg (134 lb)   02/26/17 62.6 kg (138 lb)   06/14/16 66.3 kg (146 lb 3.2 oz)              Today, you had the following     No orders found for display       Primary Care Provider Office Phone # Fax #    Milton Hollins [phone]       [address] [address]        Thank you!     Thank you for choosing [facility] CLINIC AND [facility] C[facility]  for your care. Our goal is always to provide you with excellent care. Hearing back from our patients is one way we can continue to improve our services. Please take a few minutes to complete the written survey that you may receive in the mail after your visit with us. Thank you!             Your Updated Medication List - Protect others around you: Learn how to safely use, store and throw away your medicines at www.disposemymeds.org.          This list is accurate as of: 3/12/17  2:05 PM.  Always use your most recent med list.                   Brand Name Dispense Instructions for use    acetaminophen 325 MG tablet    TYLENOL    100 tablet    Take 2 tablets (650 mg) by mouth every 4 hours as needed for mild pain       aspirin 81 MG tablet      Take 1 tablet by mouth daily.       atenolol 25 MG tablet    TENORMIN    90 tablet    Take 1 tablet (25 mg) by mouth daily       atorvastatin 40 MG tablet    LIPITOR     90 tablet    Take 1 tablet (40 mg) by mouth daily       diazepam 5 MG tablet    VALIUM    30 tablet    Take 1 tablet (5 mg) by mouth every 6 hours as needed for anxiety       ertapenem 1 GM injection    INVanz    140 mL    Inject 1 g into the vein every 24 hours for 14 days CBC with differential, creatinine, SGOT weekly while on this medication to be faxed to Dr. Philippe office.       fish oil-omega-3 fatty acids 1000 MG capsule      Take 1 g by mouth daily.       LISINOPRIL PO      Take 2.5 mg by mouth 2 times daily       MULTIVITAL PO      Take 1 tablet by mouth daily.       spironolactone 25 MG tablet    ALDACTONE    45 tablet    Take 0.5 tablets (12.5 mg) by mouth daily       temazepam 15 MG capsule    RESTORIL    30 capsule    Take 1 capsule (15 mg) by mouth nightly as needed       vitamin D 1000 UNITS capsule      Take 1 capsule by mouth daily.

## 2017-03-13 ENCOUNTER — INFUSION THERAPY VISIT (OUTPATIENT)
Dept: INFUSION THERAPY | Facility: CLINIC | Age: 82
End: 2017-03-13
Attending: INTERNAL MEDICINE
Payer: COMMERCIAL

## 2017-03-13 ENCOUNTER — HOSPITAL ENCOUNTER (OUTPATIENT)
Facility: CLINIC | Age: 82
Setting detail: SPECIMEN
Discharge: HOME | End: 2017-03-13
Attending: INTERNAL MEDICINE | Admitting: INTERNAL MEDICINE
Payer: COMMERCIAL

## 2017-03-13 VITALS
SYSTOLIC BLOOD PRESSURE: 106 MMHG | DIASTOLIC BLOOD PRESSURE: 58 MMHG | HEART RATE: 70 BPM | TEMPERATURE: 97.5 F | RESPIRATION RATE: 16 BRPM

## 2017-03-13 DIAGNOSIS — L02.91 ABSCESS: Primary | ICD-10-CM

## 2017-03-13 LAB
AST SERPL W P-5'-P-CCNC: 19 U/L (ref 0–45)
BASOPHILS # BLD AUTO: 0 10E9/L (ref 0–0.2)
BASOPHILS NFR BLD AUTO: 0.4 %
CREAT SERPL-MCNC: 0.98 MG/DL (ref 0.66–1.25)
DIFFERENTIAL METHOD BLD: ABNORMAL
EOSINOPHIL # BLD AUTO: 0.1 10E9/L (ref 0–0.7)
EOSINOPHIL NFR BLD AUTO: 1 %
ERYTHROCYTE [DISTWIDTH] IN BLOOD BY AUTOMATED COUNT: 16 % (ref 10–15)
GFR SERPL CREATININE-BSD FRML MDRD: 74 ML/MIN/1.7M2
HCT VFR BLD AUTO: 35 % (ref 40–53)
HGB BLD-MCNC: 12.3 G/DL (ref 13.3–17.7)
IMM GRANULOCYTES # BLD: 0.1 10E9/L (ref 0–0.4)
IMM GRANULOCYTES NFR BLD: 0.7 %
LYMPHOCYTES # BLD AUTO: 3 10E9/L (ref 0.8–5.3)
LYMPHOCYTES NFR BLD AUTO: 29.1 %
MCH RBC QN AUTO: 32 PG (ref 26.5–33)
MCHC RBC AUTO-ENTMCNC: 35.1 G/DL (ref 31.5–36.5)
MCV RBC AUTO: 91 FL (ref 78–100)
MONOCYTES # BLD AUTO: 0.6 10E9/L (ref 0–1.3)
MONOCYTES NFR BLD AUTO: 5.5 %
NEUTROPHILS # BLD AUTO: 6.6 10E9/L (ref 1.6–8.3)
NEUTROPHILS NFR BLD AUTO: 63.3 %
NRBC # BLD AUTO: 0 10*3/UL
NRBC BLD AUTO-RTO: 0 /100
PLATELET # BLD AUTO: 423 10E9/L (ref 150–450)
RBC # BLD AUTO: 3.84 10E12/L (ref 4.4–5.9)
WBC # BLD AUTO: 10.4 10E9/L (ref 4–11)

## 2017-03-13 PROCEDURE — 82565 ASSAY OF CREATININE: CPT | Performed by: INTERNAL MEDICINE

## 2017-03-13 PROCEDURE — 85025 COMPLETE CBC W/AUTO DIFF WBC: CPT | Performed by: INTERNAL MEDICINE

## 2017-03-13 PROCEDURE — 25000128 H RX IP 250 OP 636: Performed by: INTERNAL MEDICINE

## 2017-03-13 PROCEDURE — 84450 TRANSFERASE (AST) (SGOT): CPT | Performed by: INTERNAL MEDICINE

## 2017-03-13 PROCEDURE — 96365 THER/PROPH/DIAG IV INF INIT: CPT

## 2017-03-13 RX ADMIN — SODIUM CHLORIDE 1 G: 9 INJECTION, SOLUTION INTRAVENOUS at 14:48

## 2017-03-13 NOTE — MR AVS SNAPSHOT
After Visit Summary   3/13/2017    Oscar Chaudhary    MRN: [id number]           Patient Information     Date Of Birth          1935        Visit Information        Provider Department      3/13/2017 2:30 PM SH INFUSION CHAIR 15 [facility]        Today's Diagnoses     Abscess    -  1       Follow-ups after your visit        Your next 10 appointments already scheduled     Mar 14, 2017  2:30 PM CDT   Level 1 with SH INFUSION CHAIR 17   [facility] ([facility])    [place] [address]  [place] [address]   [phone]            Mar 15, 2017  3:00 PM CDT   Level 1 with SH INFUSION CHAIR 7   [facility] ([facility])    [facility]  [address]  [place] [address]   [phone]            Mar 16, 2017  2:00 PM CDT   Level 1 with SH INFUSION CHAIR 13   [facility] ([facility])    [facility] [place] [address]  [place] [address]   [phone]            Mar 17, 2017  2:00 PM CDT   Level 1 with SH INFUSION CHAIR 4   [facility] ([facility])    [facility]  [address]  [place] [address]   [phone]            Mar 18, 2017  1:00 PM CDT   Level 1 with SH INFUSION CHAIR 14   [facility] ([facility])    [facility]  [address]  [place] [address]   [phone]            Mar 19, 2017  1:00 PM CDT   Level 1 with SH INFUSION CHAIR 13   [facility] ([facility])    [facility]  [address]  [place] [address]   [phone]            Mar 20, 2017  2:30 PM CDT   Level 1 with SH  INFUSION CHAIR 16   [facility] ([facility])    [facility]  [address]  [place] [address]   [phone]            Mar 21, 2017  2:00 PM CDT   Level 1 with SH INFUSION CHAIR 15   [facility] and Infusion Center ([facility])    [facility]  [address]  [place] [address]   [phone]            Mar 22, 2017  2:30 PM CDT   Level 1 with SH INFUSION CHAIR 18   [facility] ([facility])    [facility]  [address]  [place] [address]   [phone]            Mar 23, 2017  2:00 PM CDT   Level 1 with SH INFUSION CHAIR 13   [facility] and Infusion Center ([facility])    [place] [id number]  [place] [address] [phone]              Who to contact     If you have questions or need follow up information about today's clinic visit or your schedule please contact [facility] AND INFUSION CENTER directly at [phone].  Normal or non-critical lab and imaging results will be communicated to you by 2canhart, letter or phone within 4 business days after the clinic has received the results. If you do not hear from us within 7 days, please contact the clinic through 2canhart or phone. If you have a critical or abnormal lab result, we will notify you by phone as soon as possible.  Submit refill requests through [email or web address] or call your pharmacy and they will forward the refill request to us. Please allow 3 business days for your refill to be completed.          Additional Information About Your Visit        [email or web address] Information     [email or web address] gives you secure access to your electronic health record. If you see a primary care provider, you can also send messages to your care team and make appointments. If you have questions,  please call your primary care clinic.  If you do not have a primary care provider, please call [phone] and they will assist you.        Care EveryWhere ID     This is your Care EveryWhere ID. This could be used by other organizations to access your [place] medical records  [id number]        Your Vitals Were     Pulse Temperature Respirations             70 97.5  F (36.4  C) (Oral) 16          Blood Pressure from Last 3 Encounters:   03/13/17 106/58   03/12/17 103/47   03/11/17 105/56    Weight from Last 3 Encounters:   03/10/17 60.8 kg (134 lb)   02/26/17 62.6 kg (138 lb)   06/14/16 66.3 kg (146 lb 3.2 oz)              We Performed the Following     AST     CBC with platelets differential     Creatinine        Primary Care Provider Office Phone # Fax #    Milton Hollins [phone]       [address] [address]        Thank you!     Thank you for choosing [facility] CANCER CLINIC AND [facility] C[facility]  for your care. Our goal is always to provide you with excellent care. Hearing back from our patients is one way we can continue to improve our services. Please take a few minutes to complete the written survey that you may receive in the mail after your visit with us. Thank you!             Your Updated Medication List - Protect others around you: Learn how to safely use, store and throw away your medicines at www.disposemymeds.org.          This list is accurate as of: 3/13/17  3:25 PM.  Always use your most recent med list.                   Brand Name Dispense Instructions for use    acetaminophen 325 MG tablet    TYLENOL    100 tablet    Take 2 tablets (650 mg) by mouth every 4 hours as needed for mild pain       aspirin 81 MG tablet      Take 1 tablet by mouth daily.       atenolol 25 MG tablet    TENORMIN    90 tablet    Take 1 tablet (25 mg) by mouth daily       atorvastatin 40 MG tablet    LIPITOR    90 tablet    Take 1 tablet (40 mg) by mouth  daily       diazepam 5 MG tablet    VALIUM    30 tablet    Take 1 tablet (5 mg) by mouth every 6 hours as needed for anxiety       ertapenem 1 GM injection    INVanz    140 mL    Inject 1 g into the vein every 24 hours for 14 days CBC with differential, creatinine, SGOT weekly while on this medication to be faxed to Dr. Philippe office.       fish oil-omega-3 fatty acids 1000 MG capsule      Take 1 g by mouth daily.       LISINOPRIL PO      Take 2.5 mg by mouth 2 times daily       MULTIVITAL PO      Take 1 tablet by mouth daily.       spironolactone 25 MG tablet    ALDACTONE    45 tablet    Take 0.5 tablets (12.5 mg) by mouth daily       temazepam 15 MG capsule    RESTORIL    30 capsule    Take 1 capsule (15 mg) by mouth nightly as needed       vitamin D 1000 UNITS capsule      Take 1 capsule by mouth daily.

## 2017-03-13 NOTE — PROGRESS NOTES
Infusion Nursing Note:  Oscar Chaudhary presents today for Invanz.    Patient seen by provider today: No   present during visit today: Not Applicable.    Note: N/A.    Intravenous Access:  Lab draw site left ac, Needle type BF, Gauge 23.  Labs drawn without difficulty.  Midline.    Treatment Conditions:  Not Applicable.      Post Infusion Assessment:  Patient tolerated infusion without incident.  Blood return noted pre and post infusion.  Site patent and intact, free from redness, edema or discomfort.  No evidence of extravasations.    Discharge Plan:   Discharge instructions reviewed with: Patient and Family.  Patient and/or family verbalized understanding of discharge instructions and all questions answered.  AVS to patient via Dogi.  Patient will return 3/14/17 for next appointment.   Patient discharged in stable condition accompanied by: daughter.  Departure Mode: Ambulatory.    Mauricio Muñiz RN

## 2017-03-14 ENCOUNTER — INFUSION THERAPY VISIT (OUTPATIENT)
Dept: INFUSION THERAPY | Facility: CLINIC | Age: 82
End: 2017-03-14
Attending: INTERNAL MEDICINE
Payer: COMMERCIAL

## 2017-03-14 VITALS
SYSTOLIC BLOOD PRESSURE: 105 MMHG | HEART RATE: 81 BPM | DIASTOLIC BLOOD PRESSURE: 51 MMHG | TEMPERATURE: 97.4 F | RESPIRATION RATE: 16 BRPM

## 2017-03-14 DIAGNOSIS — L02.91 ABSCESS: Primary | ICD-10-CM

## 2017-03-14 PROCEDURE — 96365 THER/PROPH/DIAG IV INF INIT: CPT

## 2017-03-14 PROCEDURE — 25000128 H RX IP 250 OP 636: Performed by: INTERNAL MEDICINE

## 2017-03-14 RX ADMIN — SODIUM CHLORIDE 1 G: 9 INJECTION, SOLUTION INTRAVENOUS at 14:32

## 2017-03-14 NOTE — PROGRESS NOTES
Infusion Nursing Note:  Oscar Altamiranogermaine presents today for invanz infusion.    Patient seen by provider today: No   present during visit today: Not Applicable.    Note: N/A.    Intravenous Access:  Midline.    Treatment Conditions:  Not Applicable.      Post Infusion Assessment:  Patient tolerated infusion without incident.  Site patent and intact, free from redness, edema or discomfort.  No evidence of extravasations.    Discharge Plan:   AVS to patient via MYCHART.  Patient will return 3/15/17 for next appointment.   Patient discharged in stable condition accompanied by: daughter.  Departure Mode: Ambulatory.    Danish Patterson RN

## 2017-03-14 NOTE — MR AVS SNAPSHOT
After Visit Summary   3/14/2017    Oscar Chaudhary    MRN: [id number]           Patient Information     Date Of Birth          1935        Visit Information        Provider Department      3/14/2017 2:30 PM SH INFUSION CHAIR 17 [facility]        Today's Diagnoses     Abscess    -  1       Follow-ups after your visit        Follow-up notes from your care team     Return in 1 day (on 3/15/2017).      Your next 10 appointments already scheduled     Mar 15, 2017  3:00 PM CDT   Level 1 with SH INFUSION CHAIR 7   [facility] ([facility])    [facility]  [address]  [place] [address]   [phone]            Mar 16, 2017  2:00 PM CDT   Level 1 with SH INFUSION CHAIR 13   [facility] ([facility])    [facility]  [address]  [place] [address]   [phone]            Mar 17, 2017  2:00 PM CDT   Level 1 with SH INFUSION CHAIR 4   [facility] ([facility])    [facility]  [address]  [place] [address]   [phone]            Mar 18, 2017  1:00 PM CDT   Level 1 with SH INFUSION CHAIR 14   [facility] ([facility])    [facility]  [address]  [place] [address]   [phone]            Mar 19, 2017  1:00 PM CDT   Level 1 with SH INFUSION CHAIR 13   [facility] ([facility])    [facility] [place]  [address]  [place] [address]   [phone]            Mar 20, 2017  2:30 PM CDT   Level 1 with SH INFUSION CHAIR 16   [facility] ([facility])    [facility]  [address]  [place]  [address]   [phone]            Mar 21, 2017  2:00 PM CDT   Level 1 with SH INFUSION CHAIR 15   [facility] ([facility])    [facility] [address]  [place] [address]   [phone]            Mar 22, 2017  2:30 PM CDT   Level 1 with SH INFUSION CHAIR 18   [facility] ([facility])    [facility] [address]  [place] [address]   [phone]            Mar 23, 2017  2:00 PM CDT   Level 1 with SH INFUSION CHAIR 13   [facility] ([facility])    [facility] [address]  [place] [address]   [phone]            Jun 13, 2017  8:30 AM CDT   US ABDOMINAL AORTIC LIMITED with UCUSV1   [facility] ([facility])    [address] [place] [address] [phone]           Please bring a list of your medicines (including vitamins, minerals and over-the-counter drugs). Also, tell your doctor about any allergies you may have. Wear comfortable clothes and leave your valuables at home.  Adults: No eating or drinking for 8 hours before the exam. You may take medicine with a small sip of water.  Children: - Children 6+ years: No food or drink for 6 hours before exam. - Children 1-5 years: No food or drink for 4 hours before exam. - Infants, breast-fed: may have breast milk up to 2 hours before exam. - Infants, formula: may have bottle until 4 hours before exam.  Please call the Imaging Department at your exam site with any questions.              Who to contact     If you have questions or need follow up information about today's clinic visit or your schedule please contact [facility] directly at [phone].  Normal or non-critical lab and imaging results will be communicated to  you by MyChart, letter or phone within 4 business days after the clinic has received the results. If you do not hear from us within 7 days, please contact the clinic through [employer] or phone. If you have a critical or abnormal lab result, we will notify you by phone as soon as possible.  Submit refill requests through Box Jump or call your pharmacy and they will forward the refill request to us. Please allow 3 business days for your refill to be completed.          Additional Information About Your Visit        [email or web address] Information     Box Jump gives you secure access to your electronic health record. If you see a primary care provider, you can also send messages to your care team and make appointments. If you have questions, please call your primary care clinic.  If you do not have a primary care provider, please call [phone] and they will assist you.        Care EveryWhere ID     This is your Care EveryWhere ID. This could be used by other organizations to access your [place] medical records  [id number]        Your Vitals Were     Pulse Temperature Respirations             81 97.4  F (36.3  C) (Oral) 16          Blood Pressure from Last 3 Encounters:   03/14/17 105/51   03/13/17 106/58   03/12/17 103/47    Weight from Last 3 Encounters:   03/10/17 60.8 kg (134 lb)   02/26/17 62.6 kg (138 lb)   06/14/16 66.3 kg (146 lb 3.2 oz)              Today, you had the following     No orders found for display       Primary Care Provider Office Phone # Fax #    Milton Hollins [phone]       [address] [address]        Thank you!     Thank you for choosing [facility]  for your care. Our goal is always to provide you with excellent care. Hearing back from our patients is one way we can continue to improve our services. Please take a few minutes to complete the written survey that you may receive in the mail after your visit  with us. Thank you!             Your Updated Medication List - Protect others around you: Learn how to safely use, store and throw away your medicines at www.disposemymeds.org.          This list is accurate as of: 3/14/17  3:29 PM.  Always use your most recent med list.                   Brand Name Dispense Instructions for use    acetaminophen 325 MG tablet    TYLENOL    100 tablet    Take 2 tablets (650 mg) by mouth every 4 hours as needed for mild pain       aspirin 81 MG tablet      Take 1 tablet by mouth daily.       atenolol 25 MG tablet    TENORMIN    90 tablet    Take 1 tablet (25 mg) by mouth daily       atorvastatin 40 MG tablet    LIPITOR    90 tablet    Take 1 tablet (40 mg) by mouth daily       diazepam 5 MG tablet    VALIUM    30 tablet    Take 1 tablet (5 mg) by mouth every 6 hours as needed for anxiety       ertapenem 1 GM injection    INVanz    140 mL    Inject 1 g into the vein every 24 hours for 14 days CBC with differential, creatinine, SGOT weekly while on this medication to be faxed to Dr. Philippe office.       fish oil-omega-3 fatty acids 1000 MG capsule      Take 1 g by mouth daily.       LISINOPRIL PO      Take 2.5 mg by mouth 2 times daily       MULTIVITAL PO      Take 1 tablet by mouth daily.       spironolactone 25 MG tablet    ALDACTONE    45 tablet    Take 0.5 tablets (12.5 mg) by mouth daily       temazepam 15 MG capsule    RESTORIL    30 capsule    Take 1 capsule (15 mg) by mouth nightly as needed       vitamin D 1000 UNITS capsule      Take 1 capsule by mouth daily.

## 2017-03-15 ENCOUNTER — INFUSION THERAPY VISIT (OUTPATIENT)
Dept: INFUSION THERAPY | Facility: CLINIC | Age: 82
End: 2017-03-15
Attending: INTERNAL MEDICINE
Payer: COMMERCIAL

## 2017-03-15 VITALS
TEMPERATURE: 97.4 F | SYSTOLIC BLOOD PRESSURE: 96 MMHG | RESPIRATION RATE: 16 BRPM | DIASTOLIC BLOOD PRESSURE: 52 MMHG | HEART RATE: 76 BPM

## 2017-03-15 DIAGNOSIS — L02.91 ABSCESS: Primary | ICD-10-CM

## 2017-03-15 PROCEDURE — 96365 THER/PROPH/DIAG IV INF INIT: CPT

## 2017-03-15 PROCEDURE — 25000128 H RX IP 250 OP 636: Performed by: INTERNAL MEDICINE

## 2017-03-15 RX ADMIN — SODIUM CHLORIDE 1 G: 9 INJECTION, SOLUTION INTRAVENOUS at 15:11

## 2017-03-15 ASSESSMENT — PAIN SCALES - GENERAL: PAINLEVEL: NO PAIN (0)

## 2017-03-15 NOTE — MR AVS SNAPSHOT
After Visit Summary   3/15/2017    Oscar Chaudhary    MRN: [id number]           Patient Information     Date Of Birth          1935        Visit Information        Provider Department      3/15/2017 3:00 PM SH INFUSION CHAIR 7 [facility]        Today's Diagnoses     Abscess    -  1       Follow-ups after your visit        Your next 10 appointments already scheduled     Mar 16, 2017  2:00 PM CDT   Level 1 with SH INFUSION CHAIR 13   [facility] ([facility])    [place] [id number]  [place] [address]   [phone]            Mar 17, 2017  2:00 PM CDT   Level 1 with SH INFUSION CHAIR 4   [facility] ([facility])    [facility]  [address]  [place] [address]   [phone]            Mar 18, 2017  1:00 PM CDT   Level 1 with SH INFUSION CHAIR 14   [facility] ([facility])    [facility] [place] [id number]  [place] [address]   [phone]            Mar 19, 2017  1:00 PM CDT   Level 1 with SH INFUSION CHAIR 13   [facility] ([facility])    [facility]  [address]  [place] [address]   [phone]            Mar 20, 2017  2:30 PM CDT   Level 1 with SH INFUSION CHAIR 16   [facility] ([facility])    [facility]  [address]  [place] [address]   [phone]            Mar 21, 2017  2:00 PM CDT   Level 1 with SH INFUSION CHAIR 15   [facility] ([facility])    [facility]  [address]  [place] [address]   [phone]            Mar 22, 2017  2:30 PM CDT   Level 1 with SH  INFUSION CHAIR 18   [facility] ([facility])    [facility] [place] [address]  [place] [address]   [phone]            Mar 23, 2017  2:00 PM CDT   Level 1 with  INFUSION CHAIR 13   [facility] ([facility])    [facility] [place]  [address]  [place] [address]   [phone]            Jun 13, 2017  8:30 AM CDT   US ABDOMINAL AORTIC LIMITED with UCUSV1   [facility] ([facility])    [address] [facility]  [address] [facility] [address] [phone]           Please bring a list of your medicines (including vitamins, minerals and over-the-counter drugs). Also, tell your doctor about any allergies you may have. Wear comfortable clothes and leave your valuables at home.  Adults: No eating or drinking for 8 hours before the exam. You may take medicine with a small sip of water.  Children: - Children 6+ years: No food or drink for 6 hours before exam. - Children 1-5 years: No food or drink for 4 hours before exam. - Infants, breast-fed: may have breast milk up to 2 hours before exam. - Infants, formula: may have bottle until 4 hours before exam.  Please call the Imaging Department at your exam site with any questions.            Jun 13, 2017  9:30 AM CDT   (Arrive by 9:15 AM)   RETURN HEART FAILURE with Jerrod Rangel MD   [facility]    [address] [place] [address]  3rd Floor  [facility] [address] [phone]              Who to contact     If you have questions or need follow up information about today's clinic visit or your schedule please contact [facility] directly at [phone].  Normal or non-critical lab and imaging results will be communicated to you by MyChart, letter or phone within 4 business days after the clinic has  received the results. If you do not hear from us within 7 days, please contact the clinic through Smart Balloon or phone. If you have a critical or abnormal lab result, we will notify you by phone as soon as possible.  Submit refill requests through Smart Balloon or call your pharmacy and they will forward the refill request to us. Please allow 3 business days for your refill to be completed.          Additional Information About Your Visit        PROGEN[employer] TECH[employer]IESharHelpHive Information     Smart Balloon gives you secure access to your electronic health record. If you see a primary care provider, you can also send messages to your care team and make appointments. If you have questions, please call your primary care clinic.  If you do not have a primary care provider, please call [phone] and they will assist you.        Care EveryWhere ID     This is your Care EveryWhere ID. This could be used by other organizations to access your [place] medical records  [id number]        Your Vitals Were     Pulse Temperature Respirations             76 97.4  F (36.3  C) (Oral) 16          Blood Pressure from Last 3 Encounters:   03/15/17 96/52   03/14/17 105/51   03/13/17 106/58    Weight from Last 3 Encounters:   03/10/17 60.8 kg (134 lb)   02/26/17 62.6 kg (138 lb)   06/14/16 66.3 kg (146 lb 3.2 oz)              Today, you had the following     No orders found for display       Primary Care Provider Office Phone # Fax #    Milton Hollins [phone]       [address] [address]        Thank you!     Thank you for choosing [facility]  for your care. Our goal is always to provide you with excellent care. Hearing back from our patients is one way we can continue to improve our services. Please take a few minutes to complete the written survey that you may receive in the mail after your visit with us. Thank you!             Your Updated Medication List - Protect others  around you: Learn how to safely use, store and throw away your medicines at www.disposemymeds.org.          This list is accurate as of: 3/15/17  4:02 PM.  Always use your most recent med list.                   Brand Name Dispense Instructions for use    acetaminophen 325 MG tablet    TYLENOL    100 tablet    Take 2 tablets (650 mg) by mouth every 4 hours as needed for mild pain       aspirin 81 MG tablet      Take 1 tablet by mouth daily.       atenolol 25 MG tablet    TENORMIN    90 tablet    Take 1 tablet (25 mg) by mouth daily       atorvastatin 40 MG tablet    LIPITOR    90 tablet    Take 1 tablet (40 mg) by mouth daily       diazepam 5 MG tablet    VALIUM    30 tablet    Take 1 tablet (5 mg) by mouth every 6 hours as needed for anxiety       ertapenem 1 GM injection    INVanz    140 mL    Inject 1 g into the vein every 24 hours for 14 days CBC with differential, creatinine, SGOT weekly while on this medication to be faxed to Dr. Philippe office.       fish oil-omega-3 fatty acids 1000 MG capsule      Take 1 g by mouth daily.       LISINOPRIL PO      Take 2.5 mg by mouth 2 times daily       MULTIVITAL PO      Take 1 tablet by mouth daily.       spironolactone 25 MG tablet    ALDACTONE    45 tablet    Take 0.5 tablets (12.5 mg) by mouth daily       temazepam 15 MG capsule    RESTORIL    30 capsule    Take 1 capsule (15 mg) by mouth nightly as needed       vitamin D 1000 UNITS capsule      Take 1 capsule by mouth daily.

## 2017-03-15 NOTE — PROGRESS NOTES
Infusion Nursing Note:  Oscar Altamiranogermaine presents today for eleno.    Patient seen by provider today: No   present during visit today: Not Applicable.    Note: N/A.    Intravenous Access:  Midline.    Treatment Conditions:  Not Applicable.      Post Infusion Assessment:  Patient tolerated infusion without incident.  Site patent and intact, free from redness, edema or discomfort.  No evidence of extravasations.    Discharge Plan:   Patient and/or family verbalized understanding of discharge instructions and all questions answered.  AVS to patient via [employer].  Patient will return tomorrow for next appointment.   Patient discharged in stable condition accompanied by: daughter.  Departure Mode: Ambulatory.    Awa Lopez RN

## 2017-03-16 ENCOUNTER — INFUSION THERAPY VISIT (OUTPATIENT)
Dept: INFUSION THERAPY | Facility: CLINIC | Age: 82
End: 2017-03-16
Attending: INTERNAL MEDICINE
Payer: COMMERCIAL

## 2017-03-16 VITALS
SYSTOLIC BLOOD PRESSURE: 108 MMHG | HEART RATE: 59 BPM | TEMPERATURE: 97.5 F | DIASTOLIC BLOOD PRESSURE: 51 MMHG | RESPIRATION RATE: 16 BRPM

## 2017-03-16 DIAGNOSIS — L02.91 ABSCESS: Primary | ICD-10-CM

## 2017-03-16 PROCEDURE — 25000128 H RX IP 250 OP 636: Performed by: INTERNAL MEDICINE

## 2017-03-16 PROCEDURE — 96365 THER/PROPH/DIAG IV INF INIT: CPT

## 2017-03-16 RX ADMIN — SODIUM CHLORIDE 1 G: 9 INJECTION, SOLUTION INTRAVENOUS at 14:10

## 2017-03-16 ASSESSMENT — PAIN SCALES - GENERAL: PAINLEVEL: NO PAIN (0)

## 2017-03-16 NOTE — MR AVS SNAPSHOT
After Visit Summary   3/16/2017    Oscar Chaudhary    MRN: [id number]           Patient Information     Date Of Birth          1935        Visit Information        Provider Department      3/16/2017 2:00 PM SH INFUSION CHAIR 13 [facility]        Today's Diagnoses     Abscess    -  1       Follow-ups after your visit        Your next 10 appointments already scheduled     Mar 17, 2017  2:00 PM CDT   Level 1 with SH INFUSION CHAIR 4   [facility] ([facility])    [place] [id number]  [place] [address]   [phone]            Mar 18, 2017  1:00 PM CDT   Level 1 with SH INFUSION CHAIR 14   [facility] ([facility])    [facility]  [address]  [place] [address]   [phone]            Mar 19, 2017  1:00 PM CDT   Level 1 with SH INFUSION CHAIR 13   [facility] ([facility])    [facility] [place] [id number]  [place] [address]   [phone]            Mar 20, 2017  2:30 PM CDT   Level 1 with SH INFUSION CHAIR 16   [facility] ([facility])    [facility]  [address]  [place] [address]   [phone]            Mar 21, 2017  2:00 PM CDT   Level 1 with SH INFUSION CHAIR 15   [facility] ([facility])    [facility]  [address]  [place] [address]   [phone]            Mar 22, 2017  2:30 PM CDT   Level 1 with SH INFUSION CHAIR 18   [facility] ([facility])    [facility]  [address]  [place] [address]   [phone]            Mar 23, 2017  2:00 PM CDT   Level 1 with SH  INFUSION CHAIR 13   [facility] ([facility])    [facility] [place]  [address]  [facility] [address] [phone]            Jun 13, 2017  8:30 AM CDT   US ABDOMINAL AORTIC LIMITED with UCUSV1   [facility] US ([facility])    [address] [facility]  [address] [facility] [address] [phone]           Please bring a list of your medicines (including vitamins, minerals and over-the-counter drugs). Also, tell your doctor about any allergies you may have. Wear comfortable clothes and leave your valuables at home.  Adults: No eating or drinking for 8 hours before the exam. You may take medicine with a small sip of water.  Children: - Children 6+ years: No food or drink for 6 hours before exam. - Children 1-5 years: No food or drink for 4 hours before exam. - Infants, breast-fed: may have breast milk up to 2 hours before exam. - Infants, formula: may have bottle until 4 hours before exam.  Please call the Imaging Department at your exam site with any questions.            Jun 13, 2017  9:30 AM CDT   (Arrive by 9:15 AM)   RETURN HEART FAILURE with Jerrod Rangel MD   [facility] ([facility])    [address] [place] [address]  [address] [facility] [address] [phone]              Who to contact     If you have questions or need follow up information about today's clinic visit or your schedule please contact [facility] directly at [phone].  Normal or non-critical lab and imaging results will be communicated to you by MyChart, letter or phone within 4 business days after the clinic has received the results. If you do not hear from us within 7 days, please contact the clinic through MyChart or phone. If you have a critical or abnormal lab result, we will notify you by phone as soon as possible.  Submit refill requests through  MexxBooks or call your pharmacy and they will forward the refill request to us. Please allow 3 business days for your refill to be completed.          Additional Information About Your Visit        MyChart Information     MexxBooks gives you secure access to your electronic health record. If you see a primary care provider, you can also send messages to your care team and make appointments. If you have questions, please call your primary care clinic.  If you do not have a primary care provider, please call [phone] and they will assist you.        Care EveryWhere ID     This is your Care EveryWhere ID. This could be used by other organizations to access your [place] medical records  [id number]        Your Vitals Were     Pulse Temperature Respirations             59 97.5  F (36.4  C) (Oral) 16          Blood Pressure from Last 3 Encounters:   03/16/17 108/51   03/15/17 96/52   03/14/17 105/51    Weight from Last 3 Encounters:   03/10/17 60.8 kg (134 lb)   02/26/17 62.6 kg (138 lb)   06/14/16 66.3 kg (146 lb 3.2 oz)              Today, you had the following     No orders found for display       Primary Care Provider Office Phone # Fax #    Milton Hollins [phone]       [address] [address]        Thank you!     Thank you for choosing [facility] AND [facility]  for your care. Our goal is always to provide you with excellent care. Hearing back from our patients is one way we can continue to improve our services. Please take a few minutes to complete the written survey that you may receive in the mail after your visit with us. Thank you!             Your Updated Medication List - Protect others around you: Learn how to safely use, store and throw away your medicines at www.disposemymeds.org.          This list is accurate as of: 3/16/17  2:49 PM.  Always use your most recent med list.                   Brand Name Dispense Instructions  for use    acetaminophen 325 MG tablet    TYLENOL    100 tablet    Take 2 tablets (650 mg) by mouth every 4 hours as needed for mild pain       aspirin 81 MG tablet      Take 1 tablet by mouth daily.       atenolol 25 MG tablet    TENORMIN    90 tablet    Take 1 tablet (25 mg) by mouth daily       atorvastatin 40 MG tablet    LIPITOR    90 tablet    Take 1 tablet (40 mg) by mouth daily       diazepam 5 MG tablet    VALIUM    30 tablet    Take 1 tablet (5 mg) by mouth every 6 hours as needed for anxiety       ertapenem 1 GM injection    INVanz    140 mL    Inject 1 g into the vein every 24 hours for 14 days CBC with differential, creatinine, SGOT weekly while on this medication to be faxed to Dr. Philippe office.       fish oil-omega-3 fatty acids 1000 MG capsule      Take 1 g by mouth daily.       LISINOPRIL PO      Take 2.5 mg by mouth 2 times daily       MULTIVITAL PO      Take 1 tablet by mouth daily.       spironolactone 25 MG tablet    ALDACTONE    45 tablet    Take 0.5 tablets (12.5 mg) by mouth daily       temazepam 15 MG capsule    RESTORIL    30 capsule    Take 1 capsule (15 mg) by mouth nightly as needed       vitamin D 1000 UNITS capsule      Take 1 capsule by mouth daily.

## 2017-03-16 NOTE — PROGRESS NOTES
"Infusion Nursing Note:  Oscar Chaudhary presents today for ***.    Patient seen by provider today: {YES (EXPLAIN)/NO:431193}   present during visit today: {UMHLANGUAGE:945825}    Note: {Not Applicable or free text:559602:s:\"N/A\"}.    Intravenous Access:  {UMHIVACCESS:746425}    Treatment Conditions:  {UMHTXCONDITIONS:374134}      Post Infusion Assessment:  {UMHPOSTINFUSION:591861}    Discharge Plan:   {UMHDISCHARGE:547791}    Brenda Seymour RN      "

## 2017-03-16 NOTE — PROGRESS NOTES
Infusion Nursing Note:  Oscar Chaudhary presents today for Invanz.    Patient seen by provider today: No   present during visit today: Not Applicable.    Note: N/A.    Intravenous Access:  Midline. No blood return noted, however flushes with ease and patient denies any pain at site.    Treatment Conditions:  Not Applicable.      Post Infusion Assessment:  Patient tolerated infusion without incident.  Blood return noted pre and post infusion.  Site patent and intact, free from redness, edema or discomfort.  No evidence of extravasations.    Discharge Plan:   Discharge instructions reviewed with: Patient.  Patient and/or family verbalized understanding of discharge instructions and all questions answered.  AVS to patient via Sapheon.  Patient will return 3/17 for next appointment.   Patient discharged in stable condition accompanied by: daughter.  Departure Mode: Ambulatory.    Brenda Seymour RN

## 2017-03-17 ENCOUNTER — INFUSION THERAPY VISIT (OUTPATIENT)
Dept: INFUSION THERAPY | Facility: CLINIC | Age: 82
End: 2017-03-17
Attending: INTERNAL MEDICINE
Payer: COMMERCIAL

## 2017-03-17 VITALS
TEMPERATURE: 98.4 F | HEART RATE: 56 BPM | RESPIRATION RATE: 16 BRPM | SYSTOLIC BLOOD PRESSURE: 110 MMHG | DIASTOLIC BLOOD PRESSURE: 49 MMHG

## 2017-03-17 DIAGNOSIS — L02.91 ABSCESS: Primary | ICD-10-CM

## 2017-03-17 PROCEDURE — 25000128 H RX IP 250 OP 636: Performed by: INTERNAL MEDICINE

## 2017-03-17 PROCEDURE — 96365 THER/PROPH/DIAG IV INF INIT: CPT

## 2017-03-17 RX ADMIN — SODIUM CHLORIDE 1 G: 9 INJECTION, SOLUTION INTRAVENOUS at 14:01

## 2017-03-17 ASSESSMENT — PAIN SCALES - GENERAL: PAINLEVEL: NO PAIN (0)

## 2017-03-17 NOTE — MR AVS SNAPSHOT
After Visit Summary   3/17/2017    Oscar Chaudhary    MRN: [id number]           Patient Information     Date Of Birth          1935        Visit Information        Provider Department      3/17/2017 2:00 PM  INFUSION CHAIR 4 [facility]        Today's Diagnoses     Abscess    -  1       Follow-ups after your visit        Your next 10 appointments already scheduled     Mar 18, 2017  1:00 PM CDT   Level 1 with SH INFUSION CHAIR 14   [facility] ([facility])    [place] [id number]  [place] [address]   [phone]            Mar 19, 2017  1:00 PM CDT   Level 1 with SH INFUSION CHAIR 13   [facility] ([facility])    [facility]  [address]  [place] [address]   [phone]            Mar 20, 2017  2:30 PM CDT   Level 1 with  INFUSION CHAIR 16   [facility] ([facility])    [facility]  [address]  [place] [address]   [phone]            Mar 21, 2017  2:00 PM CDT   Level 1 with SH INFUSION CHAIR 15   [facility] ([facility])    [facility]  [address]  [place] [address]   [phone]            Mar 22, 2017  2:30 PM CDT   Level 1 with  INFUSION CHAIR 18   [facility] ([facility])    [facility]  [address]  [place] [address]   [phone]            Mar 23, 2017  2:00 PM CDT   Level 1 with SH INFUSION CHAIR 13   [facility] ([facility])    [facility]  [address]  [place] [address]   [phone]            Jun 13, 2017  8:30 AM CDT   US ABDOMINAL  AORTIC LIMITED with UCUSV1   [facility] US ([facility])    [address] [place] [address] [phone]           Please bring a list of your medicines (including vitamins, minerals and over-the-counter drugs). Also, tell your doctor about any allergies you may have. Wear comfortable clothes and leave your valuables at home.  Adults: No eating or drinking for 8 hours before the exam. You may take medicine with a small sip of water.  Children: - Children 6+ years: No food or drink for 6 hours before exam. - Children 1-5 years: No food or drink for 4 hours before exam. - Infants, breast-fed: may have breast milk up to 2 hours before exam. - Infants, formula: may have bottle until 4 hours before exam.  Please call the Imaging Department at your exam site with any questions.            Jun 13, 2017  9:30 AM CDT   (Arrive by 9:15 AM)   RETURN HEART FAILURE with Jerrod Rangel MD   [facility] ([facility])    [address] [place] [address] [phone]              Who to contact     If you have questions or need follow up information about today's clinic visit or your schedule please contact [facility] AND [facility] directly at [phone].  Normal or non-critical lab and imaging results will be communicated to you by Amusohart, letter or phone within 4 business days after the clinic has received the results. If you do not hear from us within 7 days, please contact the clinic through MyChart or phone. If you have a critical or abnormal lab result, we will notify you by phone as soon as possible.  Submit refill requests through inDplay or call your pharmacy and they will forward the refill request to us. Please allow 3 business days for your refill to be completed.          Additional Information About Your Visit        inDplay Information     inDplay gives you secure  access to your electronic health record. If you see a primary care provider, you can also send messages to your care team and make appointments. If you have questions, please call your primary care clinic.  If you do not have a primary care provider, please call [phone] and they will assist you.        Care EveryWhere ID     This is your Care EveryWhere ID. This could be used by other organizations to access your [place] medical records  [id number]        Your Vitals Were     Pulse Temperature Respirations             56 98.4  F (36.9  C) (Oral) 16          Blood Pressure from Last 3 Encounters:   03/17/17 110/49   03/16/17 108/51   03/15/17 96/52    Weight from Last 3 Encounters:   03/10/17 60.8 kg (134 lb)   02/26/17 62.6 kg (138 lb)   06/14/16 66.3 kg (146 lb 3.2 oz)              Today, you had the following     No orders found for display       Primary Care Provider Office Phone # Fax #    Milton Hollins [phone]       [address] [address]        Thank you!     Thank you for choosing [facility] AND [facility]  for your care. Our goal is always to provide you with excellent care. Hearing back from our patients is one way we can continue to improve our services. Please take a few minutes to complete the written survey that you may receive in the mail after your visit with us. Thank you!             Your Updated Medication List - Protect others around you: Learn how to safely use, store and throw away your medicines at www.disposemymeds.org.          This list is accurate as of: 3/17/17  2:46 PM.  Always use your most recent med list.                   Brand Name Dispense Instructions for use    acetaminophen 325 MG tablet    TYLENOL    100 tablet    Take 2 tablets (650 mg) by mouth every 4 hours as needed for mild pain       aspirin 81 MG tablet      Take 1 tablet by mouth daily.       atenolol 25 MG tablet    TENORMIN    90  tablet    Take 1 tablet (25 mg) by mouth daily       atorvastatin 40 MG tablet    LIPITOR    90 tablet    Take 1 tablet (40 mg) by mouth daily       diazepam 5 MG tablet    VALIUM    30 tablet    Take 1 tablet (5 mg) by mouth every 6 hours as needed for anxiety       ertapenem 1 GM injection    INVanz    140 mL    Inject 1 g into the vein every 24 hours for 14 days CBC with differential, creatinine, SGOT weekly while on this medication to be faxed to Dr. Philippe office.       fish oil-omega-3 fatty acids 1000 MG capsule      Take 1 g by mouth daily.       LISINOPRIL PO      Take 2.5 mg by mouth 2 times daily       MULTIVITAL PO      Take 1 tablet by mouth daily.       spironolactone 25 MG tablet    ALDACTONE    45 tablet    Take 0.5 tablets (12.5 mg) by mouth daily       temazepam 15 MG capsule    RESTORIL    30 capsule    Take 1 capsule (15 mg) by mouth nightly as needed       vitamin D 1000 UNITS capsule      Take 1 capsule by mouth daily.

## 2017-03-17 NOTE — PROGRESS NOTES
Infusion Nursing Note:  Oscar Chaudhary presents today for invanz.    Patient seen by provider today: No   present during visit today: Not Applicable.    Note: N/A.    Intravenous Access:  Midline. No blood return noted but flushes with ease and patient denies any pain at site. Cap changed and midline dressing changed per sterile protocol.    Treatment Conditions:  Not Applicable.      Post Infusion Assessment:  Patient tolerated infusion without incident.  Blood return noted pre and post infusion.  Site patent and intact, free from redness, edema or discomfort.  No evidence of extravasations.    Discharge Plan:   Discharge instructions reviewed with: Patient.  Patient and/or family verbalized understanding of discharge instructions and all questions answered.  AVS to patient via TechShopHART.  Patient will return tomorrow for next appointment.   Patient discharged in stable condition accompanied by: daughters.  Departure Mode: Ambulatory.    Brenda Seymour RN

## 2017-03-18 ENCOUNTER — INFUSION THERAPY VISIT (OUTPATIENT)
Dept: INFUSION THERAPY | Facility: CLINIC | Age: 82
End: 2017-03-18
Attending: INTERNAL MEDICINE
Payer: COMMERCIAL

## 2017-03-18 VITALS — SYSTOLIC BLOOD PRESSURE: 105 MMHG | DIASTOLIC BLOOD PRESSURE: 42 MMHG | TEMPERATURE: 97.7 F | HEART RATE: 60 BPM

## 2017-03-18 DIAGNOSIS — L02.91 ABSCESS: Primary | ICD-10-CM

## 2017-03-18 PROCEDURE — 25000128 H RX IP 250 OP 636: Performed by: INTERNAL MEDICINE

## 2017-03-18 PROCEDURE — 96365 THER/PROPH/DIAG IV INF INIT: CPT

## 2017-03-18 RX ADMIN — SODIUM CHLORIDE 1 G: 9 INJECTION, SOLUTION INTRAVENOUS at 13:01

## 2017-03-18 ASSESSMENT — PAIN SCALES - GENERAL: PAINLEVEL: NO PAIN (0)

## 2017-03-18 NOTE — PROGRESS NOTES
Infusion Nursing Note:  Oscar Chaudhary presents today for Invanz.    Patient seen by provider today: No   present during visit today: Not Applicable.    Note: N/A.    Intravenous Access:  Midline.    Treatment Conditions:  Not Applicable.      Post Infusion Assessment:  Patient tolerated infusion without incident.  Site patent and intact, free from redness, edema or discomfort.  No evidence of extravasations.    Discharge Plan:   AVS to patient via MYCHART.  Patient will return 3/19 for next appointment.   Patient discharged in stable condition accompanied by: daughter.  Departure Mode: Ambulatory.    AKUA Cedeño RN

## 2017-03-18 NOTE — MR AVS SNAPSHOT
After Visit Summary   3/18/2017    Oscar Chaudhary    MRN: [id number]           Patient Information     Date Of Birth          1935        Visit Information        Provider Department      3/18/2017 1:00 PM  INFUSION CHAIR 14 [facility]        Today's Diagnoses     Abscess    -  1       Follow-ups after your visit        Your next 10 appointments already scheduled     Mar 19, 2017 12:30 PM CDT   Level 1 with  INFUSION CHAIR 13   [facility] ([facility])    [facility]  [address]  [place] [address]   [phone]            Mar 20, 2017  2:30 PM CDT   Level 1 with  INFUSION CHAIR 16   [facility] ([facility])    [facility] [place]  [address]  [place] [address]   [phone]            Mar 21, 2017  2:00 PM CDT   Level 1 with  INFUSION CHAIR 15   [facility] ([facility])    [facility] [place]  [address]  [place] [address]   [phone]            Mar 22, 2017  2:30 PM CDT   Level 1 with  INFUSION CHAIR 18   [facility] ([facility])    [facility] [place]  [address]  [place] [address]   [phone]            Mar 23, 2017  2:00 PM CDT   Level 1 with  INFUSION CHAIR 13   [facility] ([facility])    [facility]  [address]  [place] [address]   [phone]            Jun 13, 2017  8:30 AM CDT   US ABDOMINAL AORTIC LIMITED with UCUSV1   [facility] US ([facility])    [address] [address] [phone]           Please bring a list of your medicines (including vitamins, minerals  and over-the-counter drugs). Also, tell your doctor about any allergies you may have. Wear comfortable clothes and leave your valuables at home.  Adults: No eating or drinking for 8 hours before the exam. You may take medicine with a small sip of water.  Children: - Children 6+ years: No food or drink for 6 hours before exam. - Children 1-5 years: No food or drink for 4 hours before exam. - Infants, breast-fed: may have breast milk up to 2 hours before exam. - Infants, formula: may have bottle until 4 hours before exam.  Please call the Imaging Department at your exam site with any questions.            Jun 13, 2017  9:30 AM CDT   (Arrive by 9:15 AM)   RETURN HEART FAILURE with Jerrod Rangel MD   [facility] ([facility])    [address] [place] [address] [phone]              Who to contact     If you have questions or need follow up information about today's clinic visit or your schedule please contact [facility] directly at [phone].  Normal or non-critical lab and imaging results will be communicated to you by Applied Proteomicshart, letter or phone within 4 business days after the clinic has received the results. If you do not hear from us within 7 days, please contact the clinic through MyWebGrocert or phone. If you have a critical or abnormal lab result, we will notify you by phone as soon as possible.  Submit refill requests through [employer]io or call your pharmacy and they will forward the refill request to us. Please allow 3 business days for your refill to be completed.          Additional Information About Your Visit        Compario Information     Compario gives you secure access to your electronic health record. If you see a primary care provider, you can also send messages to your care team and make appointments. If you have questions, please call your primary care clinic.  If you do not have a primary care  provider, please call [phone] and they will assist you.        Care EveryWhere ID     This is your Care EveryWhere ID. This could be used by other organizations to access your [place] medical records  [id number]        Your Vitals Were     Pulse Temperature                60 97.7  F (36.5  C) (Oral)           Blood Pressure from Last 3 Encounters:   03/18/17 105/42   03/17/17 110/49   03/16/17 108/51    Weight from Last 3 Encounters:   03/10/17 60.8 kg (134 lb)   02/26/17 62.6 kg (138 lb)   06/14/16 66.3 kg (146 lb 3.2 oz)              Today, you had the following     No orders found for display       Primary Care Provider Office Phone # Fax #    Milton Hollins [phone]       [address] [address]        Thank you!     Thank you for choosing [facility] AND [facility]  for your care. Our goal is always to provide you with excellent care. Hearing back from our patients is one way we can continue to improve our services. Please take a few minutes to complete the written survey that you may receive in the mail after your visit with us. Thank you!             Your Updated Medication List - Protect others around you: Learn how to safely use, store and throw away your medicines at www.disposemymeds.org.          This list is accurate as of: 3/18/17  1:49 PM.  Always use your most recent med list.                   Brand Name Dispense Instructions for use    acetaminophen 325 MG tablet    TYLENOL    100 tablet    Take 2 tablets (650 mg) by mouth every 4 hours as needed for mild pain       aspirin 81 MG tablet      Take 1 tablet by mouth daily.       atenolol 25 MG tablet    TENORMIN    90 tablet    Take 1 tablet (25 mg) by mouth daily       atorvastatin 40 MG tablet    LIPITOR    90 tablet    Take 1 tablet (40 mg) by mouth daily       diazepam 5 MG tablet    VALIUM    30 tablet    Take 1 tablet (5 mg) by mouth every 6 hours as needed  for anxiety       ertapenem 1 GM injection    INVanz    140 mL    Inject 1 g into the vein every 24 hours for 14 days CBC with differential, creatinine, SGOT weekly while on this medication to be faxed to Dr. Philippe office.       fish oil-omega-3 fatty acids 1000 MG capsule      Take 1 g by mouth daily.       LISINOPRIL PO      Take 2.5 mg by mouth 2 times daily       MULTIVITAL PO      Take 1 tablet by mouth daily.       spironolactone 25 MG tablet    ALDACTONE    45 tablet    Take 0.5 tablets (12.5 mg) by mouth daily       temazepam 15 MG capsule    RESTORIL    30 capsule    Take 1 capsule (15 mg) by mouth nightly as needed       vitamin D 1000 UNITS capsule      Take 1 capsule by mouth daily.

## 2017-03-19 ENCOUNTER — INFUSION THERAPY VISIT (OUTPATIENT)
Dept: INFUSION THERAPY | Facility: CLINIC | Age: 82
End: 2017-03-19
Attending: INTERNAL MEDICINE
Payer: COMMERCIAL

## 2017-03-19 VITALS
SYSTOLIC BLOOD PRESSURE: 97 MMHG | RESPIRATION RATE: 16 BRPM | DIASTOLIC BLOOD PRESSURE: 41 MMHG | HEART RATE: 63 BPM | TEMPERATURE: 97.1 F

## 2017-03-19 DIAGNOSIS — L02.91 ABSCESS: Primary | ICD-10-CM

## 2017-03-19 PROCEDURE — 96365 THER/PROPH/DIAG IV INF INIT: CPT

## 2017-03-19 PROCEDURE — 25000128 H RX IP 250 OP 636: Performed by: INTERNAL MEDICINE

## 2017-03-19 RX ADMIN — SODIUM CHLORIDE 1 G: 9 INJECTION, SOLUTION INTRAVENOUS at 12:23

## 2017-03-19 NOTE — PROGRESS NOTES
Infusion Nursing Note:  Oscar Chaudhary presents today for invanz.    Patient seen by provider today: No   present during visit today: Not Applicable.    Note: N/A.    Intravenous Access:  Midline.    Treatment Conditions:  Not Applicable.      Post Infusion Assessment:  Patient tolerated infusion without incident.  Site patent and intact, free from redness, edema or discomfort.  No evidence of extravasations.    Discharge Plan:   AVS to patient via MYCHART.  Patient will return 3/20 for next appointment.   Patient discharged in stable condition accompanied by: daughter.  Departure Mode: Ambulatory.    Danish Patterson RN

## 2017-03-19 NOTE — MR AVS SNAPSHOT
After Visit Summary   3/19/2017    Oscar Chaudhary    MRN: [id number]           Patient Information     Date Of Birth          1935        Visit Information        Provider Department      3/19/2017 12:30 PM  INFUSION CHAIR 13 [facility]        Today's Diagnoses     Abscess    -  1       Follow-ups after your visit        Your next 10 appointments already scheduled     Mar 20, 2017  2:30 PM CDT   Level 1 with SH INFUSION CHAIR 16   [facility] ([facility])    [facility] [place] [id number]  [place] [address]   [phone]            Mar 21, 2017  2:00 PM CDT   Level 1 with  INFUSION CHAIR 15   [facility] ([facility])    [facility] [place]  [address]  [place] [address]   [phone]            Mar 22, 2017  2:30 PM CDT   Level 1 with  INFUSION CHAIR 18   [facility] ([facility])    [facility] [place]  [address]  [place] [address]   [phone]            Mar 23, 2017  2:00 PM CDT   Level 1 with  INFUSION CHAIR 13   [facility] ([facility])    [facility] [place]  [address]  [place] [address]   [phone]            Jun 13, 2017  8:30 AM CDT   US ABDOMINAL AORTIC LIMITED with UCUSV1   [facility] ([facility])    [address] [place] [address] [phone]           Please bring a list of your medicines (including vitamins, minerals and over-the-counter drugs). Also, tell your doctor about any allergies you may have. Wear comfortable clothes and leave your valuables at home.  Adults: No eating or drinking for 8 hours before the exam. You may take medicine with a small sip of  water.  Children: - Children 6+ years: No food or drink for 6 hours before exam. - Children 1-5 years: No food or drink for 4 hours before exam. - Infants, breast-fed: may have breast milk up to 2 hours before exam. - Infants, formula: may have bottle until 4 hours before exam.  Please call the Imaging Department at your exam site with any questions.            Jun 13, 2017  9:30 AM CDT   (Arrive by 9:15 AM)   RETURN HEART FAILURE with Jerrod Rangel MD   [facility] ([facility])    [address] [place] [address]  3rd [facility] [address] [phone]              Who to contact     If you have questions or need follow up information about today's clinic visit or your schedule please contact [facility] directly at [phone].  Normal or non-critical lab and imaging results will be communicated to you by MyChart, letter or phone within 4 business days after the clinic has received the results. If you do not hear from us within 7 days, please contact the clinic through BASE Inct or phone. If you have a critical or abnormal lab result, we will notify you by phone as soon as possible.  Submit refill requests through [employer] or call your pharmacy and they will forward the refill request to us. Please allow 3 business days for your refill to be completed.          Additional Information About Your Visit        MyChart Information     [employer] gives you secure access to your electronic health record. If you see a primary care provider, you can also send messages to your care team and make appointments. If you have questions, please call your primary care clinic.  If you do not have a primary care provider, please call [phone] and they will assist you.        Care EveryWhere ID     This is your Care EveryWhere ID. This could be used by other organizations to access your [facility] medical records  [id number]        Your Vitals Were     Pulse  Temperature Respirations             63 97.1  F (36.2  C) (Oral) 16          Blood Pressure from Last 3 Encounters:   03/19/17 97/41   03/18/17 105/42   03/17/17 110/49    Weight from Last 3 Encounters:   03/10/17 60.8 kg (134 lb)   02/26/17 62.6 kg (138 lb)   06/14/16 66.3 kg (146 lb 3.2 oz)              Today, you had the following     No orders found for display       Primary Care Provider Office Phone # Fax #    Milton Hollins [phone]       [facility] [address] [address]        Thank you!     Thank you for choosing [facility]CER [facility] AND [facility]  for your care. Our goal is always to provide you with excellent care. Hearing back from our patients is one way we can continue to improve our services. Please take a few minutes to complete the written survey that you may receive in the mail after your visit with us. Thank you!             Your Updated Medication List - Protect others around you: Learn how to safely use, store and throw away your medicines at www.disposemymeds.org.          This list is accurate as of: 3/19/17  1:13 PM.  Always use your most recent med list.                   Brand Name Dispense Instructions for use    acetaminophen 325 MG tablet    TYLENOL    100 tablet    Take 2 tablets (650 mg) by mouth every 4 hours as needed for mild pain       aspirin 81 MG tablet      Take 1 tablet by mouth daily.       atenolol 25 MG tablet    TENORMIN    90 tablet    Take 1 tablet (25 mg) by mouth daily       atorvastatin 40 MG tablet    LIPITOR    90 tablet    Take 1 tablet (40 mg) by mouth daily       diazepam 5 MG tablet    VALIUM    30 tablet    Take 1 tablet (5 mg) by mouth every 6 hours as needed for anxiety       ertapenem 1 GM injection    INVanz    140 mL    Inject 1 g into the vein every 24 hours for 14 days CBC with differential, creatinine, SGOT weekly while on this medication to be faxed to Dr. Philippe office.       kateryna  oil-omega-3 fatty acids 1000 MG capsule      Take 1 g by mouth daily.       LISINOPRIL PO      Take 2.5 mg by mouth 2 times daily       MULTIVITAL PO      Take 1 tablet by mouth daily.       spironolactone 25 MG tablet    ALDACTONE    45 tablet    Take 0.5 tablets (12.5 mg) by mouth daily       temazepam 15 MG capsule    RESTORIL    30 capsule    Take 1 capsule (15 mg) by mouth nightly as needed       vitamin D 1000 UNITS capsule      Take 1 capsule by mouth daily.

## 2017-03-20 ENCOUNTER — HOSPITAL ENCOUNTER (OUTPATIENT)
Facility: CLINIC | Age: 82
Setting detail: SPECIMEN
Discharge: HOME | End: 2017-03-20
Attending: INTERNAL MEDICINE | Admitting: INTERNAL MEDICINE
Payer: COMMERCIAL

## 2017-03-20 ENCOUNTER — INFUSION THERAPY VISIT (OUTPATIENT)
Dept: INFUSION THERAPY | Facility: CLINIC | Age: 82
End: 2017-03-20
Attending: INTERNAL MEDICINE
Payer: COMMERCIAL

## 2017-03-20 VITALS
RESPIRATION RATE: 18 BRPM | HEART RATE: 59 BPM | DIASTOLIC BLOOD PRESSURE: 46 MMHG | SYSTOLIC BLOOD PRESSURE: 93 MMHG | TEMPERATURE: 98.7 F

## 2017-03-20 DIAGNOSIS — L02.91 ABSCESS: Primary | ICD-10-CM

## 2017-03-20 LAB
AST SERPL W P-5'-P-CCNC: 17 U/L (ref 0–45)
BASOPHILS # BLD AUTO: 0.1 10E9/L (ref 0–0.2)
BASOPHILS NFR BLD AUTO: 0.8 %
CREAT SERPL-MCNC: 0.78 MG/DL (ref 0.66–1.25)
DIFFERENTIAL METHOD BLD: ABNORMAL
EOSINOPHIL # BLD AUTO: 0.1 10E9/L (ref 0–0.7)
EOSINOPHIL NFR BLD AUTO: 1.2 %
ERYTHROCYTE [DISTWIDTH] IN BLOOD BY AUTOMATED COUNT: 15.9 % (ref 10–15)
GFR SERPL CREATININE-BSD FRML MDRD: NORMAL ML/MIN/1.7M2
HCT VFR BLD AUTO: 30.9 % (ref 40–53)
HGB BLD-MCNC: 11 G/DL (ref 13.3–17.7)
IMM GRANULOCYTES # BLD: 0 10E9/L (ref 0–0.4)
IMM GRANULOCYTES NFR BLD: 0.2 %
LYMPHOCYTES # BLD AUTO: 3.1 10E9/L (ref 0.8–5.3)
LYMPHOCYTES NFR BLD AUTO: 36.4 %
MCH RBC QN AUTO: 32.3 PG (ref 26.5–33)
MCHC RBC AUTO-ENTMCNC: 35.6 G/DL (ref 31.5–36.5)
MCV RBC AUTO: 91 FL (ref 78–100)
MONOCYTES # BLD AUTO: 0.6 10E9/L (ref 0–1.3)
MONOCYTES NFR BLD AUTO: 6.7 %
NEUTROPHILS # BLD AUTO: 4.7 10E9/L (ref 1.6–8.3)
NEUTROPHILS NFR BLD AUTO: 54.7 %
NRBC # BLD AUTO: 0 10*3/UL
NRBC BLD AUTO-RTO: 0 /100
PLATELET # BLD AUTO: 349 10E9/L (ref 150–450)
RBC # BLD AUTO: 3.41 10E12/L (ref 4.4–5.9)
WBC # BLD AUTO: 8.5 10E9/L (ref 4–11)

## 2017-03-20 PROCEDURE — 82565 ASSAY OF CREATININE: CPT | Performed by: INTERNAL MEDICINE

## 2017-03-20 PROCEDURE — 96365 THER/PROPH/DIAG IV INF INIT: CPT

## 2017-03-20 PROCEDURE — 84450 TRANSFERASE (AST) (SGOT): CPT | Performed by: INTERNAL MEDICINE

## 2017-03-20 PROCEDURE — 25000128 H RX IP 250 OP 636: Performed by: INTERNAL MEDICINE

## 2017-03-20 PROCEDURE — 85025 COMPLETE CBC W/AUTO DIFF WBC: CPT | Performed by: INTERNAL MEDICINE

## 2017-03-20 RX ADMIN — SODIUM CHLORIDE 1 G: 9 INJECTION, SOLUTION INTRAVENOUS at 14:45

## 2017-03-20 ASSESSMENT — PAIN SCALES - GENERAL: PAINLEVEL: NO PAIN (0)

## 2017-03-20 NOTE — MR AVS SNAPSHOT
After Visit Summary   3/20/2017    Oscar Chaudhary    MRN: [id number]           Patient Information     Date Of Birth          1935        Visit Information        Provider Department      3/20/2017 2:30 PM  INFUSION CHAIR 16 [facility]        Today's Diagnoses     Abscess    -  1       Follow-ups after your visit        Your next 10 appointments already scheduled     Mar 21, 2017  2:00 PM CDT   Level 1 with  INFUSION CHAIR 15   [facility] ([facility])    [facility]  [address]  [place] [address]   [phone]            Mar 22, 2017  2:30 PM CDT   Level 1 with  INFUSION CHAIR 18   [facility] ([facility])    [facility] [place]  [address]  [place] [address]   [phone]            Mar 23, 2017  2:00 PM CDT   Level 1 with  INFUSION CHAIR 13   [facility] ([facility])    [facility] [place]  [address]  [place] [address]   [phone]            Jun 13, 2017  8:30 AM CDT   US ABDOMINAL AORTIC LIMITED with UCUSV1   [facility] ([facility])    [address] [place] [address] [phone]           Please bring a list of your medicines (including vitamins, minerals and over-the-counter drugs). Also, tell your doctor about any allergies you may have. Wear comfortable clothes and leave your valuables at home.  Adults: No eating or drinking for 8 hours before the exam. You may take medicine with a small sip of water.  Children: - Children 6+ years: No food or drink for 6 hours before exam. - Children 1-5 years: No food or drink for 4 hours before exam. - Infants, breast-fed: may have breast milk up to 2 hours before exam. - Infants, formula: may have  bottle until 4 hours before exam.  Please call the Imaging Department at your exam site with any questions.            Jun 13, 2017  9:30 AM CDT   (Arrive by 9:15 AM)   RETURN HEART FAILURE with Jerrod Rangel MD   [facility] ([facility])    [address] [facility]  3rd [facility] [address] [phone]              Who to contact     If you have questions or need follow up information about today's clinic visit or your schedule please contact [facility] AND [facility] directly at [phone].  Normal or non-critical lab and imaging results will be communicated to you by Bid[employer]hart, letter or phone within 4 business days after the clinic has received the results. If you do not hear from us within 7 days, please contact the clinic through [employer] Inct or phone. If you have a critical or abnormal lab result, we will notify you by phone as soon as possible.  Submit refill requests through Planspot or call your pharmacy and they will forward the refill request to us. Please allow 3 business days for your refill to be completed.          Additional Information About Your Visit        MyChart Information     Pl[employer] gives you secure access to your electronic health record. If you see a primary care provider, you can also send messages to your care team and make appointments. If you have questions, please call your primary care clinic.  If you do not have a primary care provider, please call [phone] and they will assist you.        Care EveryWhere ID     This is your Care EveryWhere ID. This could be used by other organizations to access your [place] medical records  [id number]        Your Vitals Were     Pulse Temperature Respirations             59 98.7  F (37.1  C) (Oral) 18          Blood Pressure from Last 3 Encounters:   03/20/17 93/46   03/19/17 97/41   03/18/17 105/42    Weight from Last 3 Encounters:   03/10/17 60.8 kg (134 lb)   02/26/17 62.6  kg (138 lb)   06/14/16 66.3 kg (146 lb 3.2 oz)              We Performed the Following     AST     CBC with platelets differential     Creatinine        Primary Care Provider Office Phone # Fax #    Milton Hollins [phone]       [address] [address]        Thank you!     Thank you for choosing [facility]ENTER  for your care. Our goal is always to provide you with excellent care. Hearing back from our patients is one way we can continue to improve our services. Please take a few minutes to complete the written survey that you may receive in the mail after your visit with us. Thank you!             Your Updated Medication List - Protect others around you: Learn how to safely use, store and throw away your medicines at www.disposemymeds.org.          This list is accurate as of: 3/20/17  3:25 PM.  Always use your most recent med list.                   Brand Name Dispense Instructions for use    acetaminophen 325 MG tablet    TYLENOL    100 tablet    Take 2 tablets (650 mg) by mouth every 4 hours as needed for mild pain       aspirin 81 MG tablet      Take 1 tablet by mouth daily.       atenolol 25 MG tablet    TENORMIN    90 tablet    Take 1 tablet (25 mg) by mouth daily       atorvastatin 40 MG tablet    LIPITOR    90 tablet    Take 1 tablet (40 mg) by mouth daily       diazepam 5 MG tablet    VALIUM    30 tablet    Take 1 tablet (5 mg) by mouth every 6 hours as needed for anxiety       ertapenem 1 GM injection    INVanz    140 mL    Inject 1 g into the vein every 24 hours for 14 days CBC with differential, creatinine, SGOT weekly while on this medication to be faxed to Dr. Philippe office.       fish oil-omega-3 fatty acids 1000 MG capsule      Take 1 g by mouth daily.       LISINOPRIL PO      Take 2.5 mg by mouth 2 times daily       MULTIVITAL PO      Take 1 tablet by mouth daily.       spironolactone 25 MG tablet     ALDACTONE    45 tablet    Take 0.5 tablets (12.5 mg) by mouth daily       temazepam 15 MG capsule    RESTORIL    30 capsule    Take 1 capsule (15 mg) by mouth nightly as needed       vitamin D 1000 UNITS capsule      Take 1 capsule by mouth daily.

## 2017-03-20 NOTE — PROGRESS NOTES
Infusion Nursing Note:  Oscar Chaudhary presents today for invanz.    Patient seen by provider today: No   present during visit today: Not Applicable.    Note: N/A.    Intravenous Access:  Labs drawn without difficulty.  Midline.  Labs drawn through left ac.    Treatment Conditions:  Lab Results   Component Value Date    HGB 12.3 03/13/2017     Lab Results   Component Value Date    WBC 10.4 03/13/2017      Lab Results   Component Value Date    ANEU 6.6 03/13/2017     Lab Results   Component Value Date     03/13/2017      Lab Results   Component Value Date     03/10/2017                   Lab Results   Component Value Date    POTASSIUM 4.5 03/10/2017           Lab Results   Component Value Date    MAG 2.5 03/06/2017            Lab Results   Component Value Date    CR 0.98 03/13/2017                   Lab Results   Component Value Date    MICKIE 8.7 03/10/2017                Lab Results   Component Value Date    BILITOTAL 1.1 02/27/2017           Lab Results   Component Value Date    ALBUMIN 3.5 02/27/2017                    Lab Results   Component Value Date    ALT 24 02/27/2017           Lab Results   Component Value Date    AST 19 03/13/2017         Post Infusion Assessment:  Patient tolerated infusion without incident.  Site patent and intact, free from redness, edema or discomfort.  No evidence of extravasations.    Discharge Plan:   Discharge instructions reviewed with: Patient.  Patient and/or family verbalized understanding of discharge instructions and all questions answered.  AVS to patient via [employer].  Patient will return 3/21/17 for next appointment.   Patient discharged in stable condition accompanied by: daughter.  Departure Mode: Ambulatory.    Annalise Arellano RN

## 2017-03-21 ENCOUNTER — INFUSION THERAPY VISIT (OUTPATIENT)
Dept: INFUSION THERAPY | Facility: CLINIC | Age: 82
End: 2017-03-21
Attending: INTERNAL MEDICINE
Payer: COMMERCIAL

## 2017-03-21 VITALS
TEMPERATURE: 97.7 F | SYSTOLIC BLOOD PRESSURE: 100 MMHG | HEART RATE: 64 BPM | DIASTOLIC BLOOD PRESSURE: 45 MMHG | RESPIRATION RATE: 18 BRPM

## 2017-03-21 DIAGNOSIS — L02.91 ABSCESS: Primary | ICD-10-CM

## 2017-03-21 PROCEDURE — 25000128 H RX IP 250 OP 636: Performed by: INTERNAL MEDICINE

## 2017-03-21 PROCEDURE — 96365 THER/PROPH/DIAG IV INF INIT: CPT

## 2017-03-21 RX ADMIN — SODIUM CHLORIDE 1 G: 9 INJECTION, SOLUTION INTRAVENOUS at 14:01

## 2017-03-21 ASSESSMENT — PAIN SCALES - GENERAL: PAINLEVEL: NO PAIN (0)

## 2017-03-21 NOTE — PROGRESS NOTES
Infusion Nursing Note:  Oscar Chaudhary presents today for invanz.    Patient seen by provider today: No   present during visit today: Not Applicable.    Note: N/A.    Intravenous Access:  Midline. No blood return noted but line flushes with ease and patient states no discomfort at site.    Treatment Conditions:  Lab Results   Component Value Date    HGB 11.0 03/20/2017     Lab Results   Component Value Date    WBC 8.5 03/20/2017      Lab Results   Component Value Date    ANEU 4.7 03/20/2017     Lab Results   Component Value Date     03/20/2017              Lab Results   Component Value Date    CR 0.78 03/20/2017                     Lab Results   Component Value Date    AST 17 03/20/2017     Results reviewed, ok to proceed with treatment.      Post Infusion Assessment:  Patient tolerated infusion without incident.  Blood return noted pre and post infusion.  Site patent and intact, free from redness, edema or discomfort.  No evidence of extravasations.    Discharge Plan:   Discharge instructions reviewed with: Patient.  Patient and/or family verbalized understanding of discharge instructions and all questions answered.  Copy of AVS reviewed with patient and/or family.  Patient will return tomorrow for next appointment.  Patient discharged in stable condition accompanied by: self.  Departure Mode: Ambulatory.    Brenda Seymour RN

## 2017-03-21 NOTE — MR AVS SNAPSHOT
After Visit Summary   3/21/2017    Oscar Chaudhary    MRN: [id number]           Patient Information     Date Of Birth          1935        Visit Information        Provider Department      3/21/2017 2:00 PM  INFUSION CHAIR 15 [facility]        Today's Diagnoses     Abscess    -  1       Follow-ups after your visit        Your next 10 appointments already scheduled     Mar 22, 2017  2:30 PM CDT   Level 1 with  INFUSION CHAIR 18   [facility] ([facility])    [facility]  [address]  [facility] [address]   [phone]            Mar 23, 2017  2:00 PM CDT   Level 1 with  INFUSION CHAIR 13   [facility] ([facility])    [facility]  [address]  [facility] [address]   [phone]            Jun 13, 2017  8:30 AM CDT   US ABDOMINAL AORTIC LIMITED with UCUSV1   [facility] [place] [facility] ([facility])    [address] [place] [address] [phone]           Please bring a list of your medicines (including vitamins, minerals and over-the-counter drugs). Also, tell your doctor about any allergies you may have. Wear comfortable clothes and leave your valuables at home.  Adults: No eating or drinking for 8 hours before the exam. You may take medicine with a small sip of water.  Children: - Children 6+ years: No food or drink for 6 hours before exam. - Children 1-5 years: No food or drink for 4 hours before exam. - Infants, breast-fed: may have breast milk up to 2 hours before exam. - Infants, formula: may have bottle until 4 hours before exam.  Please call the Imaging Department at your exam site with any questions.            Jun 13, 2017  9:30 AM CDT   (Arrive by 9:15 AM)   RETURN HEART FAILURE with Jerrod Rangel MD   [facility]  [facility])    [address] [facility]  3rd Floor  [employer] [address] [phone]              Who to contact     If you have questions or need follow up information about today's clinic visit or your schedule please contact [facility] AND [facility] directly at [phone].  Normal or non-critical lab and imaging results will be communicated to you by MyChart, letter or phone within 4 business days after the clinic has received the results. If you do not hear from us within 7 days, please contact the clinic through [email or web address] or phone. If you have a critical or abnormal lab result, we will notify you by phone as soon as possible.  Submit refill requests through [employer] or call your pharmacy and they will forward the refill request to us. Please allow 3 business days for your refill to be completed.          Additional Information About Your Visit        MyChart Information     [employer] gives you secure access to your electronic health record. If you see a primary care provider, you can also send messages to your care team and make appointments. If you have questions, please call your primary care clinic.  If you do not have a primary care provider, please call [phone] and they will assist you.        Care EveryWhere ID     This is your Care EveryWhere ID. This could be used by other organizations to access your [place] medical records  [id number]        Your Vitals Were     Pulse Temperature Respirations             64 97.7  F (36.5  C) (Oral) 18          Blood Pressure from Last 3 Encounters:   03/21/17 100/45   03/20/17 93/46   03/19/17 97/41    Weight from Last 3 Encounters:   03/10/17 60.8 kg (134 lb)   02/26/17 62.6 kg (138 lb)   06/14/16 66.3 kg (146 lb 3.2 oz)              Today, you had the following     No orders found for display       Primary Care Provider Office Phone # Fax #    Milton Hollins [phone]       [place] [address]  [address] [address]        Thank you!     Thank you for choosing [facility] AND [facility] CENTER  for your care. Our goal is always to provide you with excellent care. Hearing back from our patients is one way we can continue to improve our services. Please take a few minutes to complete the written survey that you may receive in the mail after your visit with us. Thank you!             Your Updated Medication List - Protect others around you: Learn how to safely use, store and throw away your medicines at www.disposemymeds.org.          This list is accurate as of: 3/21/17  2:42 PM.  Always use your most recent med list.                   Brand Name Dispense Instructions for use    acetaminophen 325 MG tablet    TYLENOL    100 tablet    Take 2 tablets (650 mg) by mouth every 4 hours as needed for mild pain       aspirin 81 MG tablet      Take 1 tablet by mouth daily.       atenolol 25 MG tablet    TENORMIN    90 tablet    Take 1 tablet (25 mg) by mouth daily       atorvastatin 40 MG tablet    LIPITOR    90 tablet    Take 1 tablet (40 mg) by mouth daily       diazepam 5 MG tablet    VALIUM    30 tablet    Take 1 tablet (5 mg) by mouth every 6 hours as needed for anxiety       ertapenem 1 GM injection    INVanz    140 mL    Inject 1 g into the vein every 24 hours for 14 days CBC with differential, creatinine, SGOT weekly while on this medication to be faxed to Dr. Philippe office.       fish oil-omega-3 fatty acids 1000 MG capsule      Take 1 g by mouth daily.       LISINOPRIL PO      Take 2.5 mg by mouth 2 times daily       MULTIVITAL PO      Take 1 tablet by mouth daily.       spironolactone 25 MG tablet    ALDACTONE    45 tablet    Take 0.5 tablets (12.5 mg) by mouth daily       temazepam 15 MG capsule    RESTORIL    30 capsule    Take 1 capsule (15 mg) by mouth nightly as needed       vitamin D 1000 UNITS capsule      Take 1 capsule by mouth daily.

## 2017-03-22 ENCOUNTER — INFUSION THERAPY VISIT (OUTPATIENT)
Dept: INFUSION THERAPY | Facility: CLINIC | Age: 82
End: 2017-03-22
Attending: INTERNAL MEDICINE
Payer: COMMERCIAL

## 2017-03-22 VITALS
RESPIRATION RATE: 14 BRPM | TEMPERATURE: 97.5 F | HEART RATE: 68 BPM | SYSTOLIC BLOOD PRESSURE: 93 MMHG | DIASTOLIC BLOOD PRESSURE: 45 MMHG

## 2017-03-22 DIAGNOSIS — L02.91 ABSCESS: Primary | ICD-10-CM

## 2017-03-22 PROCEDURE — 25000128 H RX IP 250 OP 636: Performed by: INTERNAL MEDICINE

## 2017-03-22 PROCEDURE — 96365 THER/PROPH/DIAG IV INF INIT: CPT

## 2017-03-22 RX ADMIN — SODIUM CHLORIDE 1 G: 9 INJECTION, SOLUTION INTRAVENOUS at 14:41

## 2017-03-22 ASSESSMENT — PAIN SCALES - GENERAL: PAINLEVEL: NO PAIN (0)

## 2017-03-22 NOTE — MR AVS SNAPSHOT
After Visit Summary   3/22/2017    Oscar Chaudhary    MRN: [id number]           Patient Information     Date Of Birth          1935        Visit Information        Provider Department      3/22/2017 2:30 PM  INFUSION CHAIR 18 [facility]        Today's Diagnoses     Abscess    -  1       Follow-ups after your visit        Follow-up notes from your care team     Return in 1 day (on 3/23/2017).      Your next 10 appointments already scheduled     Mar 23, 2017  2:00 PM CDT   Level 1 with  INFUSION CHAIR 13   [facility] ([facility])    [facility] [place]  [address]  [facility] [address]   [phone]            Jun 13, 2017  8:30 AM CDT   US ABDOMINAL AORTIC LIMITED with UCUSV1   [facility] [place] US ([facility])    [address] [place] [address] [phone]           Please bring a list of your medicines (including vitamins, minerals and over-the-counter drugs). Also, tell your doctor about any allergies you may have. Wear comfortable clothes and leave your valuables at home.  Adults: No eating or drinking for 8 hours before the exam. You may take medicine with a small sip of water.  Children: - Children 6+ years: No food or drink for 6 hours before exam. - Children 1-5 years: No food or drink for 4 hours before exam. - Infants, breast-fed: may have breast milk up to 2 hours before exam. - Infants, formula: may have bottle until 4 hours before exam.  Please call the Imaging Department at your exam site with any questions.            Jun 13, 2017  9:30 AM CDT   (Arrive by 9:15 AM)   RETURN HEART FAILURE with Jerrod Rangel MD   [facility] ([facility])    [address] [place] [address] [phone]              Who to contact     If you have questions or  need follow up information about today's clinic visit or your schedule please contact [facility] AND [facility] directly at [phone].  Normal or non-critical lab and imaging results will be communicated to you by MyChart, letter or phone within 4 business days after the clinic has received the results. If you do not hear from us within 7 days, please contact the clinic through MyChart or phone. If you have a critical or abnormal lab result, we will notify you by phone as soon as possible.  Submit refill requests through [employer] or call your pharmacy and they will forward the refill request to us. Please allow 3 business days for your refill to be completed.          Additional Information About Your Visit        [employer] Information     [employer] gives you secure access to your electronic health record. If you see a primary care provider, you can also send messages to your care team and make appointments. If you have questions, please call your primary care clinic.  If you do not have a primary care provider, please call [phone] and they will assist you.        Care EveryWhere ID     This is your Care EveryWhere ID. This could be used by other organizations to access your [place] medical records  [id number]        Your Vitals Were     Pulse Temperature Respirations             68 97.5  F (36.4  C) (Oral) 14          Blood Pressure from Last 3 Encounters:   03/22/17 93/45   03/21/17 100/45   03/20/17 93/46    Weight from Last 3 Encounters:   03/10/17 60.8 kg (134 lb)   02/26/17 62.6 kg (138 lb)   06/14/16 66.3 kg (146 lb 3.2 oz)              Today, you had the following     No orders found for display       Primary Care Provider Office Phone # Fax #    Milton Hollins [phone]       [address] [address]        Thank you!     Thank you for choosing [facility] AND [facility]  for your care. Our goal is always to  provide you with excellent care. Hearing back from our patients is one way we can continue to improve our services. Please take a few minutes to complete the written survey that you may receive in the mail after your visit with us. Thank you!             Your Updated Medication List - Protect others around you: Learn how to safely use, store and throw away your medicines at www.disposemymeds.org.          This list is accurate as of: 3/22/17  3:21 PM.  Always use your most recent med list.                   Brand Name Dispense Instructions for use    acetaminophen 325 MG tablet    TYLENOL    100 tablet    Take 2 tablets (650 mg) by mouth every 4 hours as needed for mild pain       aspirin 81 MG tablet      Take 1 tablet by mouth daily.       atenolol 25 MG tablet    TENORMIN    90 tablet    Take 1 tablet (25 mg) by mouth daily       atorvastatin 40 MG tablet    LIPITOR    90 tablet    Take 1 tablet (40 mg) by mouth daily       diazepam 5 MG tablet    VALIUM    30 tablet    Take 1 tablet (5 mg) by mouth every 6 hours as needed for anxiety       ertapenem 1 GM injection    INVanz    140 mL    Inject 1 g into the vein every 24 hours for 14 days CBC with differential, creatinine, SGOT weekly while on this medication to be faxed to Dr. Philippe office.       fish oil-omega-3 fatty acids 1000 MG capsule      Take 1 g by mouth daily.       LISINOPRIL PO      Take 2.5 mg by mouth 2 times daily       MULTIVITAL PO      Take 1 tablet by mouth daily.       spironolactone 25 MG tablet    ALDACTONE    45 tablet    Take 0.5 tablets (12.5 mg) by mouth daily       temazepam 15 MG capsule    RESTORIL    30 capsule    Take 1 capsule (15 mg) by mouth nightly as needed       vitamin D 1000 UNITS capsule      Take 1 capsule by mouth daily.

## 2017-03-22 NOTE — PROGRESS NOTES
Infusion Nursing Note:  Oscar Chaudhary presents today for Invanz.    Patient seen by provider today: No   present during visit today: Not Applicable.    Note: NA.    Intravenous Access:  Midline.    Treatment Conditions:  Not Applicable.      Post Infusion Assessment:  Patient tolerated infusion without incident.  Site patent and intact, free from redness, edema or discomfort.  No evidence of extravasations.    Discharge Plan:   Discharge instructions reviewed with: Patient.  Patient and/or family verbalized understanding of discharge instructions and all questions answered.  Copy of AVS reviewed with patient and/or family.  Patient will return 3- for next appointment.  Patient discharged in stable condition accompanied by: self.  Departure Mode: Ambulatory.    Beatriz Waters RN

## 2017-03-23 ENCOUNTER — INFUSION THERAPY VISIT (OUTPATIENT)
Dept: INFUSION THERAPY | Facility: CLINIC | Age: 82
End: 2017-03-23
Attending: INTERNAL MEDICINE
Payer: COMMERCIAL

## 2017-03-23 VITALS
DIASTOLIC BLOOD PRESSURE: 49 MMHG | OXYGEN SATURATION: 96 % | TEMPERATURE: 98.8 F | HEART RATE: 63 BPM | SYSTOLIC BLOOD PRESSURE: 104 MMHG | RESPIRATION RATE: 16 BRPM

## 2017-03-23 DIAGNOSIS — L02.91 ABSCESS: Primary | ICD-10-CM

## 2017-03-23 PROCEDURE — 96365 THER/PROPH/DIAG IV INF INIT: CPT

## 2017-03-23 PROCEDURE — 25000128 H RX IP 250 OP 636: Performed by: INTERNAL MEDICINE

## 2017-03-23 RX ADMIN — SODIUM CHLORIDE 1 G: 9 INJECTION, SOLUTION INTRAVENOUS at 14:06

## 2017-03-23 ASSESSMENT — PAIN SCALES - GENERAL: PAINLEVEL: NO PAIN (0)

## 2017-03-23 NOTE — PROGRESS NOTES
Infusion Nursing Note:  Oscar BRICE Eveliagermaine presents today for Invanz.    Patient seen by provider today: No   present during visit today: Not Applicable.    Note: Patient quite certain today is last dose of Invanz as he received first dose in the hospital. I contacted Dr. Belloaids nurse Robert and she okayed that today would be last dose and dc midline after dose today..    Intravenous Access:  Midline.    Treatment Conditions:  Results reviewed, labs MET treatment parameters, ok to proceed with treatment.      Post Infusion Assessment:  Patient tolerated infusion without incident.  Site patent and intact, free from redness, edema or discomfort.  No evidence of extravasations.  Access discontinued per protocol.    Discharge Plan:   Discharge instructions reviewed with: Patient and son.  Patient and/or family verbalized understanding of discharge instructions and all questions answered.  Copy of AVS reviewed with patient and/or family.  Patient will return none needed for next appointment.  Patient discharged in stable condition accompanied by: son.  Departure Mode: Ambulatory.    Nu Garduno RN

## 2017-03-23 NOTE — MR AVS SNAPSHOT
After Visit Summary   3/23/2017    Oscar Chaudhary    MRN: [id number]           Patient Information     Date Of Birth          1935        Visit Information        Provider Department      3/23/2017 2:00 PM  INFUSION CHAIR 13 [facility]        Today's Diagnoses     Abscess    -  1       Follow-ups after your visit        Your next 10 appointments already scheduled     Jun 13, 2017  8:30 AM CDT   US ABDOMINAL AORTIC LIMITED with UCUSV1   [facility] ([facility])    [address] [place] [address] [phone]           Please bring a list of your medicines (including vitamins, minerals and over-the-counter drugs). Also, tell your doctor about any allergies you may have. Wear comfortable clothes and leave your valuables at home.  Adults: No eating or drinking for 8 hours before the exam. You may take medicine with a small sip of water.  Children: - Children 6+ years: No food or drink for 6 hours before exam. - Children 1-5 years: No food or drink for 4 hours before exam. - Infants, breast-fed: may have breast milk up to 2 hours before exam. - Infants, formula: may have bottle until 4 hours before exam.  Please call the Imaging Department at your exam site with any questions.            Jun 13, 2017  9:30 AM CDT   (Arrive by 9:15 AM)   RETURN HEART FAILURE with Jerrod Rangel MD   [facility] ([facility])    [address] [place] [address] [phone]              Who to contact     If you have questions or need follow up information about today's clinic visit or your schedule please contact [facility] directly at [phone].  Normal or non-critical lab and imaging results will be communicated to you by MyChart, letter or phone within 4 business days after the clinic has received the  results. If you do not hear from us within 7 days, please contact the clinic through [employer] or phone. If you have a critical or abnormal lab result, we will notify you by phone as soon as possible.  Submit refill requests through [employer] or call your pharmacy and they will forward the refill request to us. Please allow 3 business days for your refill to be completed.          Additional Information About Your Visit        [employer] Information     [employer] gives you secure access to your electronic health record. If you see a primary care provider, you can also send messages to your care team and make appointments. If you have questions, please call your primary care clinic.  If you do not have a primary care provider, please call [phone] and they will assist you.        Care EveryWhere ID     This is your Care EveryWhere ID. This could be used by other organizations to access your [place] medical records  [id number]        Your Vitals Were     Pulse Temperature Respirations Pulse Oximetry          63 98.8  F (37.1  C) (Oral) 16 96%         Blood Pressure from Last 3 Encounters:   03/23/17 104/49   03/22/17 93/45   03/21/17 100/45    Weight from Last 3 Encounters:   03/10/17 60.8 kg (134 lb)   02/26/17 62.6 kg (138 lb)   06/14/16 66.3 kg (146 lb 3.2 oz)              Today, you had the following     No orders found for display       Primary Care Provider Office Phone # Fax #    Milton Hollins [phone]       [address] [address]        Thank you!     Thank you for choosing [facility] AND [facility] CENTER  for your care. Our goal is always to provide you with excellent care. Hearing back from our patients is one way we can continue to improve our services. Please take a few minutes to complete the written survey that you may receive in the mail after your visit with us. Thank you!             Your Updated Medication List - Protect others  around you: Learn how to safely use, store and throw away your medicines at www.disposemymeds.org.          This list is accurate as of: 3/23/17  3:37 PM.  Always use your most recent med list.                   Brand Name Dispense Instructions for use    acetaminophen 325 MG tablet    TYLENOL    100 tablet    Take 2 tablets (650 mg) by mouth every 4 hours as needed for mild pain       aspirin 81 MG tablet      Take 1 tablet by mouth daily.       atenolol 25 MG tablet    TENORMIN    90 tablet    Take 1 tablet (25 mg) by mouth daily       atorvastatin 40 MG tablet    LIPITOR    90 tablet    Take 1 tablet (40 mg) by mouth daily       diazepam 5 MG tablet    VALIUM    30 tablet    Take 1 tablet (5 mg) by mouth every 6 hours as needed for anxiety       ertapenem 1 GM injection    INVanz    140 mL    Inject 1 g into the vein every 24 hours for 14 days CBC with differential, creatinine, SGOT weekly while on this medication to be faxed to Dr. Philippe office.       fish oil-omega-3 fatty acids 1000 MG capsule      Take 1 g by mouth daily.       LISINOPRIL PO      Take 2.5 mg by mouth 2 times daily       MULTIVITAL PO      Take 1 tablet by mouth daily.       spironolactone 25 MG tablet    ALDACTONE    45 tablet    Take 0.5 tablets (12.5 mg) by mouth daily       temazepam 15 MG capsule    RESTORIL    30 capsule    Take 1 capsule (15 mg) by mouth nightly as needed       vitamin D 1000 UNITS capsule      Take 1 capsule by mouth daily.

## 2017-05-31 DIAGNOSIS — I51.9 LV DYSFUNCTION: ICD-10-CM

## 2017-05-31 RX ORDER — SPIRONOLACTONE 25 MG/1
12.5 TABLET ORAL DAILY
Qty: 45 TABLET | Refills: 3 | Status: SHIPPED | OUTPATIENT
Start: 2017-05-31 | End: 2018-05-15

## 2017-05-31 NOTE — TELEPHONE ENCOUNTER
Spironolactone 25 mg tab  Take one half tab by mouth daily    [facility] pharmacy   [address]

## 2017-06-13 DIAGNOSIS — Z85.46 HISTORY OF PROSTATE CANCER: ICD-10-CM

## 2017-06-13 DIAGNOSIS — I25.10 CORONARY ARTERY DISEASE INVOLVING NATIVE CORONARY ARTERY OF NATIVE HEART WITHOUT ANGINA PECTORIS: ICD-10-CM

## 2017-06-13 LAB
ALBUMIN SERPL-MCNC: 3.5 G/DL (ref 3.4–5)
ALP SERPL-CCNC: 62 U/L (ref 40–150)
ALT SERPL W P-5'-P-CCNC: 20 U/L (ref 0–70)
ANION GAP SERPL CALCULATED.3IONS-SCNC: 6 MMOL/L (ref 3–14)
AST SERPL W P-5'-P-CCNC: 18 U/L (ref 0–45)
BILIRUB SERPL-MCNC: 0.7 MG/DL (ref 0.2–1.3)
BUN SERPL-MCNC: 19 MG/DL (ref 7–30)
CALCIUM SERPL-MCNC: 9.1 MG/DL (ref 8.5–10.1)
CHLORIDE SERPL-SCNC: 100 MMOL/L (ref 94–109)
CHOLEST SERPL-MCNC: 126 MG/DL
CO2 SERPL-SCNC: 28 MMOL/L (ref 20–32)
CREAT SERPL-MCNC: 0.83 MG/DL (ref 0.66–1.25)
ERYTHROCYTE [DISTWIDTH] IN BLOOD BY AUTOMATED COUNT: 15.8 % (ref 10–15)
GFR SERPL CREATININE-BSD FRML MDRD: 89 ML/MIN/1.7M2
GLUCOSE SERPL-MCNC: 103 MG/DL (ref 70–99)
HCT VFR BLD AUTO: 36.3 % (ref 40–53)
HDLC SERPL-MCNC: 57 MG/DL
HGB BLD-MCNC: 12.4 G/DL (ref 13.3–17.7)
LDLC SERPL CALC-MCNC: 56 MG/DL
MCH RBC QN AUTO: 31.6 PG (ref 26.5–33)
MCHC RBC AUTO-ENTMCNC: 34.2 G/DL (ref 31.5–36.5)
MCV RBC AUTO: 93 FL (ref 78–100)
NONHDLC SERPL-MCNC: 68 MG/DL
PLATELET # BLD AUTO: 248 10E9/L (ref 150–450)
POTASSIUM SERPL-SCNC: 4.8 MMOL/L (ref 3.4–5.3)
PROT SERPL-MCNC: 6.7 G/DL (ref 6.8–8.8)
PSA SERPL-ACNC: 0.03 UG/L (ref 0–4)
RBC # BLD AUTO: 3.92 10E12/L (ref 4.4–5.9)
SODIUM SERPL-SCNC: 134 MMOL/L (ref 133–144)
TRIGL SERPL-MCNC: 60 MG/DL
WBC # BLD AUTO: 9.3 10E9/L (ref 4–11)

## 2017-06-23 ENCOUNTER — DOCUMENTATION ONLY (OUTPATIENT)
Dept: OTHER | Facility: CLINIC | Age: 82
End: 2017-06-23

## 2017-06-23 PROBLEM — Z71.89 ACP (ADVANCE CARE PLANNING): Chronic | Status: ACTIVE | Noted: 2017-06-23

## 2017-06-29 DIAGNOSIS — I15.8 OTHER SECONDARY HYPERTENSION: ICD-10-CM

## 2017-06-29 DIAGNOSIS — Z53.9 ERRONEOUS ENCOUNTER--DISREGARD: Primary | ICD-10-CM

## 2017-06-29 RX ORDER — ATENOLOL 25 MG/1
TABLET ORAL
Qty: 90 TABLET | Refills: 1 | Status: SHIPPED | OUTPATIENT
Start: 2017-06-29 | End: 2017-06-29

## 2017-06-29 RX ORDER — ATENOLOL 25 MG/1
25 TABLET ORAL DAILY
Qty: 90 TABLET | Refills: 3 | Status: SHIPPED | OUTPATIENT
Start: 2017-06-29 | End: 2017-12-28

## 2017-08-01 ENCOUNTER — OFFICE VISIT (OUTPATIENT)
Dept: CARDIOLOGY | Facility: CLINIC | Age: 82
End: 2017-08-01
Payer: COMMERCIAL

## 2017-08-01 VITALS
OXYGEN SATURATION: 97 % | HEIGHT: 64 IN | DIASTOLIC BLOOD PRESSURE: 56 MMHG | SYSTOLIC BLOOD PRESSURE: 96 MMHG | WEIGHT: 139 LBS | HEART RATE: 68 BPM | BODY MASS INDEX: 23.73 KG/M2

## 2017-08-01 DIAGNOSIS — I71.40 ABDOMINAL AORTIC ANEURYSM (AAA) WITHOUT RUPTURE (H): Primary | ICD-10-CM

## 2017-08-01 PROCEDURE — 99214 OFFICE O/P EST MOD 30 MIN: CPT | Performed by: INTERNAL MEDICINE

## 2017-08-01 NOTE — NURSING NOTE
Med Reconcile: Reviewed and verified all current medications with the patient. The updated medication list was printed and given to the patient.  Return Appointment: Patient given instructions regarding scheduling next clinic visit. Patient demonstrated understanding of this information and agreed to call with further questions or concerns.  Patient stated he understood all health information given and agreed to call with further questions or concerns.     Medication Changes:  No medication changes at this time. Please continue current medication regiment.    Patient Instructions:  Abdominal US in 6 months   Check-In  Time Check-In Location Estimated Length Procedure   Name        [facility]  waiting room 60 minutes Abdominal Ultra Sound**     Procedure Preparations & Instructions     This is non-invasive procedure that DOES require preparation:    - Nothing to eat or drink for 8 hours      Follow up Appointment Information:  Referral to Dr. Shree White for Vascular Surgery  After testing in 6 months

## 2017-08-01 NOTE — PROGRESS NOTES
"Jerrod Rangel M.D.  Cardiovascular Medicine    I personally saw and examined this patient, discussed care with housestaff and other consultants, reviewed current laboratories and imaging studies, and conveyed impression and diagnostic/therapeutic plan to patient.    Problem List  1. ASHD  2. CAB  3. AAA  4. Hypertension  5. Lipid abnormality  6. Hypertension    History    Patient returns for follow-up.  There is no interim cardiovascular.  Hospitalized for verónica-pharyngeal abscess. No chest pain, tightness, TIA, PND, orthopnea, ankle edema.    No interim history abdominal pain, back pain, claudication. Lipids and blood pressure  ok      Objective  BP 96/56  Pulse 68  Ht 1.626 m (5' 4\")  Wt 63 kg (139 lb)  SpO2 97%  BMI 23.86 kg/m2   Constitutional: alert, oriented, normal gait and station, normal mentation.  Oral: moist mucous membrans  Lymph: without pathologic adenopathy  Chest: clear to ausculation and percussion  Cor: No evidence of left or right ventricular activity.  Rhythm is regular.  S1 normal, S2 split physiologically. Murmurs are not present  Abdomen: without tenderness, rebound, guarding, masses, ascites, bruit   Extremities: Edema not present, no femoral bruits  Neuro: no focal defects, normal mentation  Skin: without open lesionsPsych: oriented, verbal, mental status in tact    Wt Readings from Last 5 Encounters:   08/01/17 63 kg (139 lb)   03/10/17 60.8 kg (134 lb)   02/26/17 62.6 kg (138 lb)   06/14/16 66.3 kg (146 lb 3.2 oz)   06/02/15 66.9 kg (147 lb 6.4 oz)       Meds  Current Outpatient Prescriptions   Medication     omeprazole (PRILOSEC) 20 MG CR capsule     atenolol (TENORMIN) 25 MG tablet     spironolactone (ALDACTONE) 25 MG tablet     acetaminophen (TYLENOL) 325 MG tablet     LISINOPRIL PO     diazepam (VALIUM) 5 MG tablet     atorvastatin (LIPITOR) 40 MG tablet     temazepam (RESTORIL) 15 MG capsule     aspirin 81 MG tablet     fish oil-omega-3 fatty acids (FISH OIL) 1000 MG capsule     " Multiple Vitamins-Minerals (MULTIVITAL PO)     Cholecalciferol (VITAMIN D) 1000 UNITS capsule     No current facility-administered medications for this visit.          Labs     Ref. Range 6/13/2017 09:14   Sodium Latest Ref Range: 133 - 144 mmol/L 134   Potassium Latest Ref Range: 3.4 - 5.3 mmol/L 4.8   Chloride Latest Ref Range: 94 - 109 mmol/L 100   Carbon Dioxide Latest Ref Range: 20 - 32 mmol/L 28   Urea Nitrogen Latest Ref Range: 7 - 30 mg/dL 19   Creatinine Latest Ref Range: 0.66 - 1.25 mg/dL 0.83   GFR Estimate Latest Ref Range: >60 mL/min/1.7m2 89   GFR Estimate If Black Latest Ref Range: >60 mL/min/1.7m2 >90...   Calcium Latest Ref Range: 8.5 - 10.1 mg/dL 9.1   Anion Gap Latest Ref Range: 3 - 14 mmol/L 6   Albumin Latest Ref Range: 3.4 - 5.0 g/dL 3.5   Protein Total Latest Ref Range: 6.8 - 8.8 g/dL 6.7 (L)   Bilirubin Total Latest Ref Range: 0.2 - 1.3 mg/dL 0.7   Alkaline Phosphatase Latest Ref Range: 40 - 150 U/L 62   ALT Latest Ref Range: 0 - 70 U/L 20   AST Latest Ref Range: 0 - 45 U/L 18   Cholesterol Latest Ref Range: <200 mg/dL 126   HDL Cholesterol Latest Ref Range: >39 mg/dL 57   LDL Cholesterol Calculated Latest Ref Range: <100 mg/dL 56   Non HDL Cholesterol Latest Ref Range: <130 mg/dL 68   PSA Latest Ref Range: 0 - 4 ug/L 0.03   Triglycerides Latest Ref Range: <150 mg/dL 60   Glucose Latest Ref Range: 70 - 99 mg/dL 103 (H)   WBC Latest Ref Range: 4.0 - 11.0 10e9/L 9.3   Hemoglobin Latest Ref Range: 13.3 - 17.7 g/dL 12.4 (L)   Hematocrit Latest Ref Range: 40.0 - 53.0 % 36.3 (L)   Platelet Count Latest Ref Range: 150 - 450 10e9/L 248   RBC Count Latest Ref Range: 4.4 - 5.9 10e12/L 3.92 (L)   MCV Latest Ref Range: 78 - 100 fl 93   MCH Latest Ref Range: 26.5 - 33.0 pg 31.6   MCHC Latest Ref Range: 31.5 - 36.5 g/dL 34.2   RDW Latest Ref Range: 10.0 - 15.0 % 15.8 (H)     Imaging   Ultrasound retroperitoneal dated 6/13/2017 9:07 AM     Comparison study: Ultrasound 6/14/2016     Clinical history:  Follow-up AAA     Findings:  On the transverse view the aortic AP diameters were as follows:  Supraceliac: 2.2 cm  Suprarenal: 2.1 cm  Infrarenal (proximal): 1.8 cm  Infrarenal (mid): 1.7 cm  Infrarenal (distal): 4.2 x 4.4 (AP by transverse) previously measured  4.2 x 4.3 cm.     Right [place]: 1.3 cm  Left [place]: 1.4 cm         Impression:   Maximal AP diameter of the infrarenal abdominal aorta is 4.2 x 4.4 cm,  not significantly changed from 6/14/2016.     Aorta diameter measurement classification:  < 2.5cm: Normal.  2.5 - 2.9cm: Ectatic.  >3cm: Aneurysmal.     I have personally reviewed the examination and initial interpretation  and I agree with the findings.     EULALIO MILLARD MD       Assessment/Plan   1. Shree White M.D.   Review AAA  2. Follow-up US in 6 months

## 2017-08-01 NOTE — PATIENT INSTRUCTIONS
Medication Changes:  No medication changes at this time. Please continue current medication regiment.    Patient Instructions:  Abdominal US in 6 months   Check-In  Time Check-In Location Estimated Length Procedure   Name        GOLD  waiting room 60 minutes Abdominal Ultra Sound**     Procedure Preparations & Instructions     This is non-invasive procedure that DOES require preparation:    - Nothing to eat or drink for 8 hours      Follow up Appointment Information:  Referral to Dr. Shree White for Vascular Surgery  After testing in 6 months    For scheduling at [facility] please call [phone]  For scheduling at the [facility] please call [phone]  We are located on the second floor [address] at the [facility].  Our address is     [address] [place] [address] [facility]   [address]  [place]  [address]    Thank you for allowing us to be a part of your care here at the [facility]    If you have questions or concerns please contact us at:    Gracia Gates, RN      Nurse Coordinator       Pulmonary Hypertension     [facility]   [phone])[phone]  (F)[phone]    ** Please note that you will NOT receive a reminder call regarding your scheduled testing, reminder calls are for provider appointments only.  If you are scheduled for testing within the [facility] system you may receive a call regarding pre-registration for billing purposes only.**     Remember to weigh yourself daily after voiding and before you consume any food or beverages and log the numbers.  If you have gained/lost 2 pounds overnight or 5 pounds in a week contact us immediately for medication adjustments or further instructions.

## 2017-08-01 NOTE — MR AVS SNAPSHOT
After Visit Summary   8/1/2017    Oscar Chaudhary    MRN: [id number]           Patient Information     Date Of Birth          1935        Visit Information        Provider Department      8/1/2017 9:00 AM Jerrod Rangel MD [facility] [place]        Today's Diagnoses     Abdominal aortic aneurysm (AAA) without rupture (H)    -  1      Care Instructions    Medication Changes:  No medication changes at this time. Please continue current medication regiment.    Patient Instructions:  Abdominal US in 6 months   Check-In  Time Check-In Location Estimated Length Procedure   Name        GOLD  waiting room 60 minutes Abdominal Ultra Sound**     Procedure Preparations & Instructions     This is non-invasive procedure that DOES require preparation:    - Nothing to eat or drink for 8 hours      Follow up Appointment Information:  Referral to Dr. Shree White for Vascular Surgery  After testing in 6 months    For scheduling at [facility] please call [phone]  For scheduling at the [facility] please call [phone]  We are located on the second floor [address] at the [facility].  Our address is     [address] [place] [address],   [address]  [place] [address]    Thank you for allowing us to be a part of your care here at the [facility]    If you have questions or concerns please contact us at:    Gracia Gates, RN      Nurse Coordinator       Pulmonary Hypertension     [facility]   [phone])[phone]  (F)[phone]    ** Please note that you will NOT receive a reminder call regarding your scheduled testing, reminder calls are for provider appointments only.  If you are scheduled for testing within the [facility] system you may receive a call regarding pre-registration for billing purposes only.**     Remember to weigh yourself daily after voiding and before you consume any food or beverages and log the  numbers.  If you have gained/lost 2 pounds overnight or 5 pounds in a week contact us immediately for medication adjustments or further instructions.          Follow-ups after your visit        Additional Services     Follow-Up with Pulmonary Hypertension Clinic                 Future tests that were ordered for you today     Open Future Orders        Priority Expected Expires Ordered    Follow-Up with Pulmonary Hypertension Clinic Routine 2/1/2018 8/1/2018 8/1/2017    US abdominal aorta limited Routine 2/1/2018 8/1/2018 8/1/2017            Who to contact     If you have questions or need follow up information about today's clinic visit or your schedule please contact [facility] [place] directly at [phone].  Normal or non-critical lab and imaging results will be communicated to you by MyChart, letter or phone within 4 business days after the clinic has received the results. If you do not hear from us within 7 days, please contact the clinic through [facility] or phone. If you have a critical or abnormal lab result, we will notify you by phone as soon as possible.  Submit refill requests through [employer] or call your pharmacy and they will forward the refill request to us. Please allow 3 business days for your refill to be completed.          Additional Information About Your Visit        MyChart Information     [employer] gives you secure access to your electronic health record. If you see a primary care provider, you can also send messages to your care team and make appointments. If you have questions, please call your primary care clinic.  If you do not have a primary care provider, please call [phone] and they will assist you.        Care EveryWhere ID     This is your Care EveryWhere ID. This could be used by other organizations to access your [place] medical records  [id number]        Your Vitals Were     Pulse Height Pulse Oximetry BMI (Body Mass Index)          68 1.626  "m (5' 4\") 97% 23.86 kg/m2         Blood Pressure from Last 3 Encounters:   08/01/17 96/56   03/23/17 104/49   03/22/17 93/45    Weight from Last 3 Encounters:   08/01/17 63 kg (139 lb)   03/10/17 60.8 kg (134 lb)   02/26/17 62.6 kg (138 lb)               Primary Care Provider    None Specified       No primary provider on file.        Equal Access to Services     DELANO WHITAKER : Hadii aad ku hadasho Soomaali, waaxda luqadaha, qaybta kaalmada adeegyada, waxay idiin hayolun adeeg edithjohnnygia herrmann . So [facility] [phone].    ATENCIÓN: Si corinnala inez, tiene a bell disposición servicios gratuitos de asistencia lingüística. Llame al [phone].    We comply with applicable federal civil rights laws and [place] laws. We do not discriminate on the basis of race, color, national origin, age, disability sex, sexual orientation or gender identity.            Thank you!     Thank you for choosing [facility] [place]  for your care. Our goal is always to provide you with excellent care. Hearing back from our patients is one way we can continue to improve our services. Please take a few minutes to complete the written survey that you may receive in the mail after your visit with us. Thank you!             Your Updated Medication List - Protect others around you: Learn how to safely use, store and throw away your medicines at www.disposemymeds.org.          This list is accurate as of: 8/1/17 10:10 AM.  Always use your most recent med list.                   Brand Name Dispense Instructions for use Diagnosis    acetaminophen 325 MG tablet    TYLENOL    100 tablet    Take 2 tablets (650 mg) by mouth every 4 hours as needed for mild pain    Fusobacterium infection       aspirin 81 MG tablet      Take 1 tablet by mouth daily.        atenolol 25 MG tablet    TENORMIN    90 tablet    Take 1 tablet (25 mg) by mouth daily    Other secondary hypertension       atorvastatin 40 MG tablet    LIPITOR    90 " tablet    Take 1 tablet (40 mg) by mouth daily    Hyperlipidemia       diazepam 5 MG tablet    VALIUM    30 tablet    Take 1 tablet (5 mg) by mouth every 6 hours as needed for anxiety    Anxiety       fish oil-omega-3 fatty acids 1000 MG capsule      Take 1 g by mouth daily.        LISINOPRIL PO      Take 2.5 mg by mouth 2 times daily        MULTIVITAL PO      Take 1 tablet by mouth daily.        omeprazole 20 MG CR capsule    priLOSEC     Take 20 mg by mouth daily        spironolactone 25 MG tablet    ALDACTONE    45 tablet    Take 0.5 tablets (12.5 mg) by mouth daily    LV dysfunction       temazepam 15 MG capsule    RESTORIL    30 capsule    Take 1 capsule (15 mg) by mouth nightly as needed    Insomnia, unspecified       vitamin D 1000 UNITS capsule      Take 1 capsule by mouth daily.

## 2017-12-17 DIAGNOSIS — F41.9 ANXIETY: ICD-10-CM

## 2017-12-28 DIAGNOSIS — E78.5 HYPERLIPIDEMIA: ICD-10-CM

## 2017-12-28 DIAGNOSIS — F41.9 ANXIETY: ICD-10-CM

## 2017-12-28 DIAGNOSIS — I15.8 OTHER SECONDARY HYPERTENSION: ICD-10-CM

## 2017-12-28 RX ORDER — DIAZEPAM 5 MG
TABLET ORAL
Qty: 30 TABLET | Refills: 0 | Status: SHIPPED | OUTPATIENT
Start: 2017-12-28 | End: 2017-12-28

## 2017-12-28 RX ORDER — ATENOLOL 25 MG/1
TABLET ORAL
Qty: 90 TABLET | Refills: 0 | Status: SHIPPED | OUTPATIENT
Start: 2017-12-28 | End: 2018-03-31

## 2017-12-28 RX ORDER — ATORVASTATIN CALCIUM 40 MG/1
40 TABLET, FILM COATED ORAL DAILY
Qty: 90 TABLET | Refills: 3 | Status: SHIPPED | OUTPATIENT
Start: 2017-12-28 | End: 2019-02-16

## 2017-12-28 RX ORDER — DIAZEPAM 5 MG
5 TABLET ORAL EVERY 6 HOURS
Qty: 30 TABLET | Refills: 0 | Status: SHIPPED | OUTPATIENT
Start: 2017-12-28 | End: 2018-02-06

## 2018-02-01 ENCOUNTER — HOSPITAL ENCOUNTER (OUTPATIENT)
Dept: ULTRASOUND IMAGING | Facility: CLINIC | Age: 83
Discharge: HOME | End: 2018-02-01
Attending: INTERNAL MEDICINE | Admitting: INTERNAL MEDICINE
Payer: COMMERCIAL

## 2018-02-01 ENCOUNTER — OFFICE VISIT (OUTPATIENT)
Dept: OTHER | Facility: CLINIC | Age: 83
End: 2018-02-01
Attending: INTERNAL MEDICINE
Payer: COMMERCIAL

## 2018-02-01 VITALS — SYSTOLIC BLOOD PRESSURE: 123 MMHG | DIASTOLIC BLOOD PRESSURE: 59 MMHG | HEART RATE: 58 BPM

## 2018-02-01 DIAGNOSIS — I73.9 PAD (PERIPHERAL ARTERY DISEASE) (H): ICD-10-CM

## 2018-02-01 DIAGNOSIS — I71.40 ABDOMINAL AORTIC ANEURYSM (AAA) WITHOUT RUPTURE (H): Primary | ICD-10-CM

## 2018-02-01 DIAGNOSIS — I71.40 ABDOMINAL AORTIC ANEURYSM (AAA) WITHOUT RUPTURE (H): ICD-10-CM

## 2018-02-01 PROCEDURE — 93978 VASCULAR STUDY: CPT

## 2018-02-01 PROCEDURE — G0463 HOSPITAL OUTPT CLINIC VISIT: HCPCS

## 2018-02-01 PROCEDURE — 99203 OFFICE O/P NEW LOW 30 MIN: CPT | Mod: ZP | Performed by: SURGERY

## 2018-02-01 NOTE — PROGRESS NOTES
[place] [facility]    Oscar Chaudhary comes to see me today per recommendation of Dr. Rangel cardiology for a long-standing infrarenal AAA and PAD.    He has been followed several years for his aneurysm.  In 2012 this measured 3.66 cm.  In 2017 this measured 4.2 x 4.4 cm.  Ultrasound is being obtained today.      PMH: Medications: Lisinopril, Tenormin, Lipitor, Aldactone, Prilosec, aspirin          Medical: Coronary disease status post CABG in 2001                               Myocardial infarction 1976.                         Hypertension on medications.                           Hyperlipidemia   -most recent LDL= 56 on Lipitor                         Submandibular abscess requiring drainage in 2/27/2017                         GERD.             Former smoker.      Lives independently in a multistory home in [place] with his dog.    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    In the summer months he walks 3 miles daily with his dog with no claudication symptoms at any time.  No history of breast pain or ulcerations.      Ultrasound today reveals a stable distal AAA measuring 3.8 x 3.9 cm.  Aortic and iliac calcifications noted.  In the past he has had relatively normal iliac arteries (2013 approximately 1.3 cm bilaterally ) .  However today, right common iliac artery is normal at 1 cm but the left measures 1.3 x 0.3 cm with turbulent flow.  External iliac arteries measure 0.8 cm bilaterally which is normal.          Exam: Very alert pleasant talkative gentleman.  Normal affect.  Glasses.  Hearing                  aids.   Blood pressure 123/59.  Pulse 58.              HEENT= normal.  Well-healed left submandibular incision from abscess              No carotid bruits.   Chest= clear.    Cardiovascular=RR               Abdomen= soft and nontender               +3 femoral pulses bilaterally.No palpable pedal or popliteal pulses                                  bilaterally                   Normal 5.07 SW probe sensation bilaterally.  No edema.  No ulcerations                     on feet.       Bedside Doppler revealed a monophasic +2 signal in both posterior tibial arteries slightly stronger on the left with a weaker monophasic anterior tibial signal bilaterally.          Impression: #1   Stable less than 4 cm infrarenal AAA.  No need for intervention.  Recommend yearly duplex follow-up.                            #2    Relatively asymptomatic PAD.  By ultrasound he does have a significant narrowing of the left common iliac artery which is new but still has a strong femoral pulse and able to walk with no difficulty.  He may have more distal disease also as indicated by his diminished pedal pulses and Doppler signals.  We discussed this at length today.  We gave him the option of a more definitive RALPH with exercise testing to document the degree of PAD.  We also discussed the possibility of angiography and iliac stenting.  He is decided to follow this conservatively since his symptoms are very limited and I feel this is quite appropriate.  However, if he does develop claudication symptoms in the left further evaluation and likely angioplasty and stenting would be indicated.                    We spent 30 minutes in the office today with over 50% counseling.      Shree White MD     Please route or send letter to:  Dr Jerrod Rangel

## 2018-02-01 NOTE — NURSING NOTE
"Chief Complaint   Patient presents with     Consult     AAA (8:45 [facility]; 9:30 [facility]) New consult for AAA, ref by Dr. Rangel, records in Epic        Initial /59 (BP Location: Left arm, Patient Position: Chair, Cuff Size: Adult Regular)  Pulse 58 Estimated body mass index is 23.86 kg/(m^2) as calculated from the following:    Height as of 8/1/17: 5' 4\" (1.626 m).    Weight as of 8/1/17: 139 lb (63 kg).  Medication Reconciliation: complete     Cathleen Burk MA   "

## 2018-02-01 NOTE — LETTER
[facility] [address]  [place] [address]  Phone: [phone]  Fax: [phone]      2018    Re: Oscar Chaudhary - 1935    Oscar Chaudhary comes to see me today per recommendation of Dr. Rangel cardiology for a long-standing infrarenal AAA and PAD.     He has been followed several years for his aneurysm.  In  this measured 3.66 cm.  In 2017 this measured 4.2 x 4.4 cm.  Ultrasound is being obtained today.      PMH: Medications: Lisinopril, Tenormin, Lipitor, Aldactone, Prilosec, aspirin            Medical: Coronary disease status post CABG in                             Myocardial infarction .                           Hypertension on medications.                             Hyperlipidemia   -most recent LDL= 56 on Lipitor                           Submandibular abscess requiring drainage in 2017                           GERD.     Former smoker.       Lives independently in a multistory home in [place] [place] [facility] with his dog.    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     In the summer months he walks 3 miles daily with his dog with no claudication symptoms at any time.  No history of breast pain or ulcerations.      Ultrasound today reveals a stable distal AAA measuring 3.8 x 3.9 cm.  Aortic and iliac calcifications noted.  In the past he has had relatively normal iliac arteries (2013 approximately 1.3 cm bilaterally ) .  However today, right common iliac artery is normal at 1 cm but the left measures 1.3 x 0.3 cm with turbulent flow.  External iliac arteries measure 0.8 cm bilaterally which is normal.     Exam: Very alert pleasant talkative gentleman.  Normal affect.  Glasses.  Hearing aids.   Blood pressure 123/59.  Pulse 58.   HEENT= normal.  Well-healed left submandibular incision from abscess   No carotid bruits.   Chest= clear.    Cardiovascular=RR   Abdomen= soft and nontender        +3 femoral pulses  bilaterally.No palpable pedal or popliteal pulses bilaterally                 Normal 5.07 SW probe sensation bilaterally.  No edema.  No ulcerations on feet.       Bedside Doppler revealed a monophasic +2 signal in both posterior tibial arteries slightly stronger on the left with a weaker monophasic anterior tibial signal bilaterally.      Impression: #1   Stable less than 4 cm infrarenal AAA.  No need for intervention.  Recommend yearly duplex follow-up.                            #2    Relatively asymptomatic PAD.  By ultrasound he does have a significant narrowing of the left common iliac artery which is new but still has a strong femoral pulse and able to walk with no difficulty.  He may have more distal disease also as indicated by his diminished pedal pulses and Doppler signals.  We discussed this at length today.  We gave him the option of a more definitive RALPH with exercise testing to document the degree of PAD.  We also discussed the possibility of angiography and iliac stenting.  He is decided to follow this conservatively since his symptoms are very limited and I feel this is quite appropriate. However, if he does develop claudication symptoms in the left further evaluation and likely angioplasty and stenting would be indicated.        Shree White MD

## 2018-02-01 NOTE — MR AVS SNAPSHOT
After Visit Summary   2/1/2018    Oscar Chaudhary    MRN: [id number]           Patient Information     Date Of Birth          1935        Visit Information        Provider Department      2/1/2018 9:30 AM Shree White MD [facility] at  [facility] [place]      Today's Diagnoses     Abdominal aortic aneurysm (AAA) without rupture (H)    -  1    PAD (peripheral artery disease) (H)           Follow-ups after your visit        Follow-up notes from your care team     Return in about 1 year (around 2/1/2019).      Your next 10 appointments already scheduled     Feb 06, 2018 10:00 AM CST   Pulmonary Hypertension Return with Jerrod Rangel MD   [facility] ([facility])    [address] [place] [id number] [phone]              Who to contact     If you have questions or need follow up information about today's clinic visit or your schedule please contact [facility] directly at [phone].  Normal or non-critical lab and imaging results will be communicated to you by MyChart, letter or phone within 4 business days after the clinic has received the results. If you do not hear from us within 7 days, please contact the clinic through Clonehart or phone. If you have a critical or abnormal lab result, we will notify you by phone as soon as possible.  Submit refill requests through [employer] or call your pharmacy and they will forward the refill request to us. Please allow 3 business days for your refill to be completed.          Additional Information About Your Visit        MyChart Information     [employer] gives you secure access to your electronic health record. If you see a primary care provider, you can also send messages to your care team and make appointments. If you have questions, please call your primary care clinic.  If you do not have a primary  care provider, please call [phone] and they will assist you.        Care EveryWhere ID     This is your Care EveryWhere ID. This could be used by other organizations to access your [place] medical records  [id number]        Your Vitals Were     Pulse                   58            Blood Pressure from Last 3 Encounters:   02/01/18 123/59   08/01/17 96/56   03/23/17 104/49    Weight from Last 3 Encounters:   08/01/17 139 lb (63 kg)   03/10/17 134 lb (60.8 kg)   02/26/17 138 lb (62.6 kg)              Today, you had the following     No orders found for display       Primary Care Provider Fax #    Physician No Ref-Primary [phone]       No address on file        Equal Access to Services     DELANO WHITAKER : Inocencio Dutton, waaxda luqadaha, qaybta kaalmada adekayleyyada, noam herrmann . So [facility] [phone].    ATENCIÓN: Si habla español, tiene a bell disposición servicios gratuitos de asistencia lingüística. Llame al [phone].    We comply with applicable federal civil rights laws and [place] laws. We do not discriminate on the basis of race, color, national origin, age, disability, sex, sexual orientation, or gender identity.            Thank you!     Thank you for choosing [facility] [place]  for your care. Our goal is always to provide you with excellent care. Hearing back from our patients is one way we can continue to improve our services. Please take a few minutes to complete the written survey that you may receive in the mail after your visit with us. Thank you!             Your Updated Medication List - Protect others around you: Learn how to safely use, store and throw away your medicines at www.disposemymeds.org.          This list is accurate as of 2/1/18 10:13 AM.  Always use your most recent med list.                   Brand Name Dispense Instructions for use Diagnosis    acetaminophen 325 MG tablet    TYLENOL    100 tablet    Take 2  tablets (650 mg) by mouth every 4 hours as needed for mild pain    Fusobacterium infection       aspirin 81 MG tablet      Take 1 tablet by mouth daily.        atenolol 25 MG tablet    TENORMIN    90 tablet    TAKE 1 TABLET BY MOUTH DAILY    Other secondary hypertension       atorvastatin 40 MG tablet    LIPITOR    90 tablet    Take 1 tablet (40 mg) by mouth daily    Hyperlipidemia       diazepam 5 MG tablet    VALIUM    30 tablet    Take 1 tablet (5 mg) by mouth every 6 hours    Anxiety       fish oil-omega-3 fatty acids 1000 MG capsule      Take 1 g by mouth daily.        LISINOPRIL PO      Take 2.5 mg by mouth 2 times daily        MULTIVITAL PO      Take 1 tablet by mouth daily.        omeprazole 20 MG CR capsule    priLOSEC     Take 20 mg by mouth daily        spironolactone 25 MG tablet    ALDACTONE    45 tablet    Take 0.5 tablets (12.5 mg) by mouth daily    LV dysfunction       temazepam 15 MG capsule    RESTORIL    30 capsule    Take 1 capsule (15 mg) by mouth nightly as needed    Insomnia, unspecified       vitamin D 1000 UNITS capsule      Take 1 capsule by mouth daily.

## 2018-02-05 ENCOUNTER — TELEPHONE (OUTPATIENT)
Dept: OTHER | Facility: CLINIC | Age: 83
End: 2018-02-05

## 2018-02-05 DIAGNOSIS — I73.9 PAD (PERIPHERAL ARTERY DISEASE) (H): Primary | ICD-10-CM

## 2018-02-05 NOTE — TELEPHONE ENCOUNTER
"Pt LM on triage line stating that he would like to get a hold of .  Attempted to call pt's home and mobile number, no answer, message left on both.    Pt was seen in consult by  on 2/1/18 for 4cm AAA and \"relatively asymptomatic PAD\".    Will await callback from pt.    Irlanda Fung, JARADN, RN    "

## 2018-02-05 NOTE — TELEPHONE ENCOUNTER
Pt called back.  He states that he is ready to do something about his legs.  He says that now that he thinks of it his legs have been bothering him for over a year, they fatigue and feel weak with ambulation and stairs.  He also notes that he has pain even with rest.   2/1/18 note:  Relatively asymptomatic PAD.  By ultrasound he does have a significant narrowing of the left common iliac artery which is new but still has a strong femoral pulse and able to walk with no difficulty.  He may have more distal disease also as indicated by his diminished pedal pulses and Doppler signals.  We discussed this at length today.  We gave him the option of a more definitive RALPH with exercise testing to document the degree of PAD.  We also discussed the possibility of angiography and iliac stenting.  He is decided to follow this conservatively since his symptoms are very limited and I feel this is quite appropriate.  However, if he does develop claudication symptoms in the left further evaluation and likely angioplasty and stenting would be indicated.    I told pt that I thought the next step would be an RALPH, but he thought that he could go right for the angiogram.  Will clarify with  and call the pt back.    Irlanda Fung, JARADN, RN

## 2018-02-06 ENCOUNTER — OFFICE VISIT (OUTPATIENT)
Dept: CARDIOLOGY | Facility: CLINIC | Age: 83
End: 2018-02-06
Attending: INTERNAL MEDICINE
Payer: COMMERCIAL

## 2018-02-06 VITALS
SYSTOLIC BLOOD PRESSURE: 128 MMHG | WEIGHT: 141.9 LBS | HEART RATE: 64 BPM | BODY MASS INDEX: 24.22 KG/M2 | DIASTOLIC BLOOD PRESSURE: 52 MMHG | HEIGHT: 64 IN

## 2018-02-06 DIAGNOSIS — I71.40 ABDOMINAL AORTIC ANEURYSM (AAA) WITHOUT RUPTURE (H): ICD-10-CM

## 2018-02-06 DIAGNOSIS — I25.10 CORONARY ARTERY DISEASE INVOLVING NATIVE CORONARY ARTERY OF NATIVE HEART WITHOUT ANGINA PECTORIS: ICD-10-CM

## 2018-02-06 DIAGNOSIS — Z95.1 S/P CABG (CORONARY ARTERY BYPASS GRAFT): Primary | ICD-10-CM

## 2018-02-06 DIAGNOSIS — F41.9 ANXIETY: ICD-10-CM

## 2018-02-06 PROCEDURE — 99214 OFFICE O/P EST MOD 30 MIN: CPT | Performed by: INTERNAL MEDICINE

## 2018-02-06 RX ORDER — DIAZEPAM 5 MG
5 TABLET ORAL EVERY 6 HOURS
Qty: 30 TABLET | Refills: 3 | Status: SHIPPED | OUTPATIENT
Start: 2018-02-06 | End: 2021-01-01

## 2018-02-06 NOTE — PROGRESS NOTES
"Jerrod Rangel M.D.  Cardiovascular Medicine    I personally saw and examined this patient, discussed care with housestaff and other consultants, reviewed current laboratories and imaging studies, and conveyed impression and diagnostic/therapeutic plan to patient.    Dr. Shree White  FSGAIL    Problem List  1. ASPVD with claudication L>R  2. AAA stable  3. 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 Hyperlipidemia    Dear Alfonso:    I hope this finds you well.  Thank you for seeing Spike Doshi.  I read your note, reviewed the testing and agree with the plan for bilateral stenting of the lower extremity vessels.  He understands the risk and benefits and wishes to proceed and is currently making the logistical arrangements.    HGe has no interim history of chest pian, tightness, heaviness, pressure no symptoms of TIA.  He has the continuing bilateral calf pain left greater than right.    .    Objective  /52  Pulse 64  Ht 1.626 m (5' 4\")  Wt 64.4 kg (141 lb 14.4 oz)  BMI 24.36 kg/m2 alert oriented, regular rhythm, chest clear, bilateral femoral bruits  Wt Readings from Last 5 Encounters:   02/06/18 64.4 kg (141 lb 14.4 oz)   08/01/17 63 kg (139 lb)   03/10/17 60.8 kg (134 lb)   02/26/17 62.6 kg (138 lb)   06/14/16 66.3 kg (146 lb 3.2 oz)       Meds  Current Outpatient Prescriptions   Medication     diazepam (VALIUM) 5 MG tablet     atenolol (TENORMIN) 25 MG tablet     atorvastatin (LIPITOR) 40 MG tablet     omeprazole (PRILOSEC) 20 MG CR capsule     spironolactone (ALDACTONE) 25 MG tablet     acetaminophen (TYLENOL) 325 MG tablet     LISINOPRIL PO     temazepam (RESTORIL) 15 MG capsule     aspirin 81 MG tablet     fish oil-omega-3 fatty acids (FISH OIL) 1000 MG capsule     Multiple Vitamins-Minerals (MULTIVITAL PO)     Cholecalciferol (VITAMIN D) 1000 UNITS capsule     No current facility-administered medications for this visit.          Labs  Results for CARINE DOSHI (MRN [id number]) as of 2/6/2018 11:24   Ref. Range " 6/13/2017 09:07 6/13/2017 09:14 2/1/2018 09:32   Sodium Latest Ref Range: 133 - 144 mmol/L  134    Potassium Latest Ref Range: 3.4 - 5.3 mmol/L  4.8    Chloride Latest Ref Range: 94 - 109 mmol/L  100    Carbon Dioxide Latest Ref Range: 20 - 32 mmol/L  28    Urea Nitrogen Latest Ref Range: 7 - 30 mg/dL  19    Creatinine Latest Ref Range: 0.66 - 1.25 mg/dL  0.83    GFR Estimate Latest Ref Range: >60 mL/min/1.7m2  89    GFR Estimate If Black Latest Ref Range: >60 mL/min/1.7m2  >90...    Calcium Latest Ref Range: 8.5 - 10.1 mg/dL  9.1    Anion Gap Latest Ref Range: 3 - 14 mmol/L  6    Albumin Latest Ref Range: 3.4 - 5.0 g/dL  3.5    Protein Total Latest Ref Range: 6.8 - 8.8 g/dL  6.7 (L)    Bilirubin Total Latest Ref Range: 0.2 - 1.3 mg/dL  0.7    Alkaline Phosphatase Latest Ref Range: 40 - 150 U/L  62    ALT Latest Ref Range: 0 - 70 U/L  20    AST Latest Ref Range: 0 - 45 U/L  18    Cholesterol Latest Ref Range: <200 mg/dL  126    HDL Cholesterol Latest Ref Range: >39 mg/dL  57    LDL Cholesterol Calculated Latest Ref Range: <100 mg/dL  56    Non HDL Cholesterol Latest Ref Range: <130 mg/dL  68    PSA Latest Ref Range: 0 - 4 ug/L  0.03    Triglycerides Latest Ref Range: <150 mg/dL  60    Glucose Latest Ref Range: 70 - 99 mg/dL  103 (H)    WBC Latest Ref Range: 4.0 - 11.0 10e9/L  9.3    Hemoglobin Latest Ref Range: 13.3 - 17.7 g/dL  12.4 (L)    Hematocrit Latest Ref Range: 40.0 - 53.0 %  36.3 (L)    Platelet Count Latest Ref Range: 150 - 450 10e9/L  248    RBC Count Latest Ref Range: 4.4 - 5.9 10e12/L  3.92 (L)    MCV Latest Ref Range: 78 - 100 fl  93    MCH Latest Ref Range: 26.5 - 33.0 pg  31.6    MCHC Latest Ref Range: 31.5 - 36.5 g/dL  34.2    RDW Latest Ref Range: 10.0 - 15.0 %  15.8 (H)    US ABDOMINAL AORTA LIMITED Unknown Rpt     US AORTA/IVC/ILIAC DUPLEX COMPLETE Unknown   Rpt         Assessment/Plan     1. Bilateral lower extremity artery stenting  2. Nuclear stress test with Farrah protocol 4-8 weeks  following stenting.    Jerrod NGUYEN: Shree White M.D.

## 2018-02-06 NOTE — LETTER
"2/6/2018    Shree White M.D.    RE: Oscar Chaudhary       Dear Colleague,    I had the pleasure of seeing Oscar Chaudhary in the [facility].    Jerrod Rangel M.D.  Cardiovascular Medicine    I personally saw and examined this patient, discussed care with housestaff and other consultants, reviewed current laboratories and imaging studies, and conveyed impression and diagnostic/therapeutic plan to patient.    Dr. Shree White  FSD    Problem List  1. ASPVD with claudication L>R  2. AAA stable  3. 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 Hyperlipidemia    Dear Bill:    I hope this finds you well.  Thank you for seeing Spike Chaudhary.  I read your note, reviewed the testing and agree with the plan for bilateral stenting of the lower extremity vessels.  He understands the risk and benefits and wishes to proceed and is currently making the logistical arrangements.    Ronda has no interim history of chest pian, tightness, heaviness, pressure no symptoms of TIA.  He has the continuing bilateral calf pain left greater than right.    .    Objective  /52  Pulse 64  Ht 1.626 m (5' 4\")  Wt 64.4 kg (141 lb 14.4 oz)  BMI 24.36 kg/m2 alert oriented, regular rhythm, chest clear, bilateral femoral bruits  Wt Readings from Last 5 Encounters:   02/06/18 64.4 kg (141 lb 14.4 oz)   08/01/17 63 kg (139 lb)   03/10/17 60.8 kg (134 lb)   02/26/17 62.6 kg (138 lb)   06/14/16 66.3 kg (146 lb 3.2 oz)       Meds  Current Outpatient Prescriptions   Medication     diazepam (VALIUM) 5 MG tablet     atenolol (TENORMIN) 25 MG tablet     atorvastatin (LIPITOR) 40 MG tablet     omeprazole (PRILOSEC) 20 MG CR capsule     spironolactone (ALDACTONE) 25 MG tablet     acetaminophen (TYLENOL) 325 MG tablet     LISINOPRIL PO     temazepam (RESTORIL) 15 MG capsule     aspirin 81 MG tablet     fish oil-omega-3 fatty acids (FISH OIL) 1000 MG capsule     Multiple Vitamins-Minerals (MULTIVITAL PO)     Cholecalciferol (VITAMIN " D) 1000 UNITS capsule     No current facility-administered medications for this visit.          Labs  Results for CARINE DOSHI (MRN [id number]) as of 2/6/2018 11:24   Ref. Range 6/13/2017 09:07 6/13/2017 09:14 2/1/2018 09:32   Sodium Latest Ref Range: 133 - 144 mmol/L  134    Potassium Latest Ref Range: 3.4 - 5.3 mmol/L  4.8    Chloride Latest Ref Range: 94 - 109 mmol/L  100    Carbon Dioxide Latest Ref Range: 20 - 32 mmol/L  28    Urea Nitrogen Latest Ref Range: 7 - 30 mg/dL  19    Creatinine Latest Ref Range: 0.66 - 1.25 mg/dL  0.83    GFR Estimate Latest Ref Range: >60 mL/min/1.7m2  89    GFR Estimate If Black Latest Ref Range: >60 mL/min/1.7m2  >90...    Calcium Latest Ref Range: 8.5 - 10.1 mg/dL  9.1    Anion Gap Latest Ref Range: 3 - 14 mmol/L  6    Albumin Latest Ref Range: 3.4 - 5.0 g/dL  3.5    Protein Total Latest Ref Range: 6.8 - 8.8 g/dL  6.7 (L)    Bilirubin Total Latest Ref Range: 0.2 - 1.3 mg/dL  0.7    Alkaline Phosphatase Latest Ref Range: 40 - 150 U/L  62    ALT Latest Ref Range: 0 - 70 U/L  20    AST Latest Ref Range: 0 - 45 U/L  18    Cholesterol Latest Ref Range: <200 mg/dL  126    HDL Cholesterol Latest Ref Range: >39 mg/dL  57    LDL Cholesterol Calculated Latest Ref Range: <100 mg/dL  56    Non HDL Cholesterol Latest Ref Range: <130 mg/dL  68    PSA Latest Ref Range: 0 - 4 ug/L  0.03    Triglycerides Latest Ref Range: <150 mg/dL  60    Glucose Latest Ref Range: 70 - 99 mg/dL  103 (H)    WBC Latest Ref Range: 4.0 - 11.0 10e9/L  9.3    Hemoglobin Latest Ref Range: 13.3 - 17.7 g/dL  12.4 (L)    Hematocrit Latest Ref Range: 40.0 - 53.0 %  36.3 (L)    Platelet Count Latest Ref Range: 150 - 450 10e9/L  248    RBC Count Latest Ref Range: 4.4 - 5.9 10e12/L  3.92 (L)    MCV Latest Ref Range: 78 - 100 fl  93    MCH Latest Ref Range: 26.5 - 33.0 pg  31.6    MCHC Latest Ref Range: 31.5 - 36.5 g/dL  34.2    RDW Latest Ref Range: 10.0 - 15.0 %  15.8 (H)    US ABDOMINAL AORTA LIMITED Unknown Rpt      US AORTA/IVC/ILIAC DUPLEX COMPLETE Unknown   Rpt         Assessment/Plan     1. Bilateral lower extremity artery stenting  2. Nuclear stress test with Farrah protocol 4-8 weeks following stenting.    Jerrod    CC: Shree White M.D.      Thank you for allowing me to participate in the care of your patient.    Sincerely,     Jerrod Rangel MD     [facility]

## 2018-02-06 NOTE — MR AVS SNAPSHOT
After Visit Summary   2/6/2018    Oscar Chaudhary    MRN: [id number]           Patient Information     Date Of Birth          1935        Visit Information        Provider Department      2/6/2018 10:00 AM Jerrod Rangel MD [facility]        Today's Diagnoses     S/P CABG (coronary artery bypass graft); 6/29/01    -  1    Abdominal aortic aneurysm (AAA) without rupture (H)        Coronary artery disease involving native coronary artery of native heart without angina pectoris        Anxiety          Care Instructions    Medication Changes:  No medication changes at this time. Please continue current medication regiment.    Patient Instructions:  Exercise Stress Test after you have your legs done.  Please call scheduling and set this up.    Follow up Appointment Information:  3 months follow up    Results:  We reviewed you ULTRASOUND AORTA/IVC/ILIAC DUPLEX COMPLETE       For scheduling at [facility] please call [phone]  For scheduling at the [facility] please call [phone]  We are located on the second floor [address] at the [employer].  Our address is     [address] [place] [address],   [address]  [place]  [address]    Thank you for allowing us to be a part of your care here at the [facility]    If you have questions or concerns please contact us at:    Gracia Gates, RN      Nurse Coordinator       Pulmonary Hypertension     [facility]   (P)[phone]  (F)[phone]    ** Please note that you will NOT receive a reminder call regarding your scheduled testing, reminder calls are for provider appointments only.  If you are scheduled for testing within the [facility] system you may receive a call regarding pre-registration for billing purposes only.**     Remember to weigh yourself daily after voiding and before you consume any food or beverages and log the numbers.  If you have  gained/lost 2 pounds overnight or 5 pounds in a week contact us immediately for medication adjustments or further instructions.    Support Group:  Pulmonary Hypertension Association  Https://www.phassociation.org/  **Look at the Events Tab** They even have Support Groups that you can call into    [facility] PH Support Group  First Saturday of the Month from 1-3 PM   Location: [address] [place] [address]  Leader: Spike Moore  Phone: [phone]  Email: [email or web address]          Follow-ups after your visit        Additional Services     Follow-Up with Pulmonary Hypertension Clinic                 Future tests that were ordered for you today     Open Future Orders        Priority Expected Expires Ordered    Follow-Up with Pulmonary Hypertension Clinic Routine 5/1/2018 2/6/2019 2/6/2018    Exercise Stress test Routine  2/6/2019 2/6/2018            Who to contact     If you have questions or need follow up information about today's clinic visit or your schedule please contact [facility] directly at [phone].  Normal or non-critical lab and imaging results will be communicated to you by MedProhart, letter or phone within 4 business days after the clinic has received the results. If you do not hear from us within 7 days, please contact the clinic through E[employer] or phone. If you have a critical or abnormal lab result, we will notify you by phone as soon as possible.  Submit refill requests through "[employer] or call your pharmacy and they will forward the refill request to us. Please allow 3 business days for your refill to be completed.          Additional Information About Your Visit        "[employer] Information     "[employer] gives you secure access to your electronic health record. If you see a primary care provider, you can also send messages to your care team and make appointments. If you have questions, please call your primary care clinic.  If you do not have a  "primary care provider, please call [phone] and they will assist you.        Care EveryWhere ID     This is your Care EveryWhere ID. This could be used by other organizations to access your [place] medical records  [id number]        Your Vitals Were     Pulse Height BMI (Body Mass Index)             64 1.626 m (5' 4\") 24.36 kg/m2          Blood Pressure from Last 3 Encounters:   02/06/18 128/52   02/01/18 123/59   08/01/17 96/56    Weight from Last 3 Encounters:   02/06/18 64.4 kg (141 lb 14.4 oz)   08/01/17 63 kg (139 lb)   03/10/17 60.8 kg (134 lb)              We Performed the Following     Follow-Up with Pulmonary Hypertension Clinic          Where to get your medicines      Some of these will need a paper prescription and others can be bought over the counter.  Ask your nurse if you have questions.     Bring a paper prescription for each of these medications     diazepam 5 MG tablet          Primary Care Provider Fax #    Physician No Ref-Primary [phone]       No address on file        Equal Access to Services     DELANO WHITAKER : Hadii binu wheeler Soso, waaxda luqadaha, qaybta kaalmaelizabeth sanchez, noam herrmann . So [facility] [phone].    ATENCIÓN: Si habla español, tiene a bell disposición servicios gratuitos de asistencia lingüística. Llame al [phone].    We comply with applicable federal civil rights laws and [place] laws. We do not discriminate on the basis of race, color, national origin, age, disability, sex, sexual orientation, or gender identity.            Thank you!     Thank you for choosing [facility]  for your care. Our goal is always to provide you with excellent care. Hearing back from our patients is one way we can continue to improve our services. Please take a few minutes to complete the written survey that you may receive in the mail after your visit with us. Thank you!             Your Updated Medication " List - Protect others around you: Learn how to safely use, store and throw away your medicines at www.disposemymeds.org.          This list is accurate as of 2/6/18 11:20 AM.  Always use your most recent med list.                   Brand Name Dispense Instructions for use Diagnosis    acetaminophen 325 MG tablet    TYLENOL    100 tablet    Take 2 tablets (650 mg) by mouth every 4 hours as needed for mild pain    Fusobacterium infection       aspirin 81 MG tablet      Take 1 tablet by mouth daily.        atenolol 25 MG tablet    TENORMIN    90 tablet    TAKE 1 TABLET BY MOUTH DAILY    Other secondary hypertension       atorvastatin 40 MG tablet    LIPITOR    90 tablet    Take 1 tablet (40 mg) by mouth daily    Hyperlipidemia       diazepam 5 MG tablet    VALIUM    30 tablet    Take 1 tablet (5 mg) by mouth every 6 hours    Anxiety       fish oil-omega-3 fatty acids 1000 MG capsule      Take 1 g by mouth daily.        LISINOPRIL PO      Take 2.5 mg by mouth 2 times daily        MULTIVITAL PO      Take 1 tablet by mouth daily.        omeprazole 20 MG CR capsule    priLOSEC     Take 20 mg by mouth daily        spironolactone 25 MG tablet    ALDACTONE    45 tablet    Take 0.5 tablets (12.5 mg) by mouth daily    LV dysfunction       temazepam 15 MG capsule    RESTORIL    30 capsule    Take 1 capsule (15 mg) by mouth nightly as needed    Insomnia, unspecified       vitamin D 1000 UNITS capsule      Take 1 capsule by mouth daily.

## 2018-02-06 NOTE — NURSING NOTE
Procedures and/or Testing: Patient given instructions regarding  Exercise Stress TEst. Discussed purpose, preparation, procedure and when to expect results reported back to the patient. Patient demonstrated understanding of this information and agreed to call with further questions or concerns.  Med Reconcile: Reviewed and verified all current medications with the patient. The updated medication list was printed and given to the patient.  Diet: Patient instructed regarding a heart healthy diet, including discussion of reduced fat and sodium intake. Patient demonstrated understanding of this information and agreed to call with further questions or concerns.  Return Appointment: Patient given instructions regarding scheduling next clinic visit. Patient demonstrated understanding of this information and agreed to call with further questions or concerns.  Patient stated he understood all health information given and agreed to call with further questions or concerns.     Medication Changes:  No medication changes at this time. Please continue current medication regiment.    Patient Instructions:  Exercise Stress Test after you have your legs done.  Please call scheduling and set this up.    Follow up Appointment Information:  3 months follow up    Results:  We reviewed you ULTRASOUND AORTA/IVC/ILIAC DUPLEX COMPLETE

## 2018-02-06 NOTE — PATIENT INSTRUCTIONS
Medication Changes:  No medication changes at this time. Please continue current medication regiment.    Patient Instructions:  Exercise Stress Test after you have your legs done.  Please call scheduling and set this up.    Follow up Appointment Information:  3 months follow up    Results:  We reviewed you ULTRASOUND AORTA/IVC/ILIAC DUPLEX COMPLETE       For scheduling at [facility] please call [phone]  For scheduling at the [place] please call [phone]  We are located on the second floor [address] at the [facility].  Our address is     [facility] [address],   [address]  [place]  [address]    Thank you for allowing us to be a part of your care here at the [facility]    If you have questions or concerns please contact us at:    Gracia Gates, RN      Nurse Coordinator       Pulmonary Hypertension     [facility]   (P)[phone]  (F)[phone]    ** Please note that you will NOT receive a reminder call regarding your scheduled testing, reminder calls are for provider appointments only.  If you are scheduled for testing within the [facility] system you may receive a call regarding pre-registration for billing purposes only.**     Remember to weigh yourself daily after voiding and before you consume any food or beverages and log the numbers.  If you have gained/lost 2 pounds overnight or 5 pounds in a week contact us immediately for medication adjustments or further instructions.    Support Group:  Pulmonary Hypertension Association  Https://www.phassociation.org/  **Look at the Events Tab** They even have Support Groups that you can call into    [facility] PH Support Group  First Saturday of the Month from 1-3 PM   [place] [facility] [place] [facility] [address]  Leader: Spike Moore  Phone: [phone]  Email: [email or web address]

## 2018-02-08 NOTE — TELEPHONE ENCOUNTER
Left message on home number for patient to call back to schedule RALPH & follow up appointment for PAD with Dr. White.

## 2018-02-08 NOTE — TELEPHONE ENCOUNTER
Discussed with  and her recommends that pt have an RALPH with exercise and OV prior to scheduling an angiogram.  Will route to scheduling, please call pt to schedule RALPH and an OV.  If pt requests to talk with nurse, I can talk with him.    Irlanda Fung, JARADN, RN

## 2018-02-19 DIAGNOSIS — Z95.1 S/P CABG (CORONARY ARTERY BYPASS GRAFT): Primary | ICD-10-CM

## 2018-02-19 RX ORDER — LISINOPRIL 5 MG/1
2.5 TABLET ORAL 2 TIMES DAILY
Qty: 30 TABLET | Refills: 11 | Status: SHIPPED | OUTPATIENT
Start: 2018-02-19 | End: 2018-02-20

## 2018-02-20 ENCOUNTER — HOSPITAL ENCOUNTER (OUTPATIENT)
Dept: ULTRASOUND IMAGING | Facility: CLINIC | Age: 83
Discharge: HOME | End: 2018-02-20
Attending: SURGERY | Admitting: SURGERY
Payer: COMMERCIAL

## 2018-02-20 ENCOUNTER — OFFICE VISIT (OUTPATIENT)
Dept: OTHER | Facility: CLINIC | Age: 83
End: 2018-02-20
Attending: SURGERY
Payer: COMMERCIAL

## 2018-02-20 VITALS — DIASTOLIC BLOOD PRESSURE: 57 MMHG | HEART RATE: 68 BPM | SYSTOLIC BLOOD PRESSURE: 107 MMHG

## 2018-02-20 DIAGNOSIS — I73.9 PAD (PERIPHERAL ARTERY DISEASE) (H): ICD-10-CM

## 2018-02-20 DIAGNOSIS — I73.9 PAD (PERIPHERAL ARTERY DISEASE) (H): Primary | ICD-10-CM

## 2018-02-20 DIAGNOSIS — Z95.1 S/P CABG (CORONARY ARTERY BYPASS GRAFT): ICD-10-CM

## 2018-02-20 PROCEDURE — G0463 HOSPITAL OUTPT CLINIC VISIT: HCPCS

## 2018-02-20 PROCEDURE — 99213 OFFICE O/P EST LOW 20 MIN: CPT | Mod: ZP | Performed by: SURGERY

## 2018-02-20 PROCEDURE — 93924 LWR XTR VASC STDY BILAT: CPT | Mod: 26 | Performed by: INTERNAL MEDICINE

## 2018-02-20 PROCEDURE — 93924 LWR XTR VASC STDY BILAT: CPT

## 2018-02-20 RX ORDER — LISINOPRIL 5 MG/1
5 TABLET ORAL 2 TIMES DAILY
Qty: 180 TABLET | Refills: 3 | Status: SHIPPED | OUTPATIENT
Start: 2018-02-20 | End: 2019-02-16

## 2018-02-20 NOTE — MR AVS SNAPSHOT
After Visit Summary   2/20/2018    Oscar Chaudhary    MRN: [id number]           Patient Information     Date Of Birth          1935        Visit Information        Provider Department      2/20/2018 11:30 AM Shree White MD [facility] [place]      Today's Diagnoses     PAD (peripheral artery disease) (H)    -  1       Follow-ups after your visit        Who to contact     If you have questions or need follow up information about today's clinic visit or your schedule please contact [facility] directly at [phone].  Normal or non-critical lab and imaging results will be communicated to you by MyChart, letter or phone within 4 business days after the clinic has received the results. If you do not hear from us within 7 days, please contact the clinic through PeopleMatterhart or phone. If you have a critical or abnormal lab result, we will notify you by phone as soon as possible.  Submit refill requests through BioTrove or call your pharmacy and they will forward the refill request to us. Please allow 3 business days for your refill to be completed.          Additional Information About Your Visit        MyChart Information     BioTrove gives you secure access to your electronic health record. If you see a primary care provider, you can also send messages to your care team and make appointments. If you have questions, please call your primary care clinic.  If you do not have a primary care provider, please call [phone] and they will assist you.        Care EveryWhere ID     This is your Care EveryWhere ID. This could be used by other organizations to access your [place] medical records  [id number]        Your Vitals Were     Pulse                   68            Blood Pressure from Last 3 Encounters:   02/20/18 107/57   02/06/18 128/52   02/01/18 123/59    Weight from Last 3 Encounters:   02/06/18 141 lb  14.4 oz (64.4 kg)   08/01/17 139 lb (63 kg)   03/10/17 134 lb (60.8 kg)              Today, you had the following     No orders found for display         Today's Medication Changes          These changes are accurate as of 2/20/18  1:00 PM.  If you have any questions, ask your nurse or doctor.               These medicines have changed or have updated prescriptions.        Dose/Directions    diazepam 5 MG tablet   Commonly known as:  VALIUM   This may have changed:    - when to take this  - reasons to take this   Used for:  Anxiety        Dose:  5 mg   Take 1 tablet (5 mg) by mouth every 6 hours   Quantity:  30 tablet   Refills:  3       lisinopril 5 MG tablet   Commonly known as:  PRINIVIL/ZESTRIL   This may have changed:  how much to take   Used for:  S/P CABG (coronary artery bypass graft)   Changed by:  Jerrod Rangel MD        Dose:  5 mg   Take 1 tablet (5 mg) by mouth 2 times daily   Quantity:  180 tablet   Refills:  3            Where to get your medicines      These medications were sent to [place] [address] [facility] [place] - [address], [place] [address]     Phone:  [phone]     lisinopril 5 MG tablet                Primary Care Provider Fax #    Physician No Ref-Primary [phone]       No address on file        Equal Access to Services     DELANO WHITAKER AH: Hadii binu wheeler Soso, waaxda luqadaha, qaybta kaalmada adeegyada, noam sotelo. So [facility] [phone].    ATENCIÓN: Si habla español, tiene a bell disposición servicios gratuitos de asistencia lingüística. Llame al [phone].    We comply with applicable federal civil rights laws and [place] laws. We do not discriminate on the basis of race, color, national origin, age, disability, sex, sexual orientation, or gender identity.            Thank you!     Thank you for choosing [facility]  for your care. Our goal is always to provide you with excellent care.  Hearing back from our patients is one way we can continue to improve our services. Please take a few minutes to complete the written survey that you may receive in the mail after your visit with us. Thank you!             Your Updated Medication List - Protect others around you: Learn how to safely use, store and throw away your medicines at www.disposemymeds.org.          This list is accurate as of 2/20/18  1:00 PM.  Always use your most recent med list.                   Brand Name Dispense Instructions for use Diagnosis    acetaminophen 325 MG tablet    TYLENOL    100 tablet    Take 2 tablets (650 mg) by mouth every 4 hours as needed for mild pain    Fusobacterium infection       aspirin 81 MG tablet      Take 1 tablet by mouth daily.        atenolol 25 MG tablet    TENORMIN    90 tablet    TAKE 1 TABLET BY MOUTH DAILY    Other secondary hypertension       atorvastatin 40 MG tablet    LIPITOR    90 tablet    Take 1 tablet (40 mg) by mouth daily    Hyperlipidemia       diazepam 5 MG tablet    VALIUM    30 tablet    Take 1 tablet (5 mg) by mouth every 6 hours    Anxiety       fish oil-omega-3 fatty acids 1000 MG capsule      Take 1 g by mouth daily.        lisinopril 5 MG tablet    PRINIVIL/ZESTRIL    180 tablet    Take 1 tablet (5 mg) by mouth 2 times daily    S/P CABG (coronary artery bypass graft)       MULTIVITAL PO      Take 1 tablet by mouth daily.        omeprazole 20 MG CR capsule    priLOSEC     Take 20 mg by mouth daily        spironolactone 25 MG tablet    ALDACTONE    45 tablet    Take 0.5 tablets (12.5 mg) by mouth daily    LV dysfunction       temazepam 15 MG capsule    RESTORIL    30 capsule    Take 1 capsule (15 mg) by mouth nightly as needed    Insomnia, unspecified       vitamin D 1000 UNITS capsule      Take 1 capsule by mouth daily.

## 2018-02-20 NOTE — NURSING NOTE
Patient Education    Procedure: aortogram with bilateral runoff, possible angioplasty  Diagnosis: PAD  Anticoagulation Instruction: NA  Pre-Operative Physical Exam: You need to have a pre-op physical exam within 30 days of your procedure. Your procedure may be cancelled if you do not have a current History and Physical. Call your PCP's office to schedule.  Allergies:  Updated in Epic  Bowel Prep: NA  Post Procedure Education: [facility] patient post-procedure fact sheet reviewed with patient.    Learner(s):patient  Method: Reading  Barriers to Learning:No Barrier  Outcome: Patient did verbalize understanding of above education.    Irlanda Fung, JARADN, RN

## 2018-02-20 NOTE — NURSING NOTE
"Chief Complaint   Patient presents with     RECHECK     RALPH w/exercise ([facility] 10:45; WRO 11:30) *History of PAD; coordinate with an OV       Initial /57 (BP Location: Left arm, Patient Position: Chair, Cuff Size: Adult Regular)  Pulse 68 Estimated body mass index is 24.36 kg/(m^2) as calculated from the following:    Height as of 2/6/18: 5' 4\" (1.626 m).    Weight as of 2/6/18: 141 lb 14.4 oz (64.4 kg).  Medication Reconciliation: complete     Cathleen Burk MA   "

## 2018-02-20 NOTE — PROGRESS NOTES
[place] [facility]    Oscar Chaudhary saw me recently on 2/1/2018 for his AAA that is been stable in size and PAD.  Duplex of his AAA revealed a narrowing of the left common iliac artery where it measured 1.3 x 0.3 cm.  He initially reported that his symptoms were relatively limited since he is able to walk around [facility] up to 3 miles daily with no disabling claudication.    On exam he had no ulcerations, normal sensation.  He did not have palpable pedal pulses.  We initially discussed conservative treatment due to his limited symptoms.    However, since his visit is been more concerned about his PAD.  He does notice that when he goes up the tube 8 step flights of stairs in his home his legs are very uncomfortable more on the left than right side to the point where they has to stop and rest for several minutes.  He does notice during his walks if he goes at a more rapid rate his symptoms are more prominent.  He does mention that he is sometimes uncomfortable sitting and actually is better when he gets up.      He called to discuss this with me and we felt that we should do a formal RALPH before deciding to proceed with angiography to evaluate the situation further.    Exam: Alert and appropriate.  Blood pressure 107/57.  Pulse 68              Chest= clear   Cardiovascular=RR               No palpable pedal pulses.  No ulcerations.  Normal sensation.    RALPH with exercise was performed.  This is abnormal with the right posterior tibial being 0.75 with a biphasic waveform decreasing to 0.34 with exercise.  On the left index is 0.87 with a biphasic/monophasic waveforms decreasing to 0.62    He noticed bilateral left greater than right calf discomfort at 1 minute and stopped exercise due to leg fatigue at 3 minutes on a 10% incline      Impression: Patient does have evidence of PAD and is more symptomatic than he initially let us to believe as documented by his RALPH with exercise.  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  I still gave him the option of conservative treatment but he would like to evaluate this further to determine the extent of his disease and possibly improve this with angioplasty and stenting which we discussed today.  He is aware of the risk of the angiogram which are related to renal function and bleeding at the site since he has had 2 coronary angiograms in the past.  He is also aware that he will need to have someone be with him to drive him home and through the evening and he will arrange this.      Shree White MD     Please route or send letter to:  Primary Care Provider (PCP)                Panda is there is says you are in the HPI second time incomplete no

## 2018-02-27 ENCOUNTER — OFFICE VISIT (OUTPATIENT)
Dept: FAMILY MEDICINE | Facility: CLINIC | Age: 83
End: 2018-02-27
Payer: COMMERCIAL

## 2018-02-27 VITALS
BODY MASS INDEX: 24.01 KG/M2 | SYSTOLIC BLOOD PRESSURE: 110 MMHG | HEART RATE: 79 BPM | WEIGHT: 140.6 LBS | HEIGHT: 64 IN | TEMPERATURE: 97.7 F | OXYGEN SATURATION: 95 % | DIASTOLIC BLOOD PRESSURE: 66 MMHG

## 2018-02-27 DIAGNOSIS — I73.9 PVD (PERIPHERAL VASCULAR DISEASE) (H): ICD-10-CM

## 2018-02-27 DIAGNOSIS — Z01.818 PREOP GENERAL PHYSICAL EXAM: Primary | ICD-10-CM

## 2018-02-27 PROCEDURE — 80048 BASIC METABOLIC PNL TOTAL CA: CPT | Performed by: INTERNAL MEDICINE

## 2018-02-27 PROCEDURE — 99203 OFFICE O/P NEW LOW 30 MIN: CPT | Performed by: INTERNAL MEDICINE

## 2018-02-27 PROCEDURE — 36415 COLL VENOUS BLD VENIPUNCTURE: CPT | Performed by: INTERNAL MEDICINE

## 2018-02-27 NOTE — NURSING NOTE
"Chief Complaint   Patient presents with     Pre-Op Exam       Initial /66 (BP Location: Right arm, Cuff Size: Adult Regular)  Pulse 79  Temp 97.7  F (36.5  C) (Oral)  Ht 5' 4\" (1.626 m)  Wt 140 lb 9.6 oz (63.8 kg)  SpO2 95%  BMI 24.13 kg/m2 Estimated body mass index is 24.13 kg/(m^2) as calculated from the following:    Height as of this encounter: 5' 4\" (1.626 m).    Weight as of this encounter: 140 lb 9.6 oz (63.8 kg).  Medication Reconciliation: complete   Charisse Esposito MA  "

## 2018-02-27 NOTE — MR AVS SNAPSHOT
After Visit Summary   2/27/2018    Oscar Chaudhary    MRN: [id number]           Patient Information     Date Of Birth          1935        Visit Information        Provider Department      2/27/2018 11:00 AM Vega Caal MD [facility]        Today's Diagnoses     Preop general physical exam    -  1      Care Instructions      Before Your Surgery      Call your surgeon if there is any change in your health. This includes signs of a cold or flu (such as a sore throat, runny nose, cough, rash or fever).    Do not smoke, drink alcohol or take over the counter medicine (unless your surgeon or primary care doctor tells you to) for the 24 hours before and after surgery.    If you take prescribed drugs: Follow your doctor s orders about which medicines to take and which to stop until after surgery.    Eating and drinking prior to surgery: follow the instructions from your surgeon    Take a shower or bath the night before surgery. Use the soap your surgeon gave you to gently clean your skin. If you do not have soap from your surgeon, use your regular soap. Do not shave or scrub the surgery site.  Wear clean pajamas and have clean sheets on your bed.           Follow-ups after your visit        Your next 10 appointments already scheduled     Mar 08, 2018  9:00 AM CST   (Arrive by 8:45 AM)   IR LOWER EXTREMITY ANGIOGRAM BILATERAL with SHIR1   [facility] ([facility])    [address] [place] [id number] [phone]           1. Your doctor will need to do a history and physical within 30 days before this procedure. 2. Your doctor will determine whether you need a 12 lead EKG, as well as which medications should not be taken the morning of the exam. 3. Laboratory tests are to be obtained by your doctor prior to the exam (creatinine, Hgb/Hct, platelet count, and INR) 4. If you have allergies to x-ray contrast or  iodine, contact your doctor or a Radiology nurse prior to the exam day for instructions. 5. Someone will need to drive you to and from the hospital. 6. Bring a list of all drugs you are taking; include supplements and over-the-counter medications. 7. Wear comfortable clothes and leave your valuables at home. 8. If you are or may be pregnant, contact your doctor or a Radiology nurse prior to the day of the exam. 9.  If you have diabetes, check with your doctor or a Radiology nurse to see if your insulin needs to be adjusted for the exam. 10. If you are taking Coumadin (to thin you blood) please contact your doctor or a Radiology nurse at least 5 days before the exam for special instructions. 11. You should not have received barium (x-ray contrast) within 48 hours of this exam. 12. The day before your exam you may eat your regular diet and are encouraged to drink at least 2 quarts of clear liquids. Drink no alcoholic beverages for 24 hours prior to the exam. 13. If you have a colostomy you will need to irrigate it with tap water at 8PM the evening before and again at 6AM the morning of the exam. 14. Do not smoke for 24 hours prior to the procedure. 15. Do not eat any solid food or milk products for 6 hours prior to the exam. You may drink clear liquids until 2 hours prior to the exam. Clear liquids include the following: water, Jell-O, clear broth, apple juice or any non-carbonated drink that you can see through (no pop!) 16. The morning of the exam you may brush your teeth and take medications as directed with a sip of water. 17. Tell the Radiology nurse if you have any allergies. 18. You will be asked to empty your bladder before the exam begins. 19. Following the exam you will need to remain on complete bedrest for 4-6 hours. The nurse will monitor your vital signs, puncture site, and the pulses and temperature of the arm or leg that was punctured. 20. When discharged, you cannot drive until morning, and an adult  "must be with you until then. You should stay in the [facility] area overnight.              Who to contact     If you have questions or need follow up information about today's clinic visit or your schedule please contact [facility] directly at [phone].  Normal or non-critical lab and imaging results will be communicated to you by Vitehart, letter or phone within 4 business days after the clinic has received the results. If you do not hear from us within 7 days, please contact the clinic through Vitehart or phone. If you have a critical or abnormal lab result, we will notify you by phone as soon as possible.  Submit refill requests through [email or web address] or call your pharmacy and they will forward the refill request to us. Please allow 3 business days for your refill to be completed.          Additional Information About Your Visit        ViteharTapCrowd Information     [email or web address] gives you secure access to your electronic health record. If you see a primary care provider, you can also send messages to your care team and make appointments. If you have questions, please call your primary care clinic.  If you do not have a primary care provider, please call [phone] and they will assist you.        Care EveryWhere ID     This is your Care EveryWhere ID. This could be used by other organizations to access your [place] medical records  [id number]        Your Vitals Were     Pulse Temperature Height Pulse Oximetry BMI (Body Mass Index)       79 97.7  F (36.5  C) (Oral) 5' 4\" (1.626 m) 95% 24.13 kg/m2        Blood Pressure from Last 3 Encounters:   02/27/18 110/66   02/20/18 107/57   02/06/18 128/52    Weight from Last 3 Encounters:   02/27/18 140 lb 9.6 oz (63.8 kg)   02/06/18 141 lb 14.4 oz (64.4 kg)   08/01/17 139 lb (63 kg)              We Performed the Following     Basic metabolic panel          Today's Medication Changes          These changes are accurate as of 2/27/18 11:34 AM.  If you have any questions, " ask your nurse or doctor.               These medicines have changed or have updated prescriptions.        Dose/Directions    diazepam 5 MG tablet   Commonly known as:  VALIUM   This may have changed:    - when to take this  - reasons to take this   Used for:  Anxiety        Dose:  5 mg   Take 1 tablet (5 mg) by mouth every 6 hours   Quantity:  30 tablet   Refills:  3                Primary Care Provider Office Phone # Fax #    Bakari Chang [facility] [phone] [address] [facility] [address]        Equal Access to Services     DELANO WHITAKER : Hadii binu ku hadasho Soomaali, waaxda luqadaha, qaybta kaalmada adeegyada, noam herrmann . So [facility] [phone].    ATENCIÓN: Si habla español, tiene a bell disposición servicios gratuitos de asistencia lingüística. Llame al [phone].    We comply with applicable federal civil rights laws and [place] laws. We do not discriminate on the basis of race, color, national origin, age, disability, sex, sexual orientation, or gender identity.            Thank you!     Thank you for choosing [facility]  for your care. Our goal is always to provide you with excellent care. Hearing back from our patients is one way we can continue to improve our services. Please take a few minutes to complete the written survey that you may receive in the mail after your visit with us. Thank you!             Your Updated Medication List - Protect others around you: Learn how to safely use, store and throw away your medicines at www.disposemymeds.org.          This list is accurate as of 2/27/18 11:34 AM.  Always use your most recent med list.                   Brand Name Dispense Instructions for use Diagnosis    acetaminophen 325 MG tablet    TYLENOL    100 tablet    Take 2 tablets (650 mg) by mouth every 4 hours as needed for mild pain    Fusobacterium infection       aspirin 81 MG tablet      Take 1 tablet by mouth daily.         atenolol 25 MG tablet    TENORMIN    90 tablet    TAKE 1 TABLET BY MOUTH DAILY    Other secondary hypertension       atorvastatin 40 MG tablet    LIPITOR    90 tablet    Take 1 tablet (40 mg) by mouth daily    Hyperlipidemia       diazepam 5 MG tablet    VALIUM    30 tablet    Take 1 tablet (5 mg) by mouth every 6 hours    Anxiety       fish oil-omega-3 fatty acids 1000 MG capsule      Take 1 g by mouth daily.        lisinopril 5 MG tablet    PRINIVIL/ZESTRIL    180 tablet    Take 1 tablet (5 mg) by mouth 2 times daily    S/P CABG (coronary artery bypass graft)       MULTIVITAL PO      Take 1 tablet by mouth daily.        omeprazole 20 MG CR capsule    priLOSEC     Take 20 mg by mouth daily        spironolactone 25 MG tablet    ALDACTONE    45 tablet    Take 0.5 tablets (12.5 mg) by mouth daily    LV dysfunction       temazepam 15 MG capsule    RESTORIL    30 capsule    Take 1 capsule (15 mg) by mouth nightly as needed    Insomnia, unspecified       vitamin D 1000 UNITS capsule      Take 1 capsule by mouth daily.

## 2018-02-27 NOTE — PROGRESS NOTES
[facility]  [address] [facility] [address]  [phone]  Dept: [phone]    PRE-OP EVALUATION:  Today's date: 2018    Oscar Chaudhary (: 1935) presents for pre-operative evaluation assessment as requested by Dr. Tristian Gracia.  He requires evaluation and anesthesia risk assessment prior to undergoing surgery/procedure for treatment of peripheral vascular disease.    Proposed Surgery/ Procedure: Iliac angiogram/angioplasty  Date of Surgery/ Procedure: 3-8-2018  Time of Surgery/ Procedure: 9:00am  Hospital/Surgical Facility: [facility]  Primary Physician: [facility]  Type of Anesthesia Anticipated: to be determined    Patient has a Health Care Directive or Living Will:  YES   1. Yes, History of heart attack, stroke, stent, bypass or surgery on an artery in the head, neck, heart, or legs?  2. NO - Do you ever have any pain or discomfort in your chest?  3. NO - Do you have a history of  Heart Failure?  4. NO - Are you troubled by shortness of breath when: walking on the level, up a slight hill or at night?  5. NO - Do you currently have a cold, bronchitis or other respiratory infection?  6. NO - Do you have a cough, shortness of breath or wheezing?  7. yes - Do you sometimes get pains in the calves of your legs when you walk?  8. NO - Do you or anyone in your family have previous history of blood clots?  9. NO - Do you or does anyone in your family have a serious bleeding problem such as prolonged bleeding following surgeries or cuts?  10. NO - Have you ever had problems with anemia or been told to take iron pills?  11. NO - Have you had any abnormal blood loss such as black, tarry or bloody stools, or abnormal vaginal bleeding?  12. NO - Have you ever had a blood transfusion?  13. NO - Have you or any of your relatives ever had problems with anesthesia?  14. yes - Do you have sleep apnea, excessive snoring or daytime drowsiness?  15. NO - Do you have any  prosthetic heart valves?  16. NO - Do you have prosthetic joints?  17. NO - Is there any chance that you may be pregnant?      HPI:     HPI related to upcoming procedure:     For several years, the patient has had claudication of both of his legs (left greater than right) subsequently attributed to peripheral vascular disease. Persistence and progression of condition (patient experiences pain even at rest) has prompted schedulde surgical intervention.      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     Priority: Medium     Part of periodontal abscess       Abscess 02/27/2017     Priority: Medium     S/P CABG (coronary artery bypass graft); 6/29/01 05/31/2015     Priority: Medium     h/o myocardial infarction 1976 05/31/2015     Priority: Medium     History of prostate cancer 2001 10/07/2013     Priority: Medium     AAA (abdominal aortic aneurysm) (H) 07/09/2012     Priority: Medium     CAD (coronary artery disease) 07/09/2012     Priority: Medium      Past Medical History:   Diagnosis Date     AAA (abdominal aortic aneurysm) (H) 7/9/2012     CAD (coronary artery disease) 7/9/2012     Fusobacterium infection 3/4/2017    Part of periodontal abscess     h/o myocardial infarction 1976 5/31/2015     History of prostate cancer 2001 10/7/2013     S/P CABG (coronary artery bypass graft) 6/21/2001     S/P CABG (coronary artery bypass graft); 6/29/01 5/31/2015     Past Surgical History:   Procedure Laterality Date     INCISION AND DRAINAGE MANDIBLE, COMBINED N/A 2/27/2017    Procedure: COMBINED INCISION AND DRAINAGE MANDIBLE;  Surgeon: Adam Ramos DDS;  Location: [facility] OR     OPEN REDUCTION INTERNAL FIXATION MANDIBLE N/A 2/27/2017    Procedure: OPEN REDUCTION INTERNAL FIXATION MANDIBLE;  Surgeon: Richard  Adam LEAHY DDS;  Location:  OR     Current Outpatient Prescriptions   Medication Sig Dispense Refill     lisinopril (PRINIVIL/ZESTRIL) 5 MG tablet Take 1 tablet (5 mg) by mouth 2 times daily 180 tablet 3     diazepam (VALIUM) 5 MG tablet Take 1 tablet (5 mg) by mouth every 6 hours (Patient taking differently: Take 5 mg by mouth nightly as needed ) 30 tablet 3     atenolol (TENORMIN) 25 MG tablet TAKE 1 TABLET BY MOUTH DAILY 90 tablet 0     atorvastatin (LIPITOR) 40 MG tablet Take 1 tablet (40 mg) by mouth daily 90 tablet 3     omeprazole (PRILOSEC) 20 MG CR capsule Take 20 mg by mouth daily       spironolactone (ALDACTONE) 25 MG tablet Take 0.5 tablets (12.5 mg) by mouth daily 45 tablet 3     acetaminophen (TYLENOL) 325 MG tablet Take 2 tablets (650 mg) by mouth every 4 hours as needed for mild pain 100 tablet 0     temazepam (RESTORIL) 15 MG capsule Take 1 capsule (15 mg) by mouth nightly as needed 30 capsule 3     aspirin 81 MG tablet Take 1 tablet by mouth daily.       fish oil-omega-3 fatty acids (FISH OIL) 1000 MG capsule Take 1 g by mouth daily.       Multiple Vitamins-Minerals (MULTIVITAL PO) Take 1 tablet by mouth daily.       Cholecalciferol (VITAMIN D) 1000 UNITS capsule Take 1 capsule by mouth daily.       OTC products: None, except as noted above    No Known Allergies   Latex Allergy: NO    Social History   Substance Use Topics     Smoking status: Former Smoker     Years: 60.00     Types: Cigarettes     Smokeless tobacco: Never Used     Alcohol use Yes      Comment: 2 drinks per week     History   Drug Use No       REVIEW OF SYSTEMS:   C: NEGATIVE for fever, chills, change in weight  E/M: NEGATIVE for ear, mouth and throat problems  R: NEGATIVE for significant cough or SOB  CV: NEGATIVE for chest pain, palpitations or peripheral edema  GI: NEGATIVE for nausea, abdominal pain, heartburn, or change in bowel habits  : NEGATIVE for frequency, dysuria, or hematuria  N: NEGATIVE for weakness, dizziness or  "paresthesias  H: NEGATIVE for bleeding problems      EXAM:   /66 (BP Location: Right arm, Cuff Size: Adult Regular)  Pulse 79  Temp 97.7  F (36.5  C) (Oral)  Ht 5' 4\" (1.626 m)  Wt 140 lb 9.6 oz (63.8 kg)  SpO2 95%  BMI 24.13 kg/m2    GENERAL APPEARANCE: healthy, alert and no distress    NECK: no adenopathy, no asymmetry, masses, or scars and thyroid normal to palpation    RESP: lungs clear to auscultation - no rales, rhonchi or wheezes    CV: regular rates and rhythm, normal S1 S2, no S3 or S4 and no murmur, click or rub -    ABDOMEN:  soft, nontender, no HSM or masses and bowel sounds normal    NEURO: Normal strength and tone, sensory exam grossly normal, mentation intact and speech normal    PSYCH: mentation appears normal. and affect normal/bright    LYMPHATICS: No axillary, cervical, inguinal, or supraclavicular nodes      DIAGNOSTICS:     Labs Drawn and in Process:   Unresulted Labs Ordered in the Past 30 Days of this Admission     Date and Time Order Name Status Description    2/27/2018 1134 BASIC METABOLIC PANEL In process           Recent Labs   Lab Test  06/13/17   0914  03/20/17   1435   03/10/17   0721   03/01/17   0425   HGB  12.4*  11.0*   < >  12.1*   < >  10.9*   PLT  248  349   < >  352   < >  179   NA  134   --    --   134   < >  140   POTASSIUM  4.8   --    --   4.5   < >  3.8   CR  0.83  0.78   < >  0.88   < >  0.73   A1C   --    --    --    --    --   5.2    < > = values in this interval not displayed.        IMPRESSION:   Diagnosis/reason for consult: Peripheral vascular disease     The proposed surgical procedure is considered INTERMEDIATE risk.    REVISED CARDIAC RISK INDEX  The patient has the following serious cardiovascular risks for perioperative complications such as (MI, PE, VFib and 3  AV Block):  Coronary Artery Disease (MI, positive stress test, angina, Qs on EKG)  INTERPRETATION: 1 risks: Class II (low risk - 0.9% complication rate)    The patient has the following " additional risks for perioperative complications:  No identified additional risks      ICD-10-CM    1. Preop general physical exam Z01.818 Basic metabolic panel   2. PVD (peripheral vascular disease) (H) I73.9        RECOMMENDATIONS:       APPROVAL GIVEN to proceed with proposed procedure, without further diagnostic evaluation       Signed Electronically by: Vega Caal MD    Copy of this evaluation report is provided to requesting physician.    [place] Preop Guidelines

## 2018-02-28 LAB
ANION GAP SERPL CALCULATED.3IONS-SCNC: 5 MMOL/L (ref 3–14)
BUN SERPL-MCNC: 15 MG/DL (ref 7–30)
CALCIUM SERPL-MCNC: 8.7 MG/DL (ref 8.5–10.1)
CHLORIDE SERPL-SCNC: 100 MMOL/L (ref 94–109)
CO2 SERPL-SCNC: 29 MMOL/L (ref 20–32)
CREAT SERPL-MCNC: 0.83 MG/DL (ref 0.66–1.25)
GFR SERPL CREATININE-BSD FRML MDRD: 89 ML/MIN/1.7M2
GLUCOSE SERPL-MCNC: 78 MG/DL (ref 70–99)
POTASSIUM SERPL-SCNC: 4.3 MMOL/L (ref 3.4–5.3)
SODIUM SERPL-SCNC: 134 MMOL/L (ref 133–144)

## 2018-03-08 ENCOUNTER — HOSPITAL ENCOUNTER (OUTPATIENT)
Facility: CLINIC | Age: 83
Discharge: HOME | End: 2018-03-08
Attending: RADIOLOGY | Admitting: RADIOLOGY
Payer: COMMERCIAL

## 2018-03-08 ENCOUNTER — APPOINTMENT (OUTPATIENT)
Dept: INTERVENTIONAL RADIOLOGY/VASCULAR | Facility: CLINIC | Age: 83
End: 2018-03-08
Attending: SURGERY
Payer: COMMERCIAL

## 2018-03-08 VITALS
SYSTOLIC BLOOD PRESSURE: 110 MMHG | OXYGEN SATURATION: 97 % | WEIGHT: 139.3 LBS | HEIGHT: 64 IN | RESPIRATION RATE: 16 BRPM | BODY MASS INDEX: 23.78 KG/M2 | TEMPERATURE: 97.9 F | HEART RATE: 96 BPM | DIASTOLIC BLOOD PRESSURE: 59 MMHG

## 2018-03-08 DIAGNOSIS — I70.213 ATHEROSCLEROSIS OF NATIVE ARTERY OF BOTH LOWER EXTREMITIES WITH INTERMITTENT CLAUDICATION (H): ICD-10-CM

## 2018-03-08 LAB
APTT PPP: 31 SEC (ref 22–37)
CHOLEST SERPL-MCNC: 125 MG/DL
CREAT SERPL-MCNC: 0.9 MG/DL (ref 0.66–1.25)
ERYTHROCYTE [DISTWIDTH] IN BLOOD BY AUTOMATED COUNT: 16.1 % (ref 10–15)
GFR SERPL CREATININE-BSD FRML MDRD: 81 ML/MIN/1.7M2
HBA1C MFR BLD: 5.4 % (ref 4.3–6)
HCT VFR BLD AUTO: 35.8 % (ref 40–53)
HDLC SERPL-MCNC: 57 MG/DL
HGB BLD-MCNC: 12.8 G/DL (ref 13.3–17.7)
INR PPP: 0.96 (ref 0.86–1.14)
KCT BLD-ACNC: 195 SEC (ref 75–150)
LDLC SERPL CALC-MCNC: 55 MG/DL
MCH RBC QN AUTO: 32.2 PG (ref 26.5–33)
MCHC RBC AUTO-ENTMCNC: 35.8 G/DL (ref 31.5–36.5)
MCV RBC AUTO: 90 FL (ref 78–100)
NONHDLC SERPL-MCNC: 68 MG/DL
PLATELET # BLD AUTO: 247 10E9/L (ref 150–450)
RBC # BLD AUTO: 3.98 10E12/L (ref 4.4–5.9)
TRIGL SERPL-MCNC: 64 MG/DL
WBC # BLD AUTO: 8.3 10E9/L (ref 4–11)

## 2018-03-08 PROCEDURE — 85730 THROMBOPLASTIN TIME PARTIAL: CPT | Performed by: RADIOLOGY

## 2018-03-08 PROCEDURE — 27210742 ZZH CATH CR1

## 2018-03-08 PROCEDURE — 27210886 ZZH ACCESSORY CR5

## 2018-03-08 PROCEDURE — 85027 COMPLETE CBC AUTOMATED: CPT | Performed by: RADIOLOGY

## 2018-03-08 PROCEDURE — C1769 GUIDE WIRE: HCPCS

## 2018-03-08 PROCEDURE — 27210892 ZZH CATH CR4

## 2018-03-08 PROCEDURE — 25000128 H RX IP 250 OP 636: Performed by: RADIOLOGY

## 2018-03-08 PROCEDURE — 85347 COAGULATION TIME ACTIVATED: CPT

## 2018-03-08 PROCEDURE — C1725 CATH, TRANSLUMIN NON-LASER: HCPCS

## 2018-03-08 PROCEDURE — 83036 HEMOGLOBIN GLYCOSYLATED A1C: CPT | Performed by: RADIOLOGY

## 2018-03-08 PROCEDURE — 36415 COLL VENOUS BLD VENIPUNCTURE: CPT

## 2018-03-08 PROCEDURE — 40000853 ZZH STATISTIC ANGIOGRAM, STENT, VERTEBRO PLASTY

## 2018-03-08 PROCEDURE — C1876 STENT, NON-COA/NON-COV W/DEL: HCPCS

## 2018-03-08 PROCEDURE — 85610 PROTHROMBIN TIME: CPT | Performed by: RADIOLOGY

## 2018-03-08 PROCEDURE — 99213 OFFICE O/P EST LOW 20 MIN: CPT | Performed by: SURGERY

## 2018-03-08 PROCEDURE — 80061 LIPID PANEL: CPT | Performed by: RADIOLOGY

## 2018-03-08 PROCEDURE — 82565 ASSAY OF CREATININE: CPT | Performed by: RADIOLOGY

## 2018-03-08 PROCEDURE — 27210906 ZZH KIT CR8

## 2018-03-08 PROCEDURE — 27210804 ZZH SHEATH CR3

## 2018-03-08 PROCEDURE — 25000125 ZZHC RX 250: Performed by: RADIOLOGY

## 2018-03-08 PROCEDURE — 27210808 ZZH SHEATH CR7

## 2018-03-08 RX ORDER — FENTANYL CITRATE 50 UG/ML
INJECTION, SOLUTION INTRAMUSCULAR; INTRAVENOUS
Status: DISCONTINUED
Start: 2018-03-08 | End: 2018-03-08 | Stop reason: HOSPADM

## 2018-03-08 RX ORDER — LIDOCAINE HYDROCHLORIDE 10 MG/ML
1-30 INJECTION, SOLUTION EPIDURAL; INFILTRATION; INTRACAUDAL; PERINEURAL
Status: COMPLETED | OUTPATIENT
Start: 2018-03-08 | End: 2018-03-08

## 2018-03-08 RX ORDER — HEPARIN SODIUM 1000 [USP'U]/ML
INJECTION, SOLUTION INTRAVENOUS; SUBCUTANEOUS
Status: DISCONTINUED
Start: 2018-03-08 | End: 2018-03-08 | Stop reason: HOSPADM

## 2018-03-08 RX ORDER — LIDOCAINE 40 MG/G
CREAM TOPICAL
Status: DISCONTINUED | OUTPATIENT
Start: 2018-03-08 | End: 2018-03-08 | Stop reason: HOSPADM

## 2018-03-08 RX ORDER — SODIUM CHLORIDE 9 MG/ML
INJECTION, SOLUTION INTRAVENOUS CONTINUOUS
Status: DISCONTINUED | OUTPATIENT
Start: 2018-03-08 | End: 2018-03-08 | Stop reason: HOSPADM

## 2018-03-08 RX ORDER — LIDOCAINE HYDROCHLORIDE 10 MG/ML
INJECTION, SOLUTION INFILTRATION; PERINEURAL
Status: DISCONTINUED
Start: 2018-03-08 | End: 2018-03-08 | Stop reason: HOSPADM

## 2018-03-08 RX ORDER — CLOPIDOGREL BISULFATE 75 MG/1
75 TABLET ORAL DAILY
Qty: 90 TABLET | Refills: 4 | Status: SHIPPED | OUTPATIENT
Start: 2018-03-08 | End: 2019-04-02

## 2018-03-08 RX ORDER — FENTANYL CITRATE 50 UG/ML
25-50 INJECTION, SOLUTION INTRAMUSCULAR; INTRAVENOUS EVERY 5 MIN PRN
Status: DISCONTINUED | OUTPATIENT
Start: 2018-03-08 | End: 2018-03-08 | Stop reason: HOSPADM

## 2018-03-08 RX ORDER — HEPARIN SODIUM 1000 [USP'U]/ML
500-6000 INJECTION, SOLUTION INTRAVENOUS; SUBCUTANEOUS
Status: COMPLETED | OUTPATIENT
Start: 2018-03-08 | End: 2018-03-08

## 2018-03-08 RX ORDER — NALOXONE HYDROCHLORIDE 0.4 MG/ML
.1-.4 INJECTION, SOLUTION INTRAMUSCULAR; INTRAVENOUS; SUBCUTANEOUS
Status: DISCONTINUED | OUTPATIENT
Start: 2018-03-08 | End: 2018-03-08 | Stop reason: HOSPADM

## 2018-03-08 RX ORDER — FLUMAZENIL 0.1 MG/ML
0.2 INJECTION, SOLUTION INTRAVENOUS
Status: DISCONTINUED | OUTPATIENT
Start: 2018-03-08 | End: 2018-03-08 | Stop reason: HOSPADM

## 2018-03-08 RX ORDER — IODIXANOL 320 MG/ML
150 INJECTION, SOLUTION INTRAVASCULAR ONCE
Status: COMPLETED | OUTPATIENT
Start: 2018-03-08 | End: 2018-03-08

## 2018-03-08 RX ORDER — ACETAMINOPHEN 500 MG
500-1000 TABLET ORAL EVERY 6 HOURS PRN
Status: DISCONTINUED | OUTPATIENT
Start: 2018-03-08 | End: 2018-03-08 | Stop reason: HOSPADM

## 2018-03-08 RX ADMIN — HEPARIN SODIUM 3000 UNITS: 1000 INJECTION, SOLUTION INTRAVENOUS; SUBCUTANEOUS at 10:39

## 2018-03-08 RX ADMIN — IODIXANOL 128 ML: 320 INJECTION, SOLUTION INTRAVASCULAR at 12:10

## 2018-03-08 RX ADMIN — LIDOCAINE HYDROCHLORIDE 10 ML: 10 INJECTION, SOLUTION INFILTRATION; PERINEURAL at 10:01

## 2018-03-08 RX ADMIN — SODIUM CHLORIDE: 9 INJECTION, SOLUTION INTRAVENOUS at 09:14

## 2018-03-08 RX ADMIN — FENTANYL CITRATE 25 MCG: 50 INJECTION, SOLUTION INTRAMUSCULAR; INTRAVENOUS at 09:52

## 2018-03-08 RX ADMIN — MIDAZOLAM HYDROCHLORIDE 0.5 MG: 1 INJECTION, SOLUTION INTRAMUSCULAR; INTRAVENOUS at 09:51

## 2018-03-08 NOTE — PROGRESS NOTES
Patient arrived ambulatory, alert and oriented, skin is warm and dry, color is good.  Dr Gracia and Ritu NP in to see the patient and consent completed.

## 2018-03-08 NOTE — IR NOTE
Patient to IR 2 for LE angiogram. Name, allergies, labs and consent verified. Pulses assessed and documented. Patient to tabel in supine position. Monitors applied and VS obtained. Resp regular and non labored. Patient prepped and draped for procedure.moderate sedation initiated.

## 2018-03-08 NOTE — DISCHARGE INSTRUCTIONS
Peripheral Angiogram Discharge Instructions - Femoral     After you go home:      Have an adult stay with you until tomorrow.    Drink extra fluids for 2 days.    You may resume your normal diet.    No smoking       For 24 hours - due to the sedation you received:    Relax and take it easy.    Do NOT make any important or legal decisions.    Do NOT drive or operate machines at home or at work.    Do NOT drink alcohol.    Care of Groin Puncture Site:      For the first 24 hrs - check the puncture site every 1-2 hours while awake.    For 2 days, when you cough, sneeze, laugh or move your bowels, hold your hand over the puncture site and press firmly.    Remove the bandaid after 24 hours. If there is minor oozing, apply another bandaid and remove it after 12 hours.    It is normal to have a small bruise or pea size lump at the site.    You may shower tomorrow.  Do NOT take a bath, or use a hot tub or pool for at least 3 days. Do NOT scrub the site. Do not use lotion or powder near the puncture site.     Activity:            For 2 days:    No stooping or squatting    Do NOT do any heavy activity such as exercise, lifting, or straining.     No housework, yard work or any activity that make you sweat    Do NOT lift more than 10 pounds    Bleeding:      If you start bleeding from the site in your groin, lie down flat and press firmly on/above the site for 10 minutes.     Once bleeding stops, lay flat for 2 hours.     [facility] as soon as you can.       Call 911 right away if you have heavy bleeding or bleeding that does not stop.      Medicines:    You are  starting Plavix, do not stop taking it until you talk to your provider.       If you have stopped any medicines, check with your provider about when to restart them.        Follow Up Appointments:      [facility] with [facility] as directed.    Call the clinic if:      You have increased pain or a large or growing hard lump around the  site.    The site is red, swollen, hot or tender.    Blood or fluid is draining from the site.    You have chills or a fever greater than 101 F (38 C).    Your leg turns feels numb, cool or changes color.    You have hives, a rash or unusual itching.    New pain in the back or belly that you cannot control with Tylenol.    Any questions or concerns.    Other Instructions:      You received a stent - carry your stent card with you at all times.      If you have questions or your original symptoms do not improve, call:         [facility] @ [phone]

## 2018-03-08 NOTE — PROGRESS NOTES
Discharge instructions reviewed with the patient and his daughter, questions answered and a copy given to the patient.  Rx for Plavix (sent [facility] and stent cards given to the patient.

## 2018-03-08 NOTE — IR NOTE
Patient tolerated procedure and sedation. VS baseline. Patient alert. Denies pain. Sheath pulled and manual pressure held to site. Groin site soft and flat. Dressing applied and dry and intact. Patient returned to care suites and report given to RN.t

## 2018-03-08 NOTE — IP AVS SNAPSHOT
MRN:[id number]                      After Visit Summary   3/8/2018    Oscar Chaudhary    MRN: [id number]           Visit Information        Department      3/8/2018  7:33 AM [facility]          Review of your medicines      UNREVIEWED medicines. Ask your doctor about these medicines        Dose / Directions    acetaminophen 325 MG tablet   Commonly known as:  TYLENOL   Used for:  Fusobacterium infection        Dose:  650 mg   Take 2 tablets (650 mg) by mouth every 4 hours as needed for mild pain   Quantity:  100 tablet   Refills:  0       aspirin 81 MG tablet        Dose:  1 tablet   Take 1 tablet by mouth daily.   Refills:  0       atenolol 25 MG tablet   Commonly known as:  TENORMIN   Used for:  Other secondary hypertension        TAKE 1 TABLET BY MOUTH DAILY   Quantity:  90 tablet   Refills:  0       atorvastatin 40 MG tablet   Commonly known as:  LIPITOR   Used for:  Hyperlipidemia        Dose:  40 mg   Take 1 tablet (40 mg) by mouth daily   Quantity:  90 tablet   Refills:  3       diazepam 5 MG tablet   Commonly known as:  VALIUM   Used for:  Anxiety        Dose:  5 mg   Take 1 tablet (5 mg) by mouth every 6 hours   Quantity:  30 tablet   Refills:  3       fish oil-omega-3 fatty acids 1000 MG capsule        Dose:  1 g   Take 1 g by mouth daily.   Refills:  0       lisinopril 5 MG tablet   Commonly known as:  PRINIVIL/ZESTRIL   Used for:  S/P CABG (coronary artery bypass graft)        Dose:  5 mg   Take 1 tablet (5 mg) by mouth 2 times daily   Quantity:  180 tablet   Refills:  3       MULTIVITAL PO        Dose:  1 tablet   Take 1 tablet by mouth daily.   Refills:  0       omeprazole 20 MG CR capsule   Commonly known as:  priLOSEC        Dose:  20 mg   Take 20 mg by mouth daily   Refills:  0       spironolactone 25 MG tablet   Commonly known as:  ALDACTONE   Used for:  LV dysfunction        Dose:  12.5 mg   Take 0.5 tablets (12.5 mg) by mouth daily   Quantity:  45 tablet    Refills:  3       temazepam 15 MG capsule   Commonly known as:  RESTORIL   Used for:  Insomnia, unspecified        Dose:  15 mg   Take 1 capsule (15 mg) by mouth nightly as needed   Quantity:  30 capsule   Refills:  3       vitamin D 1000 UNITS capsule        Dose:  1 capsule   Take 1 capsule by mouth daily.   Refills:  0         START taking        Dose / Directions    clopidogrel 75 MG tablet   Commonly known as:  PLAVIX   Used for:  Atherosclerosis of native artery of both lower extremities with intermittent claudication (H)        Dose:  75 mg   Take 1 tablet (75 mg) by mouth daily   Quantity:  90 tablet   Refills:  4            Where to get your medicines      Some of these will need a paper prescription and others can be bought over the counter. Ask your nurse if you have questions.     Bring a paper prescription for each of these medications     clopidogrel 75 MG tablet               Prescriptions were sent or printed at these locations (1 Prescription)                   Other Prescriptions                Printed at Department/Unit printer (1 of 1)         clopidogrel (PLAVIX) 75 MG tablet                 Protect others around you: Learn how to safely use, store and throw away your medicines at www.disposemymeds.org.         Follow-ups after your visit         Care Instructions        Further instructions from your care team       Peripheral Angiogram Discharge Instructions - Femoral     After you go home:      Have an adult stay with you until tomorrow.    Drink extra fluids for 2 days.    You may resume your normal diet.    No smoking       For 24 hours - due to the sedation you received:    Relax and take it easy.    Do NOT make any important or legal decisions.    Do NOT drive or operate machines at home or at work.    Do NOT drink alcohol.    Care of Groin Puncture Site:      For the first 24 hrs - check the puncture site every 1-2 hours while awake.    For 2 days, when you cough, sneeze, laugh or move  your bowels, hold your hand over the puncture site and press firmly.    Remove the bandaid after 24 hours. If there is minor oozing, apply another bandaid and remove it after 12 hours.    It is normal to have a small bruise or pea size lump at the site.    You may shower tomorrow.  Do NOT take a bath, or use a hot tub or pool for at least 3 days. Do NOT scrub the site. Do not use lotion or powder near the puncture site.     Activity:            For 2 days:    No stooping or squatting    Do NOT do any heavy activity such as exercise, lifting, or straining.     No housework, yard work or any activity that make you sweat    Do NOT lift more than 10 pounds    Bleeding:      If you start bleeding from the site in your groin, lie down flat and press firmly on/above the site for 10 minutes.     Once bleeding stops, lay flat for 2 hours.     [facility] as soon as you can.       Call 911 right away if you have heavy bleeding or bleeding that does not stop.      Medicines:    You are  starting Plavix, do not stop taking it until you talk to your provider.       If you have stopped any medicines, check with your provider about when to restart them.        Follow Up Appointments:      [facility] with [facility] as directed.    Call the clinic if:      You have increased pain or a large or growing hard lump around the site.    The site is red, swollen, hot or tender.    Blood or fluid is draining from the site.    You have chills or a fever greater than 101 F (38 C).    Your leg turns feels numb, cool or changes color.    You have hives, a rash or unusual itching.    New pain in the back or belly that you cannot control with Tylenol.    Any questions or concerns.    Other Instructions:      You received a stent - carry your stent card with you at all times.      If you have questions or your original symptoms do not improve, call:         [facility] @ [phone]         Additional  "Information About Your Visit        MyChart Information     [facility] gives you secure access to your electronic health record. If you see a primary care provider, you can also send messages to your care team and make appointments. If you have questions, please call your primary care clinic.  If you do not have a primary care provider, please call [phone] and they will assist you.        Care EveryWhere ID     This is your Care EveryWhere ID. This could be used by other organizations to access your [facility] medical records  [id number]        Your Vitals Were     Blood Pressure Pulse Temperature Respirations Height Weight    139/57 96 97.9  F (36.6  C) (Oral) 16 1.626 m (5' 4\") 63.2 kg (139 lb 4.8 oz)    Pulse Oximetry BMI (Body Mass Index)                96% 23.91 kg/m2           Primary Care Provider Office Phone # Fax #    Bakari Chang [facility] [phone]      Equal Access to Services     DELANO WHITAKER AH: Hadii aad ku hadasho Soomaali, waaxda luqadaha, qaybta kaalmada adeegyada, waxay freddyin hayolun aisha herrmann . So [facility] [phone].    ATENCIÓN: Si corinnala esppaula, tiene a bell disposición servicios gratuitos de asistencia lingüística. Llame al [phone].    We comply with applicable federal civil rights laws and [place] laws. We do not discriminate on the basis of race, color, national origin, age, disability, sex, sexual orientation, or gender identity.            Thank you!     Thank you for choosing [facility] for your care. Our goal is always to provide you with excellent care. Hearing back from our patients is one way we can continue to improve our services. Please take a few minutes to complete the written survey that you may receive in the mail after you visit with us. Thank you!             Medication List: This is a list of all your medications and when to take them. Check marks below indicate your daily home schedule. Keep this list as a reference.      Medications  "          Morning Afternoon Evening Bedtime As Needed    acetaminophen 325 MG tablet   Commonly known as:  TYLENOL   Take 2 tablets (650 mg) by mouth every 4 hours as needed for mild pain                                aspirin 81 MG tablet   Take 1 tablet by mouth daily.                                atenolol 25 MG tablet   Commonly known as:  TENORMIN   TAKE 1 TABLET BY MOUTH DAILY                                atorvastatin 40 MG tablet   Commonly known as:  LIPITOR   Take 1 tablet (40 mg) by mouth daily                                clopidogrel 75 MG tablet   Commonly known as:  PLAVIX   Take 1 tablet (75 mg) by mouth daily                                diazepam 5 MG tablet   Commonly known as:  VALIUM   Take 1 tablet (5 mg) by mouth every 6 hours                                fish oil-omega-3 fatty acids 1000 MG capsule   Take 1 g by mouth daily.                                lisinopril 5 MG tablet   Commonly known as:  PRINIVIL/ZESTRIL   Take 1 tablet (5 mg) by mouth 2 times daily                                MULTIVITAL PO   Take 1 tablet by mouth daily.                                omeprazole 20 MG CR capsule   Commonly known as:  priLOSEC   Take 20 mg by mouth daily                                spironolactone 25 MG tablet   Commonly known as:  ALDACTONE   Take 0.5 tablets (12.5 mg) by mouth daily                                temazepam 15 MG capsule   Commonly known as:  RESTORIL   Take 1 capsule (15 mg) by mouth nightly as needed                                vitamin D 1000 UNITS capsule   Take 1 capsule by mouth daily.

## 2018-03-08 NOTE — PROCEDURES
RADIOLOGY PROCEDURE NOTE  Patient name: Oscar Chaudhary  MRN: [id number]  : 1935    Pre-procedure diagnosis: Bilateral LE claudication  Post-procedure diagnosis: Same    Procedure Date/Time: 2018  1:16 PM  Procedure: Left groin (arterial) access.  Abdominal aortogram and bilateral LE angiogram.  6 mm X 5 cm viabahn in left SFA.  Repeated 6 mm PTA with Stokes balloon, with 1 balloon fracture (removed intact).  Significantly improved though mild residual impression upon stent by large calcific plaque.  No complications.  Tolerated well.  8 mm X 27 mm balloon expandable stent in the Left [place].  Good result.  No complications.  Plavix for 1 year.  Spoke with Dr. White.  Estimated blood loss: < 5ml  Specimen(s) collected with description:  Sheath removed  The patient tolerated the procedure well with no immediate complications.  Significant findings:  Please see above.    See imaging dictation for procedural details.    Provider name: Tristian Gracia  Assistant(s):None

## 2018-03-08 NOTE — PROGRESS NOTES
Interventional Radiology Pre-Procedure Sedation Assessment   Time of Assessment: 9:10 AM    Expected Level: Moderate Sedation    Indication: Sedation is required for the following type of Procedure: Aortagram with bilateral lower extremity angiogram with possible intervention which could include angioplasty and/or stent placement     Sedation and procedural consent: Risks, benefits and alternatives were discussed with Patient, Dr Gely PETER Intake: Appropriately NPO for procedure    ASA Class: Class 2 - MILD SYSTEMIC DISEASE, NO ACUTE PROBLEMS, NO FUNCTIONAL LIMITATIONS.    Mallampati: Grade 1:  Soft palate, uvula, tonsillar pillars, and posterior pharyngeal wall visible    Lungs: Lungs Clear with good breath sounds bilaterally, decreased in bases    Heart: Normal heart sounds and rate    History and physical reviewed and no updates needed. I have reviewed the lab findings, diagnostic data, medications, and the plan for sedation. I have determined this patient to be an appropriate candidate for the planned sedation and procedure and have reassessed the patient IMMEDIATELY PRIOR to sedation and procedure.    Karolyn Tran, APRN CNP

## 2018-03-08 NOTE — CONSULTS
VASCULAR MEDICINE CHART CHECK    Patient is an 82 year old gentleman followed by Dr. White for lower extremity claudication. He presented today for an angiogram and underwent successful angioplasty of left common iliac artery stenosis and right SFA angioplasty and stenting. He is a former smoker, but not diabetic and has lipids well controlled with an LDL of 55 on Lipitor 40 mg daily. No formal Vascular Medicine consultation is needed at this time. Please call if we can be of any further assistance this admission or in the future ([phone]).     Magdi De Paz MD,[facility]    Vascular medicine service

## 2018-03-08 NOTE — CONSULTS
VASCULAR SURGERY    Oscar Chaudhary comes in today for an aortogram with bilateral runoffs by  interventional radiology.  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  He has a known small AAA and a left common iliac artery stenosis.      RALPH with exercise was abnormal bilaterally.  The right was 0.75 at rest decreasing to 0.34 with exercise.  On the left this is 0.87 decreasing to 0.62 with exercise.  Due to these findings we felt an aortogram with runoff would be beneficial.    Patient has had no rest pain or ulcerations.      Hospital course: Patient underwent aortogram with runoffs by Dr. Camargo.  He tolerated this very well.  I reviewed the films with him.  This confirmed a small AAA of no clinical concern at this time.  There was a moderately severe stenosis of the left common iliac artery which was successfully angioplastied and stented with no distal runoff disease noted on the left leg.  Minimal stenosis is noted in the right iliac arteries not requiring intervention.  A focal high-grade calcified stenosis noted in the right mid SFA with good runoff.  This was angioplastied and a Biobond stent placed with some mild mid stent stenosis still noted to the calcification but adequate flow.        Patient did well following this.  He did have palpable pedal pulses.      Admission laboratory: Serum creatinine= 0.90   hemoglobin A1c= 5.4                                       LDL= 55 (excellent with goal less than 70)                                        Hemoglobin= 12.8      Impression: Successful angioplasty of left common iliac artery stenosis with otherwise good runoff which should significantly improve his symptoms in the left leg.  Focal high-grade calcified stenosis of the right mid SFA status post angioplasty by on standing for periods mid graft stenosis is still appreciated to the calcification but should significantly improve his  symptoms.  Due to the Viabahn stent we will place him on Plavix 75 mg daily for 3-6 months.  Repeat duplex of the right SFA should be performed in 1 month..    Shree White MD

## 2018-03-08 NOTE — PROGRESS NOTES
Patient returns from the IR lab, alert and oriented, skin is warm and dry, color is good.  Daughter is at bedside.  Dr. White stopped to visit with the patient and daughter.  Sit is clean dry and intact.

## 2018-03-08 NOTE — IP AVS SNAPSHOT
[place] [address]    [place] [address]    Phone:  [phone]                                       After Visit Summary   3/8/2018    Oscar Chaudhary    MRN: [id number]           After Visit Summary Signature Page     I have received my discharge instructions, and my questions have been answered. I have discussed any challenges I see with this plan with the nurse or doctor.    ..........................................................................................................................................  Patient/Patient Representative Signature      ..........................................................................................................................................  Patient Representative Print Name and Relationship to Patient    ..................................................               ................................................  Date                                            Time    ..........................................................................................................................................  Reviewed by Signature/Title    ...................................................              ..............................................  Date                                                            Time

## 2018-03-09 ENCOUNTER — TELEPHONE (OUTPATIENT)
Dept: OTHER | Facility: CLINIC | Age: 83
End: 2018-03-09

## 2018-03-09 DIAGNOSIS — I73.9 PAD (PERIPHERAL ARTERY DISEASE) (H): Primary | ICD-10-CM

## 2018-03-09 NOTE — PROGRESS NOTES
1730 Assisted to stand. Patient felt dizzy. Sat on the side of the bed for 5 minutes and then he walked in the hallway with minor assistance.   1745- Left groin site was stable and soft after walking and sitting.

## 2018-03-09 NOTE — TELEPHONE ENCOUNTER
Left message for patient to schedule right LE arterial US and 4 week follow up with Dr. White after 3/8/18 angiogram.

## 2018-03-19 ENCOUNTER — TELEPHONE (OUTPATIENT)
Dept: SLEEP MEDICINE | Facility: CLINIC | Age: 83
End: 2018-03-19

## 2018-03-19 NOTE — TELEPHONE ENCOUNTER
Rx faxed for Temazepam, patient has not been seen in office since 2014, patient needs appointment with Dr. Healy.

## 2018-03-21 RX ORDER — TEMAZEPAM 15 MG/1
15 CAPSULE ORAL
Qty: 30 CAPSULE | Refills: 3 | Status: SHIPPED | OUTPATIENT
Start: 2018-03-21 | End: 2021-01-01

## 2018-03-21 NOTE — TELEPHONE ENCOUNTER
Telephone visit    Patient continues to do well both on CPAP and, rarely with temazepam.      He states he uses the CPAP every night without fail and feels refreshed.      On occasion (couple times a month) he will take 15mg of temazepam to assist with sleep inititiation.  He still has not noted any lingering morning sedation, imballence, or other concerning side effects.      He does not describe any sleepiness or impairment while driving.      I will refill for him.      Asked to follow up annually.

## 2018-04-12 ENCOUNTER — HOSPITAL ENCOUNTER (OUTPATIENT)
Dept: ULTRASOUND IMAGING | Facility: CLINIC | Age: 83
End: 2018-04-12
Attending: SURGERY
Payer: COMMERCIAL

## 2018-04-12 ENCOUNTER — HOSPITAL ENCOUNTER (OUTPATIENT)
Dept: ULTRASOUND IMAGING | Facility: CLINIC | Age: 83
Discharge: HOME | End: 2018-04-12
Attending: SURGERY | Admitting: SURGERY
Payer: COMMERCIAL

## 2018-04-12 ENCOUNTER — OFFICE VISIT (OUTPATIENT)
Dept: OTHER | Facility: CLINIC | Age: 83
End: 2018-04-12
Attending: SURGERY
Payer: COMMERCIAL

## 2018-04-12 VITALS — SYSTOLIC BLOOD PRESSURE: 111 MMHG | HEART RATE: 63 BPM | DIASTOLIC BLOOD PRESSURE: 43 MMHG

## 2018-04-12 DIAGNOSIS — I73.9 PAD (PERIPHERAL ARTERY DISEASE) (H): ICD-10-CM

## 2018-04-12 DIAGNOSIS — I71.40 ABDOMINAL AORTIC ANEURYSM (AAA) WITHOUT RUPTURE (H): ICD-10-CM

## 2018-04-12 DIAGNOSIS — E78.5 HYPERLIPIDEMIA LDL GOAL <70: ICD-10-CM

## 2018-04-12 DIAGNOSIS — I73.9 PAD (PERIPHERAL ARTERY DISEASE) (H): Primary | ICD-10-CM

## 2018-04-12 PROCEDURE — 93926 LOWER EXTREMITY STUDY: CPT | Mod: RT

## 2018-04-12 PROCEDURE — 99213 OFFICE O/P EST LOW 20 MIN: CPT | Mod: ZP | Performed by: SURGERY

## 2018-04-12 PROCEDURE — 93979 VASCULAR STUDY: CPT | Mod: TC

## 2018-04-12 PROCEDURE — G0463 HOSPITAL OUTPT CLINIC VISIT: HCPCS

## 2018-04-12 NOTE — LETTER
[facility]  [address]  [place] [address]  Phone: [phone]  Fax: [phone]    2018    Re: Oscar Chaudhary, : 1935    [place] [facility]     Oscar Chaudhary has a known stable 4.4 cm distal infrarenal AAA.  He developed bilateral leg claudication symptoms.  He was noted to have a decreased RALPH with exercise.     He underwent an aortogram with runoffs on 3/8/2018.  Infrarenal aortic aneurysm was confirmed and similar in size.  High-grade stenosis left proximal common duct artery was stented.  He had mild disease within the right common iliac artery.  A very high-grade focal calcified stones in the right mid SFA was angioplastied with a Biobond stent being placed. Good runoff was noted on this leg and also on the entire left leg.     He has done better following the procedure.  Get some vague left leg discomfort particular going up stairs which is not vascular in etiology.     He was placed on Plavix for 3 months following the SFA stent.  Also continue aspirin.  His Lipitor has been working well with his LDL= 55.     Exam: Alert and appropriate.  Blood pressure 111/47.  Pulse 63.              +3 dorsalis pedis pulses bilaterally.     Duplex today reveals area of mild narrowing in the right mid common iliac artery consistent with the angiographic findings were luminal goes down to 3.3 mm from 5.2 mm.  Left common iliac artery stent is widely patent with good flow.  There is no visible narrowing within the right SFA Viabahn stent.     Impression: Successful angioplasty and stenting of the left common iliac and right mid SFA arteries with adequate runoff.  He certainly does have adequate blood flow to his legs at this time.  Plan Plavix for 3 months following the procedure.  Maurice 4.4 cm AAA.  Follow-up duplex in 1 year of the AAA, iliac and SFA stents.  Along with RALPH.     Shree White MD

## 2018-04-12 NOTE — PROGRESS NOTES
[facility]    Oscar Chaudhary has a known stable 4.4 cm distal infrarenal AAA.  He developed bilateral leg claudication symptoms.  He was noted to have a decreased RALPH with exercise.    He underwent an aortogram with runoffs on 3/8/2018.  Infrarenal aortic aneurysm was confirmed and similar in size.  High-grade stenosis left proximal common duct artery was stented.  He had mild disease within the right common iliac artery.  A very high-grade focal calcified stones in the right mid SFA was angioplastied with a Biobond stent being placed.  Good runoff was noted on this leg and also on the entire left leg.      He has done better following the procedure.  Get some vague left leg discomfort particular going up stairs which is not vascular in etiology.      He was placed on Plavix for 3 months following the SFA stent.  Also continue aspirin.  His Lipitor has been working well with his LDL= 55.      Exam: Alert and appropriate.  Blood pressure 111/47.  Pulse 63.              +3 dorsalis pedis pulses bilaterally.        Duplex today reveals area of mild narrowing in the right mid common iliac artery consistent with the angiographic findings were luminal goes down to 3.3 mm from 5.2 mm.  Left common iliac artery stent is widely patent with good flow.  There is no visible narrowing within the right SFA Viabahn stent.        Impression: Successful angioplasty and stenting of the left common iliac and right mid SFA arteries with adequate runoff.  He certainly does have adequate blood flow to his legs at this time.  Plan Plavix for 3 months following the procedure.  Maurice 4.4 cm AAA.  Follow-up duplex in 1 year of the AAA, iliac and SFA stents.  Along with RALPH.       Shree White MD     Please route or send letter to:  *None*

## 2018-04-12 NOTE — MR AVS SNAPSHOT
After Visit Summary   4/12/2018    Oscar Chaudhary    MRN: [id number]           Patient Information     Date Of Birth          1935        Visit Information        Provider Department      4/12/2018 1:30 PM Shree White MD [facility]      Today's Diagnoses     PAD (peripheral artery disease) (H)    -  1    Abdominal aortic aneurysm (AAA) without rupture (H)        Hyperlipidemia LDL goal <70           Follow-ups after your visit        Future tests that were ordered for you today     Open Future Orders        Priority Expected Expires Ordered    US Aorta/IVC/Iliac Duplex Limited Routine  4/12/2019 4/12/2018            Who to contact     If you have questions or need follow up information about today's clinic visit or your schedule please contact [facility] directly at [phone].  Normal or non-critical lab and imaging results will be communicated to you by [email or web address], letter or phone within 4 business days after the clinic has received the results. If you do not hear from us within 7 days, please contact the clinic through [email or web address] or phone. If you have a critical or abnormal lab result, we will notify you by phone as soon as possible.  Submit refill requests through [employer] or call your pharmacy and they will forward the refill request to us. Please allow 3 business days for your refill to be completed.          Additional Information About Your Visit        MyChart Information     [employer] gives you secure access to your electronic health record. If you see a primary care provider, you can also send messages to your care team and make appointments. If you have questions, please call your primary care clinic.  If you do not have a primary care provider, please call [phone] and they will assist you.        Care EveryWhere ID     This is your Care EveryWhere ID. This could be used by other  organizations to access your [place] medical records  [id number]        Your Vitals Were     Pulse                   63            Blood Pressure from Last 3 Encounters:   04/12/18 111/43   03/08/18 110/59   02/27/18 110/66    Weight from Last 3 Encounters:   03/08/18 139 lb 4.8 oz (63.2 kg)   02/27/18 140 lb 9.6 oz (63.8 kg)   02/06/18 141 lb 14.4 oz (64.4 kg)              Today, you had the following     No orders found for display         Today's Medication Changes          These changes are accurate as of 4/12/18  1:56 PM.  If you have any questions, ask your nurse or doctor.               These medicines have changed or have updated prescriptions.        Dose/Directions    diazepam 5 MG tablet   Commonly known as:  VALIUM   This may have changed:    - when to take this  - reasons to take this   Used for:  Anxiety        Dose:  5 mg   Take 1 tablet (5 mg) by mouth every 6 hours   Quantity:  30 tablet   Refills:  3                Primary Care Provider Office Phone # Fax #    Bakari Charlene [facility] [phone] [address] [facility] [address]        Equal Access to Services     DELANO WHITAKER AH: Hadii binu Dutton, waharleyda nathan, qaybta kaalcaio sanchez, noam herrmann . So [facility] [phone].    ATENCIÓN: Si habla español, tiene a bell disposición servicios gratuitos de asistencia lingüística. Llame al [phone].    We comply with applicable federal civil rights laws and [place] laws. We do not discriminate on the basis of race, color, national origin, age, disability, sex, sexual orientation, or gender identity.            Thank you!     Thank you for choosing [facility]  for your care. Our goal is always to provide you with excellent care. Hearing back from our patients is one way we can continue to improve our services. Please take a few minutes to complete the written survey that you may receive in the mail after  your visit with us. Thank you!             Your Updated Medication List - Protect others around you: Learn how to safely use, store and throw away your medicines at www.disposemymeds.org.          This list is accurate as of 4/12/18  1:56 PM.  Always use your most recent med list.                   Brand Name Dispense Instructions for use Diagnosis    acetaminophen 325 MG tablet    TYLENOL    100 tablet    Take 2 tablets (650 mg) by mouth every 4 hours as needed for mild pain    Fusobacterium infection       aspirin 81 MG tablet      Take 1 tablet by mouth daily.        atenolol 25 MG tablet    TENORMIN    90 tablet    TAKE 1 TABLET BY MOUTH DAILY    Other secondary hypertension       atorvastatin 40 MG tablet    LIPITOR    90 tablet    Take 1 tablet (40 mg) by mouth daily    Hyperlipidemia       clopidogrel 75 MG tablet    PLAVIX    90 tablet    Take 1 tablet (75 mg) by mouth daily    Atherosclerosis of native artery of both lower extremities with intermittent claudication (H)       diazepam 5 MG tablet    VALIUM    30 tablet    Take 1 tablet (5 mg) by mouth every 6 hours    Anxiety       fish oil-omega-3 fatty acids 1000 MG capsule      Take 1 g by mouth daily.        lisinopril 5 MG tablet    PRINIVIL/ZESTRIL    180 tablet    Take 1 tablet (5 mg) by mouth 2 times daily    S/P CABG (coronary artery bypass graft)       MULTIVITAL PO      Take 1 tablet by mouth daily.        omeprazole 20 MG CR capsule    priLOSEC     Take 20 mg by mouth daily        spironolactone 25 MG tablet    ALDACTONE    45 tablet    Take 0.5 tablets (12.5 mg) by mouth daily    LV dysfunction       temazepam 15 MG capsule    RESTORIL    30 capsule    Take 1 capsule (15 mg) by mouth nightly as needed        vitamin D 1000 units capsule      Take 1 capsule by mouth daily.

## 2018-04-12 NOTE — NURSING NOTE
"Chief Complaint   Patient presents with     RECHECK     R SFA, US Aor/ivc/iliac ltd (12:15 C; 1:30 WRO) History of right SFA with PTA Viabahn; 4 week follow up to 3/8/18 angioplasty       Initial /43 (BP Location: Left arm, Patient Position: Chair, Cuff Size: Adult Large)  Pulse 63 Estimated body mass index is 23.91 kg/(m^2) as calculated from the following:    Height as of 3/8/18: 5' 4\" (1.626 m).    Weight as of 3/8/18: 139 lb 4.8 oz (63.2 kg).  Medication Reconciliation: complete    Emily Gomez CMA on 4/12/2018 at 1:14 PM    "

## 2018-05-15 DIAGNOSIS — I51.9 LV DYSFUNCTION: ICD-10-CM

## 2018-05-16 RX ORDER — SPIRONOLACTONE 25 MG/1
TABLET ORAL
Qty: 45 TABLET | Refills: 3 | Status: SHIPPED | OUTPATIENT
Start: 2018-05-16 | End: 2019-02-19

## 2018-09-18 DIAGNOSIS — I27.20 PULMONARY HYPERTENSION (H): Primary | ICD-10-CM

## 2018-10-02 ENCOUNTER — OFFICE VISIT (OUTPATIENT)
Dept: CARDIOLOGY | Facility: CLINIC | Age: 83
End: 2018-10-02
Attending: INTERNAL MEDICINE
Payer: COMMERCIAL

## 2018-10-02 VITALS
HEIGHT: 63 IN | WEIGHT: 138.4 LBS | HEART RATE: 68 BPM | DIASTOLIC BLOOD PRESSURE: 58 MMHG | BODY MASS INDEX: 24.52 KG/M2 | SYSTOLIC BLOOD PRESSURE: 121 MMHG | OXYGEN SATURATION: 97 %

## 2018-10-02 DIAGNOSIS — Z95.1 S/P CABG (CORONARY ARTERY BYPASS GRAFT): ICD-10-CM

## 2018-10-02 DIAGNOSIS — I27.20 PULMONARY HYPERTENSION (H): ICD-10-CM

## 2018-10-02 DIAGNOSIS — I25.10 CORONARY ARTERY DISEASE INVOLVING NATIVE CORONARY ARTERY OF NATIVE HEART WITHOUT ANGINA PECTORIS: Primary | ICD-10-CM

## 2018-10-02 PROCEDURE — 99213 OFFICE O/P EST LOW 20 MIN: CPT | Performed by: INTERNAL MEDICINE

## 2018-10-02 NOTE — NURSING NOTE
Procedures and/or Testing: Patient given instructions regarding  Exercise Stress Test with modified Farrah Balke Protocol. Discussed purpose, preparation, procedure and when to expect results reported back to the patient. Patient demonstrated understanding of this information and agreed to call with further questions or concerns.  Med Reconcile: Reviewed and verified all current medications with the patient. The updated medication list was printed and given to the patient.  Diet: Patient instructed regarding a heart healthy diet, including discussion of reduced fat and sodium intake. Patient demonstrated understanding of this information and agreed to call with further questions or concerns.  Return Appointment: Patient given instructions regarding scheduling next clinic visit. Patient demonstrated understanding of this information and agreed to call with further questions or concerns.  Patient stated he understood all health information given and agreed to call with further questions or concerns.     Pt states he will call and schedule his Exercise Stress Test.    Medication Changes:  No medication changes at this time. Please continue current medication regiment.    Patient Instructions:  1. Continue staying active and eat a heart healthy diet.    2. Please keep current list of medications with you at all times.    3. Remember to weigh yourself daily after voiding and before you consume any food or beverages and log the numbers.  If you have gained/lost 2 pounds overnight or 5 pounds in a week contact us immediately for medication adjustments or further instructions.    4. **Please call us immediately if you have any syncope, chest pain, edema, or decline in your functional status.    Follow up Appointment Information:  6 month follow up    Check-In  Time Check-In Location Estimated Length Procedure   Name         1 hours Exercise Stress Test with Modified Farrah Balke Pulmonary Hypertension Protocol**      Procedure Preparations & Instructions     This is a non-invasive procedure that DOES require preparation:  - DO NOT use alcohol, tobacco or food/beverages containing caffeine for at least 12 hours prior to your test (ie. Coffee, tea, soda, chocolate, de-caffeinated beverages, certain medications including Excedrin, Anacin, NoDoz)  - Please wear loose, two-piece clothing and comfortable, rubber soled shoes for walking

## 2018-10-02 NOTE — MR AVS SNAPSHOT
After Visit Summary   10/2/2018    Oscar Chaudhary    MRN: [id number]           Patient Information     Date Of Birth          1935        Visit Information        Provider Department      10/2/2018 8:30 AM Jerrod Rangel MD [facility]        Today's Diagnoses     Coronary artery disease involving native coronary artery of native heart without angina pectoris    -  1    Pulmonary hypertension (H)        S/P CABG (coronary artery bypass graft); 6/29/01          Care Instructions    Medication Changes:  No medication changes at this time. Please continue current medication regiment.    Patient Instructions:  1. Continue staying active and eat a heart healthy diet.    2. Please keep current list of medications with you at all times.    3. Remember to weigh yourself daily after voiding and before you consume any food or beverages and log the numbers.  If you have gained/lost 2 pounds overnight or 5 pounds in a week contact us immediately for medication adjustments or further instructions.    4. **Please call us immediately if you have any syncope, chest pain, edema, or decline in your functional status.    Follow up Appointment Information:  6 month follow up    Check-In  Time Check-In Location Estimated Length Procedure   Name         1 hours Exercise Stress Test with Modified Farrah Balke Pulmonary Hypertension Protocol**     Procedure Preparations & Instructions     This is a non-invasive procedure that DOES require preparation:  - DO NOT use alcohol, tobacco or food/beverages containing caffeine for at least 12 hours prior to your test (ie. Coffee, tea, soda, chocolate, de-caffeinated beverages, certain medications including Excedrin, Anacin, NoDoz)  - Please wear loose, two-piece clothing and comfortable, rubber soled shoes for walking       For scheduling at [facility] please call [phone]  For scheduling at the [place] please call  [phone]  We are located on the second floor [address] at the [facility].  Our address is     [address],   [address]  [place]  [address]    Thank you for allowing us to be a part of your care here at the [facility]    If you have questions or concerns please contact us at:    Gracia Gates, RN, BSN      Nurse Coordinator       Pulmonary Hypertension     [facility]   [phone])[phone]  (F)[phone]    ** Please note that you will NOT receive a reminder call regarding your scheduled testing, reminder calls are for provider appointments only.  If you are scheduled for testing within the [facility] system you may receive a call regarding pre-registration for billing purposes only.**     Remember to weigh yourself daily after voiding and before you consume any food or beverages and log the numbers.  If you have gained/lost 2 pounds overnight or 5 pounds in a week contact us immediately for medication adjustments or further instructions.    Support Group:  Pulmonary Hypertension Association  Https://www.phassociation.org/  **Look at the Events Tab** They even have Support Groups that you can call into    [facility] PH Support Group  First Saturday of the Month from 1-3 PM   Location: [facility] [address]  Leader: Spike Moore  Phone: [phone]  Email: [email or web address]          Follow-ups after your visit        Additional Services     Follow-Up with Pulmonary Hypertension Clinic                 Future tests that were ordered for you today     Open Future Orders        Priority Expected Expires Ordered    Exercise Stress test Routine 10/2/2018 10/2/2019 10/2/2018    Follow-Up with Pulmonary Hypertension Clinic Routine 4/2/2019 10/2/2019 10/2/2018            Who to contact     If you have questions or need follow up information about today's clinic visit or your schedule please contact [facility]  "CARE   [place] directly at [phone].  Normal or non-critical lab and imaging results will be communicated to you by MyChart, letter or phone within 4 business days after the clinic has received the results. If you do not hear from us within 7 days, please contact the clinic through [employer] or phone. If you have a critical or abnormal lab result, we will notify you by phone as soon as possible.  Submit refill requests through [employer] or call your pharmacy and they will forward the refill request to us. Please allow 3 business days for your refill to be completed.          Additional Information About Your Visit        [employer] Information     [employer] gives you secure access to your electronic health record. If you see a primary care provider, you can also send messages to your care team and make appointments. If you have questions, please call your primary care clinic.  If you do not have a primary care provider, please call [phone] and they will assist you.        Care EveryWhere ID     This is your Care EveryWhere ID. This could be used by other organizations to access your [facility] medical records  [id number]        Your Vitals Were     Pulse Height Pulse Oximetry BMI (Body Mass Index)          68 1.6 m (5' 3\") 97% 24.52 kg/m2         Blood Pressure from Last 3 Encounters:   10/02/18 121/58   04/12/18 111/43   03/08/18 110/59    Weight from Last 3 Encounters:   10/02/18 62.8 kg (138 lb 6.4 oz)   03/08/18 63.2 kg (139 lb 4.8 oz)   02/27/18 63.8 kg (140 lb 9.6 oz)              We Performed the Following     Follow-Up with Pulmonary Hypertension Clinic          Today's Medication Changes          These changes are accurate as of 10/2/18  9:39 AM.  If you have any questions, ask your nurse or doctor.               These medicines have changed or have updated prescriptions.        Dose/Directions    diazepam 5 MG tablet   Commonly known as:  VALIUM   This may have changed:    - when to take this  - reasons to " take this   Used for:  Anxiety        Dose:  5 mg   Take 1 tablet (5 mg) by mouth every 6 hours   Quantity:  30 tablet   Refills:  3                Primary Care Provider Office Phone # Fax #    Bakari Sarmiento [facility] [phone] [address] [place] [address]        Equal Access to Services     DELANO WHITAKER : Hadii aad ku hadasho Soomaali, waaxda luqadaha, qaybta kaalmada adeegyada, waxay freddyin hayaan adekayley dongia lagavi . So [facility] [phone].    ATENCIÓN: Si habla español, tiene a bell disposición servicios gratuitos de asistencia lingüística. Sandi al [phone].    We comply with applicable federal civil rights laws and [place] laws. We do not discriminate on the basis of race, color, national origin, age, disability, sex, sexual orientation, or gender identity.            Thank you!     Thank you for choosing [facility]  for your care. Our goal is always to provide you with excellent care. Hearing back from our patients is one way we can continue to improve our services. Please take a few minutes to complete the written survey that you may receive in the mail after your visit with us. Thank you!             Your Updated Medication List - Protect others around you: Learn how to safely use, store and throw away your medicines at www.disposemymeds.org.          This list is accurate as of 10/2/18  9:39 AM.  Always use your most recent med list.                   Brand Name Dispense Instructions for use Diagnosis    acetaminophen 325 MG tablet    TYLENOL    100 tablet    Take 2 tablets (650 mg) by mouth every 4 hours as needed for mild pain    Fusobacterium infection       aspirin 81 MG tablet      Take 1 tablet by mouth daily.        atenolol 25 MG tablet    TENORMIN    90 tablet    TAKE 1 TABLET BY MOUTH DAILY    Other secondary hypertension       atorvastatin 40 MG tablet    LIPITOR    90 tablet    Take 1 tablet (40 mg) by mouth daily     Hyperlipidemia       clopidogrel 75 MG tablet    PLAVIX    90 tablet    Take 1 tablet (75 mg) by mouth daily    Atherosclerosis of native artery of both lower extremities with intermittent claudication (H)       diazepam 5 MG tablet    VALIUM    30 tablet    Take 1 tablet (5 mg) by mouth every 6 hours    Anxiety       fish oil-omega-3 fatty acids 1000 MG capsule      Take 1 g by mouth daily.        lisinopril 5 MG tablet    PRINIVIL/ZESTRIL    180 tablet    Take 1 tablet (5 mg) by mouth 2 times daily    S/P CABG (coronary artery bypass graft)       MULTIVITAL PO      Take 1 tablet by mouth daily.        omeprazole 20 MG CR capsule    priLOSEC     Take 20 mg by mouth daily        spironolactone 25 MG tablet    ALDACTONE    45 tablet    TAKE 1/2 TABLET BY MOUTH DAILY    LV dysfunction       temazepam 15 MG capsule    RESTORIL    30 capsule    Take 1 capsule (15 mg) by mouth nightly as needed        vitamin D 1000 units capsule      Take 1 capsule by mouth daily.

## 2018-10-02 NOTE — PATIENT INSTRUCTIONS
Medication Changes:  No medication changes at this time. Please continue current medication regiment.    Patient Instructions:  1. Continue staying active and eat a heart healthy diet.    2. Please keep current list of medications with you at all times.    3. Remember to weigh yourself daily after voiding and before you consume any food or beverages and log the numbers.  If you have gained/lost 2 pounds overnight or 5 pounds in a week contact us immediately for medication adjustments or further instructions.    4. **Please call us immediately if you have any syncope, chest pain, edema, or decline in your functional status.    Follow up Appointment Information:  6 month follow up    Check-In  Time Check-In Location Estimated Length Procedure   Name         1 hours Exercise Stress Test with Modified Farrah Balke Pulmonary Hypertension Protocol**     Procedure Preparations & Instructions     This is a non-invasive procedure that DOES require preparation:  - DO NOT use alcohol, tobacco or food/beverages containing caffeine for at least 12 hours prior to your test (ie. Coffee, tea, soda, chocolate, de-caffeinated beverages, certain medications including Excedrin, Anacin, NoDoz)  - Please wear loose, two-piece clothing and comfortable, rubber soled shoes for walking       For scheduling at [facility] please call [phone]  For scheduling at the [place] please call [phone]  We are located on the second floor [address] at the [facility].  Our address is     [address] [place] [address],   [address]  [place]  [address]    Thank you for allowing us to be a part of your care here at the [facility]    If you have questions or concerns please contact us at:    Gracia Gates RN, BSN      Nurse Coordinator       Pulmonary Hypertension     [facility]   (P)[phone]  (F)[phone]    ** Please note that you will NOT receive a reminder call regarding your  scheduled testing, reminder calls are for provider appointments only.  If you are scheduled for testing within the [facility] system you may receive a call regarding pre-registration for billing purposes only.**     Remember to weigh yourself daily after voiding and before you consume any food or beverages and log the numbers.  If you have gained/lost 2 pounds overnight or 5 pounds in a week contact us immediately for medication adjustments or further instructions.    Support Group:  Pulmonary Hypertension Association  Https://www.phassociation.org/  **Look at the Events Tab** They even have Support Groups that you can call into    [facility] PH Support Group  First Saturday of the Month from 1-3 PM   Location: [address] [place] [id number]  Leader: Spike Moore  Phone: [phone]  Email: [email or web address]

## 2018-10-02 NOTE — PROGRESS NOTES
"Jerrod Rangel M.D.  Cardiovascular Medicine    I personally saw and examined this patient, discussed care with housestaff and other consultants, reviewed current laboratories and imaging studies, and conveyed impression and diagnostic/therapeutic plan to patient.    Dr. Shree Jeffrey M.D.  [facility]    Problem List  1. 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 AAA stable  3. 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 Hyperlipidemia    Dear Alfonso:    I hope this finds you well.  Thank you for seeing Spike Chaudhary.  I read your note, reviewed the testing and procedure for bilateral stenting of the lower extremity vessels which has been fully restorative.  H  He has no interim history of chest pian, tightness, heaviness, pressure no symptoms of TIA.  He has no further symptoms and is able to walk four flights of stairs.     He has no interim history of chest pain, tightness, heaviness, pressure, palpitation, pre-syncope or syncope.  .    Objective  /58 (BP Location: Right arm, Cuff Size: Adult Regular)  Pulse 68  Ht 1.6 m (5' 3\")  Wt 62.8 kg (138 lb 6.4 oz)  SpO2 97%  BMI 24.52 kg/m2 alert oriented, regular rhythm, chest clear, bilateral femoral bruits  Wt Readings from Last 5 Encounters:   10/02/18 62.8 kg (138 lb 6.4 oz)   03/08/18 63.2 kg (139 lb 4.8 oz)   02/27/18 63.8 kg (140 lb 9.6 oz)   02/06/18 64.4 kg (141 lb 14.4 oz)   08/01/17 63 kg (139 lb)       Meds  Current Outpatient Prescriptions   Medication     acetaminophen (TYLENOL) 325 MG tablet     aspirin 81 MG tablet     atenolol (TENORMIN) 25 MG tablet     atorvastatin (LIPITOR) 40 MG tablet     Cholecalciferol (VITAMIN D) 1000 UNITS capsule     clopidogrel (PLAVIX) 75 MG tablet     diazepam (VALIUM) 5 MG tablet     fish oil-omega-3 fatty acids (FISH OIL) 1000 MG capsule     lisinopril (PRINIVIL/ZESTRIL) 5 MG tablet     Multiple Vitamins-Minerals (MULTIVITAL PO)     omeprazole (PRILOSEC) 20 MG CR capsule     " spironolactone (ALDACTONE) 25 MG tablet     temazepam (RESTORIL) 15 MG capsule     No current facility-administered medications for this visit.          Labs  Component Value Flag Ref Range Units Status Collected Lab   Cholesterol 125  <200 mg/dL Final 03/08/2018  8:40 AM FrStHsLb   Triglycerides 64  <150 mg/dL Final 03/08/2018  8:40 AM FrStHsLb   HDL Cholesterol 57  >39 mg/dL Final 03/08/2018  8:40 AM FrStHsLb   LDL Cholesterol Calculated 55  <100 mg/dL Final 03/08/2018  8:40 AM FrStHsLb   Comment:         Results for CARINE DOSHI (MRN [id number]) as of 2/6/2018 11:24   Ref. Range 6/13/2017 09:07 6/13/2017 09:14 2/1/2018 09:32   Sodium Latest Ref Range: 133 - 144 mmol/L  134    Potassium Latest Ref Range: 3.4 - 5.3 mmol/L  4.8    Chloride Latest Ref Range: 94 - 109 mmol/L  100    Carbon Dioxide Latest Ref Range: 20 - 32 mmol/L  28    Urea Nitrogen Latest Ref Range: 7 - 30 mg/dL  19    Creatinine Latest Ref Range: 0.66 - 1.25 mg/dL  0.83    GFR Estimate Latest Ref Range: >60 mL/min/1.7m2  89    GFR Estimate If Black Latest Ref Range: >60 mL/min/1.7m2  >90...    Calcium Latest Ref Range: 8.5 - 10.1 mg/dL  9.1    Anion Gap Latest Ref Range: 3 - 14 mmol/L  6    Albumin Latest Ref Range: 3.4 - 5.0 g/dL  3.5    Protein Total Latest Ref Range: 6.8 - 8.8 g/dL  6.7 (L)    Bilirubin Total Latest Ref Range: 0.2 - 1.3 mg/dL  0.7    Alkaline Phosphatase Latest Ref Range: 40 - 150 U/L  62    ALT Latest Ref Range: 0 - 70 U/L  20    AST Latest Ref Range: 0 - 45 U/L  18    Cholesterol Latest Ref Range: <200 mg/dL  126    HDL Cholesterol Latest Ref Range: >39 mg/dL  57    LDL Cholesterol Calculated Latest Ref Range: <100 mg/dL  56    Non HDL Cholesterol Latest Ref Range: <130 mg/dL  68    PSA Latest Ref Range: 0 - 4 ug/L  0.03    Triglycerides Latest Ref Range: <150 mg/dL  60    Glucose Latest Ref Range: 70 - 99 mg/dL  103 (H)    WBC Latest Ref Range: 4.0 - 11.0 10e9/L  9.3    Hemoglobin Latest Ref Range: 13.3 - 17.7 g/dL   12.4 (L)    Hematocrit Latest Ref Range: 40.0 - 53.0 %  36.3 (L)    Platelet Count Latest Ref Range: 150 - 450 10e9/L  248    RBC Count Latest Ref Range: 4.4 - 5.9 10e12/L  3.92 (L)    MCV Latest Ref Range: 78 - 100 fl  93    MCH Latest Ref Range: 26.5 - 33.0 pg  31.6    MCHC Latest Ref Range: 31.5 - 36.5 g/dL  34.2    RDW Latest Ref Range: 10.0 - 15.0 %  15.8 (H)    US ABDOMINAL AORTA LIMITED Unknown Rpt     US AORTA/IVC/ILIAC DUPLEX COMPLETE Unknown   Rpt     INTERVENTIONAL RADIOLOGY LOWER EXTREMITY ANGIOGRAM BILATERAL    3/8/2018 12:10 PM     HISTORY:  82-year-old patient with abdominal aortogram and bilateral  lower extremity angiogram. Patient has claudication symptoms in both  lower extremities. Patient had RALPH examination of the right lower  extremity with resting RALPH value of 0.75 and left lower extremity  resting RALPH value of 0.87. This was performed February 20, 2018. Post  exercise values were 0.34 on the right and 0.62 on the left.     TECHNIQUE: Patient was brought to interventional radiology department  and informed consent obtained. Patient was placed in a supine  position. Skin overlying the left groin was prepped and draped in  standard sterile fashion. Given poorly palpable pulse, ultrasound was  used to visualize the left common femoral artery. Ultrasound image  stored for documentation. 1% lidocaine used for local anesthesia. With  continuous ultrasound guidance, micropuncture kit was used to access  the left common femoral artery. Over series of maneuvers a pigtail  catheter was advanced to the abdominal aorta where angiogram  performed. Pigtail catheter was then pulled back to the distal  abdominal aorta where angiogram repeated. Renal double curve catheter  was used to cross the aortic bifurcation and placed in the right  external iliac artery where angiogram performed throughout the right  lower extremity. Area of significant stenosis was noted in the distal  SFA. This was initially treated  with 4 mm balloon angioplasty.  Crossover sheath was placed and after a catheter was advanced beyond  the lesion, an 018 wire was placed. A 6 mm x 5 cm Viabahn was  deployed.  6 mm balloon angioplasty then performed with two separate  balloons as initial balloon ruptured. This was performed on numerous  instances with decent result by completion. Completion angiogram  demonstrates patency of this artery.      A catheter was then placed in the popliteal artery where angiogram  performed throughout the remainder of the right lower extremity.  Crossover sheath was then pulled back to the left common iliac artery  where angiogram repeated. An 8 mm x 27 mm balloon expandable stent was  then deployed. The distal segment of the stent was fully deployed with  a 9 mm PowerFlex balloon, dilated to 9.2 mm. Completion angiogram  demonstrates good result without complication. Angiogram then  performed throughout the left lower extremity through the sheath in  the left external iliac artery. Prior to intervention, 3000 units of  heparin was administered. ACT level was drawn and found to be 190.  Sheath was removed and hemostasis achieved with manual compression.     MEDICATIONS: 0.5 mg IV Versed, 25 mcg IV fentanyl. 3000 units of IV  heparin administered as a bolus.  Sedation time: 142 minutes  Please note the above medications were administered by the  interventional radiology staff under my direct supervision. Patient's  vital signs monitored and remained stable throughout the procedure.  Fluoroscopic time: 18 minutes  Total fluoroscopic dose: 518 mGy  Contrast: 128 mL of Visipaque administered intra-arterially without  complication.  Local anesthetic: 10 mL 1% lidocaine.     FINDINGS: A total of 34 spot fluoroscopic images and angiogram  sequences were obtained throughout the procedure. Abdominal aortogram  demonstrates patent bilateral renal arteries. Patient has bilateral  accessory renal arteries. Infrarenal abdominal  aortic aneurysm,  fusiform. Large lumbar collateral is noted on the left as the origin  of the left internal iliac artery is occluded. Right internal iliac  artery is patent. Moderate stenosis noted in the left common iliac  artery. Mild plaque in the right common iliac artery, no definitive  definite significant stenosis. Right lower extremity angiogram  demonstrates mild plaque in the right common femoral artery or  proximal superficial femoral artery. Multiple profunda femoral  arterial branches are identified, though small in size due to diffuse  plaque. Focal severe stenosis in the distal SFA due to calcified  plaque. This was treated with a 6 mm x 5 cm Viabahn stent graft.  Overall appearance is improved, though there remains a double waist in  the stent graft due to the plaque. [place] 6 mm balloon was used  repeatedly to dilate this segment. One of these balloons ruptured.  Dilatation was performed on numerous instances with improved  appearance by completion, though there does remain minimal residual  tandem stenoses. The popliteal and three runoff arteries are patent,  albeit with slow blood flow.     Deployment of 8 mm x 27 mm stent in the left common iliac artery with  good result. 9 mm balloon angioplasty distal aspect of the stent in an  effort to flare this segment of the stent. No complication. Left lower  extremity angiogram demonstrates mild plaque in the mid SFA, though no  significant stenosis. Mild plaque in the left common femoral artery.  Profunda femoral artery is patent. Popliteal three-vessel runoff  arteries.         IMPRESSION:  1. 6 mm x 5 cm Viabahn stent graft for severe stenosis and plaque in  the distal right SFA. There is mild residual narrowing in this  arterial segment, though unable to dilate further given significant  degree of calcific plaque. Patient will be on Plavix for one year.  2. 8 mm x 27 mm balloon expandable stent in the left common iliac  artery with good result at  completion.  3. Three-vessel runoff in both lower extremities.  4. 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  5. Bilateral accessory renal arteries.  6. Infrarenal abdominal aorta, with measurements previously described  on ultrasound exams.       GIL ERVIN MD    Assessment/Plan   1. Bilateral lower extremity artery stenting with symptoms relief  2. ASHD/ discussed Farrah stress testing      Jerrod    CC: Shree White M.D.

## 2018-10-02 NOTE — LETTER
"10/2/2018    [facility]  [address]  [facility] [address]    RE: Oscar Chaudhary       Dear Colleague,    I had the pleasure of seeing Oscar Chaudhary in the [facility].    Jerrod Rangel M.D.  Cardiovascular Medicine    I personally saw and examined this patient, discussed care with housestaff and other consultants, reviewed current laboratories and imaging studies, and conveyed impression and diagnostic/therapeutic plan to patient.    Dr. Shree White  FSGAIL Jeffrey M.D.  [facility]    Problem List  1. 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 AAA stable  3. 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 Hyperlipidemia    Dear Alfonso:    I hope this finds you well.  Thank you for seeing Spike Chaudhary.  I read your note, reviewed the testing and procedure for bilateral stenting of the lower extremity vessels which has been fully restorative.  H  He has no interim history of chest pian, tightness, heaviness, pressure no symptoms of TIA.  He has no further symptoms and is able to walk four flights of stairs.     He has no interim history of chest pain, tightness, heaviness, pressure, palpitation, pre-syncope or syncope.  .    Objective  /58 (BP Location: Right arm, Cuff Size: Adult Regular)  Pulse 68  Ht 1.6 m (5' 3\")  Wt 62.8 kg (138 lb 6.4 oz)  SpO2 97%  BMI 24.52 kg/m2 alert oriented, regular rhythm, chest clear, bilateral femoral bruits  Wt Readings from Last 5 Encounters:   10/02/18 62.8 kg (138 lb 6.4 oz)   03/08/18 63.2 kg (139 lb 4.8 oz)   02/27/18 63.8 kg (140 lb 9.6 oz)   02/06/18 64.4 kg (141 lb 14.4 oz)   08/01/17 63 kg (139 lb)       Meds  Current Outpatient Prescriptions   Medication     acetaminophen (TYLENOL) 325 MG tablet     aspirin 81 MG tablet     atenolol (TENORMIN) 25 MG tablet     atorvastatin (LIPITOR) 40 MG tablet     Cholecalciferol (VITAMIN D) 1000 UNITS capsule     clopidogrel (PLAVIX) 75 MG tablet     " diazepam (VALIUM) 5 MG tablet     fish oil-omega-3 fatty acids (FISH OIL) 1000 MG capsule     lisinopril (PRINIVIL/ZESTRIL) 5 MG tablet     Multiple Vitamins-Minerals (MULTIVITAL PO)     omeprazole (PRILOSEC) 20 MG CR capsule     spironolactone (ALDACTONE) 25 MG tablet     temazepam (RESTORIL) 15 MG capsule     No current facility-administered medications for this visit.          Labs  Component Value Flag Ref Range Units Status Collected Lab   Cholesterol 125  <200 mg/dL Final 03/08/2018  8:40 AM FrStHsLb   Triglycerides 64  <150 mg/dL Final 03/08/2018  8:40 AM FrStHsLb   HDL Cholesterol 57  >39 mg/dL Final 03/08/2018  8:40 AM FrStHsLb   LDL Cholesterol Calculated 55  <100 mg/dL Final 03/08/2018  8:40 AM FrStHsLb   Comment:         Results for CARINE DOHSI (MRN [id number]) as of 2/6/2018 11:24   Ref. Range 6/13/2017 09:07 6/13/2017 09:14 2/1/2018 09:32   Sodium Latest Ref Range: 133 - 144 mmol/L  134    Potassium Latest Ref Range: 3.4 - 5.3 mmol/L  4.8    Chloride Latest Ref Range: 94 - 109 mmol/L  100    Carbon Dioxide Latest Ref Range: 20 - 32 mmol/L  28    Urea Nitrogen Latest Ref Range: 7 - 30 mg/dL  19    Creatinine Latest Ref Range: 0.66 - 1.25 mg/dL  0.83    GFR Estimate Latest Ref Range: >60 mL/min/1.7m2  89    GFR Estimate If Black Latest Ref Range: >60 mL/min/1.7m2  >90...    Calcium Latest Ref Range: 8.5 - 10.1 mg/dL  9.1    Anion Gap Latest Ref Range: 3 - 14 mmol/L  6    Albumin Latest Ref Range: 3.4 - 5.0 g/dL  3.5    Protein Total Latest Ref Range: 6.8 - 8.8 g/dL  6.7 (L)    Bilirubin Total Latest Ref Range: 0.2 - 1.3 mg/dL  0.7    Alkaline Phosphatase Latest Ref Range: 40 - 150 U/L  62    ALT Latest Ref Range: 0 - 70 U/L  20    AST Latest Ref Range: 0 - 45 U/L  18    Cholesterol Latest Ref Range: <200 mg/dL  126    HDL Cholesterol Latest Ref Range: >39 mg/dL  57    LDL Cholesterol Calculated Latest Ref Range: <100 mg/dL  56    Non HDL Cholesterol Latest Ref Range: <130 mg/dL  68    PSA Latest  Ref Range: 0 - 4 ug/L  0.03    Triglycerides Latest Ref Range: <150 mg/dL  60    Glucose Latest Ref Range: 70 - 99 mg/dL  103 (H)    WBC Latest Ref Range: 4.0 - 11.0 10e9/L  9.3    Hemoglobin Latest Ref Range: 13.3 - 17.7 g/dL  12.4 (L)    Hematocrit Latest Ref Range: 40.0 - 53.0 %  36.3 (L)    Platelet Count Latest Ref Range: 150 - 450 10e9/L  248    RBC Count Latest Ref Range: 4.4 - 5.9 10e12/L  3.92 (L)    MCV Latest Ref Range: 78 - 100 fl  93    MCH Latest Ref Range: 26.5 - 33.0 pg  31.6    MCHC Latest Ref Range: 31.5 - 36.5 g/dL  34.2    RDW Latest Ref Range: 10.0 - 15.0 %  15.8 (H)    US ABDOMINAL AORTA LIMITED Unknown Rpt     US AORTA/IVC/ILIAC DUPLEX COMPLETE Unknown   Rpt     INTERVENTIONAL RADIOLOGY LOWER EXTREMITY ANGIOGRAM BILATERAL    3/8/2018 12:10 PM     HISTORY:  82-year-old patient with abdominal aortogram and bilateral  lower extremity angiogram. Patient has claudication symptoms in both  lower extremities. Patient had RALPH examination of the right lower  extremity with resting RALPH value of 0.75 and left lower extremity  resting RALPH value of 0.87. This was performed February 20, 2018. Post  exercise values were 0.34 on the right and 0.62 on the left.     TECHNIQUE: Patient was brought to interventional radiology department  and informed consent obtained. Patient was placed in a supine  position. Skin overlying the left groin was prepped and draped in  standard sterile fashion. Given poorly palpable pulse, ultrasound was  used to visualize the left common femoral artery. Ultrasound image  stored for documentation. 1% lidocaine used for local anesthesia. With  continuous ultrasound guidance, micropuncture kit was used to access  the left common femoral artery. Over series of maneuvers a pigtail  catheter was advanced to the abdominal aorta where angiogram  performed. Pigtail catheter was then pulled back to the distal  abdominal aorta where angiogram repeated. Renal double curve catheter  was used to  cross the aortic bifurcation and placed in the right  external iliac artery where angiogram performed throughout the right  lower extremity. Area of significant stenosis was noted in the distal  SFA. This was initially treated with 4 mm balloon angioplasty.  Crossover sheath was placed and after a catheter was advanced beyond  the lesion, an 018 wire was placed. A 6 mm x 5 cm Viabahn was  deployed.  6 mm balloon angioplasty then performed with two separate  balloons as initial balloon ruptured. This was performed on numerous  instances with decent result by completion. Completion angiogram  demonstrates patency of this artery.      A catheter was then placed in the popliteal artery where angiogram  performed throughout the remainder of the right lower extremity.  Crossover sheath was then pulled back to the left common iliac artery  where angiogram repeated. An 8 mm x 27 mm balloon expandable stent was  then deployed. The distal segment of the stent was fully deployed with  a 9 mm PowerFlex balloon, dilated to 9.2 mm. Completion angiogram  demonstrates good result without complication. Angiogram then  performed throughout the left lower extremity through the sheath in  the left external iliac artery. Prior to intervention, 3000 units of  heparin was administered. ACT level was drawn and found to be 190.  Sheath was removed and hemostasis achieved with manual compression.     MEDICATIONS: 0.5 mg IV Versed, 25 mcg IV fentanyl. 3000 units of IV  heparin administered as a bolus.  Sedation time: 142 minutes  Please note the above medications were administered by the  interventional radiology staff under my direct supervision. Patient's  vital signs monitored and remained stable throughout the procedure.  Fluoroscopic time: 18 minutes  Total fluoroscopic dose: 518 mGy  Contrast: 128 mL of Visipaque administered intra-arterially without  complication.  Local anesthetic: 10 mL 1% lidocaine.     FINDINGS: A total of 34 spot  fluoroscopic images and angiogram  sequences were obtained throughout the procedure. Abdominal aortogram  demonstrates patent bilateral renal arteries. Patient has bilateral  accessory renal arteries. Infrarenal abdominal aortic aneurysm,  fusiform. Large lumbar collateral is noted on the left as the origin  of the left internal iliac artery is occluded. Right internal iliac  artery is patent. Moderate stenosis noted in the left common iliac  artery. Mild plaque in the right common iliac artery, no definitive  definite significant stenosis. Right lower extremity angiogram  demonstrates mild plaque in the right common femoral artery or  proximal superficial femoral artery. Multiple profunda femoral  arterial branches are identified, though small in size due to diffuse  plaque. Focal severe stenosis in the distal SFA due to calcified  plaque. This was treated with a 6 mm x 5 cm Viabahn stent graft.  Overall appearance is improved, though there remains a double waist in  the stent graft due to the plaque. Gaastra 6 mm balloon was used  repeatedly to dilate this segment. One of these balloons ruptured.  Dilatation was performed on numerous instances with improved  appearance by completion, though there does remain minimal residual  tandem stenoses. The popliteal and three runoff arteries are patent,  albeit with slow blood flow.     Deployment of 8 mm x 27 mm stent in the left common iliac artery with  good result. 9 mm balloon angioplasty distal aspect of the stent in an  effort to flare this segment of the stent. No complication. Left lower  extremity angiogram demonstrates mild plaque in the mid SFA, though no  significant stenosis. Mild plaque in the left common femoral artery.  Profunda femoral artery is patent. Popliteal three-vessel runoff  arteries.         IMPRESSION:  1. 6 mm x 5 cm Viabahn stent graft for severe stenosis and plaque in  the distal right SFA. There is mild residual narrowing in this  arterial  segment, though unable to dilate further given significant  degree of calcific plaque. Patient will be on Plavix for one year.  2. 8 mm x 27 mm balloon expandable stent in the left common iliac  artery with good result at completion.  3. Three-vessel runoff in both lower extremities.  4. 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  5. Bilateral accessory renal arteries.  6. Infrarenal abdominal aorta, with measurements previously described  on ultrasound exams.       GIL ERVIN MD    Assessment/Plan   1. Bilateral lower extremity artery stenting with symptoms relief  2. ASHD/ discussed Farrah stress testing      Jerrod    CC: Shree White M.D.        Thank you for allowing me to participate in the care of your patient.    Sincerely,     Jerrod Rangel MD     [facility]

## 2018-10-10 ENCOUNTER — TELEPHONE (OUTPATIENT)
Dept: FAMILY MEDICINE | Facility: CLINIC | Age: 83
End: 2018-10-10

## 2018-10-10 NOTE — TELEPHONE ENCOUNTER
Patient called today.    Patient is requesting to est new care with Tristian Jeffrey MD at [facility].    Patient states that provider's father was a student of this patient.    Patient would like provider to email him at [facility] email on file.    Thank you.    Central Scheduling  Pilar DELONG

## 2018-10-10 NOTE — TELEPHONE ENCOUNTER
New patient okay per Dr. Jeffrey. Scheduled for Monday October 15 at 900.    Ramin Uribe CMA on 10/10/2018 at 11:46 AM

## 2018-10-15 ENCOUNTER — OFFICE VISIT (OUTPATIENT)
Dept: FAMILY MEDICINE | Facility: CLINIC | Age: 83
End: 2018-10-15
Payer: COMMERCIAL

## 2018-10-15 VITALS
TEMPERATURE: 97.1 F | BODY MASS INDEX: 24.11 KG/M2 | HEIGHT: 63 IN | OXYGEN SATURATION: 96 % | SYSTOLIC BLOOD PRESSURE: 121 MMHG | DIASTOLIC BLOOD PRESSURE: 59 MMHG | WEIGHT: 136.1 LBS | HEART RATE: 58 BPM

## 2018-10-15 DIAGNOSIS — Z23 NEED FOR PROPHYLACTIC VACCINATION WITH TETANUS-DIPHTHERIA (TD): Primary | ICD-10-CM

## 2018-10-15 DIAGNOSIS — Z23 NEED FOR PROPHYLACTIC VACCINATION AGAINST STREPTOCOCCUS PNEUMONIAE (PNEUMOCOCCUS): ICD-10-CM

## 2018-10-15 DIAGNOSIS — Z23 NEED FOR VACCINATION: ICD-10-CM

## 2018-10-15 DIAGNOSIS — Z23 NEED FOR SHINGLES VACCINE: ICD-10-CM

## 2018-10-15 PROCEDURE — 90670 PCV13 VACCINE IM: CPT | Performed by: INTERNAL MEDICINE

## 2018-10-15 PROCEDURE — G0009 ADMIN PNEUMOCOCCAL VACCINE: HCPCS | Performed by: INTERNAL MEDICINE

## 2018-10-15 PROCEDURE — 99212 OFFICE O/P EST SF 10 MIN: CPT | Mod: 25 | Performed by: INTERNAL MEDICINE

## 2018-10-15 NOTE — MR AVS SNAPSHOT
After Visit Summary   10/15/2018    Oscar Chaudhary    MRN: [id number]           Patient Information     Date Of Birth          1935        Visit Information        Provider Department      10/15/2018 9:00 AM Tristian Jeffrey MD [facility]        Today's Diagnoses     Need for prophylactic vaccination with tetanus-diphtheria (Td)    -  1    Need for prophylactic vaccination against Streptococcus pneumoniae (pneumococcus)        Need for shingles vaccine        Need for vaccination          Care Instructions    (Z23) Need for prophylactic vaccination with tetanus-diphtheria (Td)  (primary encounter diagnosis)  Comment: Recommend TDaP in the near future  Plan:     (Z23) Need for prophylactic vaccination against Streptococcus pneumoniae (pneumococcus)  Comment: Pneumonia vaccine Prevnar 13   Plan: Pneumococcal vaccine 13 valent PCV13 IM         (Prevnar) [35264]            (Z23) Need for shingles vaccine  Comment: Shingrix vaccine is also recommended 2-6 months follow up   Plan: ZOSTER VACCINE RECOMBINANT ADJUVANTED IM NJX                     Follow-ups after your visit        Who to contact     If you have questions or need follow up information about today's clinic visit or your schedule please contact [facility] directly at [phone].  Normal or non-critical lab and imaging results will be communicated to you by "[employer]"hart, letter or phone within 4 business days after the clinic has received the results. If you do not hear from us within 7 days, please contact the clinic through "[employer]"hart or phone. If you have a critical or abnormal lab result, we will notify you by phone as soon as possible.  Submit refill requests through [employer] or call your pharmacy and they will forward the refill request to us. Please allow 3 business days for your refill to be completed.          Additional Information About Your Visit        MyChart Information     [employer] gives you secure  "access to your electronic health record. If you see a primary care provider, you can also send messages to your care team and make appointments. If you have questions, please call your primary care clinic.  If you do not have a primary care provider, please call [phone] and they will assist you.        Care EveryWhere ID     This is your Care EveryWhere ID. This could be used by other organizations to access your [facility] medical records  [id number]        Your Vitals Were     Pulse Temperature Height Pulse Oximetry BMI (Body Mass Index)       58 97.1  F (36.2  C) (Oral) 5' 3\" (1.6 m) 96% 24.11 kg/m2        Blood Pressure from Last 3 Encounters:   10/15/18 121/59   10/02/18 121/58   04/12/18 111/43    Weight from Last 3 Encounters:   10/15/18 136 lb 1.6 oz (61.7 kg)   10/02/18 138 lb 6.4 oz (62.8 kg)   03/08/18 139 lb 4.8 oz (63.2 kg)              We Performed the Following     1st  Administration  [92901]     Pneumococcal vaccine 13 valent PCV13 IM (Prevnar) [74976]          Today's Medication Changes          These changes are accurate as of 10/15/18 12:30 PM.  If you have any questions, ask your nurse or doctor.               These medicines have changed or have updated prescriptions.        Dose/Directions    diazepam 5 MG tablet   Commonly known as:  VALIUM   This may have changed:    - when to take this  - reasons to take this   Used for:  Anxiety        Dose:  5 mg   Take 1 tablet (5 mg) by mouth every 6 hours   Quantity:  30 tablet   Refills:  3                Primary Care Provider Office Phone # Fax #    Tristian Jeffrey [phone] [address]  [facility] [address]        Equal Access to Services     DELANO WHITAKER AH: Inocencio Dutton, iván evans, maulik sanchez, noam [facility] [phone].    ATENCIÓN: Si habla español, tiene a bell disposición servicios gratuitos de asistencia lingüística. Llame al " [phone].    We comply with applicable federal civil rights laws and [place] laws. We do not discriminate on the basis of race, color, national origin, age, disability, sex, sexual orientation, or gender identity.            Thank you!     Thank you for choosing [facility]  for your care. Our goal is always to provide you with excellent care. Hearing back from our patients is one way we can continue to improve our services. Please take a few minutes to complete the written survey that you may receive in the mail after your visit with us. Thank you!             Your Updated Medication List - Protect others around you: Learn how to safely use, store and throw away your medicines at www.disposemymeds.org.          This list is accurate as of 10/15/18 12:30 PM.  Always use your most recent med list.                   Brand Name Dispense Instructions for use Diagnosis    acetaminophen 325 MG tablet    TYLENOL    100 tablet    Take 2 tablets (650 mg) by mouth every 4 hours as needed for mild pain    Fusobacterium infection       aspirin 81 MG tablet      Take 1 tablet by mouth daily.        atenolol 25 MG tablet    TENORMIN    90 tablet    TAKE 1 TABLET BY MOUTH DAILY    Other secondary hypertension       atorvastatin 40 MG tablet    LIPITOR    90 tablet    Take 1 tablet (40 mg) by mouth daily    Hyperlipidemia       clopidogrel 75 MG tablet    PLAVIX    90 tablet    Take 1 tablet (75 mg) by mouth daily    Atherosclerosis of native artery of both lower extremities with intermittent claudication (H)       diazepam 5 MG tablet    VALIUM    30 tablet    Take 1 tablet (5 mg) by mouth every 6 hours    Anxiety       fish oil-omega-3 fatty acids 1000 MG capsule      Take 1 g by mouth daily.        lisinopril 5 MG tablet    PRINIVIL/ZESTRIL    180 tablet    Take 1 tablet (5 mg) by mouth 2 times daily    S/P CABG (coronary artery bypass graft)       MULTIVITAL PO      Take 1 tablet by mouth daily.         omeprazole 20 MG CR capsule    priLOSEC     Take 20 mg by mouth daily        spironolactone 25 MG tablet    ALDACTONE    45 tablet    TAKE 1/2 TABLET BY MOUTH DAILY    LV dysfunction       temazepam 15 MG capsule    RESTORIL    30 capsule    Take 1 capsule (15 mg) by mouth nightly as needed        vitamin D 1000 units capsule      Take 1 capsule by mouth daily.

## 2018-10-15 NOTE — NURSING NOTE
Screening Questionnaire for Adult Immunization    Are you sick today?   No   Do you have allergies to medications, food, a vaccine component or latex?   No   Have you ever had a serious reaction after receiving a vaccination?   No   Do you have a long-term health problem with heart disease, lung disease, asthma, kidney disease, metabolic disease (e.g. diabetes), anemia, or other blood disorder?   Yes   Do you have cancer, leukemia, HIV/AIDS, or any other immune system problem?   Yes   In the past 3 months, have you taken medications that affect  your immune system, such as prednisone, other steroids, or anticancer drugs; drugs for the treatment of rheumatoid arthritis, Crohn s disease, or psoriasis; or have you had radiation treatments?   No   Have you had a seizure, or a brain or other nervous system problem?   No   During the past year, have you received a transfusion of blood or blood     products, or been given immune (gamma) globulin or antiviral drug?   No   For women: Are you pregnant or is there a chance you could become        pregnant during the next month?   No   Have you received any vaccinations in the past 4 weeks?   No     Immunization questionnaire was positive for at least one answer.  Notified Dr. Jeffrey.        Per orders of Dr. Jeffrey, injection of PCV-13 given by Norma Lizarraga. Patient instructed to remain in clinic for 15 minutes afterwards, and to report any adverse reaction to me immediately.       Screening performed by Norma Lizarraga on 10/15/2018 at 11:15 AM.

## 2018-10-15 NOTE — PROGRESS NOTES
"  SUBJECTIVE:   Oscar Chaudhary is a 82 year old male who presents to clinic today for the following health issues:      New Patient/Transfer of Care    Immunizations -       Due for shingrix vaccine as well as updating pneumo-vax      [facility]  CLINIC PROGRESS NOTE    Subjective:  Insomnia   Has been rotating benzodiazepines to help with sleep.  Taking only once or twice per month.    Need for vaccinations   He believes he has had a tetanus shot but many years ago, he has had a pneumonia vaccine but this is also over 10 years ago 2008.  He is due for shingles vaccination.  He did receive a flu shot in September and is curious if he might need a booster on this as well    Past medical history, medications, allergies, social history, family history reviewed and updated in EPIC as of 10/15/2018 .    ROS  CONSTITUTIONAL: no fatigue, no unexpected change in weight  SKIN: no worrisome rashes, no worrisome moles, no worrisome lesions  EYES: no acute vision problems or changes  ENT: no ear problems, no mouth problems, no throat problems  RESP: no significant cough, no shortness of breath  CV: no chest pain, no palpitations, no new or worsening peripheral edema  GI: no nausea, no vomiting, no constipation, no diarrhea  : no frequency, no dysuria, no hematuria  MS: no claudication, no myalgias, no joint aches  PSYCHIATRIC: no changes in mood or affect      Objective:  Vitals  /59 (BP Location: Left arm, Patient Position: Sitting, Cuff Size: Adult Regular)  Pulse 58  Temp 97.1  F (36.2  C) (Oral)  Ht 5' 3\" (1.6 m)  Wt 136 lb 1.6 oz (61.7 kg)  SpO2 96%  BMI 24.11 kg/m2  GEN: Alert Oriented x3 NAD  HEENT: Atraumatic, normocephalic, neck supple, no thyromegaly, negative cervical adenopathy  CV: RRR no murmurs or rubs  PULM: CTA no wheezes or crackles  ABD: Soft, nontender nondistended, no hepatosplenomegally  SKIN: No visible skin lesion or ulcerations  EXT:  No edema bilateral lower " extremities  NEURO: Gait and station deferred, No focal neurologic deficits  PSYCH: Mood good, affect mood congruent    No images are attached to the encounter.    No results found for this or any previous visit (from the past 24 hour(s)).    Assessment/Plan:  Patient Instructions   (Z23) Need for prophylactic vaccination with tetanus-diphtheria (Td)  (primary encounter diagnosis)  Comment: Recommend TDaP in the near future  Plan:     (Z23) Need for prophylactic vaccination against Streptococcus pneumoniae (pneumococcus)  Comment: Pneumonia vaccine Prevnar 13   Plan: Pneumococcal vaccine 13 valent PCV13 IM         (Prevnar) [00888]            (Z23) Need for shingles vaccine  Comment: Shingrix vaccine is also recommended 2-6 months follow up   Plan: ZOSTER VACCINE RECOMBINANT ADJUVANTED IM NJX               Follow up in 1 year    Disclaimer: This note consists of symbols derived from keyboarding, dictation and/or voice recognition software. As a result, there may be errors in the script that have gone undetected. Please consider this when interpreting information found in this chart.    Tristian Jeffrey MD  [phone]

## 2018-10-15 NOTE — PATIENT INSTRUCTIONS
(Z23) Need for prophylactic vaccination with tetanus-diphtheria (Td)  (primary encounter diagnosis)  Comment: Recommend TDaP in the near future  Plan:     (Z23) Need for prophylactic vaccination against Streptococcus pneumoniae (pneumococcus)  Comment: Pneumonia vaccine Prevnar 13   Plan: Pneumococcal vaccine 13 valent PCV13 IM         (Prevnar) [08027]            (Z23) Need for shingles vaccine  Comment: Shingrix vaccine is also recommended 2-6 months follow up   Plan: ZOSTER VACCINE RECOMBINANT ADJUVANTED IM NJX

## 2018-12-12 DIAGNOSIS — I73.9 PAD (PERIPHERAL ARTERY DISEASE) (H): ICD-10-CM

## 2018-12-12 DIAGNOSIS — I71.40 ABDOMINAL AORTIC ANEURYSM (AAA) WITHOUT RUPTURE (H): Primary | ICD-10-CM

## 2019-01-17 ENCOUNTER — TELEPHONE (OUTPATIENT)
Dept: CARDIOLOGY | Facility: CLINIC | Age: 84
End: 2019-01-17

## 2019-01-17 NOTE — TELEPHONE ENCOUNTER
Patient left a  asking us to call him.    ---------------------------  Called and patient said he was supposed to get a stress test, but has been putting it off.  His hip is bad now and he thinks he probably should get it done before they replace his hip.  Agreed and advised him I would make sure the order was in and then he would have to call and schedule.  He asked that I e-mail him the scheduling phone number as he was in the car right now.  Agreed with plan. Vibha Fuller RN on 1/17/2019 at 3:53 PM    Stress test was already ordered, and scheduling phone number was emailed to patient. Vibha Fuller RN on 1/17/2019 at 3:57 PM

## 2019-01-18 ENCOUNTER — TRANSFERRED RECORDS (OUTPATIENT)
Dept: HEALTH INFORMATION MANAGEMENT | Facility: CLINIC | Age: 84
End: 2019-01-18

## 2019-02-16 DIAGNOSIS — E78.5 HYPERLIPIDEMIA: Primary | ICD-10-CM

## 2019-02-16 DIAGNOSIS — Z95.1 S/P CABG (CORONARY ARTERY BYPASS GRAFT): ICD-10-CM

## 2019-02-18 DIAGNOSIS — Z95.1 S/P CABG (CORONARY ARTERY BYPASS GRAFT): ICD-10-CM

## 2019-02-18 DIAGNOSIS — I51.9 LV DYSFUNCTION: ICD-10-CM

## 2019-02-18 DIAGNOSIS — I25.10 CAD (CORONARY ARTERY DISEASE): Primary | ICD-10-CM

## 2019-02-18 RX ORDER — LISINOPRIL 5 MG/1
TABLET ORAL
Qty: 180 TABLET | Refills: 1 | Status: SHIPPED | OUTPATIENT
Start: 2019-02-18 | End: 2019-05-21

## 2019-02-18 RX ORDER — ATORVASTATIN CALCIUM 40 MG/1
40 TABLET, FILM COATED ORAL DAILY
Qty: 90 TABLET | Refills: 1 | Status: SHIPPED | OUTPATIENT
Start: 2019-02-18 | End: 2019-05-21

## 2019-02-18 NOTE — TELEPHONE ENCOUNTER
Medication Refill double check:    Last office visit was on 10/2/18 with .    Follow up was recommended for unclear.    Any additional encounters with changes to requested med? no    Authorizing provider is: Dr. Rangel    Refill was approved.     Additional orders/notes:      Labs  Component Value Flag Ref Range Units Status Collected Lab   Cholesterol 125   <200 mg/dL Final 03/08/2018  8:40 AM [facility]   Triglycerides 64   <150 mg/dL Final 03/08/2018  8:40 AM [facility]   HDL Cholesterol 57   >39 mg/dL Final 03/08/2018  8:40 AM [facility]   LDL Cholesterol Calculated 55   <100 mg/dL Final 03/08/2018  8:40 AM [facility]   Comment:           Vibha Fuller RN on 2/18/2019 at 9:19 AM

## 2019-02-19 RX ORDER — SPIRONOLACTONE 25 MG/1
TABLET ORAL
Qty: 45 TABLET | Refills: 3 | Status: SHIPPED | OUTPATIENT
Start: 2019-02-19 | End: 2019-05-29

## 2019-02-25 ENCOUNTER — TELEPHONE (OUTPATIENT)
Dept: CARDIOLOGY | Facility: CLINIC | Age: 84
End: 2019-02-25

## 2019-02-25 ENCOUNTER — HOSPITAL ENCOUNTER (OUTPATIENT)
Dept: CARDIOLOGY | Facility: CLINIC | Age: 84
Discharge: HOME | End: 2019-02-25
Attending: INTERNAL MEDICINE | Admitting: INTERNAL MEDICINE
Payer: COMMERCIAL

## 2019-02-25 DIAGNOSIS — Z95.1 S/P CABG (CORONARY ARTERY BYPASS GRAFT): ICD-10-CM

## 2019-02-25 DIAGNOSIS — I25.10 CAD (CORONARY ARTERY DISEASE): ICD-10-CM

## 2019-02-25 PROCEDURE — 93017 CV STRESS TEST TRACING ONLY: CPT

## 2019-02-25 PROCEDURE — 93018 CV STRESS TEST I&R ONLY: CPT | Performed by: INTERNAL MEDICINE

## 2019-02-25 PROCEDURE — 93016 CV STRESS TEST SUPVJ ONLY: CPT | Performed by: INTERNAL MEDICINE

## 2019-02-25 NOTE — TELEPHONE ENCOUNTER
[facility] Call Center    Phone Message    May a detailed message be left on voicemail: yes    Reason for Call: Other: Estefany calling with a question regarding the protocal for the stress test order.  Spike's appointment is at 3pm today in their clinic.  Please call Estefany or her counterpart Lukas back to discuss as soon as possible     Action Taken: Message routed to:  [facility] ([facility]):  Cardiology

## 2019-02-26 NOTE — TELEPHONE ENCOUNTER
I called and spoke with makayla and let her know that it needed to be Modified Farrah Messina protocol for the stress test. Gracia Gates RN on 2/26/2019 at 1:19 PM

## 2019-03-14 ENCOUNTER — PATIENT OUTREACH (OUTPATIENT)
Dept: CARDIOLOGY | Facility: CLINIC | Age: 84
End: 2019-03-14

## 2019-03-14 DIAGNOSIS — I25.10 CORONARY ARTERY DISEASE INVOLVING NATIVE CORONARY ARTERY OF NATIVE HEART WITHOUT ANGINA PECTORIS: Primary | ICD-10-CM

## 2019-03-14 NOTE — PROGRESS NOTES
I called pt to review the Stress Test.  I reviewed testing with Dr. Rangel and because it was an Exercise Stress test and he was unable to reach maximum heart rate he would need to complete a NM stress test for a definitive test.  I was unable to reach patient but did leave him a message requesting a call back. Gracia Gates RN on 3/14/2019 at 11:40 AM

## 2019-03-19 ENCOUNTER — HOSPITAL ENCOUNTER (OUTPATIENT)
Dept: CARDIOLOGY | Facility: CLINIC | Age: 84
Discharge: HOME | End: 2019-03-19
Attending: INTERNAL MEDICINE | Admitting: INTERNAL MEDICINE
Payer: COMMERCIAL

## 2019-03-19 ENCOUNTER — OFFICE VISIT (OUTPATIENT)
Dept: CARDIOLOGY | Facility: CLINIC | Age: 84
End: 2019-03-19
Payer: COMMERCIAL

## 2019-03-19 VITALS
BODY MASS INDEX: 22.93 KG/M2 | WEIGHT: 129.4 LBS | HEART RATE: 70 BPM | DIASTOLIC BLOOD PRESSURE: 52 MMHG | SYSTOLIC BLOOD PRESSURE: 122 MMHG | HEIGHT: 63 IN

## 2019-03-19 DIAGNOSIS — I25.10 ASHD (ARTERIOSCLEROTIC HEART DISEASE): Primary | ICD-10-CM

## 2019-03-19 DIAGNOSIS — Z12.5 ENCOUNTER FOR SCREENING FOR MALIGNANT NEOPLASM OF PROSTATE: ICD-10-CM

## 2019-03-19 DIAGNOSIS — R06.09 DYSPNEA ON EXERTION: ICD-10-CM

## 2019-03-19 DIAGNOSIS — R78.71 ABNORMAL LEAD LEVEL IN BLOOD: ICD-10-CM

## 2019-03-19 DIAGNOSIS — I25.10 ASHD (ARTERIOSCLEROTIC HEART DISEASE): ICD-10-CM

## 2019-03-19 LAB
ALBUMIN SERPL-MCNC: 3.5 G/DL (ref 3.4–5)
ALP SERPL-CCNC: 90 U/L (ref 40–150)
ALT SERPL W P-5'-P-CCNC: 23 U/L (ref 0–70)
ANION GAP SERPL CALCULATED.3IONS-SCNC: 11 MMOL/L (ref 3–14)
AST SERPL W P-5'-P-CCNC: 16 U/L (ref 0–45)
BILIRUB SERPL-MCNC: 0.5 MG/DL (ref 0.2–1.3)
BUN SERPL-MCNC: 11 MG/DL (ref 7–30)
CALCIUM SERPL-MCNC: 9 MG/DL (ref 8.5–10.1)
CHLORIDE SERPL-SCNC: 94 MMOL/L (ref 94–109)
CO2 SERPL-SCNC: 25 MMOL/L (ref 20–32)
CREAT SERPL-MCNC: 0.71 MG/DL (ref 0.66–1.25)
ERYTHROCYTE [DISTWIDTH] IN BLOOD BY AUTOMATED COUNT: 15.5 % (ref 10–15)
GFR SERPL CREATININE-BSD FRML MDRD: 88 ML/MIN/{1.73_M2}
GLUCOSE SERPL-MCNC: 105 MG/DL (ref 70–99)
HCT VFR BLD AUTO: 35 % (ref 40–53)
HGB BLD-MCNC: 12.4 G/DL (ref 13.3–17.7)
IRON SATN MFR SERPL: 38 % (ref 15–46)
IRON SERPL-MCNC: 102 UG/DL (ref 35–180)
MCH RBC QN AUTO: 32.7 PG (ref 26.5–33)
MCHC RBC AUTO-ENTMCNC: 35.4 G/DL (ref 31.5–36.5)
MCV RBC AUTO: 92 FL (ref 78–100)
NT-PROBNP SERPL-MCNC: 1423 PG/ML (ref 0–450)
PLATELET # BLD AUTO: 352 10E9/L (ref 150–450)
POTASSIUM SERPL-SCNC: 4.2 MMOL/L (ref 3.4–5.3)
PROT SERPL-MCNC: 6.9 G/DL (ref 6.8–8.8)
PSA SERPL-ACNC: 0.08 UG/L (ref 0–4)
RBC # BLD AUTO: 3.79 10E12/L (ref 4.4–5.9)
SODIUM SERPL-SCNC: 130 MMOL/L (ref 133–144)
TIBC SERPL-MCNC: 271 UG/DL (ref 240–430)
TSH SERPL DL<=0.005 MIU/L-ACNC: 0.76 MU/L (ref 0.4–4)
WBC # BLD AUTO: 10.4 10E9/L (ref 4–11)

## 2019-03-19 PROCEDURE — 84443 ASSAY THYROID STIM HORMONE: CPT | Performed by: INTERNAL MEDICINE

## 2019-03-19 PROCEDURE — 25500064 ZZH RX 255 OP 636: Performed by: INTERNAL MEDICINE

## 2019-03-19 PROCEDURE — 40000264 ECHOCARDIOGRAM COMPLETE

## 2019-03-19 PROCEDURE — 83550 IRON BINDING TEST: CPT | Performed by: INTERNAL MEDICINE

## 2019-03-19 PROCEDURE — 93306 TTE W/DOPPLER COMPLETE: CPT | Mod: 26 | Performed by: INTERNAL MEDICINE

## 2019-03-19 PROCEDURE — 85027 COMPLETE CBC AUTOMATED: CPT | Performed by: INTERNAL MEDICINE

## 2019-03-19 PROCEDURE — 83540 ASSAY OF IRON: CPT | Performed by: INTERNAL MEDICINE

## 2019-03-19 PROCEDURE — 36415 COLL VENOUS BLD VENIPUNCTURE: CPT | Performed by: INTERNAL MEDICINE

## 2019-03-19 PROCEDURE — G0103 PSA SCREENING: HCPCS | Performed by: INTERNAL MEDICINE

## 2019-03-19 PROCEDURE — 80053 COMPREHEN METABOLIC PANEL: CPT | Performed by: INTERNAL MEDICINE

## 2019-03-19 PROCEDURE — 83880 ASSAY OF NATRIURETIC PEPTIDE: CPT | Performed by: INTERNAL MEDICINE

## 2019-03-19 PROCEDURE — 99214 OFFICE O/P EST MOD 30 MIN: CPT | Performed by: INTERNAL MEDICINE

## 2019-03-19 RX ORDER — POTASSIUM CHLORIDE 1500 MG/1
20 TABLET, EXTENDED RELEASE ORAL
Status: CANCELLED | OUTPATIENT
Start: 2019-03-19

## 2019-03-19 RX ORDER — SODIUM CHLORIDE 9 MG/ML
INJECTION, SOLUTION INTRAVENOUS CONTINUOUS
Status: CANCELLED | OUTPATIENT
Start: 2019-03-19

## 2019-03-19 RX ORDER — LIDOCAINE 40 MG/G
CREAM TOPICAL
Status: CANCELLED | OUTPATIENT
Start: 2019-03-19

## 2019-03-19 RX ADMIN — HUMAN ALBUMIN MICROSPHERES AND PERFLUTREN 3 ML: 10; .22 INJECTION, SOLUTION INTRAVENOUS at 13:45

## 2019-03-19 ASSESSMENT — MIFFLIN-ST. JEOR: SCORE: 1187.08

## 2019-03-19 NOTE — LETTER
3/19/2019    Tristian Jeffrey MD, MD  [address]  [facility] [address]    RE: Oscar Chaudhary       Dear Colleague,    I had the pleasure of seeing Oscar Chaudhary in the [facility].    Jerrod Rangel M.D.  Cardiovascular Medicine    I personally saw and examined this patient, discussed care with housestaff and other consultants, reviewed current laboratories and imaging studies, and conveyed impression and diagnostic/therapeutic plan to patient.    Problem List  1. 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 Sequential, old records not in EMR)  2. History of prostate cancer treated with radiation  3. Exertional dyspnea  4. AAA  5. Hyperlipidemia  6. ASPVD with stenting    Left iliac stent   Right iliac stent with elevated velocities  7. Abdominal aortic aneurysm                      Infrarenal abdominal aortic aneurysm measuring 3.8 cm AP x 3.9 cm                       transverse, x 4.6 cm in length. Suspect previous measurements were                         slight over estimation. Recommend continued annual surveillance. At least                              moderate stenosis of the left common iliac artery,                       possibly severe.  8. History of jaw infection   9. 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  He has past medical history of CAB. He has no paroxysmal nocturnal dyspnea, orthopnea, ankle edema, palpitation, pre-syncope or syncope.  He has no interim history of TIA, CVA, stroke.    REVIEW of SYMPTOMS    Constitutional: without fever, chills, night sweats.  Weight is down 3 or 4 pounds  HEENT: without dry eyes, dry mouth, sinusitis, corryza, visual changes  Endocrine: without polyuria, polydypsia,  "polyphagia, heat or cold intolerance, changing mental status  Cardiology: without chest pain, tightness, heaviness, pressure, paroxysmal dyspnea, orthopnea, palpitation, pre-syncope or syncope or device discharge (if present)  Pulmonary: without asthma, wheezing, cough, hemoptysis  GI: without nausea, emesis, jaundice, pain, hematemesis, melena  : without frequency, urgency, hematuria, stones, pain, abnormal bleeding, frequency, urgency but nocturia  Neurologic: without TIA, CVA, trauma, seizure  Dermatologic: without lesions, abrasion rash,   Orthopedic/Rheum: without significant joint pain, impairment, limb, polyserositis, ulceration, Raynauds  Heme: without mass, bruising, frequent infection, anemia  Psychiatric: without substance abuse, hallucination, medication, depression    Exercise tolerance: reduced    Objective  /52   Pulse 70   Ht 1.6 m (5' 3\")   Wt 58.7 kg (129 lb 6.4 oz)   BMI 22.92 kg/m      Constitutional: alert, oriented, normal gait and station, normal mentation.  Oral: moist mucous membranes  Lymph: without pathologic adenopathy  Chest: clear to ausculation and percussion  Cor: No evidence of left or right ventricular activity.  Rhythm is regular.  S1 normal, S2 split physiologically. Murmurs are not present  Abdomen: without tenderness, rebound, guarding, masses, ascites  Extremities: Edema not present, left radial and ulnar pulses present by palpation and dopler  Neuro: no focal defects, normal mentation  Skin: without open lesions  Psych: oriente d, verbal, mental status in tact  Wt Readings from Last 5 Encounters:   03/19/19 58.7 kg (129 lb 6.4 oz)   10/15/18 61.7 kg (136 lb 1.6 oz)   10/02/18 62.8 kg (138 lb 6.4 oz)   03/08/18 63.2 kg (139 lb 4.8 oz)   02/27/18 63.8 kg (140 lb 9.6 oz)       Meds  Current Outpatient Medications   Medication     acetaminophen (TYLENOL) 325 MG tablet     aspirin 81 MG tablet     atenolol (TENORMIN) 25 MG tablet     atorvastatin (LIPITOR) 40 MG tablet "     Cholecalciferol (VITAMIN D) 1000 UNITS capsule     clopidogrel (PLAVIX) 75 MG tablet     diazepam (VALIUM) 5 MG tablet     fish oil-omega-3 fatty acids (FISH OIL) 1000 MG capsule     lisinopril (PRINIVIL/ZESTRIL) 5 MG tablet     Multiple Vitamins-Minerals (MULTIVITAL PO)     omeprazole (PRILOSEC) 20 MG CR capsule     spironolactone (ALDACTONE) 25 MG tablet     temazepam (RESTORIL) 15 MG capsule     No current facility-administered medications for this visit.      Labs/pending      Imaging /pending    Assessment/Plan     The patient has history of ASHD with CAB 19 years ago.  He now has exertional symptoms compatible with angina and + non-indicator stress test.      The patient has known ASPVD with previous stenting.      The patient lives alone and advised to have overnight person should he be discharged, otherwise should be admitted for observation status.    Laboratories ordered    The risks and benefits of invasive angiography, stenting and alternatives were discussed    Jerrod Rangel M.D.  [phone]            Thank you for allowing me to participate in the care of your patient.      Sincerely,     Jerrod Rangel MD     [facility]

## 2019-03-19 NOTE — PATIENT INSTRUCTIONS
Medication Changes:  No medication changes at this time. Please continue current medication regiment.    Patient Instructions:  1. Continue staying active and eat a heart healthy diet.    2. Please keep current list of medications with you at all times.    3. Remember to weigh yourself daily after voiding and before you consume any food or beverages and log the numbers.  If you have gained/lost 2 pounds overnight or 5 pounds in a week contact us immediately for medication adjustments or further instructions.    4. **Please call us immediately if you have any syncope (fainting or passing out), chest pain, edema (swelling or weight gain), or decline in your functional status (general decline in how you are feeling).      Check-In  Time Check-In Location Estimated Length Procedure   Name        [facility]  waiting room  minutes Angiogram**     Procedure Preparations & Instructions     This is an invasive procedure that DOES require preparation:    - Nothing to eat or drink for 6 hours   - A ride should be arranged for you as you will be unable to drive home.  You will be required to lay flat for approximately 2-4 hours in the recovery unit to ensure proper clotting of the artery.  - Take 325 mg of Asprin 24 hours prior to procedure and morning of procedure.      Follow up Appointment Information:  Follow up after Angiogram.        For scheduling at [facility] please call [phone]  For scheduling at the [facility] please call [phone]  We are located on the second floor [address] at the [facility].  Our address is     [address] [place] [address] [facility]   [address]  [place]  [address]    Thank you for allowing us to be a part of your care here at the [facility]    If you have questions or concerns please contact us at:    Gracia Gates, RN, BSN      Nurse Coordinator       Pulmonary Hypertension     [facility]   [phone])[phone]  (F)[phone]    ** Please note  that you will NOT receive a reminder call regarding your scheduled testing, reminder calls are for provider appointments only.  If you are scheduled for testing within the [facility] system you may receive a call regarding pre-registration for billing purposes only.**     Remember to weigh yourself daily after voiding and before you consume any food or beverages and log the numbers.  If you have gained/lost 2 pounds overnight or 5 pounds in a week contact us immediately for medication adjustments or further instructions.    Support Group:  Pulmonary Hypertension Association  Https://www.phassociation.org/  **Look at the Events Tab** They even have Support Groups that you can call into    [facility] PH Support Group  Second Saturday of the Month from 1-3 PM   Location: [address] [place] [address]  Leader: Carola Awan and Dalila Caicedo  Phone: [phone] or [phone]  Email: [email or web address]

## 2019-03-19 NOTE — PROGRESS NOTES
Jerrod Rangel M.D.  Cardiovascular Medicine    I personally saw and examined this patient, discussed care with housestaff and other consultants, reviewed current laboratories and imaging studies, and conveyed impression and diagnostic/therapeutic plan to patient.    Problem List  1. 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 Sequential, old records not in EMR)  2. History of prostate cancer treated with radiation  3. Exertional dyspnea  4. AAA  5. Hyperlipidemia  6. ASPVD with stenting    Left iliac stent   Right iliac stent with elevated velocities  7. Abdominal aortic aneurysm                      Infrarenal abdominal aortic aneurysm measuring 3.8 cm AP x 3.9 cm                       transverse, x 4.6 cm in length. Suspect previous measurements were                         slight over estimation. Recommend continued annual surveillance. At least                              moderate stenosis of the left common iliac artery,                       possibly severe.  8. History of jaw infection   9. 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  He has past medical history of CAB. He has no paroxysmal nocturnal dyspnea, orthopnea, ankle edema, palpitation, pre-syncope or syncope.  He has no interim history of TIA, CVA, stroke.    REVIEW of SYMPTOMS    Constitutional: without fever, chills, night sweats.  Weight is down 3 or 4 pounds  HEENT: without dry eyes, dry mouth, sinusitis, corryza, visual changes  Endocrine: without polyuria, polydypsia, polyphagia, heat or cold intolerance, changing mental status  Cardiology: without chest pain, tightness, heaviness, pressure, paroxysmal dyspnea, orthopnea, palpitation, pre-syncope or syncope or device discharge (if present)  Pulmonary:  "without asthma, wheezing, cough, hemoptysis  GI: without nausea, emesis, jaundice, pain, hematemesis, melena  : without frequency, urgency, hematuria, stones, pain, abnormal bleeding, frequency, urgency but nocturia  Neurologic: without TIA, CVA, trauma, seizure  Dermatologic: without lesions, abrasion rash,   Orthopedic/Rheum: without significant joint pain, impairment, limb, polyserositis, ulceration, Raynauds  Heme: without mass, bruising, frequent infection, anemia  Psychiatric: without substance abuse, hallucination, medication, depression    Exercise tolerance: reduced    Objective  /52   Pulse 70   Ht 1.6 m (5' 3\")   Wt 58.7 kg (129 lb 6.4 oz)   BMI 22.92 kg/m     Constitutional: alert, oriented, normal gait and station, normal mentation.  Oral: moist mucous membranes  Lymph: without pathologic adenopathy  Chest: clear to ausculation and percussion  Cor: No evidence of left or right ventricular activity.  Rhythm is regular.  S1 normal, S2 split physiologically. Murmurs are not present  Abdomen: without tenderness, rebound, guarding, masses, ascites  Extremities: Edema not present, left radial and ulnar pulses present by palpation and dopler  Neuro: no focal defects, normal mentation  Skin: without open lesions  Psych: oriente d, verbal, mental status in tact  Wt Readings from Last 5 Encounters:   03/19/19 58.7 kg (129 lb 6.4 oz)   10/15/18 61.7 kg (136 lb 1.6 oz)   10/02/18 62.8 kg (138 lb 6.4 oz)   03/08/18 63.2 kg (139 lb 4.8 oz)   02/27/18 63.8 kg (140 lb 9.6 oz)       Meds  Current Outpatient Medications   Medication     acetaminophen (TYLENOL) 325 MG tablet     aspirin 81 MG tablet     atenolol (TENORMIN) 25 MG tablet     atorvastatin (LIPITOR) 40 MG tablet     Cholecalciferol (VITAMIN D) 1000 UNITS capsule     clopidogrel (PLAVIX) 75 MG tablet     diazepam (VALIUM) 5 MG tablet     fish oil-omega-3 fatty acids (FISH OIL) 1000 MG capsule     lisinopril (PRINIVIL/ZESTRIL) 5 MG tablet     " Multiple Vitamins-Minerals (MULTIVITAL PO)     omeprazole (PRILOSEC) 20 MG CR capsule     spironolactone (ALDACTONE) 25 MG tablet     temazepam (RESTORIL) 15 MG capsule     No current facility-administered medications for this visit.      Labs/pending      Imaging /pending    Assessment/Plan     The patient has history of ASHD with CAB 19 years ago.  He now has exertional symptoms compatible with angina and + non-indicator stress test.      The patient has known ASPVD with previous stenting.      The patient lives alone and advised to have overnight person should he be discharged, otherwise should be admitted for observation status.    Laboratories ordered    The risks and benefits of invasive angiography, stenting and alternatives were discussed    Jerrod Rangel M.D.  [phone]

## 2019-03-19 NOTE — NURSING NOTE
Left Heart Catheterization: Patient given Coronary Angiogram and Angioplasty: A Patient's Guide booklet. Patient was instructed regarding left heart catheterization, including discussion of the indication, procedure, preparation, intra-procedural steps, and recovery at home. Patient demonstrated understanding of this information and agreed to call with further questions or concerns.  Med Reconcile: Reviewed and verified all current medications with the patient. The updated medication list was printed and given to the patient.  Diet: Patient instructed regarding a heart healthy diet, including discussion of reduced fat and sodium intake. Patient demonstrated understanding of this information and agreed to call with further questions or concerns.  Return Appointment: Patient given instructions regarding scheduling next clinic visit. Patient demonstrated understanding of this information and agreed to call with further questions or concerns.  Patient stated he understood all health information given and agreed to call with further questions or concerns.     Medication Changes:  No medication changes at this time. Please continue current medication regiment.    Patient Instructions:  1. Continue staying active and eat a heart healthy diet.    2. Please keep current list of medications with you at all times.    3. Remember to weigh yourself daily after voiding and before you consume any food or beverages and log the numbers.  If you have gained/lost 2 pounds overnight or 5 pounds in a week contact us immediately for medication adjustments or further instructions.    4. **Please call us immediately if you have any syncope (fainting or passing out), chest pain, edema (swelling or weight gain), or decline in your functional status (general decline in how you are feeling).      Check-In  Time Check-In Location Estimated Length Procedure   Name        [facility]  waiting room  minutes Angiogram**     Procedure Preparations &  Instructions     This is an invasive procedure that DOES require preparation:    - Nothing to eat or drink for 6 hours   - A ride should be arranged for you as you will be unable to drive home.  You will be required to lay flat for approximately 2-4 hours in the recovery unit to ensure proper clotting of the artery.  - Take 325 mg of Asprin 24 hours prior to procedure and morning of procedure.      Follow up Appointment Information:  Follow up after Angiogram.

## 2019-03-21 ENCOUNTER — SURGERY (OUTPATIENT)
Age: 84
End: 2019-03-21
Payer: COMMERCIAL

## 2019-03-21 ENCOUNTER — HOSPITAL ENCOUNTER (OUTPATIENT)
Facility: CLINIC | Age: 84
Discharge: HOME | End: 2019-03-21
Admitting: INTERNAL MEDICINE
Payer: COMMERCIAL

## 2019-03-21 VITALS
DIASTOLIC BLOOD PRESSURE: 53 MMHG | OXYGEN SATURATION: 96 % | BODY MASS INDEX: 22.32 KG/M2 | HEIGHT: 63 IN | RESPIRATION RATE: 16 BRPM | HEART RATE: 69 BPM | SYSTOLIC BLOOD PRESSURE: 93 MMHG | TEMPERATURE: 97 F | WEIGHT: 126 LBS

## 2019-03-21 DIAGNOSIS — Z98.61 STATUS POST PERCUTANEOUS TRANSLUMINAL CORONARY ANGIOPLASTY: Primary | ICD-10-CM

## 2019-03-21 DIAGNOSIS — I25.10 ASHD (ARTERIOSCLEROTIC HEART DISEASE): ICD-10-CM

## 2019-03-21 PROBLEM — Z98.890 STATUS POST CORONARY ANGIOGRAM: Status: ACTIVE | Noted: 2019-03-21

## 2019-03-21 LAB
ANION GAP SERPL CALCULATED.3IONS-SCNC: 8 MMOL/L (ref 3–14)
BUN SERPL-MCNC: 18 MG/DL (ref 7–30)
CALCIUM SERPL-MCNC: 8.8 MG/DL (ref 8.5–10.1)
CHLORIDE SERPL-SCNC: 97 MMOL/L (ref 94–109)
CO2 SERPL-SCNC: 26 MMOL/L (ref 20–32)
CREAT SERPL-MCNC: 0.82 MG/DL (ref 0.66–1.25)
GFR SERPL CREATININE-BSD FRML MDRD: 83 ML/MIN/{1.73_M2}
GLUCOSE SERPL-MCNC: 98 MG/DL (ref 70–99)
INR PPP: 0.99 (ref 0.86–1.14)
POTASSIUM SERPL-SCNC: 4.4 MMOL/L (ref 3.4–5.3)
SODIUM SERPL-SCNC: 131 MMOL/L (ref 133–144)

## 2019-03-21 PROCEDURE — 93571 IV DOP VEL&/PRESS C FLO 1ST: CPT | Mod: 52 | Performed by: INTERNAL MEDICINE

## 2019-03-21 PROCEDURE — 93459 L HRT ART/GRFT ANGIO: CPT | Performed by: INTERNAL MEDICINE

## 2019-03-21 PROCEDURE — 93455 CORONARY ART/GRFT ANGIO S&I: CPT | Mod: 26 | Performed by: INTERNAL MEDICINE

## 2019-03-21 PROCEDURE — 25000128 H RX IP 250 OP 636: Performed by: INTERNAL MEDICINE

## 2019-03-21 PROCEDURE — 80048 BASIC METABOLIC PNL TOTAL CA: CPT

## 2019-03-21 PROCEDURE — 93010 ELECTROCARDIOGRAM REPORT: CPT | Performed by: INTERNAL MEDICINE

## 2019-03-21 PROCEDURE — C1887 CATHETER, GUIDING: HCPCS | Performed by: INTERNAL MEDICINE

## 2019-03-21 PROCEDURE — 40000235 ZZH STATISTIC TELEMETRY

## 2019-03-21 PROCEDURE — 25800030 ZZH RX IP 258 OP 636: Performed by: INTERNAL MEDICINE

## 2019-03-21 PROCEDURE — 36415 COLL VENOUS BLD VENIPUNCTURE: CPT

## 2019-03-21 PROCEDURE — 93005 ELECTROCARDIOGRAM TRACING: CPT

## 2019-03-21 PROCEDURE — 92928 PRQ TCAT PLMT NTRAC ST 1 LES: CPT | Mod: RI | Performed by: INTERNAL MEDICINE

## 2019-03-21 PROCEDURE — C1725 CATH, TRANSLUMIN NON-LASER: HCPCS | Performed by: INTERNAL MEDICINE

## 2019-03-21 PROCEDURE — C1769 GUIDE WIRE: HCPCS | Performed by: INTERNAL MEDICINE

## 2019-03-21 PROCEDURE — C1894 INTRO/SHEATH, NON-LASER: HCPCS | Performed by: INTERNAL MEDICINE

## 2019-03-21 PROCEDURE — 25000125 ZZHC RX 250: Performed by: INTERNAL MEDICINE

## 2019-03-21 PROCEDURE — 25000132 ZZH RX MED GY IP 250 OP 250 PS 637: Performed by: INTERNAL MEDICINE

## 2019-03-21 PROCEDURE — 85610 PROTHROMBIN TIME: CPT

## 2019-03-21 PROCEDURE — 40000065 ZZH STATISTIC EKG NON-CHARGEABLE

## 2019-03-21 PROCEDURE — C9600 PERC DRUG-EL COR STENT SING: HCPCS | Performed by: INTERNAL MEDICINE

## 2019-03-21 PROCEDURE — 85347 COAGULATION TIME ACTIVATED: CPT

## 2019-03-21 PROCEDURE — 27210794 ZZH OR GENERAL SUPPLY STERILE: Performed by: INTERNAL MEDICINE

## 2019-03-21 PROCEDURE — C1874 STENT, COATED/COV W/DEL SYS: HCPCS | Performed by: INTERNAL MEDICINE

## 2019-03-21 PROCEDURE — 40000852 ZZH STATISTIC HEART CATH LAB OR EP LAB

## 2019-03-21 DEVICE — STENT SYNERGY DRUG ELUTING 4.00X16MM  H7493926016400: Type: IMPLANTABLE DEVICE | Status: FUNCTIONAL

## 2019-03-21 RX ORDER — ASPIRIN 81 MG/1
81 TABLET ORAL DAILY
Status: DISCONTINUED | OUTPATIENT
Start: 2019-03-21 | End: 2019-03-21 | Stop reason: HOSPADM

## 2019-03-21 RX ORDER — CLOPIDOGREL BISULFATE 75 MG/1
75 TABLET ORAL ONCE
Status: DISCONTINUED | OUTPATIENT
Start: 2019-03-22 | End: 2019-03-21 | Stop reason: HOSPADM

## 2019-03-21 RX ORDER — HEPARIN SODIUM 1000 [USP'U]/ML
INJECTION, SOLUTION INTRAVENOUS; SUBCUTANEOUS
Status: DISCONTINUED | OUTPATIENT
Start: 2019-03-21 | End: 2019-03-21 | Stop reason: HOSPADM

## 2019-03-21 RX ORDER — ACETAMINOPHEN 325 MG/1
325-650 TABLET ORAL EVERY 4 HOURS PRN
Status: DISCONTINUED | OUTPATIENT
Start: 2019-03-21 | End: 2019-03-21 | Stop reason: HOSPADM

## 2019-03-21 RX ORDER — SODIUM CHLORIDE 9 MG/ML
INJECTION, SOLUTION INTRAVENOUS CONTINUOUS
Status: DISCONTINUED | OUTPATIENT
Start: 2019-03-21 | End: 2019-03-21 | Stop reason: HOSPADM

## 2019-03-21 RX ORDER — LIDOCAINE 40 MG/G
CREAM TOPICAL
Status: DISCONTINUED | OUTPATIENT
Start: 2019-03-21 | End: 2019-03-21 | Stop reason: HOSPADM

## 2019-03-21 RX ORDER — IOPAMIDOL 755 MG/ML
INJECTION, SOLUTION INTRAVASCULAR
Status: DISCONTINUED | OUTPATIENT
Start: 2019-03-21 | End: 2019-03-21 | Stop reason: HOSPADM

## 2019-03-21 RX ORDER — NALOXONE HYDROCHLORIDE 0.4 MG/ML
.1-.4 INJECTION, SOLUTION INTRAMUSCULAR; INTRAVENOUS; SUBCUTANEOUS
Status: DISCONTINUED | OUTPATIENT
Start: 2019-03-21 | End: 2019-03-21 | Stop reason: HOSPADM

## 2019-03-21 RX ORDER — POTASSIUM CHLORIDE 1500 MG/1
20 TABLET, EXTENDED RELEASE ORAL
Status: DISCONTINUED | OUTPATIENT
Start: 2019-03-21 | End: 2019-03-21 | Stop reason: HOSPADM

## 2019-03-21 RX ORDER — NITROGLYCERIN 0.4 MG/1
0.4 TABLET SUBLINGUAL EVERY 5 MIN PRN
Status: DISCONTINUED | OUTPATIENT
Start: 2019-03-21 | End: 2019-03-21 | Stop reason: HOSPADM

## 2019-03-21 RX ORDER — CLOPIDOGREL BISULFATE 75 MG/1
TABLET ORAL
Status: DISCONTINUED | OUTPATIENT
Start: 2019-03-21 | End: 2019-03-21 | Stop reason: HOSPADM

## 2019-03-21 RX ORDER — CLOPIDOGREL BISULFATE 75 MG/1
75 TABLET ORAL DAILY
Qty: 90 TABLET | Refills: 3 | Status: SHIPPED | OUTPATIENT
Start: 2019-03-22 | End: 2019-05-21

## 2019-03-21 RX ORDER — NALOXONE HYDROCHLORIDE 0.4 MG/ML
.2-.4 INJECTION, SOLUTION INTRAMUSCULAR; INTRAVENOUS; SUBCUTANEOUS
Status: DISCONTINUED | OUTPATIENT
Start: 2019-03-21 | End: 2019-03-21 | Stop reason: HOSPADM

## 2019-03-21 RX ORDER — FENTANYL CITRATE 50 UG/ML
25-50 INJECTION, SOLUTION INTRAMUSCULAR; INTRAVENOUS
Status: DISCONTINUED | OUTPATIENT
Start: 2019-03-21 | End: 2019-03-21 | Stop reason: HOSPADM

## 2019-03-21 RX ORDER — FLUMAZENIL 0.1 MG/ML
0.2 INJECTION, SOLUTION INTRAVENOUS
Status: DISCONTINUED | OUTPATIENT
Start: 2019-03-21 | End: 2019-03-21 | Stop reason: HOSPADM

## 2019-03-21 RX ORDER — HYDROCODONE BITARTRATE AND ACETAMINOPHEN 5; 325 MG/1; MG/1
1-2 TABLET ORAL EVERY 4 HOURS PRN
Status: DISCONTINUED | OUTPATIENT
Start: 2019-03-21 | End: 2019-03-21 | Stop reason: HOSPADM

## 2019-03-21 RX ORDER — ATROPINE SULFATE 0.1 MG/ML
0.5 INJECTION INTRAVENOUS EVERY 5 MIN PRN
Status: DISCONTINUED | OUTPATIENT
Start: 2019-03-21 | End: 2019-03-21 | Stop reason: HOSPADM

## 2019-03-21 RX ADMIN — HEPARIN SODIUM 5000 UNITS: 1000 INJECTION, SOLUTION INTRAVENOUS; SUBCUTANEOUS at 13:37

## 2019-03-21 RX ADMIN — IOPAMIDOL 220 ML: 755 INJECTION, SOLUTION INTRAVENOUS at 14:41

## 2019-03-21 RX ADMIN — HEPARIN SODIUM 2500 UNITS: 1000 INJECTION, SOLUTION INTRAVENOUS; SUBCUTANEOUS at 14:21

## 2019-03-21 RX ADMIN — SODIUM CHLORIDE: 9 INJECTION, SOLUTION INTRAVENOUS at 11:45

## 2019-03-21 RX ADMIN — LIDOCAINE HYDROCHLORIDE 1 ML: 10 INJECTION, SOLUTION EPIDURAL; INFILTRATION; INTRACAUDAL; PERINEURAL at 13:28

## 2019-03-21 RX ADMIN — CLOPIDOGREL BISULFATE 300 MG: 75 TABLET ORAL at 14:22

## 2019-03-21 ASSESSMENT — MIFFLIN-ST. JEOR: SCORE: 1171.66

## 2019-03-21 NOTE — PROGRESS NOTES
Assessment completed, VS stable, denies any pain or discomfort. Up ad trae, consent signed. Labs reviewed. Education is completed. Pt is ready for procedure. All questions are answered. Family at bedside and well informed.

## 2019-03-21 NOTE — Clinical Note
The first balloon was inserted into the ramus and ramus.Max pressure = 16 quan. Total duration = 25 seconds.     Max pressure = 20 quan. Total duration = 20 seconds.   Post stent dilitation.  Balloon reinflated a second time: Max pressure = 20 quan. Total duration = 20 seconds.

## 2019-03-21 NOTE — PROGRESS NOTES
1650 Report received from Cholo Booker RN.  1700 Pt A/O. TR band intact to left wrist.  No oozing or hematoma noted. Area soft & flat. Left arm elevated on pillows. Armboard intact. Pt denies pain.  Activity restrictions with left wrist reinforced with pt. Verbal understanding received. Pt's son at bedside. Detailed update given.   1730 Pt needs frequent reminders not to move his left arm/hand. Air released from TR band per protocol.  1845 TR band removed. Bandaid applied to site. Area soft & flat.  1900 OOB - steady on feet. Ambulated in halls to bathroom with good donna. No change in puncture site assessment with activity.  1915 Ready to go home.   1920 As pt attempting to stand from sitting position, he pushed with his left hand/fingers up off the bed. Small hematoma noted. Manual pressure applied x 7 min until area softened.  1930 No oozing, no further hematoma, area softer & bruised. Bandaid applied. Armboard remains intact.  2000 Site unchanged. Area bruised, but soft & flat.   2013 Pt discharged per w/c to private vehicle. All personal belongings taken w/ pt.

## 2019-03-21 NOTE — Clinical Note
The first balloon was inserted into the ramus and ramus.Max pressure = 16 quan. Total duration = 34 seconds.

## 2019-03-21 NOTE — PROGRESS NOTES
VS stable post procedure, left radial site CDI, no hematoma, slightly discolored but denies any pain or numbness. Arm board on.Taking po fluids well, has voided several times. Discharge instruction is given to pt.  All questions are answered. Son is at the bedside. Report to Stephanie for continuing care.

## 2019-03-21 NOTE — DISCHARGE INSTRUCTIONS
Cardiac Angioplasty/Stent Discharge Instructions - Radial    After you go home:      Have an adult stay with you until tomorrow.    Drink extra fluids for 2 days.    You may resume your normal diet.    No smoking       For 24 hours - due to the sedation you received:    Relax and take it easy.    Do NOT make any important or legal decisions.    Do NOT drive or operate machines at home or at work.    Do NOT drink alcohol.    Care of Wrist Puncture Site:      For the first 24 hrs - check the puncture site every 1-2 hours while awake.    It is normal to have soreness at the puncture site and mild tingling in your hand for up to 3 days.    Remove the bandaid after 24 hours. If there is minor oozing, apply another bandaid and remove it after 12 hours.    You may shower tomorrow.  Do NOT take a bath, or use a hot tub or pool for at least 3 days. Do NOT scrub the site. Do not use lotion or powder near the puncture site.           Activity:          For 2 days:     do not use your hand or arm to support your weight (such as rising from a chair)     do not bend your wrist (such as lifting a garage door).    do not lift more than 5 pounds or exercise your arm (such as tennis, golf or bowling).    Do NOT do any heavy activity such as exercise, lifting, or straining.     Bleeding:      If you start bleeding from the site in your wrist, sit down and press firmly on/above the site for 10 minutes.     Once bleeding stops, keep arm still for 2 hours.     Call [facility] as soon as you can.       Call 911 right away if you have heavy bleeding or bleeding that does not stop.      Medicines:      If you are taking an antiplatelet medication such as Plavix, Brilinta or Effient, do not stop taking it until you talk to your cardiologist.          If you have stopped any medicines, check with your provider about when to restart them.    Follow Up Appointments:      Follow up with [facility] Nurse Practitioner at [facility] of patient  preference in 7-10 days.    Cardiac Rehab will contact you for follow up care.    Call the clinic if:      You have a large or growing hard lump around the site.    The site is red, swollen, hot or tender.    Blood or fluid is draining from the site.    You have chills or a fever greater than 101 F (38 C).    Your arm feels numb, cool or changes color.    You have hives, a rash or unusual itching.    Any questions or concerns.    Other Instructions:      If you received a stent - carry your stent card with you at all times.      [facility] [place]:    [phone] [facility] (7 days a week)

## 2019-03-22 LAB — KCT BLD-ACNC: 355 SEC (ref 75–150)

## 2019-03-23 LAB
INTERPRETATION ECG - MUSE: NORMAL
INTERPRETATION ECG - MUSE: NORMAL

## 2019-03-25 DIAGNOSIS — I15.8 OTHER SECONDARY HYPERTENSION: ICD-10-CM

## 2019-03-28 RX ORDER — ATENOLOL 25 MG/1
TABLET ORAL
Qty: 90 TABLET | Refills: 3 | Status: SHIPPED | OUTPATIENT
Start: 2019-03-28 | End: 2019-04-02

## 2019-04-01 NOTE — PROGRESS NOTES
Jerrod Rangel M.D.  Cardiovascular Medicine    I personally saw and examined this patient, discussed care with housestaff and other consultants, reviewed current laboratories and imaging studies, and conveyed impression and diagnostic/therapeutic plan to patient.    Problem List  1. 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 Sequential, old records not in EMR)  2. History of prostate cancer treated with radiation  3. Exertional dyspnea  4. AAA  5. Hyperlipidemia  6. ASPVD with stenting    Left iliac stent   Right iliac stent with elevated velocities  7. Abdominal aortic aneurysm                      Infrarenal abdominal aortic aneurysm measuring 3.8 cm AP x 3.9 cm                       transverse, x 4.6 cm in length. Suspect previous measurements were                         slight over estimation. Recommend continued annual surveillance. At least                              moderate stenosis of the left common iliac artery,                       possibly severe.  8. History of jaw infection   9. History if previous difficult intubation with jaw and neck infection  10. Recent cardiac stenting    History    The patient underwent recent angioplasty of ramus branch.  LIMA graft is widely patent. The study was done from the left wrist.  He is improved but continues to have intermittent, unpredictable exertional shortness of breath without chest pain or nocturnal component. The patient has no pleuritic chest pain or hemoptysis.    Wt Readings from Last 24 Encounters:   04/02/19 59.1 kg (130 lb 4.8 oz)   03/21/19 57.2 kg (126 lb)   03/19/19 58.7 kg (129 lb 6.4 oz)   10/15/18 61.7 kg (136 lb 1.6 oz)   10/02/18 62.8 kg (138 lb 6.4 oz)   03/08/18 63.2 kg (139 lb 4.8 oz)   02/27/18 63.8 kg (140 lb 9.6 oz)   02/06/18 64.4 kg (141 lb 14.4 oz)   08/01/17 63 kg (139 lb)   03/10/17 60.8 kg (134 lb)   02/26/17 62.6 kg (138 lb)   06/14/16 66.3 kg (146 lb 3.2 oz)   06/02/15 66.9 kg (147 lb 6.4 oz)   05/12/14 65.3 kg (144 lb)   04/25/14  65.6 kg (144 lb 9.6 oz)   10/07/13 66.4 kg (146 lb 6.4 oz)   08/06/12 66.1 kg (145 lb 11.2 oz)       .  Procedures     Coronary Angiogram   Percutaneous Coronary Intervention   Indications     ASHD (arteriosclerotic heart disease) [I25.10 (ICD-10-CM)]   Abnormal cardiovascular stress test [R94.39 (ICD-10-CM)]   Dyspnea on exertion [R06.09 (ICD-10-CM)]   Comments/Patient Narrative     83-year-old male with history including peripheral arterial disease with prior stenting of the iliac arteries and known abdominal aortic aneurysm and coronary artery disease s/p CABG in 2001 who presents on an outpatient basis for coronary angiography to assess new and progressive limiting dyspnea on exertion over the last 4-5 weeks, and subsequent abnormal stress ECG.  Distant records are unavailable. Per one old clinic note he presented around 2001 prior to prostate surgery with abnormal ejection fraction and angiography demonstrated total occlusion of the LAD, total occlusion of the right, and a 70% ramus intermedius lesion. After that he underwent what he was told was triple-vessel bypass surgery. Those records are not available. At least 1 of the grafts was an ERIKA graft.   Pre-procedure Diagnosis     abnormal stress test    Post-procedure Diagnosis     1-multivessel coronary artery disease   2-successful PCI of the ramus intermedius   See conclusions below for additional information      Conclusion     1.  Dyspnea on exertion as likely anginal equivalent and abnormal stress ECG.  2. Three-vessel obstructive coronary artery disease of the proximal LAD (100% occlusion, mid LAD supplied by LIMA), proximal RCA (100% occlusion, SVG to RCA occluded) and proximal ramus intermedius (80% stenosis, iFR 0.87) status-post CABG.  3. Widely patent LIMA-LAD with occluded SVG-RCA. We are unable to identify any additional grafts despite probing with multiple catheters and utilizing ascending aortography.  4. Successful PCI of the ramus intermedius  with a 4.0x16mm Synergy drug eluting stent.  5. Left radial arterial sheath removed and hemostasis achieved with a TR Band.   Plan     1. Aspirin 81 mg po daily lifelong.  2. Plavix 75 mg po daily daily for at least 1 year uninterrupted.  3. Bedrest per protocol.  4. Discharge today per protocol.   5. Continued medical management and lifestyle modification for cardiovascular risk factor optimization.   Coronary Findings     Diagnostic   Dominance: Right   Left Main   The vessel was visualized by selective angiography and is moderate in size. There was 0% vessel disease.   Left Anterior Descending   Ost LAD to Prox LAD lesion is 100% stenosed. The lesion is chronic total occlusion.   Mid LAD lesion is 20% stenosed.   First Diagonal Branch   The vessel is small.   Collaterals   1st Diag filled by collaterals from Ramus.      Collaterals   1st Diag filled by collaterals from Dist LAD.      Ost 1st Diag lesion is 100% stenosed. The lesion is chronic total occlusion.   Ramus Intermedius   The vessel is large.   Ramus lesion is 80% stenosed. Culprit lesion. The lesion is eccentric. The lesion is calcified. Ramus intermedius IFR significantly abnormal at 0.87   Left Circumflex   Mid Cx lesion is 30% stenosed.   First Obtuse Marginal Branch   The vessel is moderate in size.   1st Mrg lesion is 30% stenosed.   Second Obtuse Marginal Branch   The vessel is small.   Right Coronary Artery   Prox RCA to Dist RCA lesion is 100% stenosed. The lesion is chronic total occlusion.   Right Posterior Descending Artery   Collaterals   RPDA filled by collaterals from Dist LAD.      First Right Posterolateral   Collaterals   1st RPLB filled by collaterals from Dist LAD.      Collaterals   1st RPLB filled by collaterals from Mid LAD.      DIAZ Graft to Mid LAD   The graft was visualized by angiography and is moderate in size. The graft is angiographically normal.   Graft to Dist RCA   Origin lesion is 100% stenosed.   Intervention     Ramus  lesion   Stent   The guidewire guidewire crosses lesionGUIDEWIRE VASC [id number] RUNTHROUGH  crossed the lesion. The pre-interventional distal flow is normal (THELMA 3). Pre-stent angioplasty was performed using a CATH BALLOON NC EMERGE [id number] supply. A STENT SYNERGY DRUG ELUTING 4.03F15JB [id number] drug eluting stent was successfully placed. Post-stent angioplasty was performed using a CATH BALLOON NC EMERGE 4.00X8MM [id number] supply. The post-interventional distal flow is normal (THELMA 3). The intervention was successful. No complications occurred at this lesion.   There is no residual stenosis post intervention.         REVIEW of SYMPTOMS    Constitutional: without fever, chills, night sweats.  Weight is down 3 or 4 pounds  HEENT: without dry eyes, dry mouth, sinusitis, corryza, visual changes  Endocrine: without polyuria, polydypsia, polyphagia, heat or cold intolerance, changing mental status  Cardiology: without chest pain, tightness, heaviness, pressure, paroxysmal dyspnea, orthopnea, palpitation, pre-syncope or syncope or device discharge (if present)  Pulmonary: without asthma, wheezing, cough, hemoptysis  GI: without nausea, emesis, jaundice, pain, hematemesis, melena  : without frequency, urgency, hematuria, stones, pain, abnormal bleeding, frequency, urgency but nocturia  Neurologic: without TIA, CVA, trauma, seizure  Dermatologic: without lesions, abrasion rash,   Orthopedic/Rheum: without significant joint pain, impairment, limb, polyserositis, ulceration, Raynauds  Heme: without mass, bruising, frequent infection, anemia  Psychiatric: without substance abuse, hallucination, medication, depression    Exercise tolerance: reduced    Objective  There were no vitals taken for this visit.   Constitutional: alert, oriented, normal gait and station, normal mentation.  Oral: moist mucous membranes  Lymph: without pathologic adenopathy  Chest: clear to ausculation and  percussion  Cor: No evidence of left or right ventricular activity.  Rhythm is regular.  S1 normal, S2 split physiologically. Murmurs are not present  Abdomen: without tenderness, rebound, guarding, masses, ascites  Extremities: Edema not present, left radial and ulnar pulses present by palpation and dopler  Neuro: no focal defects, normal mentation  Skin: without open lesions  Psych: oriente d, verbal, mental status in tact    Meds  Current Outpatient Medications   Medication     acetaminophen (TYLENOL) 325 MG tablet     aspirin 81 MG tablet     atenolol (TENORMIN) 25 MG tablet     atorvastatin (LIPITOR) 40 MG tablet     Cholecalciferol (VITAMIN D) 1000 UNITS capsule     clopidogrel (PLAVIX) 75 MG tablet     clopidogrel (PLAVIX) 75 MG tablet     diazepam (VALIUM) 5 MG tablet     fish oil-omega-3 fatty acids (FISH OIL) 1000 MG capsule     lisinopril (PRINIVIL/ZESTRIL) 5 MG tablet     Multiple Vitamins-Minerals (MULTIVITAL PO)     omeprazole (PRILOSEC) 20 MG CR capsule     spironolactone (ALDACTONE) 25 MG tablet     temazepam (RESTORIL) 15 MG capsule     No current facility-administered medications for this visit.      Labs/pending      Results for JOSH DOSHI (MRN [id number]) as of 4/1/2019 15:18   Ref. Range 3/21/2019 11:44 3/21/2019 11:53   Sodium Latest Ref Range: 133 - 144 mmol/L 131 (L)    Potassium Latest Ref Range: 3.4 - 5.3 mmol/L 4.4    Chloride Latest Ref Range: 94 - 109 mmol/L 97    Carbon Dioxide Latest Ref Range: 20 - 32 mmol/L 26    Urea Nitrogen Latest Ref Range: 7 - 30 mg/dL 18    Creatinine Latest Ref Range: 0.66 - 1.25 mg/dL 0.82    GFR Estimate Latest Ref Range: >60 mL/min/1.73_m2 83    GFR Estimate If Black Latest Ref Range: >60 mL/min/1.73_m2 >90    Calcium Latest Ref Range: 8.5 - 10.1 mg/dL 8.8    Anion Gap Latest Ref Range: 3 - 14 mmol/L 8    Glucose Latest Ref Range: 70 - 99 mg/dL 98    INR Latest Ref Range: 0.86 - 1.14  0.99    EKG 12-LEAD, TRACING ONLY Unknown  Rpt        Assessment/Plan     The patient has intermittent, recurrent exertional shortness of breath post ramus angioplasty.  He has no history or findings of pulmonary embolism or arrhythmia.  He has no paroxysmal nocturnal dyspnea, weight change or ankle edema.      The patient has known ASPVD with previous stenting.      1. Check BNP, BMP  troponin and potassium today  2. Consider small dose of diuretic or repeat stress test with nuclear augmentation  3. Consider change from atenolol to other forms of BB. Will stop atenolol and start nebivolol 5 once daily      Jerrod Rangel M.D.  [phone]

## 2019-04-02 ENCOUNTER — OFFICE VISIT (OUTPATIENT)
Dept: CARDIOLOGY | Facility: CLINIC | Age: 84
End: 2019-04-02
Attending: INTERNAL MEDICINE
Payer: COMMERCIAL

## 2019-04-02 VITALS
WEIGHT: 130.3 LBS | DIASTOLIC BLOOD PRESSURE: 64 MMHG | SYSTOLIC BLOOD PRESSURE: 116 MMHG | HEART RATE: 60 BPM | BODY MASS INDEX: 23.09 KG/M2 | OXYGEN SATURATION: 93 % | HEIGHT: 63 IN

## 2019-04-02 DIAGNOSIS — R06.09 DYSPNEA ON EXERTION: Primary | ICD-10-CM

## 2019-04-02 DIAGNOSIS — I50.22 CHRONIC SYSTOLIC CONGESTIVE HEART FAILURE (H): ICD-10-CM

## 2019-04-02 DIAGNOSIS — I25.10 ASHD (ARTERIOSCLEROTIC HEART DISEASE): ICD-10-CM

## 2019-04-02 LAB
ANION GAP SERPL CALCULATED.3IONS-SCNC: 11 MMOL/L (ref 6–17)
BUN SERPL-MCNC: 17 MG/DL (ref 7–30)
CALCIUM SERPL-MCNC: 9.6 MG/DL (ref 8.5–10.5)
CHLORIDE SERPL-SCNC: 92 MMOL/L (ref 98–107)
CO2 SERPL-SCNC: 27 MMOL/L (ref 23–29)
CREAT SERPL-MCNC: 1.09 MG/DL (ref 0.7–1.3)
GFR SERPL CREATININE-BSD FRML MDRD: 65 ML/MIN/{1.73_M2}
GLUCOSE SERPL-MCNC: 116 MG/DL (ref 70–105)
NT-PROBNP SERPL-MCNC: 1221 PG/ML (ref 0–450)
POTASSIUM SERPL-SCNC: 5 MMOL/L (ref 3.5–5.1)
SODIUM SERPL-SCNC: 125 MMOL/L (ref 136–145)
TROPONIN I SERPL-MCNC: <0.015 UG/L (ref 0–0.04)

## 2019-04-02 PROCEDURE — 99214 OFFICE O/P EST MOD 30 MIN: CPT | Performed by: INTERNAL MEDICINE

## 2019-04-02 PROCEDURE — 80048 BASIC METABOLIC PNL TOTAL CA: CPT | Performed by: INTERNAL MEDICINE

## 2019-04-02 PROCEDURE — 83880 ASSAY OF NATRIURETIC PEPTIDE: CPT | Performed by: INTERNAL MEDICINE

## 2019-04-02 PROCEDURE — 84484 ASSAY OF TROPONIN QUANT: CPT | Performed by: INTERNAL MEDICINE

## 2019-04-02 PROCEDURE — 36415 COLL VENOUS BLD VENIPUNCTURE: CPT | Performed by: INTERNAL MEDICINE

## 2019-04-02 RX ORDER — NEBIVOLOL 5 MG/1
5 TABLET ORAL DAILY
Qty: 90 TABLET | Refills: 3 | Status: SHIPPED | OUTPATIENT
Start: 2019-04-02 | End: 2019-04-22

## 2019-04-02 ASSESSMENT — MIFFLIN-ST. JEOR: SCORE: 1191.17

## 2019-04-02 NOTE — LETTER
4/2/2019    Tristian Jeffrey MD, MD  [address]  [facility] [address]    RE: Oscar Chaudhary       Dear Colleague,    I had the pleasure of seeing Oscar Chaudhary in the [facility].    Jerrod Rangel M.D.  Cardiovascular Medicine    I personally saw and examined this patient, discussed care with housestaff and other consultants, reviewed current laboratories and imaging studies, and conveyed impression and diagnostic/therapeutic plan to patient.    Problem List  1. 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 Sequential, old records not in EMR)  2. History of prostate cancer treated with radiation  3. Exertional dyspnea  4. AAA  5. Hyperlipidemia  6. ASPVD with stenting    Left iliac stent   Right iliac stent with elevated velocities  7. Abdominal aortic aneurysm                      Infrarenal abdominal aortic aneurysm measuring 3.8 cm AP x 3.9 cm                       transverse, x 4.6 cm in length. Suspect previous measurements were                         slight over estimation. Recommend continued annual surveillance. At least                              moderate stenosis of the left common iliac artery,                       possibly severe.  8. History of jaw infection   9. History if previous difficult intubation with jaw and neck infection  10. Recent cardiac stenting    History    The patient underwent recent angioplasty of ramus branch.  LIMA graft is widely patent. The study was done from the left wrist.  He is improved but continues to have intermittent, unpredictable exertional shortness of breath without chest pain or nocturnal component. The patient has no pleuritic chest pain or hemoptysis.    Wt Readings from Last 24 Encounters:   04/02/19 59.1 kg (130 lb 4.8 oz)   03/21/19 57.2 kg (126 lb)   03/19/19 58.7 kg (129 lb 6.4 oz)   10/15/18 61.7 kg (136 lb 1.6 oz)   10/02/18 62.8 kg (138 lb 6.4 oz)   03/08/18 63.2 kg (139 lb 4.8 oz)   02/27/18 63.8 kg (140 lb  9.6 oz)   02/06/18 64.4 kg (141 lb 14.4 oz)   08/01/17 63 kg (139 lb)   03/10/17 60.8 kg (134 lb)   02/26/17 62.6 kg (138 lb)   06/14/16 66.3 kg (146 lb 3.2 oz)   06/02/15 66.9 kg (147 lb 6.4 oz)   05/12/14 65.3 kg (144 lb)   04/25/14 65.6 kg (144 lb 9.6 oz)   10/07/13 66.4 kg (146 lb 6.4 oz)   08/06/12 66.1 kg (145 lb 11.2 oz)       .  Procedures     Coronary Angiogram   Percutaneous Coronary Intervention   Indications     ASHD (arteriosclerotic heart disease) [I25.10 (ICD-10-CM)]   Abnormal cardiovascular stress test [R94.39 (ICD-10-CM)]   Dyspnea on exertion [R06.09 (ICD-10-CM)]   Comments/Patient Narrative     83-year-old male with history including peripheral arterial disease with prior stenting of the iliac arteries and known abdominal aortic aneurysm and coronary artery disease s/p CABG in 2001 who presents on an outpatient basis for coronary angiography to assess new and progressive limiting dyspnea on exertion over the last 4-5 weeks, and subsequent abnormal stress ECG.  Distant records are unavailable. Per one old clinic note he presented around 2001 prior to prostate surgery with abnormal ejection fraction and angiography demonstrated total occlusion of the LAD, total occlusion of the right, and a 70% ramus intermedius lesion. After that he underwent what he was told was triple-vessel bypass surgery. Those records are not available. At least 1 of the grafts was an ERIKA graft.   Pre-procedure Diagnosis     abnormal stress test    Post-procedure Diagnosis     1-multivessel coronary artery disease   2-successful PCI of the ramus intermedius   See conclusions below for additional information      Conclusion     1.  Dyspnea on exertion as likely anginal equivalent and abnormal stress ECG.  2. Three-vessel obstructive coronary artery disease of the proximal LAD (100% occlusion, mid LAD supplied by LIMA), proximal RCA (100% occlusion, SVG to RCA occluded) and proximal ramus intermedius (80% stenosis, iFR 0.87)  status-post CABG.  3. Widely patent LIMA-LAD with occluded SVG-RCA. We are unable to identify any additional grafts despite probing with multiple catheters and utilizing ascending aortography.  4. Successful PCI of the ramus intermedius with a 4.0x16mm Synergy drug eluting stent.  5. Left radial arterial sheath removed and hemostasis achieved with a TR Band.   Plan     1. Aspirin 81 mg po daily lifelong.  2. Plavix 75 mg po daily daily for at least 1 year uninterrupted.  3. Bedrest per protocol.  4. Discharge today per protocol.   5. Continued medical management and lifestyle modification for cardiovascular risk factor optimization.   Coronary Findings     Diagnostic   Dominance: Right   Left Main   The vessel was visualized by selective angiography and is moderate in size. There was 0% vessel disease.   Left Anterior Descending   Ost LAD to Prox LAD lesion is 100% stenosed. The lesion is chronic total occlusion.   Mid LAD lesion is 20% stenosed.   First Diagonal Branch   The vessel is small.   Collaterals   1st Diag filled by collaterals from Ramus.      Collaterals   1st Diag filled by collaterals from Dist LAD.      Ost 1st Diag lesion is 100% stenosed. The lesion is chronic total occlusion.   Ramus Intermedius   The vessel is large.   Ramus lesion is 80% stenosed. Culprit lesion. The lesion is eccentric. The lesion is calcified. Ramus intermedius IFR significantly abnormal at 0.87   Left Circumflex   Mid Cx lesion is 30% stenosed.   First Obtuse Marginal Branch   The vessel is moderate in size.   1st Mrg lesion is 30% stenosed.   Second Obtuse Marginal Branch   The vessel is small.   Right Coronary Artery   Prox RCA to Dist RCA lesion is 100% stenosed. The lesion is chronic total occlusion.   Right Posterior Descending Artery   Collaterals   RPDA filled by collaterals from Dist LAD.      First Right Posterolateral   Collaterals   1st RPLB filled by collaterals from Dist LAD.      Collaterals   1st RPLB filled by  collaterals from Mid LAD.      DIAZ Graft to Mid LAD   The graft was visualized by angiography and is moderate in size. The graft is angiographically normal.   Graft to Dist RCA   Origin lesion is 100% stenosed.   Intervention     Ramus lesion   Stent   The guidewire guidewire crosses lesionGUIDEWIRE VASC [id number] RUNTHROUGH  crossed the lesion. The pre-interventional distal flow is normal (THELMA 3). Pre-stent angioplasty was performed using a CATH BALLOON NC EMERGE 3.36I17TW [id number] supply. A STENT SYNERGY DRUG ELUTING 4.05Y97JD [id number] drug eluting stent was successfully placed. Post-stent angioplasty was performed using a CATH BALLOON NC EMERGE 4.00X8MM [id number] supply. The post-interventional distal flow is normal (THELMA 3). The intervention was successful. No complications occurred at this lesion.   There is no residual stenosis post intervention.         REVIEW of SYMPTOMS    Constitutional: without fever, chills, night sweats.  Weight is down 3 or 4 pounds  HEENT: without dry eyes, dry mouth, sinusitis, corryza, visual changes  Endocrine: without polyuria, polydypsia, polyphagia, heat or cold intolerance, changing mental status  Cardiology: without chest pain, tightness, heaviness, pressure, paroxysmal dyspnea, orthopnea, palpitation, pre-syncope or syncope or device discharge (if present)  Pulmonary: without asthma, wheezing, cough, hemoptysis  GI: without nausea, emesis, jaundice, pain, hematemesis, melena  : without frequency, urgency, hematuria, stones, pain, abnormal bleeding, frequency, urgency but nocturia  Neurologic: without TIA, CVA, trauma, seizure  Dermatologic: without lesions, abrasion rash,   Orthopedic/Rheum: without significant joint pain, impairment, limb, polyserositis, ulceration, Raynauds  Heme: without mass, bruising, frequent infection, anemia  Psychiatric: without substance abuse, hallucination, medication, depression    Exercise tolerance:  reduced    Objective  There were no vitals taken for this visit.   Constitutional: alert, oriented, normal gait and station, normal mentation.  Oral: moist mucous membranes  Lymph: without pathologic adenopathy  Chest: clear to ausculation and percussion  Cor: No evidence of left or right ventricular activity.  Rhythm is regular.  S1 normal, S2 split physiologically. Murmurs are not present  Abdomen: without tenderness, rebound, guarding, masses, ascites  Extremities: Edema not present, left radial and ulnar pulses present by palpation and dopler  Neuro: no focal defects, normal mentation  Skin: without open lesions  Psych: oriente d, verbal, mental status in tact    Meds  Current Outpatient Medications   Medication     acetaminophen (TYLENOL) 325 MG tablet     aspirin 81 MG tablet     atenolol (TENORMIN) 25 MG tablet     atorvastatin (LIPITOR) 40 MG tablet     Cholecalciferol (VITAMIN D) 1000 UNITS capsule     clopidogrel (PLAVIX) 75 MG tablet     clopidogrel (PLAVIX) 75 MG tablet     diazepam (VALIUM) 5 MG tablet     fish oil-omega-3 fatty acids (FISH OIL) 1000 MG capsule     lisinopril (PRINIVIL/ZESTRIL) 5 MG tablet     Multiple Vitamins-Minerals (MULTIVITAL PO)     omeprazole (PRILOSEC) 20 MG CR capsule     spironolactone (ALDACTONE) 25 MG tablet     temazepam (RESTORIL) 15 MG capsule     No current facility-administered medications for this visit.      Labs/pending      Results for JOSH DOSHI (MRN [id number]) as of 4/1/2019 15:18   Ref. Range 3/21/2019 11:44 3/21/2019 11:53   Sodium Latest Ref Range: 133 - 144 mmol/L 131 (L)    Potassium Latest Ref Range: 3.4 - 5.3 mmol/L 4.4    Chloride Latest Ref Range: 94 - 109 mmol/L 97    Carbon Dioxide Latest Ref Range: 20 - 32 mmol/L 26    Urea Nitrogen Latest Ref Range: 7 - 30 mg/dL 18    Creatinine Latest Ref Range: 0.66 - 1.25 mg/dL 0.82    GFR Estimate Latest Ref Range: >60 mL/min/1.73_m2 83    GFR Estimate If Black Latest Ref Range: >60 mL/min/1.73_m2 >90     Calcium Latest Ref Range: 8.5 - 10.1 mg/dL 8.8    Anion Gap Latest Ref Range: 3 - 14 mmol/L 8    Glucose Latest Ref Range: 70 - 99 mg/dL 98    INR Latest Ref Range: 0.86 - 1.14  0.99    EKG 12-LEAD, TRACING ONLY Unknown  Rpt       Assessment/Plan     The patient has intermittent, recurrent exertional shortness of breath post ramus angioplasty.  He has no history or findings of pulmonary embolism or arrhythmia.  He has no paroxysmal nocturnal dyspnea, weight change or ankle edema.      The patient has known ASPVD with previous stenting.      1. Check BNP, BMP  troponin and potassium today  2. Consider small dose of diuretic or repeat stress test with nuclear augmentation  3. Consider change from atenolol to other forms of BB. Will stop atenolol and start nebivolol 5 once daily      Jerrod Rangel M.D.  [phone]        Thank you for allowing me to participate in the care of your patient.    Sincerely,     Jerrod Rangel MD     [facility]

## 2019-04-02 NOTE — LETTER
4/2/2019    Tristian Jeffrey MD, MD  [address]  [facility] [address]    RE: Oscar Chaudhary       Dear Colleague,    I had the pleasure of seeing Oscar Chaudhary in the [facility].    Jerrod Rangel M.D.  Cardiovascular Medicine    I personally saw and examined this patient, discussed care with housestaff and other consultants, reviewed current laboratories and imaging studies, and conveyed impression and diagnostic/therapeutic plan to patient.    Problem List  1. 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 Sequential, old records not in EMR)  2. History of prostate cancer treated with radiation  3. Exertional dyspnea  4. AAA  5. Hyperlipidemia  6. ASPVD with stenting    Left iliac stent   Right iliac stent with elevated velocities  7. Abdominal aortic aneurysm                      Infrarenal abdominal aortic aneurysm measuring 3.8 cm AP x 3.9 cm                       transverse, x 4.6 cm in length. Suspect previous measurements were                         slight over estimation. Recommend continued annual surveillance. At least                              moderate stenosis of the left common iliac artery,                       possibly severe.  8. History of jaw infection   9. History if previous difficult intubation with jaw and neck infection  10. Recent cardiac stenting    History    The patient underwent recent angioplasty of ramus branch.  LIMA graft is widely patent. The study was done from the left wrist.  He is improved but continues to have intermittent, unpredictable exertional shortness of breath without chest pain or nocturnal component. The patient has no pleuritic chest pain or hemoptysis.    Wt Readings from Last 24 Encounters:   04/02/19 59.1 kg (130 lb 4.8 oz)   03/21/19 57.2 kg (126 lb)   03/19/19 58.7 kg (129 lb 6.4 oz)   10/15/18 61.7 kg (136 lb 1.6 oz)   10/02/18 62.8 kg (138 lb 6.4 oz)   03/08/18 63.2 kg (139 lb 4.8 oz)   02/27/18 63.8 kg (140 lb  9.6 oz)   02/06/18 64.4 kg (141 lb 14.4 oz)   08/01/17 63 kg (139 lb)   03/10/17 60.8 kg (134 lb)   02/26/17 62.6 kg (138 lb)   06/14/16 66.3 kg (146 lb 3.2 oz)   06/02/15 66.9 kg (147 lb 6.4 oz)   05/12/14 65.3 kg (144 lb)   04/25/14 65.6 kg (144 lb 9.6 oz)   10/07/13 66.4 kg (146 lb 6.4 oz)   08/06/12 66.1 kg (145 lb 11.2 oz)       .  Procedures     Coronary Angiogram   Percutaneous Coronary Intervention   Indications     ASHD (arteriosclerotic heart disease) [I25.10 (ICD-10-CM)]   Abnormal cardiovascular stress test [R94.39 (ICD-10-CM)]   Dyspnea on exertion [R06.09 (ICD-10-CM)]   Comments/Patient Narrative     83-year-old male with history including peripheral arterial disease with prior stenting of the iliac arteries and known abdominal aortic aneurysm and coronary artery disease s/p CABG in 2001 who presents on an outpatient basis for coronary angiography to assess new and progressive limiting dyspnea on exertion over the last 4-5 weeks, and subsequent abnormal stress ECG.  Distant records are unavailable. Per one old clinic note he presented around 2001 prior to prostate surgery with abnormal ejection fraction and angiography demonstrated total occlusion of the LAD, total occlusion of the right, and a 70% ramus intermedius lesion. After that he underwent what he was told was triple-vessel bypass surgery. Those records are not available. At least 1 of the grafts was an ERIKA graft.   Pre-procedure Diagnosis     abnormal stress test    Post-procedure Diagnosis     1-multivessel coronary artery disease   2-successful PCI of the ramus intermedius   See conclusions below for additional information      Conclusion     1.  Dyspnea on exertion as likely anginal equivalent and abnormal stress ECG.  2. Three-vessel obstructive coronary artery disease of the proximal LAD (100% occlusion, mid LAD supplied by LIMA), proximal RCA (100% occlusion, SVG to RCA occluded) and proximal ramus intermedius (80% stenosis, iFR 0.87)  status-post CABG.  3. Widely patent LIMA-LAD with occluded SVG-RCA. We are unable to identify any additional grafts despite probing with multiple catheters and utilizing ascending aortography.  4. Successful PCI of the ramus intermedius with a 4.0x16mm Synergy drug eluting stent.  5. Left radial arterial sheath removed and hemostasis achieved with a TR Band.   Plan     1. Aspirin 81 mg po daily lifelong.  2. Plavix 75 mg po daily daily for at least 1 year uninterrupted.  3. Bedrest per protocol.  4. Discharge today per protocol.   5. Continued medical management and lifestyle modification for cardiovascular risk factor optimization.   Coronary Findings     Diagnostic   Dominance: Right   Left Main   The vessel was visualized by selective angiography and is moderate in size. There was 0% vessel disease.   Left Anterior Descending   Ost LAD to Prox LAD lesion is 100% stenosed. The lesion is chronic total occlusion.   Mid LAD lesion is 20% stenosed.   First Diagonal Branch   The vessel is small.   Collaterals   1st Diag filled by collaterals from Ramus.      Collaterals   1st Diag filled by collaterals from Dist LAD.      Ost 1st Diag lesion is 100% stenosed. The lesion is chronic total occlusion.   Ramus Intermedius   The vessel is large.   Ramus lesion is 80% stenosed. Culprit lesion. The lesion is eccentric. The lesion is calcified. Ramus intermedius IFR significantly abnormal at 0.87   Left Circumflex   Mid Cx lesion is 30% stenosed.   First Obtuse Marginal Branch   The vessel is moderate in size.   1st Mrg lesion is 30% stenosed.   Second Obtuse Marginal Branch   The vessel is small.   Right Coronary Artery   Prox RCA to Dist RCA lesion is 100% stenosed. The lesion is chronic total occlusion.   Right Posterior Descending Artery   Collaterals   RPDA filled by collaterals from Dist LAD.      First Right Posterolateral   Collaterals   1st RPLB filled by collaterals from Dist LAD.      Collaterals   1st RPLB filled by  collaterals from Mid LAD.      DIAZ Graft to Mid LAD   The graft was visualized by angiography and is moderate in size. The graft is angiographically normal.   Graft to Dist RCA   Origin lesion is 100% stenosed.   Intervention     Ramus lesion   Stent   The guidewire guidewire crosses lesionGUIDEWIRE VASC [id number] RUNTHROUGH  crossed the lesion. The pre-interventional distal flow is normal (THELMA 3). Pre-stent angioplasty was performed using a CATH BALLOON NC EMERGE [id number] supply. A STENT SYNERGY DRUG ELUTING [id number] drug eluting stent was successfully placed. Post-stent angioplasty was performed using a CATH BALLOON NC EMERGE 4.00X8MM [id number] supply. The post-interventional distal flow is normal (THELMA 3). The intervention was successful. No complications occurred at this lesion.   There is no residual stenosis post intervention.         REVIEW of SYMPTOMS    Constitutional: without fever, chills, night sweats.  Weight is down 3 or 4 pounds  HEENT: without dry eyes, dry mouth, sinusitis, corryza, visual changes  Endocrine: without polyuria, polydypsia, polyphagia, heat or cold intolerance, changing mental status  Cardiology: without chest pain, tightness, heaviness, pressure, paroxysmal dyspnea, orthopnea, palpitation, pre-syncope or syncope or device discharge (if present)  Pulmonary: without asthma, wheezing, cough, hemoptysis  GI: without nausea, emesis, jaundice, pain, hematemesis, melena  : without frequency, urgency, hematuria, stones, pain, abnormal bleeding, frequency, urgency but nocturia  Neurologic: without TIA, CVA, trauma, seizure  Dermatologic: without lesions, abrasion rash,   Orthopedic/Rheum: without significant joint pain, impairment, limb, polyserositis, ulceration, Raynauds  Heme: without mass, bruising, frequent infection, anemia  Psychiatric: without substance abuse, hallucination, medication, depression    Exercise tolerance:  reduced    Objective  There were no vitals taken for this visit.   Constitutional: alert, oriented, normal gait and station, normal mentation.  Oral: moist mucous membranes  Lymph: without pathologic adenopathy  Chest: clear to ausculation and percussion  Cor: No evidence of left or right ventricular activity.  Rhythm is regular.  S1 normal, S2 split physiologically. Murmurs are not present  Abdomen: without tenderness, rebound, guarding, masses, ascites  Extremities: Edema not present, left radial and ulnar pulses present by palpation and dopler  Neuro: no focal defects, normal mentation  Skin: without open lesions  Psych: oriente d, verbal, mental status in tact    Meds  Current Outpatient Medications   Medication     acetaminophen (TYLENOL) 325 MG tablet     aspirin 81 MG tablet     atenolol (TENORMIN) 25 MG tablet     atorvastatin (LIPITOR) 40 MG tablet     Cholecalciferol (VITAMIN D) 1000 UNITS capsule     clopidogrel (PLAVIX) 75 MG tablet     clopidogrel (PLAVIX) 75 MG tablet     diazepam (VALIUM) 5 MG tablet     fish oil-omega-3 fatty acids (FISH OIL) 1000 MG capsule     lisinopril (PRINIVIL/ZESTRIL) 5 MG tablet     Multiple Vitamins-Minerals (MULTIVITAL PO)     omeprazole (PRILOSEC) 20 MG CR capsule     spironolactone (ALDACTONE) 25 MG tablet     temazepam (RESTORIL) 15 MG capsule     No current facility-administered medications for this visit.      Labs/pending      Results for JOSH DOSHI (MRN [id number]) as of 4/1/2019 15:18   Ref. Range 3/21/2019 11:44 3/21/2019 11:53   Sodium Latest Ref Range: 133 - 144 mmol/L 131 (L)    Potassium Latest Ref Range: 3.4 - 5.3 mmol/L 4.4    Chloride Latest Ref Range: 94 - 109 mmol/L 97    Carbon Dioxide Latest Ref Range: 20 - 32 mmol/L 26    Urea Nitrogen Latest Ref Range: 7 - 30 mg/dL 18    Creatinine Latest Ref Range: 0.66 - 1.25 mg/dL 0.82    GFR Estimate Latest Ref Range: >60 mL/min/1.73_m2 83    GFR Estimate If Black Latest Ref Range: >60 mL/min/1.73_m2 >90     Calcium Latest Ref Range: 8.5 - 10.1 mg/dL 8.8    Anion Gap Latest Ref Range: 3 - 14 mmol/L 8    Glucose Latest Ref Range: 70 - 99 mg/dL 98    INR Latest Ref Range: 0.86 - 1.14  0.99    EKG 12-LEAD, TRACING ONLY Unknown  Rpt       Assessment/Plan     The patient has intermittent, recurrent exertional shortness of breath post ramus angioplasty.  He has no history or findings of pulmonary embolism or arrhythmia.  He has no paroxysmal nocturnal dyspnea, weight change or ankle edema.      The patient has known ASPVD with previous stenting.      1. Check BNP, BMP  troponin and potassium today  2. Consider small dose of diuretic or repeat stress test with nuclear augmentation  3. Consider change from atenolol to other forms of BB. Will stop atenolol and start nebivolol 5 once daily      Jerrod Rangel M.D.  [phone]            Thank you for allowing me to participate in the care of your patient.      Sincerely,     Jerrod Rangel MD     [facility]    cc:   Jerrod Rangel MD  [address] [place], [place] [address]  [place] [address]

## 2019-04-02 NOTE — NURSING NOTE
Med Reconcile: Reviewed and verified all current medications with the patient. The updated medication list was printed and given to the patient.  Diet: Patient instructed regarding a heart healthy diet, including discussion of reduced fat and sodium intake. Patient demonstrated understanding of this information and agreed to call with further questions or concerns.  Return Appointment: Patient given instructions regarding scheduling next clinic visit. Patient demonstrated understanding of this information and agreed to call with further questions or concerns.  Patient stated he understood all health information given and agreed to call with further questions or concerns.     Medication Changes:  Stop Atenolol  Start Nebivolol 5 mg Daily     Patient Instructions:  1. Continue staying active and eat a heart healthy diet.    2. Please keep current list of medications with you at all times.    3. Remember to weigh yourself daily after voiding and before you consume any food or beverages and log the numbers.  If you have gained/lost 2 pounds overnight or 5 pounds in a week contact us immediately for medication adjustments or further instructions.    4. **Please call us immediately if you have any syncope (fainting or passing out), chest pain, edema (swelling or weight gain), or decline in your functional status (general decline in how you are feeling).    Follow up Appointment Information:  6-9 weeks    Results:  At this appointment we reviewed all your recent testing and labs

## 2019-04-02 NOTE — PATIENT INSTRUCTIONS
Medication Changes:  Stop Atenolol  Start Nebivolol 5 mg Daily     Patient Instructions:  1. Continue staying active and eat a heart healthy diet.    2. Please keep current list of medications with you at all times.    3. Remember to weigh yourself daily after voiding and before you consume any food or beverages and log the numbers.  If you have gained/lost 2 pounds overnight or 5 pounds in a week contact us immediately for medication adjustments or further instructions.    4. **Please call us immediately if you have any syncope (fainting or passing out), chest pain, edema (swelling or weight gain), or decline in your functional status (general decline in how you are feeling).    Follow up Appointment Information:  6-9 weeks    Results:  At this appointment we reviewed all your recent testing and labs      For scheduling at [facility] please call [phone]  For scheduling at the [facility] please call [phone]  We are located on the second floor [address] at the [facility].  Our address is     [address] [place] [address]   [address]  [place]  [address]    Thank you for allowing us to be a part of your care here at the [facility]    If you have questions or concerns please contact us at:    Gracia Gates RN, BSN      Nurse Coordinator       Pulmonary Hypertension     [facility]   (P)[phone]  (F)[phone]    ** Please note that you will NOT receive a reminder call regarding your scheduled testing, reminder calls are for provider appointments only.  If you are scheduled for testing within the [facility] system you may receive a call regarding pre-registration for billing purposes only.**     Remember to weigh yourself daily after voiding and before you consume any food or beverages and log the numbers.  If you have gained/lost 2 pounds overnight or 5 pounds in a week contact us immediately for medication adjustments or further instructions.    Support  Group:  Pulmonary Hypertension Association  Https://www.phassociation.org/  **Look at the Events Tab** They even have Support Groups that you can call into    [facility] PH Support Group  Second Saturday of the Month from 1-3 PM   [facility] [address]  Leader: Carola Caicedo  Phone: [phone] or [phone]  Email: [email or web address]

## 2019-04-03 ENCOUNTER — TELEPHONE (OUTPATIENT)
Dept: CARDIOLOGY | Facility: CLINIC | Age: 84
End: 2019-04-03

## 2019-04-03 NOTE — TELEPHONE ENCOUNTER
Central Prior Authorization Team   Phone: [phone]      Prior Authorization Retail Medication Request    Medication/Dose: nebivolol (BYSTOLIC) 5 MG tablet  ICD code (if different than what is on RX):  ASHD (arteriosclerotic heart disease) [I25.10]   Previously Tried and Failed:    Rationale:      Insurance Name:  [facility]  Insurance ID:  [id number]      Pharmacy Information (if different than what is on RX)  Name:  [facility] [id number]  Phone:  [phone]

## 2019-04-03 NOTE — TELEPHONE ENCOUNTER
PA Initiation    Medication: nebivolol (BYSTOLIC) 5 MG tablet -   Insurance Company: [employer] - Phone [phone] Fax [phone]  Pharmacy Filling the Rx: [facility] [id number] [place] [facility] [place] - [address]  Filling Pharmacy Phone: [phone]  Filling Pharmacy Fax: [phone]  Start Date: 4/3/2019

## 2019-04-03 NOTE — TELEPHONE ENCOUNTER
Prior Authorization Approval    Authorization Effective Date: 3/4/2019  Authorization Expiration Date: 4/2/2020  Medication: nebivolol (BYSTOLIC) 5 MG tablet - APPROVED  Approved Dose/Quantity:   Reference #: [id number]   Insurance Company: [employer] - Phone [phone] Fax [phone]  Expected CoPay:       CoPay Card Available:      Foundation Assistance Needed:    Which Pharmacy is filling the prescription (Not needed for infusion/clinic administered): [facility] [id number] [place] [address]  Pharmacy Notified: Yes  Patient Notified: Comment:  **Pharmacy will notify patient when script is ready for .**

## 2019-04-16 NOTE — TELEPHONE ENCOUNTER
I called pt and notified him that his co-payment will be $290. Gracia Gates RN on 4/16/2019 at 12:48 PM

## 2019-04-17 DIAGNOSIS — I25.10 ASHD (ARTERIOSCLEROTIC HEART DISEASE): Primary | ICD-10-CM

## 2019-04-17 RX ORDER — NITROGLYCERIN 0.3 MG/1
TABLET SUBLINGUAL
Qty: 15 TABLET | Refills: 3 | Status: SHIPPED | OUTPATIENT
Start: 2019-04-17 | End: 2019-05-21

## 2019-04-17 NOTE — TELEPHONE ENCOUNTER
Date: 4/17/2019    Time of Call: 12:54 PM     Diagnosis:  shortness of breath     [ TORB ] Ordering provider: Jerrod Rangel MD   Order: Needs follow up visit  Ok for nitroglycerin scrip for new onset chest pain     Order received by: Gracia Gates RN      Follow-up/additional notes: Called pt and gave above info

## 2019-04-18 ENCOUNTER — APPOINTMENT (OUTPATIENT)
Dept: LAB | Facility: CLINIC | Age: 84
End: 2019-04-18
Payer: COMMERCIAL

## 2019-04-18 ENCOUNTER — OFFICE VISIT (OUTPATIENT)
Dept: CARDIOLOGY | Facility: CLINIC | Age: 84
End: 2019-04-18
Attending: INTERNAL MEDICINE
Payer: COMMERCIAL

## 2019-04-18 VITALS
HEART RATE: 75 BPM | SYSTOLIC BLOOD PRESSURE: 117 MMHG | HEIGHT: 63 IN | DIASTOLIC BLOOD PRESSURE: 62 MMHG | OXYGEN SATURATION: 96 % | WEIGHT: 123.9 LBS | BODY MASS INDEX: 21.95 KG/M2

## 2019-04-18 DIAGNOSIS — I50.33 ACUTE ON CHRONIC DIASTOLIC CONGESTIVE HEART FAILURE (H): ICD-10-CM

## 2019-04-18 DIAGNOSIS — I25.10 ASHD (ARTERIOSCLEROTIC HEART DISEASE): Primary | ICD-10-CM

## 2019-04-18 DIAGNOSIS — I50.32 CHRONIC DIASTOLIC CONGESTIVE HEART FAILURE (H): ICD-10-CM

## 2019-04-18 LAB
ALBUMIN SERPL-MCNC: 3.7 G/DL (ref 3.4–5)
ALP SERPL-CCNC: 57 U/L (ref 40–150)
ALT SERPL W P-5'-P-CCNC: 22 U/L (ref 0–70)
ANION GAP SERPL CALCULATED.3IONS-SCNC: 6 MMOL/L (ref 3–14)
AST SERPL W P-5'-P-CCNC: 20 U/L (ref 0–45)
BILIRUB SERPL-MCNC: 0.4 MG/DL (ref 0.2–1.3)
BUN SERPL-MCNC: 15 MG/DL (ref 7–30)
CALCIUM SERPL-MCNC: 8.9 MG/DL (ref 8.5–10.1)
CHLORIDE SERPL-SCNC: 94 MMOL/L (ref 94–109)
CO2 SERPL-SCNC: 28 MMOL/L (ref 20–32)
CREAT SERPL-MCNC: 0.82 MG/DL (ref 0.66–1.25)
GFR SERPL CREATININE-BSD FRML MDRD: 83 ML/MIN/{1.73_M2}
GLUCOSE SERPL-MCNC: 97 MG/DL (ref 70–99)
HGB BLD-MCNC: 12.4 G/DL (ref 13.3–17.7)
NT-PROBNP SERPL-MCNC: 1210 PG/ML (ref 0–450)
POTASSIUM SERPL-SCNC: 4.4 MMOL/L (ref 3.4–5.3)
PROT SERPL-MCNC: 6.8 G/DL (ref 6.8–8.8)
SODIUM SERPL-SCNC: 127 MMOL/L (ref 133–144)

## 2019-04-18 PROCEDURE — 83880 ASSAY OF NATRIURETIC PEPTIDE: CPT | Performed by: INTERNAL MEDICINE

## 2019-04-18 PROCEDURE — G0463 HOSPITAL OUTPT CLINIC VISIT: HCPCS | Mod: ZF

## 2019-04-18 PROCEDURE — 85018 HEMOGLOBIN: CPT | Performed by: INTERNAL MEDICINE

## 2019-04-18 PROCEDURE — 93010 ELECTROCARDIOGRAM REPORT: CPT | Mod: ZP | Performed by: INTERNAL MEDICINE

## 2019-04-18 PROCEDURE — 80053 COMPREHEN METABOLIC PANEL: CPT | Performed by: INTERNAL MEDICINE

## 2019-04-18 PROCEDURE — 36415 COLL VENOUS BLD VENIPUNCTURE: CPT | Performed by: INTERNAL MEDICINE

## 2019-04-18 PROCEDURE — 99215 OFFICE O/P EST HI 40 MIN: CPT | Mod: ZP | Performed by: INTERNAL MEDICINE

## 2019-04-18 RX ORDER — NITROGLYCERIN 0.4 MG/1
TABLET SUBLINGUAL
Qty: 30 TABLET | Refills: 1 | Status: SHIPPED | OUTPATIENT
Start: 2019-04-18 | End: 2020-08-11

## 2019-04-18 ASSESSMENT — ENCOUNTER SYMPTOMS
PARALYSIS: 0
NUMBNESS: 0
DYSPNEA ON EXERTION: 1
MUSCLE WEAKNESS: 0
SPUTUM PRODUCTION: 0
SHORTNESS OF BREATH: 1
STIFFNESS: 0
MYALGIAS: 0
DYSURIA: 0
POSTURAL DYSPNEA: 0
JOINT SWELLING: 0
COUGH: 0
TREMORS: 0
MEMORY LOSS: 0
ARTHRALGIAS: 0
LOSS OF CONSCIOUSNESS: 0
HEADACHES: 0
MUSCLE CRAMPS: 0
NECK PAIN: 0
SEIZURES: 0
DIFFICULTY URINATING: 0
COUGH DISTURBING SLEEP: 0
DISTURBANCES IN COORDINATION: 0
SNORES LOUDLY: 0
SPEECH CHANGE: 0
HEMATURIA: 0
HEMOPTYSIS: 0
FLANK PAIN: 0
TINGLING: 0
WHEEZING: 0
BACK PAIN: 1
DIZZINESS: 0
WEAKNESS: 0

## 2019-04-18 ASSESSMENT — MIFFLIN-ST. JEOR: SCORE: 1162.14

## 2019-04-18 ASSESSMENT — PAIN SCALES - GENERAL: PAINLEVEL: NO PAIN (0)

## 2019-04-18 NOTE — LETTER
4/18/2019      RE: Oscar Chaudhary  [address] [facility] [id number]       Dear Colleague,    Thank you for the opportunity to participate in the care of your patient, Oscar Chaudhary, at the [facility] HEART [facility] at [facility]. Please see a copy of my visit note below.      1. 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 Sequential, old records not in EMR)  2. History of prostate cancer treated with radiation  3. Exertional dyspnea  4. AAA  5. Hyperlipidemia  6. ASPVD with stenting               Left iliac stent              Right iliac stent with elevated velocities  7. Abdominal aortic aneurysm                      Infrarenal abdominal aortic aneurysm measuring 3.8 cm AP x 3.9 cm                       transverse, x 4.6 cm in length. Suspect previous measurements were                                                 slight over estimation. Recommend continued annual surveillance. At least                              moderate stenosis of the left common iliac artery,                       possibly severe.  8. History of jaw infection   9. History if previous difficult intubation with jaw and neck infection  10. Recent cardiac stenting    The patient returns for follow-up of heart recent angioplasyt.  There is no interim history of chest pain, tightness, paroxysmal nocturnal dyspnea, orthopnea, peripheral edema, palpitation, pre-syncope, syncope.  .  Exercise tolerance is stable, however, he does report exertion associated throat tightness and some shortness of breath without chest pain, diaphoresis, nausea, palpitation, pre-syncope  The patient is attempting to exercise regularly and following a sodium restricted, calorically appropriate diet.  Medications are reviewed and the patient is taking medications as prescribed.  The patient is generally sleeping well. Laboratories have been abnormal for uncertain reason, consisting of lower sodium, increasing BNP, negative  troponin.    I ambulated him in the ramirez.  He developed throat tightness without changes in heart regularity or oxygenation. His heart rate increased appropriately.  An immediate post exercise examination demonstrated regular rhythm, no wheezing.  An ECG at this time showed lateral ST-T changes that were not new compared to the most recent ECG.      We discussed the diagnostic and therapeutic options including repeat angiogram, stress echocardiography, TNG trial.  It should be noted that he is markedly improved post angioplasty in terms of exercise tolerance and symptom reduction.                General Symptoms: No  Skin Symptoms: No  HENT Symptoms: No  EYE SYMPTOMS: No  HEART SYMPTOMS: No  LUNG SYMPTOMS: Yes  INTESTINAL SYMPTOMS: No  URINARY SYMPTOMS: Yes  REPRODUCTIVE SYMPTOMS: No  SKELETAL SYMPTOMS: Yes  BLOOD SYMPTOMS: No  NERVOUS SYSTEM SYMPTOMS: Yes  MENTAL HEALTH SYMPTOMS: No  Cough: No  Sputum or phlegm: No  Coughing up blood: No  Difficulty breating or shortness of breath: Yes  Snoring: No  Wheezing: No  Difficulty breathing on exertion: Yes  Nighttime Cough: No  Difficulty breathing when lying flat: No  Trouble holding urine or incontinence: No  Pain or burning: No  Trouble starting or stopping: No  Increased frequency of urination: No  Blood in urine: No  Decreased frequency of urination: No  Frequent nighttime urination: Yes  Flank pain: No  Difficulty emptying bladder: No  Back pain: Yes  Muscle aches: No  Neck pain: No  Swollen joints: No  Joint pain: No  Bone pain: No  Muscle cramps: No  Muscle weakness: No  Joint stiffness: No  Bone fracture: No  Trouble with coordination: No  Dizziness or trouble with balance: No  Fainting or black-out spells: No  Memory loss: No  Headache: No  Seizures: No  Speech problems: No  Tingling: No  Tremor: No  Weakness: No  Difficulty walking: Yes  Paralysis: No  Numbness: No  Results for JOSH DOSHI (MRN [id number]) as of 4/19/2019 09:23   Ref. Range 4/18/2019  16:49   Sodium Latest Ref Range: 133 - 144 mmol/L 127 (L)   Potassium Latest Ref Range: 3.4 - 5.3 mmol/L 4.4   Chloride Latest Ref Range: 94 - 109 mmol/L 94   Carbon Dioxide Latest Ref Range: 20 - 32 mmol/L 28   Urea Nitrogen Latest Ref Range: 7 - 30 mg/dL 15   Creatinine Latest Ref Range: 0.66 - 1.25 mg/dL 0.82   GFR Estimate Latest Ref Range: >60 mL/min/1.73_m2 83   GFR Estimate If Black Latest Ref Range: >60 mL/min/1.73_m2 >90   Calcium Latest Ref Range: 8.5 - 10.1 mg/dL 8.9   Anion Gap Latest Ref Range: 3 - 14 mmol/L 6   Albumin Latest Ref Range: 3.4 - 5.0 g/dL 3.7   Protein Total Latest Ref Range: 6.8 - 8.8 g/dL 6.8   Bilirubin Total Latest Ref Range: 0.2 - 1.3 mg/dL 0.4   Alkaline Phosphatase Latest Ref Range: 40 - 150 U/L 57   ALT Latest Ref Range: 0 - 70 U/L 22   AST Latest Ref Range: 0 - 45 U/L 20   N-Terminal Pro Bnp Latest Ref Range: 0 - 450 pg/mL 1,210 (H)   Glucose Latest Ref Range: 70 - 99 mg/dL 97   Hemoglobin Latest Ref Range: 13.3 - 17.7 g/dL 12.4 (L)      Ref. Range 2019 10:22   Sodium Latest Ref Range: 136 - 145 mmol/L 125 (L)   Potassium Latest Ref Range: 3.5 - 5.1 mmol/L 5.0   Chloride Latest Ref Range: 98 - 107 mmol/L 92 (L)   Carbon Dioxide Latest Ref Range: 23 - 29 mmol/L 27   Urea Nitrogen Latest Ref Range: 7 - 30 mg/dL 17   Creatinine Latest Ref Range: 0.70 - 1.30 mg/dL 1.09   GFR Estimate Latest Ref Range: >60 mL/min/1.73_m2 65   GFR Estimate If Black Latest Ref Range: >60 mL/min/1.73_m2 79   Calcium Latest Ref Range: 8.5 - 10.5 mg/dL 9.6   Anion Gap Latest Ref Range: 6 - 17 mmol/L 11   N-Terminal Pro Bnp Latest Ref Range: 0 - 450 pg/mL 1,221 (H)   Troponin I ES Latest Ref Range: 0.000 - 0.045 ug/L <0.015   Glucose Latest Ref Range: 70 - 105 mg/dL 116 (H)     Name: CARINE DOSHI  MRN: [id number]  : 1935  Study Date: 2019 11:53 AM  Age: 83 yrs  Gender: Male  Patient Location: [facility]  Reason For Study: ASHD (arteriosclerotic heart disease), Dyspnea on  exertion  Ordering Physician: CASTILLO MIKE  Referring Physician: CASTILLO MIKE  Performed By: Sherley Halee     BSA: 1.6 m2  Height: 63 in  Weight: 129 lb  HR: 72  BP: 125/72 mmHg  _____________________________________________________________________________  __     Procedure  Complete Echo Adult. Contrast Optison.     _____________________________________________________________________________  __        Interpretation Summary     Left ventricular systolic function is mildly reduced.  The visual ejection fraction is estimated at 45-50%.  There is mild septal hypokinesis.  There is moderate apical wall hypokinesis.  Mild mid to distal anterior wall hypokinesis.  The right ventricle is normal in structure, function and size.  There is moderate trileaflet aortic sclerosis.  There is mild to moderate (1-2+) aortic regurgitation.  There is mild (1+) pulmonic valvular regurgitation.  Borderline aortic root dilatation.  Moderate atherosclerotic plaque(s) in the ascending aorta.  Compared to the prior study dated 5/12/2019, there have been no changes. The  study was technically limited. Optison contrast was used without apparent  complications.  _____________________________________________________________________________  __        Left Ventricle  The left ventricle is normal in size. There is normal left ventricular wall  thickness. Left ventricular systolic function is mildly reduced. The visual  ejection fraction is estimated at 45-50%. Left ventricular diastolic function  is normal. Diastolic Doppler findings (E/E' ratio and/or other parameters)  suggest left ventricular filling pressures are indeterminate. There is mild  septal hypokinesis. There is moderate apical wall hypokinesis. Mild mid to  distal anterior wall hypokinesis. There is no thrombus seen in the left  ventricle.     Right Ventricle  The right ventricle is normal in structure, function and size.     Atria  Normal left atrial size. Right  atrial size is normal. There is no color  Doppler evidence of an atrial shunt.     Mitral Valve  There is mild mitral annular calcification. The mitral valve leaflets are  mildly thickened. There is trace to mild mitral regurgitation. There is no  mitral valve stenosis.     Tricuspid Valve  Normal tricuspid valve. No tricuspid regurgitation.        Aortic Valve  There is moderate trileaflet aortic sclerosis. There is mild to moderate (1-  2+) aortic regurgitation. No hemodynamically significant valvular aortic  stenosis.     Pulmonic Valve  The pulmonic valve is not well visualized. There is mild (1+) pulmonic  valvular regurgitation. Normal pulmonic valve velocity.     Vessels  Borderline aortic root dilatation. Normal size ascending aorta. Moderate  atherosclerotic plaque(s) in the ascending aorta. The IVC is normal in size  and reactivity with respiration, suggesting normal central venous pressure.     Pericardium  There is no pericardial effusion.     Rhythm  Sinus rhythm was noted.     _____________________________________________________________________________  __  MMode/2D Measurements & Calculations  IVSd: 1.2 cm  LVIDd: 4.6 cm  LVIDs: 3.0 cm  LVPWd: 0.94 cm  FS: 35.5 %  LV mass(C)d: 174.4 grams  LV mass(C)dI: 108.6 grams/m2  Ao root diam: 3.6 cm  LA dimension: 4.8 cm     asc Aorta Diam: 3.4 cm  LA/Ao: 1.3  LA Volume Index (BP): 29.9 ml/m2  RWT: 0.41        Doppler Measurements & Calculations  MV E max earle: 59.6 cm/sec  MV A max earle: 77.8 cm/sec  MV E/A: 0.77  MV dec slope: 462.2 cm/sec2  MV dec time: 0.13 sec  Ao V2 max: 100.8 cm/sec  Ao max P.1 mmHg  AI P1/2t: 479.6 msec     PA acc time: 0.10 sec  E/E': 12.2  Peak E' Earle: 4.9 cm/sec         Plan:  1. TNG prescription given as therapeutic trial.  2. Non exercise stress echocardiogram on Monday   3. The patient has my personal cell and will call if any changes before Monday and/or seek immediate ER attention at [facility].  4. He was changed from atenolol to  new beta blocker and may need to reconsider type or dosing.    5. Again patient reminded that should he have nocturnal symptoms, increasing symptoms, symptoms at rest, that he needs to be seen immediately.      Jerrod Rangel MD

## 2019-04-18 NOTE — NURSING NOTE
Vitals completed successfully and medication reconciled.     Baylee Sullivan, ENRICO  2:41 PM  Chief Complaint   Patient presents with     Follow Up     Return Heart Failure

## 2019-04-18 NOTE — PROGRESS NOTES
1. 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 Sequential, old records not in EMR)  2. History of prostate cancer treated with radiation  3. Exertional dyspnea  4. AAA  5. Hyperlipidemia  6. ASPVD with stenting               Left iliac stent              Right iliac stent with elevated velocities  7. Abdominal aortic aneurysm                      Infrarenal abdominal aortic aneurysm measuring 3.8 cm AP x 3.9 cm                       transverse, x 4.6 cm in length. Suspect previous measurements were                                                 slight over estimation. Recommend continued annual surveillance. At least                              moderate stenosis of the left common iliac artery,                       possibly severe.  8. History of jaw infection   9. History if previous difficult intubation with jaw and neck infection  10. Recent cardiac stenting    The patient returns for follow-up of heart recent angioplasyt.  There is no interim history of chest pain, tightness, paroxysmal nocturnal dyspnea, orthopnea, peripheral edema, palpitation, pre-syncope, syncope.  .  Exercise tolerance is stable, however, he does report exertion associated throat tightness and some shortness of breath without chest pain, diaphoresis, nausea, palpitation, pre-syncope  The patient is attempting to exercise regularly and following a sodium restricted, calorically appropriate diet.  Medications are reviewed and the patient is taking medications as prescribed.  The patient is generally sleeping well. Laboratories have been abnormal for uncertain reason, consisting of lower sodium, increasing BNP, negative troponin.    I ambulated him in the ramirez.  He developed throat tightness without changes in heart regularity or oxygenation. His heart rate increased appropriately.  An immediate post exercise examination demonstrated regular rhythm, no wheezing.  An ECG at this time showed lateral ST-T changes that were not new compared to the  most recent ECG.      We discussed the diagnostic and therapeutic options including repeat angiogram, stress echocardiography, TNG trial.  It should be noted that he is markedly improved post angioplasty in terms of exercise tolerance and symptom reduction.                General Symptoms: No  Skin Symptoms: No  HENT Symptoms: No  EYE SYMPTOMS: No  HEART SYMPTOMS: No  LUNG SYMPTOMS: Yes  INTESTINAL SYMPTOMS: No  URINARY SYMPTOMS: Yes  REPRODUCTIVE SYMPTOMS: No  SKELETAL SYMPTOMS: Yes  BLOOD SYMPTOMS: No  NERVOUS SYSTEM SYMPTOMS: Yes  MENTAL HEALTH SYMPTOMS: No  Cough: No  Sputum or phlegm: No  Coughing up blood: No  Difficulty breating or shortness of breath: Yes  Snoring: No  Wheezing: No  Difficulty breathing on exertion: Yes  Nighttime Cough: No  Difficulty breathing when lying flat: No  Trouble holding urine or incontinence: No  Pain or burning: No  Trouble starting or stopping: No  Increased frequency of urination: No  Blood in urine: No  Decreased frequency of urination: No  Frequent nighttime urination: Yes  Flank pain: No  Difficulty emptying bladder: No  Back pain: Yes  Muscle aches: No  Neck pain: No  Swollen joints: No  Joint pain: No  Bone pain: No  Muscle cramps: No  Muscle weakness: No  Joint stiffness: No  Bone fracture: No  Trouble with coordination: No  Dizziness or trouble with balance: No  Fainting or black-out spells: No  Memory loss: No  Headache: No  Seizures: No  Speech problems: No  Tingling: No  Tremor: No  Weakness: No  Difficulty walking: Yes  Paralysis: No  Numbness: No  Results for JOSH DOSHI (MRN [id number]) as of 4/19/2019 09:23   Ref. Range 4/18/2019 16:49   Sodium Latest Ref Range: 133 - 144 mmol/L 127 (L)   Potassium Latest Ref Range: 3.4 - 5.3 mmol/L 4.4   Chloride Latest Ref Range: 94 - 109 mmol/L 94   Carbon Dioxide Latest Ref Range: 20 - 32 mmol/L 28   Urea Nitrogen Latest Ref Range: 7 - 30 mg/dL 15   Creatinine Latest Ref Range: 0.66 - 1.25 mg/dL 0.82   GFR Estimate  Latest Ref Range: >60 mL/min/1.73_m2 83   GFR Estimate If Black Latest Ref Range: >60 mL/min/1.73_m2 >90   Calcium Latest Ref Range: 8.5 - 10.1 mg/dL 8.9   Anion Gap Latest Ref Range: 3 - 14 mmol/L 6   Albumin Latest Ref Range: 3.4 - 5.0 g/dL 3.7   Protein Total Latest Ref Range: 6.8 - 8.8 g/dL 6.8   Bilirubin Total Latest Ref Range: 0.2 - 1.3 mg/dL 0.4   Alkaline Phosphatase Latest Ref Range: 40 - 150 U/L 57   ALT Latest Ref Range: 0 - 70 U/L 22   AST Latest Ref Range: 0 - 45 U/L 20   N-Terminal Pro Bnp Latest Ref Range: 0 - 450 pg/mL 1,210 (H)   Glucose Latest Ref Range: 70 - 99 mg/dL 97   Hemoglobin Latest Ref Range: 13.3 - 17.7 g/dL 12.4 (L)      Ref. Range 2019 10:22   Sodium Latest Ref Range: 136 - 145 mmol/L 125 (L)   Potassium Latest Ref Range: 3.5 - 5.1 mmol/L 5.0   Chloride Latest Ref Range: 98 - 107 mmol/L 92 (L)   Carbon Dioxide Latest Ref Range: 23 - 29 mmol/L 27   Urea Nitrogen Latest Ref Range: 7 - 30 mg/dL 17   Creatinine Latest Ref Range: 0.70 - 1.30 mg/dL 1.09   GFR Estimate Latest Ref Range: >60 mL/min/1.73_m2 65   GFR Estimate If Black Latest Ref Range: >60 mL/min/1.73_m2 79   Calcium Latest Ref Range: 8.5 - 10.5 mg/dL 9.6   Anion Gap Latest Ref Range: 6 - 17 mmol/L 11   N-Terminal Pro Bnp Latest Ref Range: 0 - 450 pg/mL 1,221 (H)   Troponin I ES Latest Ref Range: 0.000 - 0.045 ug/L <0.015   Glucose Latest Ref Range: 70 - 105 mg/dL 116 (H)     Name: CARINE DOSHI  MRN: [id number]  : 1935  Study Date: 2019 11:53 AM  Age: 83 yrs  Gender: Male  Patient Location: [facility]  Reason For Study: ASHD (arteriosclerotic heart disease), Dyspnea on exertion  Ordering Physician: CASTILLO MIKE  Referring Physician: CASTILLO MIKE  Performed By: Sherley Young     BSA: 1.6 m2  Height: 63 in  Weight: 129 lb  HR: 72  BP: 125/72 mmHg  _____________________________________________________________________________  __     Procedure  Complete Echo Adult. Contrast  Optison.     _____________________________________________________________________________  __        Interpretation Summary     Left ventricular systolic function is mildly reduced.  The visual ejection fraction is estimated at 45-50%.  There is mild septal hypokinesis.  There is moderate apical wall hypokinesis.  Mild mid to distal anterior wall hypokinesis.  The right ventricle is normal in structure, function and size.  There is moderate trileaflet aortic sclerosis.  There is mild to moderate (1-2+) aortic regurgitation.  There is mild (1+) pulmonic valvular regurgitation.  Borderline aortic root dilatation.  Moderate atherosclerotic plaque(s) in the ascending aorta.  Compared to the prior study dated 5/12/2019, there have been no changes. The  study was technically limited. Optison contrast was used without apparent  complications.  _____________________________________________________________________________  __        Left Ventricle  The left ventricle is normal in size. There is normal left ventricular wall  thickness. Left ventricular systolic function is mildly reduced. The visual  ejection fraction is estimated at 45-50%. Left ventricular diastolic function  is normal. Diastolic Doppler findings (E/E' ratio and/or other parameters)  suggest left ventricular filling pressures are indeterminate. There is mild  septal hypokinesis. There is moderate apical wall hypokinesis. Mild mid to  distal anterior wall hypokinesis. There is no thrombus seen in the left  ventricle.     Right Ventricle  The right ventricle is normal in structure, function and size.     Atria  Normal left atrial size. Right atrial size is normal. There is no color  Doppler evidence of an atrial shunt.     Mitral Valve  There is mild mitral annular calcification. The mitral valve leaflets are  mildly thickened. There is trace to mild mitral regurgitation. There is no  mitral valve stenosis.     Tricuspid Valve  Normal tricuspid valve. No  tricuspid regurgitation.        Aortic Valve  There is moderate trileaflet aortic sclerosis. There is mild to moderate (1-  2+) aortic regurgitation. No hemodynamically significant valvular aortic  stenosis.     Pulmonic Valve  The pulmonic valve is not well visualized. There is mild (1+) pulmonic  valvular regurgitation. Normal pulmonic valve velocity.     Vessels  Borderline aortic root dilatation. Normal size ascending aorta. Moderate  atherosclerotic plaque(s) in the ascending aorta. The IVC is normal in size  and reactivity with respiration, suggesting normal central venous pressure.     Pericardium  There is no pericardial effusion.     Rhythm  Sinus rhythm was noted.     _____________________________________________________________________________  __  MMode/2D Measurements & Calculations  IVSd: 1.2 cm  LVIDd: 4.6 cm  LVIDs: 3.0 cm  LVPWd: 0.94 cm  FS: 35.5 %  LV mass(C)d: 174.4 grams  LV mass(C)dI: 108.6 grams/m2  Ao root diam: 3.6 cm  LA dimension: 4.8 cm     asc Aorta Diam: 3.4 cm  LA/Ao: 1.3  LA Volume Index (BP): 29.9 ml/m2  RWT: 0.41        Doppler Measurements & Calculations  MV E max earle: 59.6 cm/sec  MV A max earle: 77.8 cm/sec  MV E/A: 0.77  MV dec slope: 462.2 cm/sec2  MV dec time: 0.13 sec  Ao V2 max: 100.8 cm/sec  Ao max P.1 mmHg  AI P1/2t: 479.6 msec     PA acc time: 0.10 sec  E/E': 12.2  Peak E' Earle: 4.9 cm/sec         Plan:  1. TNG prescription given as therapeutic trial.  2. Non exercise stress echocardiogram on Monday   3. The patient has my personal cell and will call if any changes before Monday and/or seek immediate ER attention at [facility].  4. He was changed from atenolol to new beta blocker and may need to reconsider type or dosing.    5. Again patient reminded that should he have nocturnal symptoms, increasing symptoms, symptoms at rest, that he needs to be seen immediately.

## 2019-04-18 NOTE — PATIENT INSTRUCTIONS
Nitroglycerin prescription and directions given    Laboratories today    If prolonged chest or throat pain need to report to ER    Thank you for your visit today.  Please call me with any questions or concerns.   Lukas Haskins RN  Cardiology Care Coordinator  [phone], press option 1 then option 3

## 2019-04-19 ENCOUNTER — OFFICE VISIT (OUTPATIENT)
Dept: CARDIOLOGY | Facility: CLINIC | Age: 84
End: 2019-04-19
Payer: COMMERCIAL

## 2019-04-19 DIAGNOSIS — I25.10 ASHD (ARTERIOSCLEROTIC HEART DISEASE): Primary | ICD-10-CM

## 2019-04-19 DIAGNOSIS — I25.118 CORONARY ARTERY DISEASE OF NATIVE HEART WITH STABLE ANGINA PECTORIS, UNSPECIFIED VESSEL OR LESION TYPE (H): Primary | ICD-10-CM

## 2019-04-19 LAB — INTERPRETATION ECG - MUSE: NORMAL

## 2019-04-19 PROCEDURE — 99213 OFFICE O/P EST LOW 20 MIN: CPT | Mod: ZP | Performed by: INTERNAL MEDICINE

## 2019-04-19 NOTE — LETTER
4/19/2019      RE: Oscar Chaudhary  [address] [facility], [place] [address]       Dear Colleague,    Thank you for the opportunity to participate in the care of your patient, Oscar Chaudhary, at the [facility] HEART [facility]. Please see a copy of my visit note below.        Jerrod Rangel MD at 4/18/2019  2:30 PM     Status: Signed         1. 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 Sequential, old records not in EMR)  2. History of prostate cancer treated with radiation  3. Exertional dyspnea  4. AAA  5. Hyperlipidemia  6. ASPVD with stenting               Left iliac stent              Right iliac stent with elevated velocities  7. Abdominal aortic aneurysm                      Infrarenal abdominal aortic aneurysm measuring 3.8 cm AP x 3.9 cm                       transverse, x 4.6 cm in length. Suspect previous measurements were                                                 slight over estimation. Recommend continued annual surveillance. At least                              moderate stenosis of the left common iliac artery,                       possibly severe.  8. History of jaw infection   9. History if previous difficult intubation with jaw and neck infection  10. Recent cardiac stenting    The patient is improved compared to pre-stenting condition.  He has markedly reduced chest pain and tightness, however, complains of exertional neck/throat discomfort.  He has no interim history of PND, orthopnea, nocturnal chest pain, pre-syncope or syncope. He does not have                         General Symptoms: No  Skin Symptoms: No  HENT Symptoms: No  EYE SYMPTOMS: No  HEART SYMPTOMS: No  LUNG SYMPTOMS: Yes  INTESTINAL SYMPTOMS: No  URINARY SYMPTOMS: Yes  REPRODUCTIVE SYMPTOMS: No  SKELETAL SYMPTOMS: Yes  BLOOD SYMPTOMS: No  NERVOUS SYSTEM SYMPTOMS: Yes  MENTAL HEALTH SYMPTOMS: No  Cough: No  Sputum or phlegm: No  Coughing up blood: No  Difficulty breating or  shortness of breath: Yes  Snoring: No  Wheezing: No  Difficulty breathing on exertion: Yes  Nighttime Cough: No  Difficulty breathing when lying flat: No  Trouble holding urine or incontinence: No  Pain or burning: No  Trouble starting or stopping: No  Increased frequency of urination: No  Blood in urine: No  Decreased frequency of urination: No  Frequent nighttime urination: Yes  Flank pain: No  Difficulty emptying bladder: No  Back pain: Yes  Muscle aches: No  Neck pain: No  Swollen joints: No  Joint pain: No  Bone pain: No  Muscle cramps: No  Muscle weakness: No  Joint stiffness: No  Bone fracture: No  Trouble with coordination: No  Dizziness or trouble with balance: No  Fainting or black-out spells: No  Memory loss: No  Headache: No  Seizures: No  Speech problems: No  Tingling: No  Tremor: No  Weakness: No  Difficulty walking: Yes  Paralysis: No  Numbness: No       Ref. Range 4/2/2019 10:22   Sodium Latest Ref Range: 136 - 145 mmol/L 125 (L)   Potassium Latest Ref Range: 3.5 - 5.1 mmol/L 5.0   Chloride Latest Ref Range: 98 - 107 mmol/L 92 (L)   Carbon Dioxide Latest Ref Range: 23 - 29 mmol/L 27   Urea Nitrogen Latest Ref Range: 7 - 30 mg/dL 17   Creatinine Latest Ref Range: 0.70 - 1.30 mg/dL 1.09   GFR Estimate Latest Ref Range: >60 mL/min/1.73_m2 65   GFR Estimate If Black Latest Ref Range: >60 mL/min/1.73_m2 79   Calcium Latest Ref Range: 8.5 - 10.5 mg/dL 9.6   Anion Gap Latest Ref Range: 6 - 17 mmol/L 11   N-Terminal Pro Bnp Latest Ref Range: 0 - 450 pg/mL 1,221 (H)   Troponin I ES Latest Ref Range: 0.000 - 0.045 ug/L <0.015   Glucose Latest Ref Range: 70 - 105 mg/dL 116 (H)      Wt Readings from Last 24 Encounters:   04/18/19 56.2 kg (123 lb 14.4 oz)   04/02/19 59.1 kg (130 lb 4.8 oz)   03/21/19 57.2 kg (126 lb)   03/19/19 58.7 kg (129 lb 6.4 oz)   10/15/18 61.7 kg (136 lb 1.6 oz)   10/02/18 62.8 kg (138 lb 6.4 oz)   03/08/18 63.2 kg (139 lb 4.8 oz)   02/27/18 63.8 kg (140 lb 9.6 oz)   02/06/18 64.4 kg (141  lb 14.4 oz)   17 63 kg (139 lb)   03/10/17 60.8 kg (134 lb)   17 62.6 kg (138 lb)   16 66.3 kg (146 lb 3.2 oz)   06/02/15 66.9 kg (147 lb 6.4 oz)   14 65.3 kg (144 lb)   14 65.6 kg (144 lb 9.6 oz)   10/07/13 66.4 kg (146 lb 6.4 oz)   12 66.1 kg (145 lb 11.2 oz)     Current Outpatient Medications   Medication     acetaminophen (TYLENOL) 325 MG tablet     aspirin 81 MG tablet     atorvastatin (LIPITOR) 40 MG tablet     Cholecalciferol (VITAMIN D) 1000 UNITS capsule     clopidogrel (PLAVIX) 75 MG tablet     diazepam (VALIUM) 5 MG tablet     fish oil-omega-3 fatty acids (FISH OIL) 1000 MG capsule     lisinopril (PRINIVIL/ZESTRIL) 5 MG tablet     Multiple Vitamins-Minerals (MULTIVITAL PO)     nebivolol (BYSTOLIC) 5 MG tablet     nitroGLYcerin (NITROSTAT) 0.3 MG sublingual tablet     nitroGLYcerin (NITROSTAT) 0.4 MG sublingual tablet     omeprazole (PRILOSEC) 20 MG CR capsule     spironolactone (ALDACTONE) 25 MG tablet     temazepam (RESTORIL) 15 MG capsule     No current facility-administered medications for this visit.      Name: CARINE DOSHI  MRN: [id number]  : 1935  Study Date: 2019 11:53 AM  Age: 83 yrs  Gender: Male  Patient Location: [facility]  Reason For Study: ASHD (arteriosclerotic heart disease), Dyspnea on exertion  Ordering Physician: CASTILLO MIKE  Referring Physician: CASTILLO MIKE  Performed By: Sherley Young     BSA: 1.6 m2  Height: 63 in  Weight: 129 lb  HR: 72  BP: 125/72 mmHg  _____________________________________________________________________________  __     Procedure  Complete Echo Adult. Contrast Optison.     _____________________________________________________________________________  __        Interpretation Summary     Left ventricular systolic function is mildly reduced.  The visual ejection fraction is estimated at 45-50%.  There is mild septal hypokinesis.  There is moderate apical wall hypokinesis.  Mild mid to distal anterior  wall hypokinesis.  The right ventricle is normal in structure, function and size.  There is moderate trileaflet aortic sclerosis.  There is mild to moderate (1-2+) aortic regurgitation.  There is mild (1+) pulmonic valvular regurgitation.  Borderline aortic root dilatation.  Moderate atherosclerotic plaque(s) in the ascending aorta.  Compared to the prior study dated 5/12/2019, there have been no changes. The  study was technically limited. Optison contrast was used without apparent  complications.  _____________________________________________________________________________  __        Left Ventricle  The left ventricle is normal in size. There is normal left ventricular wall  thickness. Left ventricular systolic function is mildly reduced. The visual  ejection fraction is estimated at 45-50%. Left ventricular diastolic function  is normal. Diastolic Doppler findings (E/E' ratio and/or other parameters)  suggest left ventricular filling pressures are indeterminate. There is mild  septal hypokinesis. There is moderate apical wall hypokinesis. Mild mid to  distal anterior wall hypokinesis. There is no thrombus seen in the left  ventricle.     Right Ventricle  The right ventricle is normal in structure, function and size.     Atria  Normal left atrial size. Right atrial size is normal. There is no color  Doppler evidence of an atrial shunt.     Mitral Valve  There is mild mitral annular calcification. The mitral valve leaflets are  mildly thickened. There is trace to mild mitral regurgitation. There is no  mitral valve stenosis.     Tricuspid Valve  Normal tricuspid valve. No tricuspid regurgitation.        Aortic Valve  There is moderate trileaflet aortic sclerosis. There is mild to moderate (1-  2+) aortic regurgitation. No hemodynamically significant valvular aortic  stenosis.     Pulmonic Valve  The pulmonic valve is not well visualized. There is mild (1+) pulmonic  valvular regurgitation. Normal pulmonic valve  velocity.     Vessels  Borderline aortic root dilatation. Normal size ascending aorta. Moderate  atherosclerotic plaque(s) in the ascending aorta. The IVC is normal in size  and reactivity with respiration, suggesting normal central venous pressure.     Pericardium  There is no pericardial effusion.     Rhythm  Sinus rhythm was noted.     _____________________________________________________________________________  __  MMode/2D Measurements & Calculations  IVSd: 1.2 cm  LVIDd: 4.6 cm  LVIDs: 3.0 cm  LVPWd: 0.94 cm  FS: 35.5 %  LV mass(C)d: 174.4 grams  LV mass(C)dI: 108.6 grams/m2  Ao root diam: 3.6 cm  LA dimension: 4.8 cm     asc Aorta Diam: 3.4 cm  LA/Ao: 1.3  LA Volume Index (BP): 29.9 ml/m2  RWT: 0.41        Doppler Measurements & Calculations  MV E max earle: 59.6 cm/sec  MV A max earle: 77.8 cm/sec  MV E/A: 0.77  MV dec slope: 462.2 cm/sec2  MV dec time: 0.13 sec  Ao V2 max: 100.8 cm/sec  Ao max P.1 mmHg  AI P1/2t: 479.6 msec     PA acc time: 0.10 sec  E/E': 12.2  Peak E' Earle: 4.9 cm/sec           Results for JOSH DOSHI BABS (MRN [id number]) as of 2019 08:46   Ref. Range 2019 16:49   Sodium Latest Ref Range: 133 - 144 mmol/L 127 (L)   Potassium Latest Ref Range: 3.4 - 5.3 mmol/L 4.4   Chloride Latest Ref Range: 94 - 109 mmol/L 94   Carbon Dioxide Latest Ref Range: 20 - 32 mmol/L 28   Urea Nitrogen Latest Ref Range: 7 - 30 mg/dL 15   Creatinine Latest Ref Range: 0.66 - 1.25 mg/dL 0.82   GFR Estimate Latest Ref Range: >60 mL/min/1.73_m2 83   GFR Estimate If Black Latest Ref Range: >60 mL/min/1.73_m2 >90   Calcium Latest Ref Range: 8.5 - 10.1 mg/dL 8.9   Anion Gap Latest Ref Range: 3 - 14 mmol/L 6   Albumin Latest Ref Range: 3.4 - 5.0 g/dL 3.7   Protein Total Latest Ref Range: 6.8 - 8.8 g/dL 6.8   Bilirubin Total Latest Ref Range: 0.2 - 1.3 mg/dL 0.4   Alkaline Phosphatase Latest Ref Range: 40 - 150 U/L 57   ALT Latest Ref Range: 0 - 70 U/L 22   AST Latest Ref Range: 0 - 45 U/L 20   N-Terminal Pro  Bnp Latest Ref Range: 0 - 450 pg/mL 1,210 (H)   Glucose Latest Ref Range: 70 - 99 mg/dL 97   Hemoglobin Latest Ref Range: 13.3 - 17.7 g/dL 12.4 (L)     Jerrod Rangel MD

## 2019-04-19 NOTE — PROGRESS NOTES
EUGENIA Rangel, Jerrod Bennett MD at 4/18/2019  2:30 PM     Status: Signed         1. 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 Sequential, old records not in EMR)  2. History of prostate cancer treated with radiation  3. Exertional dyspnea  4. AAA  5. Hyperlipidemia  6. ASPVD with stenting               Left iliac stent              Right iliac stent with elevated velocities  7. Abdominal aortic aneurysm                      Infrarenal abdominal aortic aneurysm measuring 3.8 cm AP x 3.9 cm                       transverse, x 4.6 cm in length. Suspect previous measurements were                                                 slight over estimation. Recommend continued annual surveillance. At least                              moderate stenosis of the left common iliac artery,                       possibly severe.  8. History of jaw infection   9. History if previous difficult intubation with jaw and neck infection  10. Recent cardiac stenting    The patient is improved compared to pre-stenting condition.  He has markedly reduced chest pain and tightness, however, complains of exertional neck/throat discomfort.  He has no interim history of PND, orthopnea, nocturnal chest pain, pre-syncope or syncope. He does not have                         General Symptoms: No  Skin Symptoms: No  HENT Symptoms: No  EYE SYMPTOMS: No  HEART SYMPTOMS: No  LUNG SYMPTOMS: Yes  INTESTINAL SYMPTOMS: No  URINARY SYMPTOMS: Yes  REPRODUCTIVE SYMPTOMS: No  SKELETAL SYMPTOMS: Yes  BLOOD SYMPTOMS: No  NERVOUS SYSTEM SYMPTOMS: Yes  MENTAL HEALTH SYMPTOMS: No  Cough: No  Sputum or phlegm: No  Coughing up blood: No  Difficulty breating or shortness of breath: Yes  Snoring: No  Wheezing: No  Difficulty breathing on exertion: Yes  Nighttime Cough: No  Difficulty breathing when lying flat: No  Trouble holding urine or incontinence: No  Pain or burning: No  Trouble starting or stopping: No  Increased frequency of urination: No  Blood in urine: No  Decreased  frequency of urination: No  Frequent nighttime urination: Yes  Flank pain: No  Difficulty emptying bladder: No  Back pain: Yes  Muscle aches: No  Neck pain: No  Swollen joints: No  Joint pain: No  Bone pain: No  Muscle cramps: No  Muscle weakness: No  Joint stiffness: No  Bone fracture: No  Trouble with coordination: No  Dizziness or trouble with balance: No  Fainting or black-out spells: No  Memory loss: No  Headache: No  Seizures: No  Speech problems: No  Tingling: No  Tremor: No  Weakness: No  Difficulty walking: Yes  Paralysis: No  Numbness: No       Ref. Range 4/2/2019 10:22   Sodium Latest Ref Range: 136 - 145 mmol/L 125 (L)   Potassium Latest Ref Range: 3.5 - 5.1 mmol/L 5.0   Chloride Latest Ref Range: 98 - 107 mmol/L 92 (L)   Carbon Dioxide Latest Ref Range: 23 - 29 mmol/L 27   Urea Nitrogen Latest Ref Range: 7 - 30 mg/dL 17   Creatinine Latest Ref Range: 0.70 - 1.30 mg/dL 1.09   GFR Estimate Latest Ref Range: >60 mL/min/1.73_m2 65   GFR Estimate If Black Latest Ref Range: >60 mL/min/1.73_m2 79   Calcium Latest Ref Range: 8.5 - 10.5 mg/dL 9.6   Anion Gap Latest Ref Range: 6 - 17 mmol/L 11   N-Terminal Pro Bnp Latest Ref Range: 0 - 450 pg/mL 1,221 (H)   Troponin I ES Latest Ref Range: 0.000 - 0.045 ug/L <0.015   Glucose Latest Ref Range: 70 - 105 mg/dL 116 (H)      Wt Readings from Last 24 Encounters:   04/18/19 56.2 kg (123 lb 14.4 oz)   04/02/19 59.1 kg (130 lb 4.8 oz)   03/21/19 57.2 kg (126 lb)   03/19/19 58.7 kg (129 lb 6.4 oz)   10/15/18 61.7 kg (136 lb 1.6 oz)   10/02/18 62.8 kg (138 lb 6.4 oz)   03/08/18 63.2 kg (139 lb 4.8 oz)   02/27/18 63.8 kg (140 lb 9.6 oz)   02/06/18 64.4 kg (141 lb 14.4 oz)   08/01/17 63 kg (139 lb)   03/10/17 60.8 kg (134 lb)   02/26/17 62.6 kg (138 lb)   06/14/16 66.3 kg (146 lb 3.2 oz)   06/02/15 66.9 kg (147 lb 6.4 oz)   05/12/14 65.3 kg (144 lb)   04/25/14 65.6 kg (144 lb 9.6 oz)   10/07/13 66.4 kg (146 lb 6.4 oz)   08/06/12 66.1 kg (145 lb 11.2 oz)     Current Outpatient  Medications   Medication     acetaminophen (TYLENOL) 325 MG tablet     aspirin 81 MG tablet     atorvastatin (LIPITOR) 40 MG tablet     Cholecalciferol (VITAMIN D) 1000 UNITS capsule     clopidogrel (PLAVIX) 75 MG tablet     diazepam (VALIUM) 5 MG tablet     fish oil-omega-3 fatty acids (FISH OIL) 1000 MG capsule     lisinopril (PRINIVIL/ZESTRIL) 5 MG tablet     Multiple Vitamins-Minerals (MULTIVITAL PO)     nebivolol (BYSTOLIC) 5 MG tablet     nitroGLYcerin (NITROSTAT) 0.3 MG sublingual tablet     nitroGLYcerin (NITROSTAT) 0.4 MG sublingual tablet     omeprazole (PRILOSEC) 20 MG CR capsule     spironolactone (ALDACTONE) 25 MG tablet     temazepam (RESTORIL) 15 MG capsule     No current facility-administered medications for this visit.      Name: CARINE DOSHI  MRN: [id number]  : 1935  Study Date: 2019 11:53 AM  Age: 83 yrs  Gender: Male  Patient Location: [facility]  Reason For Study: ASHD (arteriosclerotic heart disease), Dyspnea on exertion  Ordering Physician: CASTILLO MIKE  Referring Physician: CASTILLO MIKE  Performed By: Sherley Young     BSA: 1.6 m2  Height: 63 in  Weight: 129 lb  HR: 72  BP: 125/72 mmHg  _____________________________________________________________________________  __     Procedure  Complete Echo Adult. Contrast Optison.     _____________________________________________________________________________  __        Interpretation Summary     Left ventricular systolic function is mildly reduced.  The visual ejection fraction is estimated at 45-50%.  There is mild septal hypokinesis.  There is moderate apical wall hypokinesis.  Mild mid to distal anterior wall hypokinesis.  The right ventricle is normal in structure, function and size.  There is moderate trileaflet aortic sclerosis.  There is mild to moderate (1-2+) aortic regurgitation.  There is mild (1+) pulmonic valvular regurgitation.  Borderline aortic root dilatation.  Moderate atherosclerotic plaque(s) in the  ascending aorta.  Compared to the prior study dated 5/12/2019, there have been no changes. The  study was technically limited. Optison contrast was used without apparent  complications.  _____________________________________________________________________________  __        Left Ventricle  The left ventricle is normal in size. There is normal left ventricular wall  thickness. Left ventricular systolic function is mildly reduced. The visual  ejection fraction is estimated at 45-50%. Left ventricular diastolic function  is normal. Diastolic Doppler findings (E/E' ratio and/or other parameters)  suggest left ventricular filling pressures are indeterminate. There is mild  septal hypokinesis. There is moderate apical wall hypokinesis. Mild mid to  distal anterior wall hypokinesis. There is no thrombus seen in the left  ventricle.     Right Ventricle  The right ventricle is normal in structure, function and size.     Atria  Normal left atrial size. Right atrial size is normal. There is no color  Doppler evidence of an atrial shunt.     Mitral Valve  There is mild mitral annular calcification. The mitral valve leaflets are  mildly thickened. There is trace to mild mitral regurgitation. There is no  mitral valve stenosis.     Tricuspid Valve  Normal tricuspid valve. No tricuspid regurgitation.        Aortic Valve  There is moderate trileaflet aortic sclerosis. There is mild to moderate (1-  2+) aortic regurgitation. No hemodynamically significant valvular aortic  stenosis.     Pulmonic Valve  The pulmonic valve is not well visualized. There is mild (1+) pulmonic  valvular regurgitation. Normal pulmonic valve velocity.     Vessels  Borderline aortic root dilatation. Normal size ascending aorta. Moderate  atherosclerotic plaque(s) in the ascending aorta. The IVC is normal in size  and reactivity with respiration, suggesting normal central venous pressure.     Pericardium  There is no pericardial effusion.     Rhythm  Sinus  rhythm was noted.     _____________________________________________________________________________  __  MMode/2D Measurements & Calculations  IVSd: 1.2 cm  LVIDd: 4.6 cm  LVIDs: 3.0 cm  LVPWd: 0.94 cm  FS: 35.5 %  LV mass(C)d: 174.4 grams  LV mass(C)dI: 108.6 grams/m2  Ao root diam: 3.6 cm  LA dimension: 4.8 cm     asc Aorta Diam: 3.4 cm  LA/Ao: 1.3  LA Volume Index (BP): 29.9 ml/m2  RWT: 0.41        Doppler Measurements & Calculations  MV E max earle: 59.6 cm/sec  MV A max earle: 77.8 cm/sec  MV E/A: 0.77  MV dec slope: 462.2 cm/sec2  MV dec time: 0.13 sec  Ao V2 max: 100.8 cm/sec  Ao max P.1 mmHg  AI P1/2t: 479.6 msec     PA acc time: 0.10 sec  E/E': 12.2  Peak E' Earle: 4.9 cm/sec           Results for JOSH DOSHI (MRN [id number]) as of 2019 08:46   Ref. Range 2019 16:49   Sodium Latest Ref Range: 133 - 144 mmol/L 127 (L)   Potassium Latest Ref Range: 3.4 - 5.3 mmol/L 4.4   Chloride Latest Ref Range: 94 - 109 mmol/L 94   Carbon Dioxide Latest Ref Range: 20 - 32 mmol/L 28   Urea Nitrogen Latest Ref Range: 7 - 30 mg/dL 15   Creatinine Latest Ref Range: 0.66 - 1.25 mg/dL 0.82   GFR Estimate Latest Ref Range: >60 mL/min/1.73_m2 83   GFR Estimate If Black Latest Ref Range: >60 mL/min/1.73_m2 >90   Calcium Latest Ref Range: 8.5 - 10.1 mg/dL 8.9   Anion Gap Latest Ref Range: 3 - 14 mmol/L 6   Albumin Latest Ref Range: 3.4 - 5.0 g/dL 3.7   Protein Total Latest Ref Range: 6.8 - 8.8 g/dL 6.8   Bilirubin Total Latest Ref Range: 0.2 - 1.3 mg/dL 0.4   Alkaline Phosphatase Latest Ref Range: 40 - 150 U/L 57   ALT Latest Ref Range: 0 - 70 U/L 22   AST Latest Ref Range: 0 - 45 U/L 20   N-Terminal Pro Bnp Latest Ref Range: 0 - 450 pg/mL 1,210 (H)   Glucose Latest Ref Range: 70 - 99 mg/dL 97   Hemoglobin Latest Ref Range: 13.3 - 17.7 g/dL 12.4 (L)

## 2019-04-19 NOTE — PROGRESS NOTES
Cancelled Dobutamine stress echo after clarifying with Dr. Rangel.  Re-ordered Dobutamine NM stress test @ [facility]. Vibha Fuller RN on 4/19/2019 at 12:10 PM  -------------------------------------  Called patient and advised him of the testing change, cancellation of the previous dobutamine stress Echo and gave him the scheduling # @ [facility].  Patient verbalized understanding, agreed with plan and denied any further questions. Vibha Fuller RN on 4/19/2019 at 12:20 PM

## 2019-04-22 ENCOUNTER — PATIENT OUTREACH (OUTPATIENT)
Dept: CARDIOLOGY | Facility: CLINIC | Age: 84
End: 2019-04-22

## 2019-04-22 DIAGNOSIS — I25.10 ASHD (ARTERIOSCLEROTIC HEART DISEASE): Primary | ICD-10-CM

## 2019-04-22 RX ORDER — METOPROLOL SUCCINATE 25 MG/1
25 TABLET, EXTENDED RELEASE ORAL DAILY
Qty: 90 TABLET | Refills: 3 | Status: SHIPPED | OUTPATIENT
Start: 2019-04-22 | End: 2019-05-23

## 2019-04-22 NOTE — PROGRESS NOTES
Per Dr. Rangel, he would like pt to hold the beta blocker today and tomorrow before the test.  We will stop the nevobiol and start metoprolol as Toprol XL 25 mgs once daily.  We will probably need to increase that dosage.  I have ordered the Toprol XL and sent a message to the pt. Gracia Gates RN on 4/22/2019 at 9:53 AM

## 2019-04-23 ENCOUNTER — HOSPITAL ENCOUNTER (OUTPATIENT)
Facility: CLINIC | Age: 84
Discharge: HOME | End: 2019-04-23
Attending: INTERNAL MEDICINE | Admitting: INTERNAL MEDICINE
Payer: COMMERCIAL

## 2019-04-23 ENCOUNTER — HOSPITAL ENCOUNTER (OUTPATIENT)
Dept: CARDIOLOGY | Facility: CLINIC | Age: 84
End: 2019-04-23
Attending: INTERNAL MEDICINE | Admitting: INTERNAL MEDICINE
Payer: COMMERCIAL

## 2019-04-23 ENCOUNTER — HOSPITAL ENCOUNTER (OUTPATIENT)
Dept: NUCLEAR MEDICINE | Facility: CLINIC | Age: 84
Setting detail: NUCLEAR MEDICINE
Discharge: HOME | End: 2019-04-23
Attending: INTERNAL MEDICINE | Admitting: INTERNAL MEDICINE
Payer: COMMERCIAL

## 2019-04-23 ENCOUNTER — HOSPITAL ENCOUNTER (OUTPATIENT)
Dept: NUCLEAR MEDICINE | Facility: CLINIC | Age: 84
Setting detail: NUCLEAR MEDICINE
End: 2019-04-23
Attending: INTERNAL MEDICINE | Admitting: INTERNAL MEDICINE
Payer: COMMERCIAL

## 2019-04-23 VITALS — RESPIRATION RATE: 18 BRPM | SYSTOLIC BLOOD PRESSURE: 116 MMHG | HEART RATE: 88 BPM | DIASTOLIC BLOOD PRESSURE: 56 MMHG

## 2019-04-23 DIAGNOSIS — I25.118 CORONARY ARTERY DISEASE OF NATIVE HEART WITH STABLE ANGINA PECTORIS, UNSPECIFIED VESSEL OR LESION TYPE (H): ICD-10-CM

## 2019-04-23 PROCEDURE — 93016 CV STRESS TEST SUPVJ ONLY: CPT | Performed by: INTERNAL MEDICINE

## 2019-04-23 PROCEDURE — 34300033 ZZH RX 343: Performed by: INTERNAL MEDICINE

## 2019-04-23 PROCEDURE — 78452 HT MUSCLE IMAGE SPECT MULT: CPT

## 2019-04-23 PROCEDURE — 25000128 H RX IP 250 OP 636: Performed by: INTERNAL MEDICINE

## 2019-04-23 PROCEDURE — A9502 TC99M TETROFOSMIN: HCPCS | Performed by: INTERNAL MEDICINE

## 2019-04-23 PROCEDURE — 93018 CV STRESS TEST I&R ONLY: CPT | Performed by: INTERNAL MEDICINE

## 2019-04-23 PROCEDURE — 93017 CV STRESS TEST TRACING ONLY: CPT | Performed by: REHABILITATION PRACTITIONER

## 2019-04-23 PROCEDURE — 40000855 ZZH STATISTIC ECHO STRESS OR NM NPI

## 2019-04-23 PROCEDURE — 78452 HT MUSCLE IMAGE SPECT MULT: CPT | Mod: 26 | Performed by: INTERNAL MEDICINE

## 2019-04-23 RX ORDER — ACYCLOVIR 200 MG/1
0-1 CAPSULE ORAL
Status: DISCONTINUED | OUTPATIENT
Start: 2019-04-23 | End: 2019-04-23 | Stop reason: HOSPADM

## 2019-04-23 RX ORDER — ALBUTEROL SULFATE 90 UG/1
2 AEROSOL, METERED RESPIRATORY (INHALATION) EVERY 5 MIN PRN
Status: DISCONTINUED | OUTPATIENT
Start: 2019-04-23 | End: 2019-04-23 | Stop reason: HOSPADM

## 2019-04-23 RX ORDER — AMINOPHYLLINE 25 MG/ML
50-100 INJECTION, SOLUTION INTRAVENOUS
Status: DISCONTINUED | OUTPATIENT
Start: 2019-04-23 | End: 2019-04-23 | Stop reason: HOSPADM

## 2019-04-23 RX ORDER — CAFFEINE CITRATE 20 MG/ML
60 SOLUTION INTRAVENOUS
Status: DISCONTINUED | OUTPATIENT
Start: 2019-04-23 | End: 2019-04-23 | Stop reason: HOSPADM

## 2019-04-23 RX ORDER — CAFFEINE 200 MG
200 TABLET ORAL
Status: DISCONTINUED | OUTPATIENT
Start: 2019-04-23 | End: 2019-04-23 | Stop reason: HOSPADM

## 2019-04-23 RX ORDER — REGADENOSON 0.08 MG/ML
0.4 INJECTION, SOLUTION INTRAVENOUS ONCE
Status: COMPLETED | OUTPATIENT
Start: 2019-04-23 | End: 2019-04-23

## 2019-04-23 RX ADMIN — REGADENOSON 0.4 MG: 0.08 INJECTION, SOLUTION INTRAVENOUS at 11:30

## 2019-04-23 RX ADMIN — TETROFOSMIN 8.9 MCI.: 1.38 INJECTION, POWDER, LYOPHILIZED, FOR SOLUTION INTRAVENOUS at 09:55

## 2019-04-23 RX ADMIN — TETROFOSMIN 25.6 MCI.: 1.38 INJECTION, POWDER, LYOPHILIZED, FOR SOLUTION INTRAVENOUS at 11:30

## 2019-04-23 NOTE — PROGRESS NOTES
1122 lexiscan stress test explained to pt. VSS. Pt denies chest pain. Pt has chronic back pain 7. Iv in per nuc med. Heart tones normal. Lungs clear. Denies asthma or copd. Denies any caffeine in last 12 hrs. No beta blockers in last 2 days. Not started toprol yet.   1138 pt had shortness of breath initially, which went away with in first 2-3 min. Pt had tummy ache, which is also gone. About 5 min after injection pt had slight chest tightness mid chest at 0.5-1, which went away with in 1 min. Fleeting. Back pain down to 1 now.  Other symptoms gone. VSS. NSR. Pt going to lobby via wheel chair with ekg, will get food and beverage before next scan.

## 2019-04-25 ENCOUNTER — TRANSFERRED RECORDS (OUTPATIENT)
Dept: HEALTH INFORMATION MANAGEMENT | Facility: CLINIC | Age: 84
End: 2019-04-25

## 2019-04-26 DIAGNOSIS — I25.10 ASHD (ARTERIOSCLEROTIC HEART DISEASE): ICD-10-CM

## 2019-04-26 RX ORDER — NITROGLYCERIN 0.3 MG/1
TABLET SUBLINGUAL
Qty: 300 TABLET | Refills: 3 | OUTPATIENT
Start: 2019-04-26

## 2019-05-02 ENCOUNTER — TRANSFERRED RECORDS (OUTPATIENT)
Dept: HEALTH INFORMATION MANAGEMENT | Facility: CLINIC | Age: 84
End: 2019-05-02

## 2019-05-08 DIAGNOSIS — I25.83 CORONARY ARTERY DISEASE DUE TO LIPID RICH PLAQUE: Primary | ICD-10-CM

## 2019-05-08 DIAGNOSIS — I25.10 CORONARY ARTERY DISEASE DUE TO LIPID RICH PLAQUE: Primary | ICD-10-CM

## 2019-05-08 DIAGNOSIS — R06.02 SOB (SHORTNESS OF BREATH): ICD-10-CM

## 2019-05-09 DIAGNOSIS — R06.09 DYSPNEA ON EXERTION: ICD-10-CM

## 2019-05-09 DIAGNOSIS — I25.10 ASHD (ARTERIOSCLEROTIC HEART DISEASE): ICD-10-CM

## 2019-05-09 LAB
ANION GAP SERPL CALCULATED.3IONS-SCNC: 9.5 MMOL/L (ref 6–17)
BUN SERPL-MCNC: 11 MG/DL (ref 7–30)
CALCIUM SERPL-MCNC: 9.2 MG/DL (ref 8.5–10.5)
CHLORIDE SERPL-SCNC: 90 MMOL/L (ref 98–107)
CO2 SERPL-SCNC: 28 MMOL/L (ref 23–29)
CREAT SERPL-MCNC: 0.91 MG/DL (ref 0.7–1.3)
GFR SERPL CREATININE-BSD FRML MDRD: 80 ML/MIN/{1.73_M2}
GLUCOSE SERPL-MCNC: 114 MG/DL (ref 70–105)
NT-PROBNP SERPL-MCNC: 974 PG/ML (ref 0–450)
POTASSIUM SERPL-SCNC: 4.5 MMOL/L (ref 3.5–5.1)
SODIUM SERPL-SCNC: 123 MMOL/L (ref 136–145)

## 2019-05-09 PROCEDURE — 83880 ASSAY OF NATRIURETIC PEPTIDE: CPT | Performed by: INTERNAL MEDICINE

## 2019-05-09 PROCEDURE — 80048 BASIC METABOLIC PNL TOTAL CA: CPT | Performed by: INTERNAL MEDICINE

## 2019-05-09 PROCEDURE — 36415 COLL VENOUS BLD VENIPUNCTURE: CPT | Performed by: INTERNAL MEDICINE

## 2019-05-13 ENCOUNTER — CARE COORDINATION (OUTPATIENT)
Dept: SLEEP MEDICINE | Facility: CLINIC | Age: 84
End: 2019-05-13

## 2019-05-13 DIAGNOSIS — G47.33 OBSTRUCTIVE SLEEP APNEA (ADULT) (PEDIATRIC): Primary | ICD-10-CM

## 2019-05-13 DIAGNOSIS — G47.33 OSA (OBSTRUCTIVE SLEEP APNEA): Primary | ICD-10-CM

## 2019-05-14 ENCOUNTER — OFFICE VISIT (OUTPATIENT)
Dept: CARDIOLOGY | Facility: CLINIC | Age: 84
End: 2019-05-14
Attending: INTERNAL MEDICINE
Payer: COMMERCIAL

## 2019-05-14 ENCOUNTER — ANCILLARY PROCEDURE (OUTPATIENT)
Dept: CT IMAGING | Facility: CLINIC | Age: 84
End: 2019-05-14
Attending: INTERNAL MEDICINE
Payer: COMMERCIAL

## 2019-05-14 VITALS
OXYGEN SATURATION: 98 % | WEIGHT: 124 LBS | BODY MASS INDEX: 21.97 KG/M2 | HEART RATE: 72 BPM | SYSTOLIC BLOOD PRESSURE: 131 MMHG | HEIGHT: 63 IN | DIASTOLIC BLOOD PRESSURE: 71 MMHG

## 2019-05-14 DIAGNOSIS — R06.02 SHORTNESS OF BREATH: Primary | ICD-10-CM

## 2019-05-14 DIAGNOSIS — R06.02 SHORTNESS OF BREATH: ICD-10-CM

## 2019-05-14 PROCEDURE — G0463 HOSPITAL OUTPT CLINIC VISIT: HCPCS | Mod: ZF

## 2019-05-14 PROCEDURE — 99207 ZZC NON-BILLABLE SERV PER CHARTING: CPT | Mod: ZP | Performed by: INTERNAL MEDICINE

## 2019-05-14 ASSESSMENT — ENCOUNTER SYMPTOMS
STIFFNESS: 0
LOSS OF CONSCIOUSNESS: 0
TREMORS: 0
MYALGIAS: 0
DYSURIA: 0
HEMOPTYSIS: 0
COUGH DISTURBING SLEEP: 0
DYSPNEA ON EXERTION: 1
COUGH: 0
SHORTNESS OF BREATH: 1
BRUISES/BLEEDS EASILY: 1
NUMBNESS: 0
BACK PAIN: 1
TINGLING: 0
SWOLLEN GLANDS: 0
DISTURBANCES IN COORDINATION: 0
DIFFICULTY URINATING: 0
SNORES LOUDLY: 0
PARALYSIS: 0
SEIZURES: 0
NECK PAIN: 0
MUSCLE CRAMPS: 0
MEMORY LOSS: 0
WHEEZING: 0
HEMATURIA: 0
SPEECH CHANGE: 0
POSTURAL DYSPNEA: 0
FLANK PAIN: 0
DIZZINESS: 0
ARTHRALGIAS: 1
MUSCLE WEAKNESS: 1
JOINT SWELLING: 0
SPUTUM PRODUCTION: 0
HEADACHES: 0
WEAKNESS: 1

## 2019-05-14 ASSESSMENT — MIFFLIN-ST. JEOR: SCORE: 1162.59

## 2019-05-14 ASSESSMENT — PAIN SCALES - GENERAL: PAINLEVEL: NO PAIN (0)

## 2019-05-14 NOTE — LETTER
2019      RE: Oscar Doshi  [address] [facility] [address]       Dear Colleague,    Thank you for the opportunity to participate in the care of your patient, Oscar Doshi, at the [facility]. Please see a copy of my visit note below.            Name: OSCAR DOSHI  MRN: [id number]  : 1935  Study Date: 2019 11:53 AM  Age: 83 yrs  Gender: Male  Patient Location: [facility]  Reason For Study: ASHD (arteriosclerotic heart disease), Dyspnea on exertion  Ordering Physician: CASTILLO MIKE  Referring Physician: CASTILLO MIKE  Performed By: Sherley Young     BSA: 1.6 m2  Height: 63 in  Weight: 129 lb  HR: 72  BP: 125/72 mmHg  _____________________________________________________________________________  __     Procedure  Complete Echo Adult. Contrast Optison.     _____________________________________________________________________________  __       Interpretation Summary     Left ventricular systolic function is mildly reduced.  The visual ejection fraction is estimated at 45-50%.  There is mild septal hypokinesis.  There is moderate apical wall hypokinesis.  Mild mid to distal anterior wall hypokinesis.  The right ventricle is normal in structure, function and size.  There is moderate trileaflet aortic sclerosis.  There is mild to moderate (1-2+) aortic regurgitation.  There is mild (1+) pulmonic valvular regurgitation.  Borderline aortic root dilatation.  Moderate atherosclerotic plaque(s) in the ascending aorta.  Compared to the prior study dated 2019, there have been no changes. The  study was technically limited. Optison contrast was used without apparent  complications.  _____________________________________________________________________________  __        Left Ventricle  The left ventricle is normal in size. There is normal left ventricular wall  thickness. Left ventricular systolic function is  mildly reduced. The visual  ejection fraction is estimated at 45-50%. Left ventricular diastolic function  is normal. Diastolic Doppler findings (E/E' ratio and/or other parameters)  suggest left ventricular filling pressures are indeterminate. There is mild  septal hypokinesis. There is moderate apical wall hypokinesis. Mild mid to  distal anterior wall hypokinesis. There is no thrombus seen in the left  ventricle.     Right Ventricle  The right ventricle is normal in structure, function and size.     Atria  Normal left atrial size. Right atrial size is normal. There is no color  Doppler evidence of an atrial shunt.     Mitral Valve  There is mild mitral annular calcification. The mitral valve leaflets are  mildly thickened. There is trace to mild mitral regurgitation. There is no  mitral valve stenosis.     Tricuspid Valve  Normal tricuspid valve. No tricuspid regurgitation.        Aortic Valve  There is moderate trileaflet aortic sclerosis. There is mild to moderate (1-  2+) aortic regurgitation. No hemodynamically significant valvular aortic  stenosis.     Pulmonic Valve  The pulmonic valve is not well visualized. There is mild (1+) pulmonic  valvular regurgitation. Normal pulmonic valve velocity.     Vessels  Borderline aortic root dilatation. Normal size ascending aorta. Moderate  atherosclerotic plaque(s) in the ascending aorta. The IVC is normal in size  and reactivity with respiration, suggesting normal central venous pressure.     Pericardium  There is no pericardial effusion.     Rhythm  Sinus rhythm was noted.     _____________________________________________________________________________  __  MMode/2D Measurements & Calculations  IVSd: 1.2 cm  LVIDd: 4.6 cm  LVIDs: 3.0 cm  LVPWd: 0.94 cm  FS: 35.5 %  LV mass(C)d: 174.4 grams  LV mass(C)dI: 108.6 grams/m2  Ao root diam: 3.6 cm  LA dimension: 4.8 cm     asc Aorta Diam: 3.4 cm  LA/Ao: 1.3  LA Volume Index (BP): 29.9 ml/m2  RWT: 0.41        Doppler  Measurements & Calculations  MV E max earle: 59.6 cm/sec  MV A max earle: 77.8 cm/sec  MV E/A: 0.77  MV dec slope: 462.2 cm/sec2  MV dec time: 0.13 sec  Ao V2 max: 100.8 cm/sec  Ao max P.1 mmHg  AI P1/2t: 479.6 msec     PA acc time: 0.10 sec  E/E': 12.2  Peak E' Earle: 4.9 cm/sec            Wt Readings from Last 24 Encounters:   19 56.2 kg (124 lb)   19 56.2 kg (123 lb 14.4 oz)   19 59.1 kg (130 lb 4.8 oz)   19 57.2 kg (126 lb)   19 58.7 kg (129 lb 6.4 oz)   10/15/18 61.7 kg (136 lb 1.6 oz)   10/02/18 62.8 kg (138 lb 6.4 oz)   18 63.2 kg (139 lb 4.8 oz)   18 63.8 kg (140 lb 9.6 oz)   18 64.4 kg (141 lb 14.4 oz)   17 63 kg (139 lb)   03/10/17 60.8 kg (134 lb)   17 62.6 kg (138 lb)   16 66.3 kg (146 lb 3.2 oz)   06/02/15 66.9 kg (147 lb 6.4 oz)   14 65.3 kg (144 lb)   14 65.6 kg (144 lb 9.6 oz)   10/07/13 66.4 kg (146 lb 6.4 oz)   12 66.1 kg (145 lb 11.2 oz)     Current Outpatient Medications   Medication     acetaminophen (TYLENOL) 325 MG tablet     aspirin 81 MG tablet     atorvastatin (LIPITOR) 40 MG tablet     Cholecalciferol (VITAMIN D) 1000 UNITS capsule     clopidogrel (PLAVIX) 75 MG tablet     diazepam (VALIUM) 5 MG tablet     fish oil-omega-3 fatty acids (FISH OIL) 1000 MG capsule     lisinopril (PRINIVIL/ZESTRIL) 5 MG tablet     metoprolol succinate ER (TOPROL XL) 25 MG 24 hr tablet     Multiple Vitamins-Minerals (MULTIVITAL PO)     nitroGLYcerin (NITROSTAT) 0.3 MG sublingual tablet     nitroGLYcerin (NITROSTAT) 0.4 MG sublingual tablet     omeprazole (PRILOSEC) 20 MG CR capsule     spironolactone (ALDACTONE) 25 MG tablet     temazepam (RESTORIL) 15 MG capsule     No current facility-administered medications for this visit.          Results for JOSH DOSHI (MRN [id number]) as of 2019 10:23   Ref. Range 2019 10:22 2019 14:47 2019 16:49 2019 12:55 2019 11:52   Sodium Latest Ref Range: 136 - 145  mmol/L 125 (L)  127 (L)  123 (L)   Potassium Latest Ref Range: 3.5 - 5.1 mmol/L 5.0  4.4  4.5   Chloride Latest Ref Range: 98 - 107 mmol/L 92 (L)  94  90 (L)   Carbon Dioxide Latest Ref Range: 23 - 29 mmol/L 27  28  28   Urea Nitrogen Latest Ref Range: 7 - 30 mg/dL 17  15  11   Creatinine Latest Ref Range: 0.70 - 1.30 mg/dL 1.09  0.82  0.91   GFR Estimate Latest Ref Range: >60 mL/min/1.73_m2 65  83  80   GFR Estimate If Black Latest Ref Range: >60 mL/min/1.73_m2 79  >90  >90   Calcium Latest Ref Range: 8.5 - 10.5 mg/dL 9.6  8.9  9.2   Anion Gap Latest Ref Range: 6 - 17 mmol/L 11  6  9.5   Albumin Latest Ref Range: 3.4 - 5.0 g/dL   3.7     Protein Total Latest Ref Range: 6.8 - 8.8 g/dL   6.8     Bilirubin Total Latest Ref Range: 0.2 - 1.3 mg/dL   0.4     Alkaline Phosphatase Latest Ref Range: 40 - 150 U/L   57     ALT Latest Ref Range: 0 - 70 U/L   22     AST Latest Ref Range: 0 - 45 U/L   20     N-Terminal Pro Bnp Latest Ref Range: 0 - 450 pg/mL 1,221 (H)  1,210 (H)  974 (H)   Troponin I ES Latest Ref Range: 0.000 - 0.045 ug/L <0.015       Glucose Latest Ref Range: 70 - 105 mg/dL 116 (H)  97  114 (H)   Hemoglobin Latest Ref Range: 13.3 - 17.7 g/dL   12.4 (L)     NM MPI WITH LEXISCAN Unknown    Rpt    EKG 12-LEAD, TRACING ONLY Unknown  Rpt            GATED MYOCARDIAL PERFUSION SCINTIGRAPHY WITH INTRAVENOUS PHARMACOLOGIC  VASODILATATION LEXISCAN -ONE DAY STUDY      4/23/2019 12:55 PM  CARINE DOSHI  83 years  Male  1935.     Indication/Clinical History: Jaw pain, CAD     Impression  1.  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   2. Gated images demonstrated apical wall hypokinesia.  The left  ventricular systolic function is reduced with LVEF of 43% with stress.  3. No prior study for comparison.     Procedure  Pharmacologic stress  testing was performed with Lexiscan at a rate of  0.08 mg/ml rapid bolus injection, for 15 seconds, 0.4 mg/5ml  intravenously. Low-level exercise was not performed along with the  vasodilator infusion.  The heart rate was 63 at baseline and amadeo to  96 beats per minute during the Lexiscan infusion. The rest blood  pressure was 127/62 mmHg and was 121/50 mm Hg during Lexiscan  infusion. The patient experienced shortness of breath  during the  test.     Myocardial perfusion imaging was performed at rest, approximately 45  minutes after the injection intravenously of 8.9 mCi of Tc-99m  Myoview. At peak pharmacologic effect, 10-20 seconds after Lexiscan,   the patient was injected intravenously with 24.3 mCi of  Tc-99m  Myoview. The post-stress tomographic imaging was performed  approximately 60 minutes after stress.     EKG Findings  The resting EKG demonstrated sinus rhythm, diffuse ST depression. The  stress EKG demonstrated no significant ST-T changes compared to  baseline. Stress EKG nondiagnostic due to resting EKG of normalities.     Tomographic Findings  Overall, the study quality is adequate . On both stress and rest  images there is a severe intensity apical, mild-to-moderate intensity  distal anteroseptal wall photopenic defect. These defects appear worse  on some stress images in distal anteroseptal region then rest images.  Gated images demonstrated apical wall hypokinesia. The left  ventricular ejection fraction was calculated to be 43% with stress.  TID was visually absent.     BETZY GENTILE MD  Answers for HPI/ROS submitted by the patient on 5/14/2019   General Symptoms: No  Skin Symptoms: No  HENT Symptoms: No  EYE SYMPTOMS: No  HEART SYMPTOMS: No  LUNG SYMPTOMS: Yes  INTESTINAL SYMPTOMS: No  URINARY SYMPTOMS: Yes  REPRODUCTIVE SYMPTOMS: No  SKELETAL SYMPTOMS: Yes  BLOOD SYMPTOMS: Yes  NERVOUS SYSTEM SYMPTOMS: Yes  MENTAL HEALTH SYMPTOMS: No  Cough: No  Sputum or phlegm: No  Coughing up blood:  No  Difficulty breating or shortness of breath: Yes  Snoring: No  Wheezing: No  Difficulty breathing on exertion: Yes  Nighttime Cough: No  Difficulty breathing when lying flat: No  Trouble holding urine or incontinence: No  Pain or burning: No  Trouble starting or stopping: No  Increased frequency of urination: No  Blood in urine: No  Decreased frequency of urination: No  Frequent nighttime urination: Yes  Flank pain: No  Difficulty emptying bladder: No  Back pain: Yes  Muscle aches: No  Neck pain: No  Swollen joints: No  Joint pain: Yes  Bone pain: No  Muscle cramps: No  Muscle weakness: Yes  Joint stiffness: No  Bone fracture: No  Anemia: No  Swollen glands: No  Easy bleeding or bruising: Yes  Edema or swelling: No  Trouble with coordination: No  Dizziness or trouble with balance: No  Fainting or black-out spells: No  Memory loss: No  Headache: No  Seizures: No  Speech problems: No  Tingling: No  Tremor: No  Weakness: Yes  Difficulty walking: Yes  Paralysis: No  Numbness: No      Please do not hesitate to contact me if you have any questions/concerns.     Sincerely,     Jerrod Rangel MD

## 2019-05-14 NOTE — NURSING NOTE
Chief Complaint   Patient presents with     Follow Up     Dr. Rangel: CAD, S/P CABG, PVD with claudication, Cardiomyopathy.     Vitals were taken and medications were reconciled.     MARY Wesley  10:05 AM

## 2019-05-14 NOTE — PATIENT INSTRUCTIONS
Thank you for your visit today.  Please call me with any questions or concerns.   Lukas Haskins RN  Cardiology Care Coordinator  [phone], press option 1 then option 4

## 2019-05-14 NOTE — PROGRESS NOTES
Name: CARINE DOSHI  MRN: [id number]  : 1935  Study Date: 2019 11:53 AM  Age: 83 yrs  Gender: Male  Patient Location: [facility]  Reason For Study: ASHD (arteriosclerotic heart disease), Dyspnea on exertion  Ordering Physician: CASTILLO MIKE  Referring Physician: CASTILLO MIKE  Performed By: Sherley Young     BSA: 1.6 m2  Height: 63 in  Weight: 129 lb  HR: 72  BP: 125/72 mmHg  _____________________________________________________________________________  __     Procedure  Complete Echo Adult. Contrast Optison.     _____________________________________________________________________________  __       Interpretation Summary     Left ventricular systolic function is mildly reduced.  The visual ejection fraction is estimated at 45-50%.  There is mild septal hypokinesis.  There is moderate apical wall hypokinesis.  Mild mid to distal anterior wall hypokinesis.  The right ventricle is normal in structure, function and size.  There is moderate trileaflet aortic sclerosis.  There is mild to moderate (1-2+) aortic regurgitation.  There is mild (1+) pulmonic valvular regurgitation.  Borderline aortic root dilatation.  Moderate atherosclerotic plaque(s) in the ascending aorta.  Compared to the prior study dated 2019, there have been no changes. The  study was technically limited. Optison contrast was used without apparent  complications.  _____________________________________________________________________________  __        Left Ventricle  The left ventricle is normal in size. There is normal left ventricular wall  thickness. Left ventricular systolic function is mildly reduced. The visual  ejection fraction is estimated at 45-50%. Left ventricular diastolic function  is normal. Diastolic Doppler findings (E/E' ratio and/or other parameters)  suggest left ventricular filling pressures are indeterminate. There is mild  septal hypokinesis. There is moderate apical wall hypokinesis. Mild  mid to  distal anterior wall hypokinesis. There is no thrombus seen in the left  ventricle.     Right Ventricle  The right ventricle is normal in structure, function and size.     Atria  Normal left atrial size. Right atrial size is normal. There is no color  Doppler evidence of an atrial shunt.     Mitral Valve  There is mild mitral annular calcification. The mitral valve leaflets are  mildly thickened. There is trace to mild mitral regurgitation. There is no  mitral valve stenosis.     Tricuspid Valve  Normal tricuspid valve. No tricuspid regurgitation.        Aortic Valve  There is moderate trileaflet aortic sclerosis. There is mild to moderate (1-  2+) aortic regurgitation. No hemodynamically significant valvular aortic  stenosis.     Pulmonic Valve  The pulmonic valve is not well visualized. There is mild (1+) pulmonic  valvular regurgitation. Normal pulmonic valve velocity.     Vessels  Borderline aortic root dilatation. Normal size ascending aorta. Moderate  atherosclerotic plaque(s) in the ascending aorta. The IVC is normal in size  and reactivity with respiration, suggesting normal central venous pressure.     Pericardium  There is no pericardial effusion.     Rhythm  Sinus rhythm was noted.     _____________________________________________________________________________  __  MMode/2D Measurements & Calculations  IVSd: 1.2 cm  LVIDd: 4.6 cm  LVIDs: 3.0 cm  LVPWd: 0.94 cm  FS: 35.5 %  LV mass(C)d: 174.4 grams  LV mass(C)dI: 108.6 grams/m2  Ao root diam: 3.6 cm  LA dimension: 4.8 cm     asc Aorta Diam: 3.4 cm  LA/Ao: 1.3  LA Volume Index (BP): 29.9 ml/m2  RWT: 0.41        Doppler Measurements & Calculations  MV E max earle: 59.6 cm/sec  MV A max earle: 77.8 cm/sec  MV E/A: 0.77  MV dec slope: 462.2 cm/sec2  MV dec time: 0.13 sec  Ao V2 max: 100.8 cm/sec  Ao max P.1 mmHg  AI P1/2t: 479.6 msec     PA acc time: 0.10 sec  E/E': 12.2  Peak E' Earle: 4.9 cm/sec            Wt Readings from Last 24 Encounters:    05/14/19 56.2 kg (124 lb)   04/18/19 56.2 kg (123 lb 14.4 oz)   04/02/19 59.1 kg (130 lb 4.8 oz)   03/21/19 57.2 kg (126 lb)   03/19/19 58.7 kg (129 lb 6.4 oz)   10/15/18 61.7 kg (136 lb 1.6 oz)   10/02/18 62.8 kg (138 lb 6.4 oz)   03/08/18 63.2 kg (139 lb 4.8 oz)   02/27/18 63.8 kg (140 lb 9.6 oz)   02/06/18 64.4 kg (141 lb 14.4 oz)   08/01/17 63 kg (139 lb)   03/10/17 60.8 kg (134 lb)   02/26/17 62.6 kg (138 lb)   06/14/16 66.3 kg (146 lb 3.2 oz)   06/02/15 66.9 kg (147 lb 6.4 oz)   05/12/14 65.3 kg (144 lb)   04/25/14 65.6 kg (144 lb 9.6 oz)   10/07/13 66.4 kg (146 lb 6.4 oz)   08/06/12 66.1 kg (145 lb 11.2 oz)     Current Outpatient Medications   Medication     acetaminophen (TYLENOL) 325 MG tablet     aspirin 81 MG tablet     atorvastatin (LIPITOR) 40 MG tablet     Cholecalciferol (VITAMIN D) 1000 UNITS capsule     clopidogrel (PLAVIX) 75 MG tablet     diazepam (VALIUM) 5 MG tablet     fish oil-omega-3 fatty acids (FISH OIL) 1000 MG capsule     lisinopril (PRINIVIL/ZESTRIL) 5 MG tablet     metoprolol succinate ER (TOPROL XL) 25 MG 24 hr tablet     Multiple Vitamins-Minerals (MULTIVITAL PO)     nitroGLYcerin (NITROSTAT) 0.3 MG sublingual tablet     nitroGLYcerin (NITROSTAT) 0.4 MG sublingual tablet     omeprazole (PRILOSEC) 20 MG CR capsule     spironolactone (ALDACTONE) 25 MG tablet     temazepam (RESTORIL) 15 MG capsule     No current facility-administered medications for this visit.          Results for JOSH DOSHI (MRN [id number]) as of 5/14/2019 10:23   Ref. Range 4/2/2019 10:22 4/18/2019 14:47 4/18/2019 16:49 4/23/2019 12:55 5/9/2019 11:52   Sodium Latest Ref Range: 136 - 145 mmol/L 125 (L)  127 (L)  123 (L)   Potassium Latest Ref Range: 3.5 - 5.1 mmol/L 5.0  4.4  4.5   Chloride Latest Ref Range: 98 - 107 mmol/L 92 (L)  94  90 (L)   Carbon Dioxide Latest Ref Range: 23 - 29 mmol/L 27  28  28   Urea Nitrogen Latest Ref Range: 7 - 30 mg/dL 17  15  11   Creatinine Latest Ref Range: 0.70 - 1.30 mg/dL  1.09  0.82  0.91   GFR Estimate Latest Ref Range: >60 mL/min/1.73_m2 65  83  80   GFR Estimate If Black Latest Ref Range: >60 mL/min/1.73_m2 79  >90  >90   Calcium Latest Ref Range: 8.5 - 10.5 mg/dL 9.6  8.9  9.2   Anion Gap Latest Ref Range: 6 - 17 mmol/L 11  6  9.5   Albumin Latest Ref Range: 3.4 - 5.0 g/dL   3.7     Protein Total Latest Ref Range: 6.8 - 8.8 g/dL   6.8     Bilirubin Total Latest Ref Range: 0.2 - 1.3 mg/dL   0.4     Alkaline Phosphatase Latest Ref Range: 40 - 150 U/L   57     ALT Latest Ref Range: 0 - 70 U/L   22     AST Latest Ref Range: 0 - 45 U/L   20     N-Terminal Pro Bnp Latest Ref Range: 0 - 450 pg/mL 1,221 (H)  1,210 (H)  974 (H)   Troponin I ES Latest Ref Range: 0.000 - 0.045 ug/L <0.015       Glucose Latest Ref Range: 70 - 105 mg/dL 116 (H)  97  114 (H)   Hemoglobin Latest Ref Range: 13.3 - 17.7 g/dL   12.4 (L)     NM MPI WITH LEXISCAN Unknown    Rpt    EKG 12-LEAD, TRACING ONLY Unknown  Rpt            GATED MYOCARDIAL PERFUSION SCINTIGRAPHY WITH INTRAVENOUS PHARMACOLOGIC  VASODILATATION LEXISCAN -ONE DAY STUDY      4/23/2019 12:55 PM  CARINE DOSHI  83 years  Male  1935.     Indication/Clinical History: Jaw pain, CAD     Impression  1.  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   2. Gated images demonstrated apical wall hypokinesia.  The left  ventricular systolic function is reduced with LVEF of 43% with stress.  3. No prior study for comparison.     Procedure  Pharmacologic stress testing was performed with Lexiscan at a rate of  0.08 mg/ml rapid bolus injection, for 15 seconds, 0.4 mg/5ml  intravenously. Low-level exercise was not performed along with the  vasodilator infusion.  The heart rate was 63 at baseline and amadeo to  96 beats per minute during the Lexiscan infusion. The rest blood  pressure was  127/62 mmHg and was 121/50 mm Hg during Lexiscan  infusion. The patient experienced shortness of breath  during the  test.     Myocardial perfusion imaging was performed at rest, approximately 45  minutes after the injection intravenously of 8.9 mCi of Tc-99m  Myoview. At peak pharmacologic effect, 10-20 seconds after Lexiscan,   the patient was injected intravenously with 24.3 mCi of  Tc-99m  Myoview. The post-stress tomographic imaging was performed  approximately 60 minutes after stress.     EKG Findings  The resting EKG demonstrated sinus rhythm, diffuse ST depression. The  stress EKG demonstrated no significant ST-T changes compared to  baseline. Stress EKG nondiagnostic due to resting EKG of normalities.     Tomographic Findings  Overall, the study quality is adequate . On both stress and rest  images there is a severe intensity apical, mild-to-moderate intensity  distal anteroseptal wall photopenic defect. These defects appear worse  on some stress images in distal anteroseptal region then rest images.  Gated images demonstrated apical wall hypokinesia. The left  ventricular ejection fraction was calculated to be 43% with stress.  TID was visually absent.     BETZY GENTILE MD  Answers for HPI/ROS submitted by the patient on 5/14/2019   General Symptoms: No  Skin Symptoms: No  HENT Symptoms: No  EYE SYMPTOMS: No  HEART SYMPTOMS: No  LUNG SYMPTOMS: Yes  INTESTINAL SYMPTOMS: No  URINARY SYMPTOMS: Yes  REPRODUCTIVE SYMPTOMS: No  SKELETAL SYMPTOMS: Yes  BLOOD SYMPTOMS: Yes  NERVOUS SYSTEM SYMPTOMS: Yes  MENTAL HEALTH SYMPTOMS: No  Cough: No  Sputum or phlegm: No  Coughing up blood: No  Difficulty breating or shortness of breath: Yes  Snoring: No  Wheezing: No  Difficulty breathing on exertion: Yes  Nighttime Cough: No  Difficulty breathing when lying flat: No  Trouble holding urine or incontinence: No  Pain or burning: No  Trouble starting or stopping: No  Increased frequency of urination: No  Blood in urine:  No  Decreased frequency of urination: No  Frequent nighttime urination: Yes  Flank pain: No  Difficulty emptying bladder: No  Back pain: Yes  Muscle aches: No  Neck pain: No  Swollen joints: No  Joint pain: Yes  Bone pain: No  Muscle cramps: No  Muscle weakness: Yes  Joint stiffness: No  Bone fracture: No  Anemia: No  Swollen glands: No  Easy bleeding or bruising: Yes  Edema or swelling: No  Trouble with coordination: No  Dizziness or trouble with balance: No  Fainting or black-out spells: No  Memory loss: No  Headache: No  Seizures: No  Speech problems: No  Tingling: No  Tremor: No  Weakness: Yes  Difficulty walking: Yes  Paralysis: No  Numbness: No

## 2019-05-20 DIAGNOSIS — Z95.1 S/P CABG (CORONARY ARTERY BYPASS GRAFT): ICD-10-CM

## 2019-05-20 DIAGNOSIS — Z98.61 STATUS POST PERCUTANEOUS TRANSLUMINAL CORONARY ANGIOPLASTY: ICD-10-CM

## 2019-05-20 DIAGNOSIS — I25.10 ASHD (ARTERIOSCLEROTIC HEART DISEASE): Primary | ICD-10-CM

## 2019-05-20 DIAGNOSIS — E78.5 HYPERLIPIDEMIA: ICD-10-CM

## 2019-05-20 NOTE — TELEPHONE ENCOUNTER
Patient LM x2 advising me he needs some refills and would like to send me a list via email.      Called patient back and advised him he could just tell me what they are, but he states its a long list.  I asked if we are the prescribing MD; Yes.  Advised patient I would send him a generic email and he could reply with the list of meds that need refills.  Patient verbalized understanding, agreed with plan and denied any further questions. Vibha Fuller RN on 5/20/2019 at 4:33 PM    email sent. Vibha Fuller RN on 5/20/2019 at 4:36 PM

## 2019-05-21 RX ORDER — LISINOPRIL 5 MG/1
5 TABLET ORAL 2 TIMES DAILY
Qty: 180 TABLET | Refills: 3 | Status: SHIPPED | OUTPATIENT
Start: 2019-05-21 | End: 2019-05-29

## 2019-05-21 RX ORDER — ATORVASTATIN CALCIUM 40 MG/1
40 TABLET, FILM COATED ORAL DAILY
Qty: 30 TABLET | Refills: 0 | Status: SHIPPED | OUTPATIENT
Start: 2019-05-21 | End: 2019-05-29

## 2019-05-21 RX ORDER — CLOPIDOGREL BISULFATE 75 MG/1
75 TABLET ORAL DAILY
Qty: 90 TABLET | Refills: 3 | Status: SHIPPED | OUTPATIENT
Start: 2019-05-21 | End: 2019-05-29

## 2019-05-21 NOTE — TELEPHONE ENCOUNTER
Metoprolol Erx sent.  --------------------------------  Hi...    My bad!    The Atenolol  was discontinued months ago and eventually replaced by the Metoprolol 25mg tabs, Dr. Rangel increased the dose to two tablets a day  on 29 April by phone.  I keep detailed notes of my conversations.    His office notes, if any, seem not to be in MyChart or anywhere else.    With all the fasting labs and procedures I was surprised that the lipid panel wasn't included. I'll get it done soon.    Thanks for you help and attention to detail.    Spike  -----------------------------------  Advised patient I only sent a 1 month supply of the Lipitor, as he is overdue for a Lipid profile.  I need clarification and the Metoprolol/Atenolol as they are the same med.     Advised Spike to go back to Dr. Doug Ryan to have him re-prescribe the Ultram for him because he's seeing him for the back pain. Vibha Fuller RN on 5/21/2019 at 8:53 AM    ----------------------------------------  Oscar Chaudhary [email or web address]>  Mon 5/20/2019 5:19 PM  Thanks for calling me.  Here's the list.  I use the [facility] at the [facility].  It will wait until tomorrow.    BTW Jerrod is my cousin. He usually doesn't want anyone to know.     I  seem to be pretty stable. I ll probably call Jerrod this week to chat about options for the future.      My  [facility] prescriptions always seems to need attention.      They  seem unable to delete Bystolic from my list of medications though it was discontinued a while ago.        They  need a renewal for my clopidigrel (Plavix) 75mg once daily. It was originally prescribed by someone else whom I don t know.         The  metoprolol 25mg dose was increased to two tablets a day from one. I m running out. So a new RX has to be sent reflecting the current dose.        Dr. Doug Ryan gave me a prescription for tramadol for back pain relief. I didn t fill it because acetaminophen had been doing a fair job. Could  you send an Rx for tramadol to have on hand.        Other  meds that require an MD renewal are:      Spironolactone - 25mg    tablet daily    Atenolol 25mg once daily    Lisinopril 5mg one tablet twice daily    Atorvastatin 40mg  once daily

## 2019-05-23 RX ORDER — METOPROLOL SUCCINATE 50 MG/1
50 TABLET, EXTENDED RELEASE ORAL DAILY
Qty: 90 TABLET | Refills: 3 | Status: SHIPPED | OUTPATIENT
Start: 2019-05-23 | End: 2019-05-29

## 2019-05-29 DIAGNOSIS — I51.9 LV DYSFUNCTION: ICD-10-CM

## 2019-05-29 DIAGNOSIS — Z95.1 S/P CABG (CORONARY ARTERY BYPASS GRAFT): ICD-10-CM

## 2019-05-29 DIAGNOSIS — Z98.61 STATUS POST PERCUTANEOUS TRANSLUMINAL CORONARY ANGIOPLASTY: ICD-10-CM

## 2019-05-29 DIAGNOSIS — I25.10 ASHD (ARTERIOSCLEROTIC HEART DISEASE): ICD-10-CM

## 2019-05-29 DIAGNOSIS — E78.5 HYPERLIPIDEMIA: ICD-10-CM

## 2019-05-29 RX ORDER — ATORVASTATIN CALCIUM 40 MG/1
40 TABLET, FILM COATED ORAL DAILY
Qty: 90 TABLET | Refills: 3 | Status: SHIPPED | OUTPATIENT
Start: 2019-05-29 | End: 2020-08-11

## 2019-05-29 RX ORDER — SPIRONOLACTONE 25 MG/1
TABLET ORAL
Qty: 45 TABLET | Refills: 3 | Status: SHIPPED | OUTPATIENT
Start: 2019-05-29 | End: 2020-05-20

## 2019-05-29 RX ORDER — METOPROLOL SUCCINATE 50 MG/1
50 TABLET, EXTENDED RELEASE ORAL DAILY
Qty: 180 TABLET | Refills: 3 | Status: SHIPPED | OUTPATIENT
Start: 2019-05-29 | End: 2020-08-11

## 2019-05-29 RX ORDER — LISINOPRIL 5 MG/1
5 TABLET ORAL 2 TIMES DAILY
Qty: 180 TABLET | Refills: 3 | Status: SHIPPED | OUTPATIENT
Start: 2019-05-29 | End: 2020-08-11

## 2019-05-29 RX ORDER — CLOPIDOGREL BISULFATE 75 MG/1
75 TABLET ORAL DAILY
Qty: 90 TABLET | Refills: 3 | Status: SHIPPED | OUTPATIENT
Start: 2019-05-29 | End: 2020-08-11

## 2019-05-29 NOTE — TELEPHONE ENCOUNTER
Email received from pt:     Hi     I seem to be pretty stable. I ll probably call Jerrod this week to chat about options for the future.    My [facility] presciptions always seems to need attention.  1. They seem unable to delete Bystolic from my list of medications though it was discontinued a while ago.  2. They need a renewal for my clopidigrel (Plavix) 75mg once daily. It was originally prescribed by someone else whom I don t know.   3. The metoprolol 25mg dose was increased to two tablets a day from one. I m running out. So a new RX has to be sent reflecting the current dose.  4. Dr. Doug Ryan gave me a prescription for tramadol for back pain relief. I didn t fill it because acetaminophen has been doing a fair job. Could you send an Rx for tramadol to have on hand.  5. Other meds that require an MD renewal are:  Spironolactone - 25mg    tablet daily  Atenolol 25mg once daily  Lisinopril 5mg one tablet twice daily  Atorvastatin 40mg  once daily    As always many thanks.    Spike     Messaged pt back asking him to contact his PCP regarding the tramadol.  I also let him know that the Atenolol was replaced by the metoprolol and we would no be refilling the medication.  All other refills sent. Gracia Gates RN on 5/29/2019 at 12:23 PM

## 2019-07-09 ENCOUNTER — HOSPITAL ENCOUNTER (OUTPATIENT)
Dept: ULTRASOUND IMAGING | Facility: CLINIC | Age: 84
Discharge: HOME | End: 2019-07-09
Attending: SURGERY | Admitting: SURGERY
Payer: COMMERCIAL

## 2019-07-09 ENCOUNTER — HOSPITAL ENCOUNTER (OUTPATIENT)
Dept: ULTRASOUND IMAGING | Facility: CLINIC | Age: 84
End: 2019-07-09
Attending: SURGERY
Payer: COMMERCIAL

## 2019-07-09 DIAGNOSIS — I73.9 PAD (PERIPHERAL ARTERY DISEASE) (H): ICD-10-CM

## 2019-07-09 DIAGNOSIS — I71.40 ABDOMINAL AORTIC ANEURYSM (AAA) WITHOUT RUPTURE (H): ICD-10-CM

## 2019-07-09 PROCEDURE — 93924 LWR XTR VASC STDY BILAT: CPT

## 2019-07-09 PROCEDURE — 93978 VASCULAR STUDY: CPT

## 2019-07-09 PROCEDURE — 93926 LOWER EXTREMITY STUDY: CPT | Mod: RT

## 2019-07-15 NOTE — PROGRESS NOTES
[facility]    Oscar A [facility] transfer vascular follow-up.  This 83-year-old patient has a stable asymptomatic 4.4 cm distal infrarenal AAA.  He developed bilateral disabling leg claudication with decreased ABIs with exercise.    Aortogram and 3/8/2018 revealed a high-grade stenosis of the left proximal common iliac artery that was angioplastied and stented with mild disease in the right common iliac artery.  Of high-grade focal calcified stenosis of the right mid SFA was angioplastied and treated with a Viabahn stent.  Good runoff was noted distal to this on the right with normal runoff in the entire left leg.    On his follow-up on 4/12/2018 he had excellent palpable dorsalis pedis pulse bilaterally.  Some very mild narrowing was noted in the untreated right mid common iliac artery with a luminal diameter of 3.3 mm.  Left common iliac stent and right SFA stent were both widely patent.  I recommended Plavix for 3 months following the stent placement.      PMH: Medications: Lisinopril, Valium, Toprol-XL, Lipitor, aspirin, Plavix.            Medical: Coronary disease.  Had shortness of breath and underwent coronary stenting on 3/21/2019.  Has been on Plavix since with improved symptoms.  No acute MI.  Did have coronary bypass grafting in 2001 with the least to the grafts being occluded on the recent angiogram.                           Low back pain involving his left leg.  Initially thought this is related to PAD but now realizes this is more a back issue.  He is undergone SI joint injections only some improvement.                           Hyperlipidemia on statin.  LDL= 55 on 3/8/2018    Non-smoker.  Lives independently.    Exam: Alert and appropriate.  Glasses.  Normal affect.             Blood pressure 119/69.  Pulse 64 regular.             HEENT= normal with no carotid bruits.             Chest =clear                Cardiovascular= regular rate              Extremities  = no edema.   Normal sensation.  +3 dorsalis pedis pulses                         Bilaterally.              Abdomen= thin soft nontender.    Abdominal ultrasound reveals a stable AAA measuring 3.9 x 3.9 cm.  Iliac arteries are normal though somewhat elevated velocities.  Left common iliac artery stent is widely patent.  Right common artery has a mild narrowing not clinically significant.    Right SFA stent also was patent with good flow.    Ankle-brachial index is 1.0 so on the right with biphasic waveforms decreasing to 0.76 with exercise.  On the left this is 1.07 with triphasic waveform slightly decreasing to 0.99 with exercise      Impression: #1.  Stable infrarenal AAA now measuring 3.9 x 3.9 cm.  Yearly follow-up.                       #2.  PAD with patent left common duct stent and right SFA stent.  Some mild change in the right common iliac artery but very adequate blood flow to the right leg.  His left leg is involved with his back issues.  Thus the decrease in RALPH on the right leg is not affecting his lifestyle and can be followed clinically with a repeat exam in 1 year.                        #3.  Aware the importance of good risk factor control with his Pan systemic vascular issues.  LDL was at goal last year on statins and he is well controlled hypertension is a non-smoker.      Spent 25 minutes with the patient today with over 50% of counseling.      Shree White MD     CC  Patient Care Team:  Tristian Jeffrey MD as PCP - General (Internal Medicine)  Tristian Jeffrey MD as Assigned PCP  Vibha Fuller, RN as Specialty Care Coordinator (Cardiology)  Gracia Gates, RN as Specialty Care Coordinator (Cardiology)  Lukas Haskins, RN as Specialty Care Coordinator (Cardiology)

## 2019-07-16 ENCOUNTER — OFFICE VISIT (OUTPATIENT)
Dept: OTHER | Facility: CLINIC | Age: 84
End: 2019-07-16
Payer: COMMERCIAL

## 2019-07-16 VITALS — DIASTOLIC BLOOD PRESSURE: 69 MMHG | SYSTOLIC BLOOD PRESSURE: 119 MMHG | HEART RATE: 64 BPM

## 2019-07-16 DIAGNOSIS — Z95.1 S/P CABG (CORONARY ARTERY BYPASS GRAFT): ICD-10-CM

## 2019-07-16 DIAGNOSIS — I73.9 PAD (PERIPHERAL ARTERY DISEASE) (H): Primary | ICD-10-CM

## 2019-07-16 DIAGNOSIS — E78.5 HYPERLIPIDEMIA: ICD-10-CM

## 2019-07-16 DIAGNOSIS — R06.09 DYSPNEA ON EXERTION: ICD-10-CM

## 2019-07-16 DIAGNOSIS — I25.83 CORONARY ARTERY DISEASE DUE TO LIPID RICH PLAQUE: ICD-10-CM

## 2019-07-16 DIAGNOSIS — I71.40 AAA (ABDOMINAL AORTIC ANEURYSM) WITHOUT RUPTURE (H): ICD-10-CM

## 2019-07-16 DIAGNOSIS — I25.10 CORONARY ARTERY DISEASE DUE TO LIPID RICH PLAQUE: ICD-10-CM

## 2019-07-16 DIAGNOSIS — R06.02 SOB (SHORTNESS OF BREATH): ICD-10-CM

## 2019-07-16 DIAGNOSIS — I25.10 ASHD (ARTERIOSCLEROTIC HEART DISEASE): ICD-10-CM

## 2019-07-16 LAB
ANION GAP SERPL CALCULATED.3IONS-SCNC: 11.5 MMOL/L (ref 6–17)
BASOPHILS # BLD AUTO: 0.1 10E9/L (ref 0–0.2)
BASOPHILS NFR BLD AUTO: 0.6 %
BUN SERPL-MCNC: 25 MG/DL (ref 7–30)
CALCIUM SERPL-MCNC: 9.9 MG/DL (ref 8.5–10.5)
CHLORIDE SERPL-SCNC: 96 MMOL/L (ref 98–107)
CHOLEST SERPL-MCNC: 133 MG/DL
CO2 SERPL-SCNC: 28 MMOL/L (ref 23–29)
CREAT SERPL-MCNC: 1 MG/DL (ref 0.7–1.3)
DIFFERENTIAL METHOD BLD: ABNORMAL
EOSINOPHIL # BLD AUTO: 0 10E9/L (ref 0–0.7)
EOSINOPHIL NFR BLD AUTO: 0.4 %
ERYTHROCYTE [DISTWIDTH] IN BLOOD BY AUTOMATED COUNT: 15.6 % (ref 10–15)
GFR SERPL CREATININE-BSD FRML MDRD: 72 ML/MIN/{1.73_M2}
GLUCOSE SERPL-MCNC: 103 MG/DL (ref 70–105)
HCT VFR BLD AUTO: 35.2 % (ref 40–53)
HDLC SERPL-MCNC: 60 MG/DL
HGB BLD-MCNC: 12.6 G/DL (ref 13.3–17.7)
IMM GRANULOCYTES # BLD: 0 10E9/L (ref 0–0.4)
IMM GRANULOCYTES NFR BLD: 0.4 %
LDLC SERPL CALC-MCNC: 59 MG/DL
LYMPHOCYTES # BLD AUTO: 2.4 10E9/L (ref 0.8–5.3)
LYMPHOCYTES NFR BLD AUTO: 28.2 %
MCH RBC QN AUTO: 33.7 PG (ref 26.5–33)
MCHC RBC AUTO-ENTMCNC: 35.8 G/DL (ref 31.5–36.5)
MCV RBC AUTO: 94 FL (ref 78–100)
MONOCYTES # BLD AUTO: 0.6 10E9/L (ref 0–1.3)
MONOCYTES NFR BLD AUTO: 7.7 %
NEUTROPHILS # BLD AUTO: 5.2 10E9/L (ref 1.6–8.3)
NEUTROPHILS NFR BLD AUTO: 62.7 %
NONHDLC SERPL-MCNC: 73 MG/DL
NRBC # BLD AUTO: 0 10*3/UL
NRBC BLD AUTO-RTO: 0 /100
NT-PROBNP SERPL-MCNC: 605 PG/ML (ref 0–450)
PLATELET # BLD AUTO: 298 10E9/L (ref 150–450)
POTASSIUM SERPL-SCNC: 4.5 MMOL/L (ref 3.5–5.1)
RBC # BLD AUTO: 3.74 10E12/L (ref 4.4–5.9)
SODIUM SERPL-SCNC: 131 MMOL/L (ref 136–145)
TRIGL SERPL-MCNC: 70 MG/DL
WBC # BLD AUTO: 8.3 10E9/L (ref 4–11)

## 2019-07-16 PROCEDURE — 85025 COMPLETE CBC W/AUTO DIFF WBC: CPT | Performed by: INTERNAL MEDICINE

## 2019-07-16 PROCEDURE — 80048 BASIC METABOLIC PNL TOTAL CA: CPT | Performed by: INTERNAL MEDICINE

## 2019-07-16 PROCEDURE — 80061 LIPID PANEL: CPT | Performed by: INTERNAL MEDICINE

## 2019-07-16 PROCEDURE — G0463 HOSPITAL OUTPT CLINIC VISIT: HCPCS

## 2019-07-16 PROCEDURE — 36415 COLL VENOUS BLD VENIPUNCTURE: CPT | Performed by: INTERNAL MEDICINE

## 2019-07-16 PROCEDURE — 99214 OFFICE O/P EST MOD 30 MIN: CPT | Mod: ZP | Performed by: SURGERY

## 2019-07-16 PROCEDURE — 83880 ASSAY OF NATRIURETIC PEPTIDE: CPT | Performed by: INTERNAL MEDICINE

## 2019-07-16 NOTE — LETTER
[facility] [place] [address]  [place] [address]  Phone: [phone]  Fax: [phone]      2019       Re: Oscar Chaudhary - 1935    Oscar [facility] transfer vascular follow-up.  This 83-year-old patient has a stable asymptomatic 4.4 cm distal infrarenal AAA.  He developed bilateral disabling leg claudication with decreased ABIs with exercise.     Aortogram and 3/8/2018 revealed a high-grade stenosis of the left proximal common iliac artery that was angioplastied and stented with mild disease in the right common iliac artery.  Of high-grade focal calcified stenosis of the right mid SFA was angioplastied and treated with a Viabahn stent.  Good runoff was noted distal to this on the right with normal runoff in the entire left leg.     On his follow-up on 2018 he had excellent palpable dorsalis pedis pulse bilaterally.  Some very mild narrowing was noted in the untreated right mid common iliac artery with a luminal diameter of 3.3 mm.  Left common iliac stent and right SFA stent were both widely patent.  I recommended Plavix for 3 months following the stent placement.      PMH: Medications: Lisinopril, Valium, Toprol-XL, Lipitor, aspirin, Plavix.            Medical: Coronary disease.  Had shortness of breath and underwent coronary stenting on 3/21/2019.  Has been on Plavix since with improved symptoms.  No acute MI.  Did have coronary bypass grafting in 2001 with the least to the grafts being occluded on the recent angiogram.                            Low back pain involving his left leg.  Initially thought this is related to PAD but now realizes this is more a back issue.  He is undergone SI joint injections only some improvement.                            Hyperlipidemia on statin.  LDL= 55 on 3/8/2018     Non-smoker.  Lives independently.     Exam: Alert and appropriate.  Glasses.  Normal affect.             Blood pressure 119/69.  Pulse 64  regular.             HEENT= normal with no carotid bruits.             Chest =clear                Cardiovascular= regular rate             Extremities  = no edema.  Normal sensation.  +3 dorsalis pedis pulses             Bilaterally.             Abdomen= thin soft nontender.     Abdominal ultrasound reveals a stable AAA measuring 3.9 x 3.9 cm.  Iliac arteries are normal though somewhat elevated velocities.  Left common iliac artery stent is widely patent.  Right common artery has a mild narrowing not clinically significant.    Right SFA stent also was patent with good flow.     Ankle-brachial index is 1.0 so on the right with biphasic waveforms decreasing to 0.76 with exercise.  On the left this is 1.07 with triphasic waveform slightly decreasing to 0.99 with exercise      Impression: #1.  Stable infrarenal AAA now measuring 3.9 x 3.9 cm.  Yearly follow-up.                       #2.  PAD with patent left common duct stent and right SFA stent.  Some mild change in the right common iliac artery but very adequate blood flow to the right leg.  His left leg is involved with his back issues.  Thus the decrease in RALPH on the right leg is not affecting his lifestyle and can be followed clinically with a repeat exam in 1 year.                         #3.  Aware the importance of good risk factor control with his Pan systemic vascular issues.  LDL was at goal last year on statins and he is well controlled hypertension is a non-smoker.             Shree White MD

## 2019-07-16 NOTE — NURSING NOTE
"Oscar Chaudhary is a 83 year old male who presents for:  Chief Complaint   Patient presents with     RECHECK     [facility] only. History of left [place] stent and right SFA stent on 3/8/18, known 3.9 cm infrarenal AAA; 1 year follow up to 4/12/18 appointment with Dr. White.        Vitals:    Vitals:    07/16/19 0924   BP: 119/69   BP Location: Right arm   Patient Position: Chair   Cuff Size: Adult Regular   Pulse: 64       BMI:  Estimated body mass index is 21.97 kg/m  as calculated from the following:    Height as of 5/14/19: 5' 3\" (1.6 m).    Weight as of 5/14/19: 124 lb (56.2 kg).    Pain Score:  Data Unavailable        Radha Hdez MA    "

## 2019-09-11 DIAGNOSIS — I25.10 ASHD (ARTERIOSCLEROTIC HEART DISEASE): ICD-10-CM

## 2019-09-16 RX ORDER — NITROGLYCERIN 0.3 MG/1
TABLET SUBLINGUAL
Qty: 30 TABLET | Refills: 1 | Status: SHIPPED | OUTPATIENT
Start: 2019-09-16 | End: 2019-09-17

## 2019-09-16 NOTE — TELEPHONE ENCOUNTER
nitroGLYcerin (NITROSTAT) 0.4 MG sublingual tablet    Last Written Prescription Date:  4/18/19  Last Fill Quantity: 30,   # refills: 1  Last Office Visit : 5/14/19 GIRISH   Future Office visit:  9/17/19 debby

## 2019-09-17 ENCOUNTER — OFFICE VISIT (OUTPATIENT)
Dept: CARDIOLOGY | Facility: CLINIC | Age: 84
End: 2019-09-17
Attending: INTERNAL MEDICINE
Payer: COMMERCIAL

## 2019-09-17 VITALS
DIASTOLIC BLOOD PRESSURE: 65 MMHG | HEART RATE: 67 BPM | WEIGHT: 127 LBS | HEIGHT: 63 IN | BODY MASS INDEX: 22.5 KG/M2 | SYSTOLIC BLOOD PRESSURE: 114 MMHG

## 2019-09-17 DIAGNOSIS — R06.09 DYSPNEA ON EXERTION: ICD-10-CM

## 2019-09-17 DIAGNOSIS — I25.10 ASHD (ARTERIOSCLEROTIC HEART DISEASE): Primary | ICD-10-CM

## 2019-09-17 DIAGNOSIS — I27.20 PULMONARY HYPERTENSION (H): ICD-10-CM

## 2019-09-17 DIAGNOSIS — I25.10 ASHD (ARTERIOSCLEROTIC HEART DISEASE): ICD-10-CM

## 2019-09-17 LAB
ANION GAP SERPL CALCULATED.3IONS-SCNC: 12.1 MMOL/L (ref 6–17)
BUN SERPL-MCNC: 17 MG/DL (ref 7–30)
CALCIUM SERPL-MCNC: 10.5 MG/DL (ref 8.5–10.5)
CHLORIDE SERPL-SCNC: 98 MMOL/L (ref 98–107)
CO2 SERPL-SCNC: 30 MMOL/L (ref 23–29)
CREAT SERPL-MCNC: 0.97 MG/DL (ref 0.7–1.3)
ERYTHROCYTE [DISTWIDTH] IN BLOOD BY AUTOMATED COUNT: 16.1 % (ref 10–15)
GFR SERPL CREATININE-BSD FRML MDRD: 74 ML/MIN/{1.73_M2}
GLUCOSE SERPL-MCNC: 107 MG/DL (ref 70–105)
HCT VFR BLD AUTO: 36.8 % (ref 40–53)
HGB BLD-MCNC: 13.2 G/DL (ref 13.3–17.7)
MCH RBC QN AUTO: 33.1 PG (ref 26.5–33)
MCHC RBC AUTO-ENTMCNC: 35.9 G/DL (ref 31.5–36.5)
MCV RBC AUTO: 92 FL (ref 78–100)
NT-PROBNP SERPL-MCNC: 805 PG/ML (ref 0–450)
PLATELET # BLD AUTO: 289 10E9/L (ref 150–450)
POTASSIUM SERPL-SCNC: 5.1 MMOL/L (ref 3.5–5.1)
RBC # BLD AUTO: 3.99 10E12/L (ref 4.4–5.9)
SODIUM SERPL-SCNC: 135 MMOL/L (ref 136–145)
WBC # BLD AUTO: 8.2 10E9/L (ref 4–11)

## 2019-09-17 PROCEDURE — 85027 COMPLETE CBC AUTOMATED: CPT | Performed by: INTERNAL MEDICINE

## 2019-09-17 PROCEDURE — 36415 COLL VENOUS BLD VENIPUNCTURE: CPT | Performed by: INTERNAL MEDICINE

## 2019-09-17 PROCEDURE — 83880 ASSAY OF NATRIURETIC PEPTIDE: CPT | Performed by: INTERNAL MEDICINE

## 2019-09-17 PROCEDURE — 99214 OFFICE O/P EST MOD 30 MIN: CPT | Performed by: INTERNAL MEDICINE

## 2019-09-17 PROCEDURE — 80048 BASIC METABOLIC PNL TOTAL CA: CPT | Performed by: INTERNAL MEDICINE

## 2019-09-17 ASSESSMENT — MIFFLIN-ST. JEOR: SCORE: 1176.2

## 2019-09-17 NOTE — NURSING NOTE
Med Reconcile: Reviewed and verified all current medications with the patient. The updated medication list was printed and given to the patient.  Diet: Patient instructed regarding a heart healthy diet, including discussion of reduced fat and sodium intake. Patient demonstrated understanding of this information and agreed to call with further questions or concerns.  Return Appointment: Patient given instructions regarding scheduling next clinic visit. Patient demonstrated understanding of this information and agreed to call with further questions or concerns.  Patient stated he understood all health information given and agreed to call with further questions or concerns.     Medication Changes:  No medication changes at this time. Please continue current medication regiment.    Patient Instructions:  1. Continue staying active and eat a heart healthy diet.    2. Please keep current list of medications with you at all times.    3. Remember to weigh yourself daily after voiding and before you consume any food or beverages and log the numbers.  If you have gained 2 pounds overnight or 5 pounds in a week contact us immediately for medication adjustments or further instructions.    4. **Please call us immediately if you have any syncope (fainting or passing out), chest pain, edema (swelling or weight gain), or decline in your functional status (general decline in how you are feeling).    Follow up Appointment Information:  Labs today and follow up in 2 months with labs

## 2019-09-17 NOTE — LETTER
2019    Tristian Jeffrey MD, MD  [address]  [place] [address]    RE: Carine Chaudhary       Dear Colleague,    I had the pleasure of seeing Carine Chaudhary in the [facility].    1. 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 Sequential, old records not in EMR)  2. History of prostate cancer treated with radiation  3. Exertional dyspnea  4. AAA  5. Hyperlipidemia  6. ASPVD with stenting               Left iliac stent              Right iliac stent with elevated velocities  7. Abdominal aortic aneurysm                      Infrarenal abdominal aortic aneurysm measuring 3.8 cm AP x 3.9 cm                       transverse, x 4.6 cm in length. Suspect previous measurements were                                                 slight over estimation. Recommend continued annual surveillance. 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  8. History of jaw infection   9. History if previous difficult intubation with jaw and neck infection  10. Recent cardiac stenting    The patient has exertional shortness of breath without orthopnea, PND, weight gain, edema, palpitation, pre-syncope and syncope. He has no classic angina.  Lower back pain has disappeared.     Name: CARINE CHAUDHARY  MRN: [id number]  : 1935  Study Date: 2019 11:53 AM  Age: 83 yrs  Gender: Male  Patient Location: [facility]  Reason For Study: ASHD (arteriosclerotic heart disease), Dyspnea on exertion  Ordering Physician: CASTILLO MIKE  Referring Physician: CASTILLO MIKE  Performed By: Sherley Young     BSA: 1.6 m2  Height: 63 in  Weight: 129 lb  HR: 72  BP: 125/72 mmHg  _____________________________________________________________________________  __     Procedure  Complete Echo Adult. Contrast Optison.     _____________________________________________________________________________  __     Interpretation Summary     Left ventricular systolic function is  mildly reduced.  The visual ejection fraction is estimated at 45-50%.  There is mild septal hypokinesis.  There is moderate apical wall hypokinesis.  Mild mid to distal anterior wall hypokinesis.  The right ventricle is normal in structure, function and size.  There is moderate trileaflet aortic sclerosis.  There is mild to moderate (1-2+) aortic regurgitation.  There is mild (1+) pulmonic valvular regurgitation.  Borderline aortic root dilatation.  Moderate atherosclerotic plaque(s) in the ascending aorta.  Compared to the prior study dated 5/12/2019, there have been no changes. The  study was technically limited. Optison contrast was used without apparent  complications.  _____________________________________________________________________________  __        Left Ventricle  The left ventricle is normal in size. There is normal left ventricular wall  thickness. Left ventricular systolic function is mildly reduced. The visual  ejection fraction is estimated at 45-50%. Left ventricular diastolic function  is normal. Diastolic Doppler findings (E/E' ratio and/or other parameters)  suggest left ventricular filling pressures are indeterminate. There is mild  septal hypokinesis. There is moderate apical wall hypokinesis. Mild mid to  distal anterior wall hypokinesis. There is no thrombus seen in the left  ventricle.     Right Ventricle  The right ventricle is normal in structure, function and size.     Atria  Normal left atrial size. Right atrial size is normal. There is no color  Doppler evidence of an atrial shunt.     Mitral Valve  There is mild mitral annular calcification. The mitral valve leaflets are  mildly thickened. There is trace to mild mitral regurgitation. There is no  mitral valve stenosis.     Tricuspid Valve  Normal tricuspid valve. No tricuspid regurgitation.        Aortic Valve  There is moderate trileaflet aortic sclerosis. There is mild to moderate (1-  2+) aortic regurgitation. No hemodynamically  significant valvular aortic  stenosis.     Pulmonic Valve  The pulmonic valve is not well visualized. There is mild (1+) pulmonic  valvular regurgitation. Normal pulmonic valve velocity.     Vessels  Borderline aortic root dilatation. Normal size ascending aorta. Moderate  atherosclerotic plaque(s) in the ascending aorta. The IVC is normal in size  and reactivity with respiration, suggesting normal central venous pressure.     Pericardium  There is no pericardial effusion.     Rhythm  Sinus rhythm was noted.     _____________________________________________________________________________  __  MMode/2D Measurements & Calculations  IVSd: 1.2 cm  LVIDd: 4.6 cm  LVIDs: 3.0 cm  LVPWd: 0.94 cm  FS: 35.5 %  LV mass(C)d: 174.4 grams  LV mass(C)dI: 108.6 grams/m2  Ao root diam: 3.6 cm  LA dimension: 4.8 cm     asc Aorta Diam: 3.4 cm  LA/Ao: 1.3  LA Volume Index (BP): 29.9 ml/m2  RWT: 0.41        Doppler Measurements & Calculations  MV E max earle: 59.6 cm/sec  MV A max earle: 77.8 cm/sec  MV E/A: 0.77  MV dec slope: 462.2 cm/sec2  MV dec time: 0.13 sec  Ao V2 max: 100.8 cm/sec  Ao max P.1 mmHg  AI P1/2t: 479.6 msec     PA acc time: 0.10 sec  E/E': 12.2  Peak E' Earle: 4.9 cm/sec        Wt Readings from Last 24 Encounters:   19 57.6 kg (127 lb)   19 56.2 kg (124 lb)   19 56.2 kg (123 lb 14.4 oz)   19 59.1 kg (130 lb 4.8 oz)   19 57.2 kg (126 lb)   19 58.7 kg (129 lb 6.4 oz)   10/15/18 61.7 kg (136 lb 1.6 oz)   10/02/18 62.8 kg (138 lb 6.4 oz)   18 63.2 kg (139 lb 4.8 oz)   18 63.8 kg (140 lb 9.6 oz)   18 64.4 kg (141 lb 14.4 oz)   17 63 kg (139 lb)   03/10/17 60.8 kg (134 lb)   17 62.6 kg (138 lb)   16 66.3 kg (146 lb 3.2 oz)   06/02/15 66.9 kg (147 lb 6.4 oz)   14 65.3 kg (144 lb)   14 65.6 kg (144 lb 9.6 oz)   10/07/13 66.4 kg (146 lb 6.4 oz)   12 66.1 kg (145 lb 11.2 oz)     Current Outpatient Medications   Medication     acetaminophen  "(TYLENOL) 325 MG tablet     aspirin 81 MG tablet     atorvastatin (LIPITOR) 40 MG tablet     Cholecalciferol (VITAMIN D) 1000 UNITS capsule     clopidogrel (PLAVIX) 75 MG tablet     diazepam (VALIUM) 5 MG tablet     fish oil-omega-3 fatty acids (FISH OIL) 1000 MG capsule     lisinopril (PRINIVIL/ZESTRIL) 5 MG tablet     metoprolol succinate ER (TOPROL-XL) 50 MG 24 hr tablet     Multiple Vitamins-Minerals (MULTIVITAL PO)     omeprazole (PRILOSEC) 20 MG CR capsule     spironolactone (ALDACTONE) 25 MG tablet     temazepam (RESTORIL) 15 MG capsule     nitroGLYcerin (NITROSTAT) 0.4 MG sublingual tablet     No current facility-administered medications for this visit.          Most Recent Value  3/20/2017 - Today   /65  9/17/2019   Temp 97  F (36.1  C)  3/21/2019   Pulse 67  9/17/2019   Resp 18  4/23/2019   SpO2 98 %  5/14/2019   Height 1.6 m (5' 3\")  9/17/2019   BMI (Calculated) 22.5  9/17/2019   Weight (lbs) 127 lb  9/17/2019       GATED MYOCARDIAL PERFUSION SCINTIGRAPHY WITH INTRAVENOUS PHARMACOLOGIC  VASODILATATION LEXISCAN -ONE DAY STUDY      4/23/2019 12:55 PM  CARINE DOSHI  83 years  Male  1935.     Indication/Clinical History: Jaw pain, CAD     Impression  1.  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   2. Gated images demonstrated apical wall hypokinesia.  The left  ventricular systolic function is reduced with LVEF of 43% with stress.  3. No prior study for comparison.     Procedure  Pharmacologic stress testing was performed with Lexiscan at a rate of  0.08 mg/ml rapid bolus injection, for 15 seconds, 0.4 mg/5ml  intravenously. Low-level exercise was not performed along with the  vasodilator infusion.  The heart rate was 63 at baseline and amadeo to  96 beats per minute during the Lexiscan infusion. The rest blood  pressure " was 127/62 mmHg and was 121/50 mm Hg during Lexiscan  infusion. The patient experienced shortness of breath  during the  test.     Myocardial perfusion imaging was performed at rest, approximately 45  minutes after the injection intravenously of 8.9 mCi of Tc-99m  Myoview. At peak pharmacologic effect, 10-20 seconds after Lexiscan,   the patient was injected intravenously with 24.3 mCi of  Tc-99m  Myoview. The post-stress tomographic imaging was performed  approximately 60 minutes after stress.     EKG Findings  The resting EKG demonstrated sinus rhythm, diffuse ST depression. The  stress EKG demonstrated no significant ST-T changes compared to  baseline. Stress EKG nondiagnostic due to resting EKG of normalities.     Tomographic Findings  Overall, the study quality is adequate . On both stress and rest  images there is a severe intensity apical, mild-to-moderate intensity  distal anteroseptal wall photopenic defect. These defects appear worse  on some stress images in distal anteroseptal region then rest images.  Gated images demonstrated apical wall hypokinesia. The left  ventricular ejection fraction was calculated to be 43% with stress.        Results for JOSH DOSHI (MRN [id number]) as of 9/17/2019 09:29   Ref. Range 7/9/2019 09:37 7/16/2019 09:59   Sodium Latest Ref Range: 136 - 145 mmol/L  131 (L)   Potassium Latest Ref Range: 3.5 - 5.1 mmol/L  4.5   Chloride Latest Ref Range: 98 - 107 mmol/L  96 (L)   Carbon Dioxide Latest Ref Range: 23 - 29 mmol/L  28   Urea Nitrogen Latest Ref Range: 7 - 30 mg/dL  25   Creatinine Latest Ref Range: 0.70 - 1.30 mg/dL  1.00   GFR Estimate Latest Ref Range: >60 mL/min/1.73_m2  72   GFR Estimate If Black Latest Ref Range: >60 mL/min/1.73_m2  87   Calcium Latest Ref Range: 8.5 - 10.5 mg/dL  9.9   Anion Gap Latest Ref Range: 6 - 17 mmol/L  11.5   Cholesterol Latest Ref Range: <200 mg/dL  133   HDL Cholesterol Latest Ref Range: >39 mg/dL  60   LDL Cholesterol Calculated  Latest Ref Range: <100 mg/dL  59   Non HDL Cholesterol Latest Ref Range: <130 mg/dL  73   N-Terminal Pro Bnp Latest Ref Range: 0 - 450 pg/mL  605 (H)   Triglycerides Latest Ref Range: <150 mg/dL  70   Glucose Latest Ref Range: 70 - 105 mg/dL  103   WBC Latest Ref Range: 4.0 - 11.0 10e9/L  8.3   Hemoglobin Latest Ref Range: 13.3 - 17.7 g/dL  12.6 (L)   Hematocrit Latest Ref Range: 40.0 - 53.0 %  35.2 (L)   Platelet Count Latest Ref Range: 150 - 450 10e9/L  298   RBC Count Latest Ref Range: 4.4 - 5.9 10e12/L  3.74 (L)   MCV Latest Ref Range: 78 - 100 fl  94   MCH Latest Ref Range: 26.5 - 33.0 pg  33.7 (H)   MCHC Latest Ref Range: 31.5 - 36.5 g/dL  35.8   RDW Latest Ref Range: 10.0 - 15.0 %  15.6 (H)   Diff Method Unknown  Automated Method   % Neutrophils Latest Units: %  62.7   % Lymphocytes Latest Units: %  28.2   % Monocytes Latest Units: %  7.7   % Eosinophils Latest Units: %  0.4   % Basophils Latest Units: %  0.6   % Immature Granulocytes Latest Units: %  0.4   Nucleated RBCs Latest Ref Range: 0 /100  0   Absolute Neutrophil Latest Ref Range: 1.6 - 8.3 10e9/L  5.2   Absolute Lymphocytes Latest Ref Range: 0.8 - 5.3 10e9/L  2.4   Absolute Monocytes Latest Ref Range: 0.0 - 1.3 10e9/L  0.6   Absolute Eosinophils Latest Ref Range: 0.0 - 0.7 10e9/L  0.0   Absolute Basophils Latest Ref Range: 0.0 - 0.2 10e9/L  0.1   Abs Immature Granulocytes Latest Ref Range: 0 - 0.4 10e9/L  0.0   Absolute Nucleated RBC Unknown  0.0   US RALPH DOPPLER WITH EXERCISE BILATERAL Unknown Rpt        Laboratories: BMP, BNP, hemoglobin     Therapeutic trial of taking TNG before exercise.  If works, then will consider nuclear stress test.          Thank you for allowing me to participate in the care of your patient.    Sincerely,     Jerrod Rangel MD     [facility]

## 2019-09-17 NOTE — PROGRESS NOTES
1. 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 Sequential, old records not in EMR)  2. History of prostate cancer treated with radiation  3. Exertional dyspnea  4. AAA  5. Hyperlipidemia  6. ASPVD with stenting               Left iliac stent              Right iliac stent with elevated velocities  7. Abdominal aortic aneurysm                      Infrarenal abdominal aortic aneurysm measuring 3.8 cm AP x 3.9 cm                       transverse, x 4.6 cm in length. Suspect previous measurements were                                                 slight over estimation. Recommend continued annual surveillance. 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  8. History of jaw infection   9. History if previous difficult intubation with jaw and neck infection  10. Recent cardiac stenting    The patient has exertional shortness of breath without orthopnea, PND, weight gain, edema, palpitation, pre-syncope and syncope. He has no classic angina.  Lower back pain has disappeared.     Name: CARINE DOSHI  MRN: [id number]  : 1935  Study Date: 2019 11:53 AM  Age: 83 yrs  Gender: Male  Patient Location: [facility]  Reason For Study: ASHD (arteriosclerotic heart disease), Dyspnea on exertion  Ordering Physician: CASTILLO MIKE  Referring Physician: CASTILLO MIKE  Performed By: Sherley Young     BSA: 1.6 m2  Height: 63 in  Weight: 129 lb  HR: 72  BP: 125/72 mmHg  _____________________________________________________________________________  __     Procedure  Complete Echo Adult. Contrast Optison.     _____________________________________________________________________________  __     Interpretation Summary     Left ventricular systolic function is mildly reduced.  The visual ejection fraction is estimated at 45-50%.  There is mild septal hypokinesis.  There is moderate apical wall hypokinesis.  Mild mid to distal anterior wall hypokinesis.  The right ventricle is normal in structure,  function and size.  There is moderate trileaflet aortic sclerosis.  There is mild to moderate (1-2+) aortic regurgitation.  There is mild (1+) pulmonic valvular regurgitation.  Borderline aortic root dilatation.  Moderate atherosclerotic plaque(s) in the ascending aorta.  Compared to the prior study dated 5/12/2019, there have been no changes. The  study was technically limited. Optison contrast was used without apparent  complications.  _____________________________________________________________________________  __        Left Ventricle  The left ventricle is normal in size. There is normal left ventricular wall  thickness. Left ventricular systolic function is mildly reduced. The visual  ejection fraction is estimated at 45-50%. Left ventricular diastolic function  is normal. Diastolic Doppler findings (E/E' ratio and/or other parameters)  suggest left ventricular filling pressures are indeterminate. There is mild  septal hypokinesis. There is moderate apical wall hypokinesis. Mild mid to  distal anterior wall hypokinesis. There is no thrombus seen in the left  ventricle.     Right Ventricle  The right ventricle is normal in structure, function and size.     Atria  Normal left atrial size. Right atrial size is normal. There is no color  Doppler evidence of an atrial shunt.     Mitral Valve  There is mild mitral annular calcification. The mitral valve leaflets are  mildly thickened. There is trace to mild mitral regurgitation. There is no  mitral valve stenosis.     Tricuspid Valve  Normal tricuspid valve. No tricuspid regurgitation.        Aortic Valve  There is moderate trileaflet aortic sclerosis. There is mild to moderate (1-  2+) aortic regurgitation. No hemodynamically significant valvular aortic  stenosis.     Pulmonic Valve  The pulmonic valve is not well visualized. There is mild (1+) pulmonic  valvular regurgitation. Normal pulmonic valve velocity.     Vessels  Borderline aortic root dilatation. Normal  size ascending aorta. Moderate  atherosclerotic plaque(s) in the ascending aorta. The IVC is normal in size  and reactivity with respiration, suggesting normal central venous pressure.     Pericardium  There is no pericardial effusion.     Rhythm  Sinus rhythm was noted.     _____________________________________________________________________________  __  MMode/2D Measurements & Calculations  IVSd: 1.2 cm  LVIDd: 4.6 cm  LVIDs: 3.0 cm  LVPWd: 0.94 cm  FS: 35.5 %  LV mass(C)d: 174.4 grams  LV mass(C)dI: 108.6 grams/m2  Ao root diam: 3.6 cm  LA dimension: 4.8 cm     asc Aorta Diam: 3.4 cm  LA/Ao: 1.3  LA Volume Index (BP): 29.9 ml/m2  RWT: 0.41        Doppler Measurements & Calculations  MV E max earle: 59.6 cm/sec  MV A max earle: 77.8 cm/sec  MV E/A: 0.77  MV dec slope: 462.2 cm/sec2  MV dec time: 0.13 sec  Ao V2 max: 100.8 cm/sec  Ao max P.1 mmHg  AI P1/2t: 479.6 msec     PA acc time: 0.10 sec  E/E': 12.2  Peak E' Earle: 4.9 cm/sec        Wt Readings from Last 24 Encounters:   19 57.6 kg (127 lb)   19 56.2 kg (124 lb)   19 56.2 kg (123 lb 14.4 oz)   19 59.1 kg (130 lb 4.8 oz)   19 57.2 kg (126 lb)   19 58.7 kg (129 lb 6.4 oz)   10/15/18 61.7 kg (136 lb 1.6 oz)   10/02/18 62.8 kg (138 lb 6.4 oz)   18 63.2 kg (139 lb 4.8 oz)   18 63.8 kg (140 lb 9.6 oz)   18 64.4 kg (141 lb 14.4 oz)   17 63 kg (139 lb)   03/10/17 60.8 kg (134 lb)   17 62.6 kg (138 lb)   16 66.3 kg (146 lb 3.2 oz)   06/02/15 66.9 kg (147 lb 6.4 oz)   14 65.3 kg (144 lb)   14 65.6 kg (144 lb 9.6 oz)   10/07/13 66.4 kg (146 lb 6.4 oz)   12 66.1 kg (145 lb 11.2 oz)     Current Outpatient Medications   Medication     acetaminophen (TYLENOL) 325 MG tablet     aspirin 81 MG tablet     atorvastatin (LIPITOR) 40 MG tablet     Cholecalciferol (VITAMIN D) 1000 UNITS capsule     clopidogrel (PLAVIX) 75 MG tablet     diazepam (VALIUM) 5 MG tablet     fish oil-omega-3 fatty  "acids (FISH OIL) 1000 MG capsule     lisinopril (PRINIVIL/ZESTRIL) 5 MG tablet     metoprolol succinate ER (TOPROL-XL) 50 MG 24 hr tablet     Multiple Vitamins-Minerals (MULTIVITAL PO)     omeprazole (PRILOSEC) 20 MG CR capsule     spironolactone (ALDACTONE) 25 MG tablet     temazepam (RESTORIL) 15 MG capsule     nitroGLYcerin (NITROSTAT) 0.4 MG sublingual tablet     No current facility-administered medications for this visit.          Most Recent Value  3/20/2017 - Today   /65  9/17/2019   Temp 97  F (36.1  C)  3/21/2019   Pulse 67  9/17/2019   Resp 18  4/23/2019   SpO2 98 %  5/14/2019   Height 1.6 m (5' 3\")  9/17/2019   BMI (Calculated) 22.5  9/17/2019   Weight (lbs) 127 lb  9/17/2019       GATED MYOCARDIAL PERFUSION SCINTIGRAPHY WITH INTRAVENOUS PHARMACOLOGIC  VASODILATATION LEXISCAN -ONE DAY STUDY      4/23/2019 12:55 PM  CARINE DOSHI  83 years  Male  1935.     Indication/Clinical History: Jaw pain, CAD     Impression  1.  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   2. Gated images demonstrated apical wall hypokinesia.  The left  ventricular systolic function is reduced with LVEF of 43% with stress.  3. No prior study for comparison.     Procedure  Pharmacologic stress testing was performed with Lexiscan at a rate of  0.08 mg/ml rapid bolus injection, for 15 seconds, 0.4 mg/5ml  intravenously. Low-level exercise was not performed along with the  vasodilator infusion.  The heart rate was 63 at baseline and amadeo to  96 beats per minute during the Lexiscan infusion. The rest blood  pressure was 127/62 mmHg and was 121/50 mm Hg during Lexiscan  infusion. The patient experienced shortness of breath  during the  test.     Myocardial perfusion imaging was performed at rest, approximately 45  minutes after the injection " intravenously of 8.9 mCi of Tc-99m  Myoview. At peak pharmacologic effect, 10-20 seconds after Lexiscan,   the patient was injected intravenously with 24.3 mCi of  Tc-99m  Myoview. The post-stress tomographic imaging was performed  approximately 60 minutes after stress.     EKG Findings  The resting EKG demonstrated sinus rhythm, diffuse ST depression. The  stress EKG demonstrated no significant ST-T changes compared to  baseline. Stress EKG nondiagnostic due to resting EKG of normalities.     Tomographic Findings  Overall, the study quality is adequate . On both stress and rest  images there is a severe intensity apical, mild-to-moderate intensity  distal anteroseptal wall photopenic defect. These defects appear worse  on some stress images in distal anteroseptal region then rest images.  Gated images demonstrated apical wall hypokinesia. The left  ventricular ejection fraction was calculated to be 43% with stress.        Results for JOSH DOSHI (MRN [id number]) as of 9/17/2019 09:29   Ref. Range 7/9/2019 09:37 7/16/2019 09:59   Sodium Latest Ref Range: 136 - 145 mmol/L  131 (L)   Potassium Latest Ref Range: 3.5 - 5.1 mmol/L  4.5   Chloride Latest Ref Range: 98 - 107 mmol/L  96 (L)   Carbon Dioxide Latest Ref Range: 23 - 29 mmol/L  28   Urea Nitrogen Latest Ref Range: 7 - 30 mg/dL  25   Creatinine Latest Ref Range: 0.70 - 1.30 mg/dL  1.00   GFR Estimate Latest Ref Range: >60 mL/min/1.73_m2  72   GFR Estimate If Black Latest Ref Range: >60 mL/min/1.73_m2  87   Calcium Latest Ref Range: 8.5 - 10.5 mg/dL  9.9   Anion Gap Latest Ref Range: 6 - 17 mmol/L  11.5   Cholesterol Latest Ref Range: <200 mg/dL  133   HDL Cholesterol Latest Ref Range: >39 mg/dL  60   LDL Cholesterol Calculated Latest Ref Range: <100 mg/dL  59   Non HDL Cholesterol Latest Ref Range: <130 mg/dL  73   N-Terminal Pro Bnp Latest Ref Range: 0 - 450 pg/mL  605 (H)   Triglycerides Latest Ref Range: <150 mg/dL  70   Glucose Latest Ref Range: 70 -  105 mg/dL  103   WBC Latest Ref Range: 4.0 - 11.0 10e9/L  8.3   Hemoglobin Latest Ref Range: 13.3 - 17.7 g/dL  12.6 (L)   Hematocrit Latest Ref Range: 40.0 - 53.0 %  35.2 (L)   Platelet Count Latest Ref Range: 150 - 450 10e9/L  298   RBC Count Latest Ref Range: 4.4 - 5.9 10e12/L  3.74 (L)   MCV Latest Ref Range: 78 - 100 fl  94   MCH Latest Ref Range: 26.5 - 33.0 pg  33.7 (H)   MCHC Latest Ref Range: 31.5 - 36.5 g/dL  35.8   RDW Latest Ref Range: 10.0 - 15.0 %  15.6 (H)   Diff Method Unknown  Automated Method   % Neutrophils Latest Units: %  62.7   % Lymphocytes Latest Units: %  28.2   % Monocytes Latest Units: %  7.7   % Eosinophils Latest Units: %  0.4   % Basophils Latest Units: %  0.6   % Immature Granulocytes Latest Units: %  0.4   Nucleated RBCs Latest Ref Range: 0 /100  0   Absolute Neutrophil Latest Ref Range: 1.6 - 8.3 10e9/L  5.2   Absolute Lymphocytes Latest Ref Range: 0.8 - 5.3 10e9/L  2.4   Absolute Monocytes Latest Ref Range: 0.0 - 1.3 10e9/L  0.6   Absolute Eosinophils Latest Ref Range: 0.0 - 0.7 10e9/L  0.0   Absolute Basophils Latest Ref Range: 0.0 - 0.2 10e9/L  0.1   Abs Immature Granulocytes Latest Ref Range: 0 - 0.4 10e9/L  0.0   Absolute Nucleated RBC Unknown  0.0   US RALPH DOPPLER WITH EXERCISE BILATERAL Unknown Rpt        Laboratories: BMP, BNP, hemoglobin     Therapeutic trial of taking TNG before exercise.  If works, then will consider nuclear stress test.

## 2019-09-17 NOTE — LETTER
2019    Tristian Jeffrey MD, MD  [address]  [place] [address]    RE: Carine Chaudhary       Dear Colleague,    I had the pleasure of seeing Carine Chaudhary in the [facility].    1. 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 Sequential, old records not in EMR)  2. History of prostate cancer treated with radiation  3. Exertional dyspnea  4. AAA  5. Hyperlipidemia  6. ASPVD with stenting               Left iliac stent              Right iliac stent with elevated velocities  7. Abdominal aortic aneurysm                      Infrarenal abdominal aortic aneurysm measuring 3.8 cm AP x 3.9 cm                       transverse, x 4.6 cm in length. Suspect previous measurements were                                                 slight over estimation. Recommend continued annual surveillance. 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  8. History of jaw infection   9. History if previous difficult intubation with jaw and neck infection  10. Recent cardiac stenting    The patient has exertional shortness of breath without orthopnea, PND, weight gain, edema, palpitation, pre-syncope and syncope. He has no classic angina.  Lower back pain has disappeared.     Name: CARINE CHAUDHARY  MRN: [id number]  : 1935  Study Date: 2019 11:53 AM  Age: 83 yrs  Gender: Male  Patient Location: [facility]  Reason For Study: ASHD (arteriosclerotic heart disease), Dyspnea on exertion  Ordering Physician: CASTILLO MIKE  Referring Physician: CASTILLO MIKE  Performed By: Sherley Young     BSA: 1.6 m2  Height: 63 in  Weight: 129 lb  HR: 72  BP: 125/72 mmHg  _____________________________________________________________________________  __     Procedure  Complete Echo Adult. Contrast Optison.     _____________________________________________________________________________  __     Interpretation Summary     Left ventricular systolic function is  mildly reduced.  The visual ejection fraction is estimated at 45-50%.  There is mild septal hypokinesis.  There is moderate apical wall hypokinesis.  Mild mid to distal anterior wall hypokinesis.  The right ventricle is normal in structure, function and size.  There is moderate trileaflet aortic sclerosis.  There is mild to moderate (1-2+) aortic regurgitation.  There is mild (1+) pulmonic valvular regurgitation.  Borderline aortic root dilatation.  Moderate atherosclerotic plaque(s) in the ascending aorta.  Compared to the prior study dated 5/12/2019, there have been no changes. The  study was technically limited. Optison contrast was used without apparent  complications.  _____________________________________________________________________________  __        Left Ventricle  The left ventricle is normal in size. There is normal left ventricular wall  thickness. Left ventricular systolic function is mildly reduced. The visual  ejection fraction is estimated at 45-50%. Left ventricular diastolic function  is normal. Diastolic Doppler findings (E/E' ratio and/or other parameters)  suggest left ventricular filling pressures are indeterminate. There is mild  septal hypokinesis. There is moderate apical wall hypokinesis. Mild mid to  distal anterior wall hypokinesis. There is no thrombus seen in the left  ventricle.     Right Ventricle  The right ventricle is normal in structure, function and size.     Atria  Normal left atrial size. Right atrial size is normal. There is no color  Doppler evidence of an atrial shunt.     Mitral Valve  There is mild mitral annular calcification. The mitral valve leaflets are  mildly thickened. There is trace to mild mitral regurgitation. There is no  mitral valve stenosis.     Tricuspid Valve  Normal tricuspid valve. No tricuspid regurgitation.        Aortic Valve  There is moderate trileaflet aortic sclerosis. There is mild to moderate (1-  2+) aortic regurgitation. No hemodynamically  significant valvular aortic  stenosis.     Pulmonic Valve  The pulmonic valve is not well visualized. There is mild (1+) pulmonic  valvular regurgitation. Normal pulmonic valve velocity.     Vessels  Borderline aortic root dilatation. Normal size ascending aorta. Moderate  atherosclerotic plaque(s) in the ascending aorta. The IVC is normal in size  and reactivity with respiration, suggesting normal central venous pressure.     Pericardium  There is no pericardial effusion.     Rhythm  Sinus rhythm was noted.     _____________________________________________________________________________  __  MMode/2D Measurements & Calculations  IVSd: 1.2 cm  LVIDd: 4.6 cm  LVIDs: 3.0 cm  LVPWd: 0.94 cm  FS: 35.5 %  LV mass(C)d: 174.4 grams  LV mass(C)dI: 108.6 grams/m2  Ao root diam: 3.6 cm  LA dimension: 4.8 cm     asc Aorta Diam: 3.4 cm  LA/Ao: 1.3  LA Volume Index (BP): 29.9 ml/m2  RWT: 0.41        Doppler Measurements & Calculations  MV E max earle: 59.6 cm/sec  MV A max earle: 77.8 cm/sec  MV E/A: 0.77  MV dec slope: 462.2 cm/sec2  MV dec time: 0.13 sec  Ao V2 max: 100.8 cm/sec  Ao max P.1 mmHg  AI P1/2t: 479.6 msec     PA acc time: 0.10 sec  E/E': 12.2  Peak E' Earle: 4.9 cm/sec        Wt Readings from Last 24 Encounters:   19 57.6 kg (127 lb)   19 56.2 kg (124 lb)   19 56.2 kg (123 lb 14.4 oz)   19 59.1 kg (130 lb 4.8 oz)   19 57.2 kg (126 lb)   19 58.7 kg (129 lb 6.4 oz)   10/15/18 61.7 kg (136 lb 1.6 oz)   10/02/18 62.8 kg (138 lb 6.4 oz)   18 63.2 kg (139 lb 4.8 oz)   18 63.8 kg (140 lb 9.6 oz)   18 64.4 kg (141 lb 14.4 oz)   17 63 kg (139 lb)   03/10/17 60.8 kg (134 lb)   17 62.6 kg (138 lb)   16 66.3 kg (146 lb 3.2 oz)   06/02/15 66.9 kg (147 lb 6.4 oz)   14 65.3 kg (144 lb)   14 65.6 kg (144 lb 9.6 oz)   10/07/13 66.4 kg (146 lb 6.4 oz)   12 66.1 kg (145 lb 11.2 oz)     Current Outpatient Medications   Medication     acetaminophen  "(TYLENOL) 325 MG tablet     aspirin 81 MG tablet     atorvastatin (LIPITOR) 40 MG tablet     Cholecalciferol (VITAMIN D) 1000 UNITS capsule     clopidogrel (PLAVIX) 75 MG tablet     diazepam (VALIUM) 5 MG tablet     fish oil-omega-3 fatty acids (FISH OIL) 1000 MG capsule     lisinopril (PRINIVIL/ZESTRIL) 5 MG tablet     metoprolol succinate ER (TOPROL-XL) 50 MG 24 hr tablet     Multiple Vitamins-Minerals (MULTIVITAL PO)     omeprazole (PRILOSEC) 20 MG CR capsule     spironolactone (ALDACTONE) 25 MG tablet     temazepam (RESTORIL) 15 MG capsule     nitroGLYcerin (NITROSTAT) 0.4 MG sublingual tablet     No current facility-administered medications for this visit.          Most Recent Value  3/20/2017 - Today   /65  9/17/2019   Temp 97  F (36.1  C)  3/21/2019   Pulse 67  9/17/2019   Resp 18  4/23/2019   SpO2 98 %  5/14/2019   Height 1.6 m (5' 3\")  9/17/2019   BMI (Calculated) 22.5  9/17/2019   Weight (lbs) 127 lb  9/17/2019       GATED MYOCARDIAL PERFUSION SCINTIGRAPHY WITH INTRAVENOUS PHARMACOLOGIC  VASODILATATION LEXISCAN -ONE DAY STUDY      4/23/2019 12:55 PM  CARINE DOSHI  83 years  Male  1935.     Indication/Clinical History: Jaw pain, CAD     Impression  1.  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   2. Gated images demonstrated apical wall hypokinesia.  The left  ventricular systolic function is reduced with LVEF of 43% with stress.  3. No prior study for comparison.     Procedure  Pharmacologic stress testing was performed with Lexiscan at a rate of  0.08 mg/ml rapid bolus injection, for 15 seconds, 0.4 mg/5ml  intravenously. Low-level exercise was not performed along with the  vasodilator infusion.  The heart rate was 63 at baseline and amadeo to  96 beats per minute during the Lexiscan infusion. The rest blood  pressure " was 127/62 mmHg and was 121/50 mm Hg during Lexiscan  infusion. The patient experienced shortness of breath  during the  test.     Myocardial perfusion imaging was performed at rest, approximately 45  minutes after the injection intravenously of 8.9 mCi of Tc-99m  Myoview. At peak pharmacologic effect, 10-20 seconds after Lexiscan,   the patient was injected intravenously with 24.3 mCi of  Tc-99m  Myoview. The post-stress tomographic imaging was performed  approximately 60 minutes after stress.     EKG Findings  The resting EKG demonstrated sinus rhythm, diffuse ST depression. The  stress EKG demonstrated no significant ST-T changes compared to  baseline. Stress EKG nondiagnostic due to resting EKG of normalities.     Tomographic Findings  Overall, the study quality is adequate . On both stress and rest  images there is a severe intensity apical, mild-to-moderate intensity  distal anteroseptal wall photopenic defect. These defects appear worse  on some stress images in distal anteroseptal region then rest images.  Gated images demonstrated apical wall hypokinesia. The left  ventricular ejection fraction was calculated to be 43% with stress.        Results for JOSH DOSHI (MRN [id number]) as of 9/17/2019 09:29   Ref. Range 7/9/2019 09:37 7/16/2019 09:59   Sodium Latest Ref Range: 136 - 145 mmol/L  131 (L)   Potassium Latest Ref Range: 3.5 - 5.1 mmol/L  4.5   Chloride Latest Ref Range: 98 - 107 mmol/L  96 (L)   Carbon Dioxide Latest Ref Range: 23 - 29 mmol/L  28   Urea Nitrogen Latest Ref Range: 7 - 30 mg/dL  25   Creatinine Latest Ref Range: 0.70 - 1.30 mg/dL  1.00   GFR Estimate Latest Ref Range: >60 mL/min/1.73_m2  72   GFR Estimate If Black Latest Ref Range: >60 mL/min/1.73_m2  87   Calcium Latest Ref Range: 8.5 - 10.5 mg/dL  9.9   Anion Gap Latest Ref Range: 6 - 17 mmol/L  11.5   Cholesterol Latest Ref Range: <200 mg/dL  133   HDL Cholesterol Latest Ref Range: >39 mg/dL  60   LDL Cholesterol Calculated  Latest Ref Range: <100 mg/dL  59   Non HDL Cholesterol Latest Ref Range: <130 mg/dL  73   N-Terminal Pro Bnp Latest Ref Range: 0 - 450 pg/mL  605 (H)   Triglycerides Latest Ref Range: <150 mg/dL  70   Glucose Latest Ref Range: 70 - 105 mg/dL  103   WBC Latest Ref Range: 4.0 - 11.0 10e9/L  8.3   Hemoglobin Latest Ref Range: 13.3 - 17.7 g/dL  12.6 (L)   Hematocrit Latest Ref Range: 40.0 - 53.0 %  35.2 (L)   Platelet Count Latest Ref Range: 150 - 450 10e9/L  298   RBC Count Latest Ref Range: 4.4 - 5.9 10e12/L  3.74 (L)   MCV Latest Ref Range: 78 - 100 fl  94   MCH Latest Ref Range: 26.5 - 33.0 pg  33.7 (H)   MCHC Latest Ref Range: 31.5 - 36.5 g/dL  35.8   RDW Latest Ref Range: 10.0 - 15.0 %  15.6 (H)   Diff Method Unknown  Automated Method   % Neutrophils Latest Units: %  62.7   % Lymphocytes Latest Units: %  28.2   % Monocytes Latest Units: %  7.7   % Eosinophils Latest Units: %  0.4   % Basophils Latest Units: %  0.6   % Immature Granulocytes Latest Units: %  0.4   Nucleated RBCs Latest Ref Range: 0 /100  0   Absolute Neutrophil Latest Ref Range: 1.6 - 8.3 10e9/L  5.2   Absolute Lymphocytes Latest Ref Range: 0.8 - 5.3 10e9/L  2.4   Absolute Monocytes Latest Ref Range: 0.0 - 1.3 10e9/L  0.6   Absolute Eosinophils Latest Ref Range: 0.0 - 0.7 10e9/L  0.0   Absolute Basophils Latest Ref Range: 0.0 - 0.2 10e9/L  0.1   Abs Immature Granulocytes Latest Ref Range: 0 - 0.4 10e9/L  0.0   Absolute Nucleated RBC Unknown  0.0   US RALPH DOPPLER WITH EXERCISE BILATERAL Unknown Rpt        Laboratories: BMP, BNP, hemoglobin     Therapeutic trial of taking TNG before exercise.  If works, then will consider nuclear stress test.      Thank you for allowing me to participate in the care of your patient.      Sincerely,     Jerrod Rangel MD     [facility]    cc:   Jerrod Rangel MD  [address]  [place] [address]

## 2019-09-17 NOTE — PATIENT INSTRUCTIONS
Medication Changes:  No medication changes at this time. Please continue current medication regiment.    Patient Instructions:  1. Continue staying active and eat a heart healthy diet.    2. Please keep current list of medications with you at all times.    3. Remember to weigh yourself daily after voiding and before you consume any food or beverages and log the numbers.  If you have gained 2 pounds overnight or 5 pounds in a week contact us immediately for medication adjustments or further instructions.    4. **Please call us immediately if you have any syncope (fainting or passing out), chest pain, edema (swelling or weight gain), or decline in your functional status (general decline in how you are feeling).    Follow up Appointment Information:  Labs today and follow up in 2 months with labs        For scheduling at [facility] please call [phone]  For scheduling at the [facility] please call [phone]  We are located on the second floor [address] at the [facility].  Our address is     [address] [place] [address]   [address]  [place] [address]    Thank you for allowing us to be a part of your care here at the [facility]    If you have questions or concerns please contact us at:    Gracia Gates RN, BSN      Nurse Coordinator       Pulmonary Hypertension     [facility]   (Phone)[phone]  (Fax)[phone]    ** Please note that you will NOT receive a reminder call regarding your scheduled testing, reminder calls are for provider appointments only.  If you are scheduled for testing within the [facility] system you may receive a call regarding pre-registration for billing purposes only.**     Remember to weigh yourself daily after voiding and before you consume any food or beverages and log the numbers.  If you have gained/lost 2 pounds overnight or 5 pounds in a week contact us immediately for medication adjustments or further instructions.    Support  Group:  Pulmonary Hypertension Association  Https://www.phassociation.org/  **Look at the Events Tab** They even have Support Groups that you can call into    [facility] PH Support Group  Second Saturday of the Month from 1-3 PM   Location: [facility] [address]  Leader: Carola Caicedo  Phone: [phone] or [phone]  Email: [email or web address]

## 2019-11-02 ENCOUNTER — HEALTH MAINTENANCE LETTER (OUTPATIENT)
Age: 84
End: 2019-11-02

## 2020-01-14 ENCOUNTER — OFFICE VISIT (OUTPATIENT)
Dept: CARDIOLOGY | Facility: CLINIC | Age: 85
End: 2020-01-14
Payer: COMMERCIAL

## 2020-01-14 VITALS
HEART RATE: 70 BPM | HEIGHT: 63 IN | BODY MASS INDEX: 22.59 KG/M2 | WEIGHT: 127.5 LBS | DIASTOLIC BLOOD PRESSURE: 73 MMHG | OXYGEN SATURATION: 98 % | SYSTOLIC BLOOD PRESSURE: 131 MMHG

## 2020-01-14 DIAGNOSIS — R06.09 DYSPNEA ON EXERTION: ICD-10-CM

## 2020-01-14 DIAGNOSIS — I25.10 ASHD (ARTERIOSCLEROTIC HEART DISEASE): ICD-10-CM

## 2020-01-14 LAB
ANION GAP SERPL CALCULATED.3IONS-SCNC: 10.7 MMOL/L (ref 6–17)
BUN SERPL-MCNC: 14 MG/DL (ref 7–30)
CALCIUM SERPL-MCNC: 9.6 MG/DL (ref 8.5–10.5)
CHLORIDE SERPL-SCNC: 94 MMOL/L (ref 98–107)
CO2 SERPL-SCNC: 29 MMOL/L (ref 23–29)
CREAT SERPL-MCNC: 1.03 MG/DL (ref 0.7–1.3)
ERYTHROCYTE [DISTWIDTH] IN BLOOD BY AUTOMATED COUNT: 16 % (ref 10–15)
GFR SERPL CREATININE-BSD FRML MDRD: 69 ML/MIN/{1.73_M2}
GLUCOSE SERPL-MCNC: 132 MG/DL (ref 70–105)
HCT VFR BLD AUTO: 34.7 % (ref 40–53)
HGB BLD-MCNC: 12.2 G/DL (ref 13.3–17.7)
MCH RBC QN AUTO: 32.2 PG (ref 26.5–33)
MCHC RBC AUTO-ENTMCNC: 35.2 G/DL (ref 31.5–36.5)
MCV RBC AUTO: 92 FL (ref 78–100)
NT-PROBNP SERPL-MCNC: 1351 PG/ML (ref 0–450)
PLATELET # BLD AUTO: 403 10E9/L (ref 150–450)
POTASSIUM SERPL-SCNC: 4.7 MMOL/L (ref 3.5–5.1)
RBC # BLD AUTO: 3.79 10E12/L (ref 4.4–5.9)
SODIUM SERPL-SCNC: 129 MMOL/L (ref 136–145)
WBC # BLD AUTO: 11.2 10E9/L (ref 4–11)

## 2020-01-14 PROCEDURE — 80048 BASIC METABOLIC PNL TOTAL CA: CPT | Performed by: INTERNAL MEDICINE

## 2020-01-14 PROCEDURE — 83880 ASSAY OF NATRIURETIC PEPTIDE: CPT | Performed by: INTERNAL MEDICINE

## 2020-01-14 PROCEDURE — 85027 COMPLETE CBC AUTOMATED: CPT | Performed by: INTERNAL MEDICINE

## 2020-01-14 PROCEDURE — 99214 OFFICE O/P EST MOD 30 MIN: CPT | Performed by: INTERNAL MEDICINE

## 2020-01-14 PROCEDURE — 36415 COLL VENOUS BLD VENIPUNCTURE: CPT | Performed by: INTERNAL MEDICINE

## 2020-01-14 ASSESSMENT — MIFFLIN-ST. JEOR: SCORE: 1173.47

## 2020-01-14 NOTE — PROGRESS NOTES
1. 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 Sequential, old records not in EMR)  2. History of prostate cancer treated with radiation  3. Exertional dyspnea  4. AAA 3.9  5. Hyperlipidemia  6. ASPVD with stenting               Left iliac stent              Right iliac stent with elevated velocities  7. Abdominal aortic aneurysm                      Infrarenal abdominal aortic aneurysm measuring 3.8 cm AP x 3.9 cm                       transverse, x 4.6 cm in length. Suspect previous measurements were                                                 slight over estimation. Recommend continued annual surveillance. 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  8. History of jaw infection   9. History if previous difficult intubation with jaw and neck infection  10. Recent cardiac stenting    The patient returns for follow-up o ASHD and heart failure.  There is no interim history of chest pain, tightness, paroxysmal nocturnal dyspnea, orthopnea, peripheral edema, palpitation, pre-syncope, syncope, device discharge.  Exercise tolerance is stable.  The patient is attempting to exercise regularly and following a sodium restricted, calorically appropriate diet.  Medications are reviewed and the patient is taking medications as prescribed.  The patient is generally sleeping well.     He has some transient shortness of breath multiple times per day, lasting seconds, and unrelated to eating or exertion.    Constitutional: weight loss, fever, chills, night sweats  HEENT: without visual changes, swallow difficulties  Pulmonary: without shortness of breath, cough, wheeze, hemoptysis  Cardiac: without chest pain, RYAN, PND, orthopnea, edema, palpitation, pre-syncope, syncope,  GI: without diarrhea, constipation, jaundice, melena, GERD, hematemesis  : without frequency, urgency, dysuria, hematuria  Skin: rash, bruise, open lesions  Neuro: without TIA, focal neurologic complaints, seizure, trauma  Ortho:  without pain, swelling, mobility impairment  Endocrine: diabetes, thyroid, heat/cold intolerance, polyuria, polyphagia, change bowel habits.  Sleep:no NADINE, periodic breathing, fatigue  Other:    Current Outpatient Medications   Medication     acetaminophen (TYLENOL) 325 MG tablet     aspirin 81 MG tablet     atorvastatin (LIPITOR) 40 MG tablet     Cholecalciferol (VITAMIN D) 1000 UNITS capsule     clopidogrel (PLAVIX) 75 MG tablet     diazepam (VALIUM) 5 MG tablet     fish oil-omega-3 fatty acids (FISH OIL) 1000 MG capsule     lisinopril (PRINIVIL/ZESTRIL) 5 MG tablet     metoprolol succinate ER (TOPROL-XL) 50 MG 24 hr tablet     Multiple Vitamins-Minerals (MULTIVITAL PO)     nitroGLYcerin (NITROSTAT) 0.4 MG sublingual tablet     omeprazole (PRILOSEC) 20 MG CR capsule     spironolactone (ALDACTONE) 25 MG tablet     temazepam (RESTORIL) 15 MG capsule     No current facility-administered medications for this visit.           Name: CARINE DOSHI  MRN: [id number]  : 1935  Study Date: 2019 11:53 AM  Age: 83 yrs  Gender: Male  Patient Location: [facility]  Reason For Study: ASHD (arteriosclerotic heart disease), Dyspnea on exertion  Ordering Physician: CASTILLO MIKE  Referring Physician: CASTILLO MIKE  Performed By: Sherley Young     BSA: 1.6 m2  Height: 63 in  Weight: 129 lb  HR: 72  BP: 125/72 mmHg  _____________________________________________________________________________  __     Procedure  Complete Echo Adult. Contrast Optison.     _____________________________________________________________________________  __     Interpretation Summary     Left ventricular systolic function is mildly reduced.  The visual ejection fraction is estimated at 45-50%.  There is mild septal hypokinesis.  There is moderate apical wall hypokinesis.  Mild mid to distal anterior wall hypokinesis.  The right ventricle is normal in structure, function and size.  There is moderate trileaflet aortic sclerosis.  There  is mild to moderate (1-2+) aortic regurgitation.  There is mild (1+) pulmonic valvular regurgitation.  Borderline aortic root dilatation.  Moderate atherosclerotic plaque(s) in the ascending aorta.  Compared to the prior study dated 5/12/2019, there have been no changes. The  study was technically limited. Optison contrast was used without apparent  complications.  _____________________________________________________________________________  __        Left Ventricle  The left ventricle is normal in size. There is normal left ventricular wall  thickness. Left ventricular systolic function is mildly reduced. The visual  ejection fraction is estimated at 45-50%. Left ventricular diastolic function  is normal. Diastolic Doppler findings (E/E' ratio and/or other parameters)  suggest left ventricular filling pressures are indeterminate. There is mild  septal hypokinesis. There is moderate apical wall hypokinesis. Mild mid to  distal anterior wall hypokinesis. There is no thrombus seen in the left  ventricle.     Right Ventricle  The right ventricle is normal in structure, function and size.     Atria  Normal left atrial size. Right atrial size is normal. There is no color  Doppler evidence of an atrial shunt.     Mitral Valve  There is mild mitral annular calcification. The mitral valve leaflets are  mildly thickened. There is trace to mild mitral regurgitation. There is no  mitral valve stenosis.     Tricuspid Valve  Normal tricuspid valve. No tricuspid regurgitation.        Aortic Valve  There is moderate trileaflet aortic sclerosis. There is mild to moderate (1-  2+) aortic regurgitation. No hemodynamically significant valvular aortic  stenosis.     Pulmonic Valve  The pulmonic valve is not well visualized. There is mild (1+) pulmonic  valvular regurgitation. Normal pulmonic valve velocity.     Vessels  Borderline aortic root dilatation. Normal size ascending aorta. Moderate  atherosclerotic plaque(s) in the ascending  aorta. The IVC is normal in size  and reactivity with respiration, suggesting normal central venous pressure.     Pericardium  There is no pericardial effusion.     Rhythm  Sinus rhythm was noted.     _____________________________________________________________________________  __  MMode/2D Measurements & Calculations  IVSd: 1.2 cm  LVIDd: 4.6 cm  LVIDs: 3.0 cm  LVPWd: 0.94 cm  FS: 35.5 %  LV mass(C)d: 174.4 grams  LV mass(C)dI: 108.6 grams/m2  Ao root diam: 3.6 cm  LA dimension: 4.8 cm     asc Aorta Diam: 3.4 cm  LA/Ao: 1.3  LA Volume Index (BP): 29.9 ml/m2  RWT: 0.41        Doppler Measurements & Calculations  MV E max earle: 59.6 cm/sec  MV A max earle: 77.8 cm/sec  MV E/A: 0.77  MV dec slope: 462.2 cm/sec2  MV dec time: 0.13 sec  Ao V2 max: 100.8 cm/sec  Ao max P.1 mmHg  AI P1/2t: 479.6 msec     PA acc time: 0.10 sec  E/E': 12.2  Peak E' Earle: 4.9 cm/sec        Wt Readings from Last 24 Encounters:   20 57.8 kg (127 lb 8 oz)   19 57.6 kg (127 lb)   19 56.2 kg (124 lb)   19 56.2 kg (123 lb 14.4 oz)   19 59.1 kg (130 lb 4.8 oz)   19 57.2 kg (126 lb)   19 58.7 kg (129 lb 6.4 oz)   10/15/18 61.7 kg (136 lb 1.6 oz)   10/02/18 62.8 kg (138 lb 6.4 oz)   18 63.2 kg (139 lb 4.8 oz)   18 63.8 kg (140 lb 9.6 oz)   18 64.4 kg (141 lb 14.4 oz)   17 63 kg (139 lb)   03/10/17 60.8 kg (134 lb)   17 62.6 kg (138 lb)   16 66.3 kg (146 lb 3.2 oz)   06/02/15 66.9 kg (147 lb 6.4 oz)   14 65.3 kg (144 lb)   14 65.6 kg (144 lb 9.6 oz)   10/07/13 66.4 kg (146 lb 6.4 oz)   12 66.1 kg (145 lb 11.2 oz)     Current Outpatient Medications   Medication     acetaminophen (TYLENOL) 325 MG tablet     aspirin 81 MG tablet     atorvastatin (LIPITOR) 40 MG tablet     Cholecalciferol (VITAMIN D) 1000 UNITS capsule     clopidogrel (PLAVIX) 75 MG tablet     diazepam (VALIUM) 5 MG tablet     fish oil-omega-3 fatty acids (FISH OIL) 1000 MG capsule      "lisinopril (PRINIVIL/ZESTRIL) 5 MG tablet     metoprolol succinate ER (TOPROL-XL) 50 MG 24 hr tablet     Multiple Vitamins-Minerals (MULTIVITAL PO)     nitroGLYcerin (NITROSTAT) 0.4 MG sublingual tablet     omeprazole (PRILOSEC) 20 MG CR capsule     spironolactone (ALDACTONE) 25 MG tablet     temazepam (RESTORIL) 15 MG capsule     No current facility-administered medications for this visit.        Most Recent Value  3/20/2017 - Today   /65  9/17/2019   Temp 97  F (36.1  C)  3/21/2019   Pulse 67  9/17/2019   Resp 18  4/23/2019   SpO2 98 %  5/14/2019   Height 1.6 m (5' 3\")  9/17/2019   BMI (Calculated) 22.5  9/17/2019   Weight (lbs) 127 lb  9/17/2019       GATED MYOCARDIAL PERFUSION SCINTIGRAPHY WITH INTRAVENOUS PHARMACOLOGIC  VASODILATATION LEXISCAN -ONE DAY STUDY      4/23/2019 12:55 PM  CARINE DOSHI  83 years  Male  1935.     Indication/Clinical History: Jaw pain, CAD     Impression  1.  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   2. Gated images demonstrated apical wall hypokinesia.  The left  ventricular systolic function is reduced with LVEF of 43% with stress.  3. No prior study for comparison.     Procedure  Pharmacologic stress testing was performed with Lexiscan at a rate of  0.08 mg/ml rapid bolus injection, for 15 seconds, 0.4 mg/5ml  intravenously. Low-level exercise was not performed along with the  vasodilator infusion.  The heart rate was 63 at baseline and amadeo to  96 beats per minute during the Lexiscan infusion. The rest blood  pressure was 127/62 mmHg and was 121/50 mm Hg during Lexiscan  infusion. The patient experienced shortness of breath  during the  test.     Myocardial perfusion imaging was performed at rest, approximately 45  minutes after the injection intravenously of 8.9 mCi of Tc-99m  Myoview. At " peak pharmacologic effect, 10-20 seconds after Lexiscan,   the patient was injected intravenously with 24.3 mCi of  Tc-99m  Myoview. The post-stress tomographic imaging was performed  approximately 60 minutes after stress.     EKG Findings  The resting EKG demonstrated sinus rhythm, diffuse ST depression. The  stress EKG demonstrated no significant ST-T changes compared to  baseline. Stress EKG nondiagnostic due to resting EKG of normalities.     Tomographic Findings  Overall, the study quality is adequate . On both stress and rest  images there is a severe intensity apical, mild-to-moderate intensity  distal anteroseptal wall photopenic defect. These defects appear worse  on some stress images in distal anteroseptal region then rest images.  Gated images demonstrated apical wall hypokinesia. The left  ventricular ejection fraction was calculated to be 43% with stress.      Discussion:    The patient is clinically stable without angina, findings of congestive heart failure or arrhythmia.     RTC 6 months

## 2020-01-14 NOTE — LETTER
1/14/2020    Tristian Jeffrey MD, MD  [address]  [facility] [address]    RE: Oscar Chaudhary       Dear Colleague,    I had the pleasure of seeing Oscar Chaudhary in the [facility].    1. 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 Sequential, old records not in EMR)  2. History of prostate cancer treated with radiation  3. Exertional dyspnea  4. AAA 3.9  5. Hyperlipidemia  6. ASPVD with stenting               Left iliac stent              Right iliac stent with elevated velocities  7. Abdominal aortic aneurysm                      Infrarenal abdominal aortic aneurysm measuring 3.8 cm AP x 3.9 cm                       transverse, x 4.6 cm in length. Suspect previous measurements were                                                 slight over estimation. Recommend continued annual surveillance. 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  8. History of jaw infection   9. History if previous difficult intubation with jaw and neck infection  10. Recent cardiac stenting    The patient returns for follow-up o ASHD and heart failure.  There is no interim history of chest pain, tightness, paroxysmal nocturnal dyspnea, orthopnea, peripheral edema, palpitation, pre-syncope, syncope, device discharge.  Exercise tolerance is stable.  The patient is attempting to exercise regularly and following a sodium restricted, calorically appropriate diet.  Medications are reviewed and the patient is taking medications as prescribed.  The patient is generally sleeping well.     He has some transient shortness of breath multiple times per day, lasting seconds, and unrelated to eating or exertion.    Constitutional: weight loss, fever, chills, night sweats  HEENT: without visual changes, swallow difficulties  Pulmonary: without shortness of breath, cough, wheeze, hemoptysis  Cardiac: without chest pain, RYAN, PND, orthopnea, edema, palpitation, pre-syncope,  syncope,  GI: without diarrhea, constipation, jaundice, melena, GERD, hematemesis  : without frequency, urgency, dysuria, hematuria  Skin: rash, bruise, open lesions  Neuro: without TIA, focal neurologic complaints, seizure, trauma  Ortho: without pain, swelling, mobility impairment  Endocrine: diabetes, thyroid, heat/cold intolerance, polyuria, polyphagia, change bowel habits.  Sleep:no NADINE, periodic breathing, fatigue  Other:    Current Outpatient Medications   Medication     acetaminophen (TYLENOL) 325 MG tablet     aspirin 81 MG tablet     atorvastatin (LIPITOR) 40 MG tablet     Cholecalciferol (VITAMIN D) 1000 UNITS capsule     clopidogrel (PLAVIX) 75 MG tablet     diazepam (VALIUM) 5 MG tablet     fish oil-omega-3 fatty acids (FISH OIL) 1000 MG capsule     lisinopril (PRINIVIL/ZESTRIL) 5 MG tablet     metoprolol succinate ER (TOPROL-XL) 50 MG 24 hr tablet     Multiple Vitamins-Minerals (MULTIVITAL PO)     nitroGLYcerin (NITROSTAT) 0.4 MG sublingual tablet     omeprazole (PRILOSEC) 20 MG CR capsule     spironolactone (ALDACTONE) 25 MG tablet     temazepam (RESTORIL) 15 MG capsule     No current facility-administered medications for this visit.           Name: CARINE DOSHI  MRN: [id number]  : 1935  Study Date: 2019 11:53 AM  Age: 83 yrs  Gender: Male  Patient Location: [facility]  Reason For Study: ASHD (arteriosclerotic heart disease), Dyspnea on exertion  Ordering Physician: CASTILLO MIKE  Referring Physician: CASTILLO MIKE  Performed By: Sherley Young     BSA: 1.6 m2  Height: 63 in  Weight: 129 lb  HR: 72  BP: 125/72 mmHg  _____________________________________________________________________________  __     Procedure  Complete Echo Adult. Contrast Optison.     _____________________________________________________________________________  __     Interpretation Summary     Left ventricular systolic function is mildly reduced.  The visual ejection fraction is estimated at  45-50%.  There is mild septal hypokinesis.  There is moderate apical wall hypokinesis.  Mild mid to distal anterior wall hypokinesis.  The right ventricle is normal in structure, function and size.  There is moderate trileaflet aortic sclerosis.  There is mild to moderate (1-2+) aortic regurgitation.  There is mild (1+) pulmonic valvular regurgitation.  Borderline aortic root dilatation.  Moderate atherosclerotic plaque(s) in the ascending aorta.  Compared to the prior study dated 5/12/2019, there have been no changes. The  study was technically limited. Optison contrast was used without apparent  complications.  _____________________________________________________________________________  __        Left Ventricle  The left ventricle is normal in size. There is normal left ventricular wall  thickness. Left ventricular systolic function is mildly reduced. The visual  ejection fraction is estimated at 45-50%. Left ventricular diastolic function  is normal. Diastolic Doppler findings (E/E' ratio and/or other parameters)  suggest left ventricular filling pressures are indeterminate. There is mild  septal hypokinesis. There is moderate apical wall hypokinesis. Mild mid to  distal anterior wall hypokinesis. There is no thrombus seen in the left  ventricle.     Right Ventricle  The right ventricle is normal in structure, function and size.     Atria  Normal left atrial size. Right atrial size is normal. There is no color  Doppler evidence of an atrial shunt.     Mitral Valve  There is mild mitral annular calcification. The mitral valve leaflets are  mildly thickened. There is trace to mild mitral regurgitation. There is no  mitral valve stenosis.     Tricuspid Valve  Normal tricuspid valve. No tricuspid regurgitation.        Aortic Valve  There is moderate trileaflet aortic sclerosis. There is mild to moderate (1-  2+) aortic regurgitation. No hemodynamically significant valvular aortic  stenosis.     Pulmonic Valve  The  pulmonic valve is not well visualized. There is mild (1+) pulmonic  valvular regurgitation. Normal pulmonic valve velocity.     Vessels  Borderline aortic root dilatation. Normal size ascending aorta. Moderate  atherosclerotic plaque(s) in the ascending aorta. The IVC is normal in size  and reactivity with respiration, suggesting normal central venous pressure.     Pericardium  There is no pericardial effusion.     Rhythm  Sinus rhythm was noted.     _____________________________________________________________________________  __  MMode/2D Measurements & Calculations  IVSd: 1.2 cm  LVIDd: 4.6 cm  LVIDs: 3.0 cm  LVPWd: 0.94 cm  FS: 35.5 %  LV mass(C)d: 174.4 grams  LV mass(C)dI: 108.6 grams/m2  Ao root diam: 3.6 cm  LA dimension: 4.8 cm     asc Aorta Diam: 3.4 cm  LA/Ao: 1.3  LA Volume Index (BP): 29.9 ml/m2  RWT: 0.41        Doppler Measurements & Calculations  MV E max earle: 59.6 cm/sec  MV A max erale: 77.8 cm/sec  MV E/A: 0.77  MV dec slope: 462.2 cm/sec2  MV dec time: 0.13 sec  Ao V2 max: 100.8 cm/sec  Ao max P.1 mmHg  AI P1/2t: 479.6 msec     PA acc time: 0.10 sec  E/E': 12.2  Peak E' Earle: 4.9 cm/sec        Wt Readings from Last 24 Encounters:   20 57.8 kg (127 lb 8 oz)   19 57.6 kg (127 lb)   19 56.2 kg (124 lb)   19 56.2 kg (123 lb 14.4 oz)   19 59.1 kg (130 lb 4.8 oz)   19 57.2 kg (126 lb)   19 58.7 kg (129 lb 6.4 oz)   10/15/18 61.7 kg (136 lb 1.6 oz)   10/02/18 62.8 kg (138 lb 6.4 oz)   18 63.2 kg (139 lb 4.8 oz)   18 63.8 kg (140 lb 9.6 oz)   18 64.4 kg (141 lb 14.4 oz)   17 63 kg (139 lb)   03/10/17 60.8 kg (134 lb)   17 62.6 kg (138 lb)   16 66.3 kg (146 lb 3.2 oz)   06/02/15 66.9 kg (147 lb 6.4 oz)   14 65.3 kg (144 lb)   14 65.6 kg (144 lb 9.6 oz)   10/07/13 66.4 kg (146 lb 6.4 oz)   12 66.1 kg (145 lb 11.2 oz)     Current Outpatient Medications   Medication     acetaminophen (TYLENOL) 325 MG tablet      "aspirin 81 MG tablet     atorvastatin (LIPITOR) 40 MG tablet     Cholecalciferol (VITAMIN D) 1000 UNITS capsule     clopidogrel (PLAVIX) 75 MG tablet     diazepam (VALIUM) 5 MG tablet     fish oil-omega-3 fatty acids (FISH OIL) 1000 MG capsule     lisinopril (PRINIVIL/ZESTRIL) 5 MG tablet     metoprolol succinate ER (TOPROL-XL) 50 MG 24 hr tablet     Multiple Vitamins-Minerals (MULTIVITAL PO)     nitroGLYcerin (NITROSTAT) 0.4 MG sublingual tablet     omeprazole (PRILOSEC) 20 MG CR capsule     spironolactone (ALDACTONE) 25 MG tablet     temazepam (RESTORIL) 15 MG capsule     No current facility-administered medications for this visit.        Most Recent Value  3/20/2017 - Today   /65  9/17/2019   Temp 97  F (36.1  C)  3/21/2019   Pulse 67  9/17/2019   Resp 18  4/23/2019   SpO2 98 %  5/14/2019   Height 1.6 m (5' 3\")  9/17/2019   BMI (Calculated) 22.5  9/17/2019   Weight (lbs) 127 lb  9/17/2019       GATED MYOCARDIAL PERFUSION SCINTIGRAPHY WITH INTRAVENOUS PHARMACOLOGIC  VASODILATATION LEXISCAN -ONE DAY STUDY      4/23/2019 12:55 PM  CARINE DOSHI  83 years  Male  1935.     Indication/Clinical History: Jaw pain, CAD     Impression  1.  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   2. Gated images demonstrated apical wall hypokinesia.  The left  ventricular systolic function is reduced with LVEF of 43% with stress.  3. No prior study for comparison.     Procedure  Pharmacologic stress testing was performed with Lexiscan at a rate of  0.08 mg/ml rapid bolus injection, for 15 seconds, 0.4 mg/5ml  intravenously. Low-level exercise was not performed along with the  vasodilator infusion.  The heart rate was 63 at baseline and amadeo to  96 beats per minute during the Lexiscan infusion. The rest blood  pressure was 127/62 mmHg and was 121/50 " mm Hg during Lexiscan  infusion. The patient experienced shortness of breath  during the  test.     Myocardial perfusion imaging was performed at rest, approximately 45  minutes after the injection intravenously of 8.9 mCi of Tc-99m  Myoview. At peak pharmacologic effect, 10-20 seconds after Lexiscan,   the patient was injected intravenously with 24.3 mCi of  Tc-99m  Myoview. The post-stress tomographic imaging was performed  approximately 60 minutes after stress.     EKG Findings  The resting EKG demonstrated sinus rhythm, diffuse ST depression. The  stress EKG demonstrated no significant ST-T changes compared to  baseline. Stress EKG nondiagnostic due to resting EKG of normalities.     Tomographic Findings  Overall, the study quality is adequate . On both stress and rest  images there is a severe intensity apical, mild-to-moderate intensity  distal anteroseptal wall photopenic defect. These defects appear worse  on some stress images in distal anteroseptal region then rest images.  Gated images demonstrated apical wall hypokinesia. The left  ventricular ejection fraction was calculated to be 43% with stress.      Discussion:    The patient is clinically stable without angina, findings of congestive heart failure or arrhythmia.     RTC 6 months        Thank you for allowing me to participate in the care of your patient.    Sincerely,     Jerrod Rangel MD     [facility]

## 2020-01-14 NOTE — PATIENT INSTRUCTIONS
Medication Changes:  No medication changes at this time. Please continue current medication regiment.    Patient Instructions:  1. Continue staying active and eat a heart healthy diet.    2. Please keep current list of medications with you at all times.    3. Remember to weigh yourself daily after voiding and before you consume any food or beverages and log the numbers.  If you have gained 2 pounds overnight or 5 pounds in a week contact us immediately for medication adjustments or further instructions.    4. **Please call us immediately if you have any syncope (fainting or passing out), chest pain, edema (swelling or weight gain), or decline in your functional status (general decline in how you are feeling).    Follow up Appointment Information:  6 month follow up with labs        For scheduling at [facility] please call [phone]  For scheduling at the [place] please call [phone]  We are located on the second floor [address] at the [facility].  Our address is     [address] [place] [address]   [address]  [place] [address]    Thank you for allowing us to be a part of your care here at the [facility]    If you have questions or concerns please contact us at:    Gracia Gates, RN, BSN      Nurse Coordinator       Pulmonary Hypertension     [facility]   (Phone)[phone]  (Fax)[phone]    ** Please note that you will NOT receive a reminder call regarding your scheduled testing, reminder calls are for provider appointments only.  If you are scheduled for testing within the [facility] system you may receive a call regarding pre-registration for billing purposes only.**     Remember to weigh yourself daily after voiding and before you consume any food or beverages and log the numbers.  If you have gained/lost 2 pounds overnight or 5 pounds in a week contact us immediately for medication adjustments or further instructions.    Support Group:  Pulmonary  Hypertension Association  Https://www.phassociation.org/  **Look at the Events Tab** They even have Support Groups that you can call into    [facility]  Second Saturday of the Month from 1-3 PM   [facility] [address]  Leader: Carola Caicedo  Phone: [phone] or [phone]  Email: [email or web address]

## 2020-01-14 NOTE — NURSING NOTE
Med Reconcile: Reviewed and verified all current medications with the patient. The updated medication list was printed and given to the patient.  Diet: Patient instructed regarding a heart healthy diet, including discussion of reduced fat and sodium intake. Patient demonstrated understanding of this information and agreed to call with further questions or concerns.  Return Appointment: Patient given instructions regarding scheduling next clinic visit. Patient demonstrated understanding of this information and agreed to call with further questions or concerns.  Patient stated he understood all health information given and agreed to call with further questions or concerns.     Medication Changes:  No medication changes at this time. Please continue current medication regiment.    Patient Instructions:  1. Continue staying active and eat a heart healthy diet.    2. Please keep current list of medications with you at all times.    3. Remember to weigh yourself daily after voiding and before you consume any food or beverages and log the numbers.  If you have gained 2 pounds overnight or 5 pounds in a week contact us immediately for medication adjustments or further instructions.    4. **Please call us immediately if you have any syncope (fainting or passing out), chest pain, edema (swelling or weight gain), or decline in your functional status (general decline in how you are feeling).    Follow up Appointment Information:  6 month follow up with labs

## 2020-02-10 ENCOUNTER — HEALTH MAINTENANCE LETTER (OUTPATIENT)
Age: 85
End: 2020-02-10

## 2020-02-11 DIAGNOSIS — I25.83 CORONARY ARTERY DISEASE DUE TO LIPID RICH PLAQUE: ICD-10-CM

## 2020-02-11 DIAGNOSIS — I25.10 CORONARY ARTERY DISEASE DUE TO LIPID RICH PLAQUE: ICD-10-CM

## 2020-02-11 DIAGNOSIS — Z12.5 ENCOUNTER FOR SCREENING FOR MALIGNANT NEOPLASM OF PROSTATE: ICD-10-CM

## 2020-02-11 DIAGNOSIS — I27.20 PULMONARY HYPERTENSION (H): Primary | ICD-10-CM

## 2020-02-11 DIAGNOSIS — R06.09 DYSPNEA ON EXERTION: ICD-10-CM

## 2020-02-20 DIAGNOSIS — Z12.5 ENCOUNTER FOR SCREENING FOR MALIGNANT NEOPLASM OF PROSTATE: ICD-10-CM

## 2020-02-20 DIAGNOSIS — I25.10 CORONARY ARTERY DISEASE DUE TO LIPID RICH PLAQUE: ICD-10-CM

## 2020-02-20 DIAGNOSIS — I27.20 PULMONARY HYPERTENSION (H): ICD-10-CM

## 2020-02-20 DIAGNOSIS — I25.83 CORONARY ARTERY DISEASE DUE TO LIPID RICH PLAQUE: ICD-10-CM

## 2020-02-20 DIAGNOSIS — R06.09 DYSPNEA ON EXERTION: ICD-10-CM

## 2020-02-20 LAB
ERYTHROCYTE [DISTWIDTH] IN BLOOD BY AUTOMATED COUNT: 16.1 % (ref 10–15)
HCT VFR BLD AUTO: 35.7 % (ref 40–53)
HGB BLD-MCNC: 12.5 G/DL (ref 13.3–17.7)
MCH RBC QN AUTO: 32.1 PG (ref 26.5–33)
MCHC RBC AUTO-ENTMCNC: 35 G/DL (ref 31.5–36.5)
MCV RBC AUTO: 92 FL (ref 78–100)
NT-PROBNP SERPL-MCNC: 1379 PG/ML (ref 0–450)
PLATELET # BLD AUTO: 335 10E9/L (ref 150–450)
RBC # BLD AUTO: 3.9 10E12/L (ref 4.4–5.9)
WBC # BLD AUTO: 9.9 10E9/L (ref 4–11)

## 2020-02-20 PROCEDURE — 36415 COLL VENOUS BLD VENIPUNCTURE: CPT | Performed by: INTERNAL MEDICINE

## 2020-02-20 PROCEDURE — 83880 ASSAY OF NATRIURETIC PEPTIDE: CPT | Performed by: INTERNAL MEDICINE

## 2020-02-20 PROCEDURE — G0103 PSA SCREENING: HCPCS | Performed by: INTERNAL MEDICINE

## 2020-02-20 PROCEDURE — 80048 BASIC METABOLIC PNL TOTAL CA: CPT | Performed by: INTERNAL MEDICINE

## 2020-02-20 PROCEDURE — 85027 COMPLETE CBC AUTOMATED: CPT | Performed by: INTERNAL MEDICINE

## 2020-02-21 LAB
ANION GAP SERPL CALCULATED.3IONS-SCNC: 9 MMOL/L (ref 3–14)
BUN SERPL-MCNC: 15 MG/DL (ref 7–30)
CALCIUM SERPL-MCNC: 8.9 MG/DL (ref 8.5–10.1)
CHLORIDE SERPL-SCNC: 102 MMOL/L (ref 94–109)
CO2 SERPL-SCNC: 24 MMOL/L (ref 20–32)
CREAT SERPL-MCNC: 0.88 MG/DL (ref 0.66–1.25)
GFR SERPL CREATININE-BSD FRML MDRD: 80 ML/MIN/{1.73_M2}
GLUCOSE SERPL-MCNC: 106 MG/DL (ref 70–99)
POTASSIUM SERPL-SCNC: 4.3 MMOL/L (ref 3.4–5.3)
PSA SERPL-ACNC: 0.04 UG/L (ref 0–4)
SODIUM SERPL-SCNC: 135 MMOL/L (ref 133–144)

## 2020-05-18 DIAGNOSIS — I51.9 LV DYSFUNCTION: ICD-10-CM

## 2020-05-20 RX ORDER — SPIRONOLACTONE 25 MG/1
TABLET ORAL
Qty: 45 TABLET | Refills: 3 | Status: SHIPPED | OUTPATIENT
Start: 2020-05-20 | End: 2021-01-01

## 2020-07-14 DIAGNOSIS — I73.9 PAD (PERIPHERAL ARTERY DISEASE) (H): Primary | ICD-10-CM

## 2020-07-14 DIAGNOSIS — I71.40 AAA (ABDOMINAL AORTIC ANEURYSM) WITHOUT RUPTURE (H): ICD-10-CM

## 2020-08-10 DIAGNOSIS — Z95.1 S/P CABG (CORONARY ARTERY BYPASS GRAFT): ICD-10-CM

## 2020-08-10 DIAGNOSIS — E78.5 HYPERLIPIDEMIA: ICD-10-CM

## 2020-08-10 DIAGNOSIS — I25.10 ASHD (ARTERIOSCLEROTIC HEART DISEASE): ICD-10-CM

## 2020-08-10 DIAGNOSIS — Z98.61 STATUS POST PERCUTANEOUS TRANSLUMINAL CORONARY ANGIOPLASTY: ICD-10-CM

## 2020-08-10 DIAGNOSIS — I50.32 CHRONIC DIASTOLIC CONGESTIVE HEART FAILURE (H): ICD-10-CM

## 2020-08-11 RX ORDER — ATORVASTATIN CALCIUM 40 MG/1
40 TABLET, FILM COATED ORAL DAILY
Qty: 90 TABLET | Refills: 3 | Status: SHIPPED | OUTPATIENT
Start: 2020-08-11 | End: 2021-01-01

## 2020-08-11 RX ORDER — METOPROLOL SUCCINATE 50 MG/1
50 TABLET, EXTENDED RELEASE ORAL DAILY
Qty: 180 TABLET | Refills: 3 | Status: SHIPPED | OUTPATIENT
Start: 2020-08-11 | End: 2021-01-01

## 2020-08-11 RX ORDER — CLOPIDOGREL BISULFATE 75 MG/1
75 TABLET ORAL DAILY
Qty: 90 TABLET | Refills: 3 | Status: SHIPPED | OUTPATIENT
Start: 2020-08-11 | End: 2021-01-01

## 2020-08-11 RX ORDER — LISINOPRIL 5 MG/1
5 TABLET ORAL 2 TIMES DAILY
Qty: 180 TABLET | Refills: 3 | Status: SHIPPED | OUTPATIENT
Start: 2020-08-11 | End: 2021-01-01

## 2020-08-11 RX ORDER — NITROGLYCERIN 0.4 MG/1
TABLET SUBLINGUAL
Qty: 30 TABLET | Refills: 1 | Status: SHIPPED | OUTPATIENT
Start: 2020-08-11 | End: 2021-01-01

## 2020-09-25 ENCOUNTER — TELEPHONE (OUTPATIENT)
Dept: OTHER | Facility: CLINIC | Age: 85
End: 2020-09-25

## 2020-09-25 NOTE — TELEPHONE ENCOUNTER
September 25, 2020    Mailed recall letters to patient on  and  reminding patient to call [facility] & schedule an appointment per the follow-up orders signed by Dr. White.   These orders were expected to be completed by July 2020.     To date, patient has not responded to our attempts to contact them and has not contacted our office for scheduling.  No further attempts will be made to contact patient for scheduling per these follow-up orders.     Christelle Fernandez    [facility]  Office: [phone]  Fax [phone]

## 2020-11-14 ENCOUNTER — HEALTH MAINTENANCE LETTER (OUTPATIENT)
Age: 85
End: 2020-11-14

## 2020-12-30 ENCOUNTER — VIRTUAL VISIT (OUTPATIENT)
Dept: CARDIOLOGY | Facility: CLINIC | Age: 85
End: 2020-12-30
Attending: INTERNAL MEDICINE
Payer: COMMERCIAL

## 2020-12-30 ENCOUNTER — TELEPHONE (OUTPATIENT)
Dept: FAMILY MEDICINE | Facility: CLINIC | Age: 85
End: 2020-12-30

## 2020-12-30 DIAGNOSIS — R06.09 DYSPNEA ON EXERTION: ICD-10-CM

## 2020-12-30 DIAGNOSIS — I25.10 ASHD (ARTERIOSCLEROTIC HEART DISEASE): ICD-10-CM

## 2020-12-30 PROCEDURE — 99214 OFFICE O/P EST MOD 30 MIN: CPT | Mod: 95 | Performed by: INTERNAL MEDICINE

## 2020-12-30 NOTE — PROGRESS NOTES
"Oscar Chaudhary is a 85 year old male who is being evaluated via a billable video visit.      The patient has been notified of following:     \"This video visit will be conducted via a call between you and your physician/provider. We have found that certain health care needs can be provided without the need for an in-person physical exam.  This service lets us provide the care you need with a video conversation.  If a prescription is necessary we can send it directly to your pharmacy.  If lab work is needed we can place an order for that and you can then stop by our lab to have the test done at a later time.    Video visits are billed at different rates depending on your insurance coverage.  Please reach out to your insurance provider with any questions.    If during the course of the call the physician/provider feels a video visit is not appropriate, you will not be charged for this service.\"    Patient has given verbal consent for Video visit? Yes  How would you like to obtain your AVS? MyChart              "

## 2020-12-30 NOTE — LETTER
12/30/2020      RE: Oscar Chaudhary  [address] [address]       Dear Colleague,    Thank you for the opportunity to participate in the care of your patient, Oscar Chaudhary, at the [facility] [place] at [facility]. Please see a copy of my visit note below.    Progress Notes    Plan:  1. See Tristian Jeffrey next week: examination, CXR, CBC, BNP, CMP, walk with oximeter to determine pulse and O2 saturations with exertion  2. 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  He weighs himself daily and weight is stable.  He exertion is limited by severe, degenerative spine disease.  He still plays the piano.  He reads extensively    He does not feel he is worse but he has obvious shortness of breath with talking that I do not remember his having in the past.    Constitutional: weight loss, fever, chills, night sweats  HEENT: without visual changes, swallow difficulties  Pulmonary: with intermittent  shortness of breath, cough, wheeze, hemoptysis  Cardiac: without chest pain, but some RYAN, without PND, orthopnea, edema, palpitation, pre-syncope, syncope,  GI: without diarrhea, constipation, jaundice, melena, GERD, hematemesis  : without frequency, urgency, dysuria, hematuria but nocturia  Skin: rash, bruise, open lesions  Neuro: without TIA, focal neurologic complaints, seizure, trauma  Ortho: without pain, swelling but, mobility impairment secondary to back  Endocrine: diabetes, thyroid, heat/cold intolerance, polyuria, polyphagia, change bowel habits.  Sleep:no NADINE, periodic breathing, fatigue  :         Jerrod Rangel MD (Physician)     Cardiology     1. ASHD with CAB (LIMA to LESLY,  SVBG ? Sequential, old records not in EMR)  2. History of prostate cancer treated with radiation  3. Exertional dyspnea  4. AAA 3.9  5. Hyperlipidemia  6. ASPVD with stenting               Left iliac stent              Right iliac stent with elevated velocities  7. Abdominal aortic aneurysm                      Infrarenal abdominal aortic aneurysm measuring 3.8 cm AP x 3.9 cm                       transverse, x 4.6 cm in length. Suspect previous measurements were                                                 slight over estimation. Recommend continued annual surveillance. 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  8. History of jaw infection   9. History if previous difficult intubation with jaw and neck infection  10. Recent cardiac stenting/ramus     The patient returns for follow-up o ASHD and heart failure.  There is no interim history of chest pain, tightness, paroxysmal nocturnal dyspnea, orthopnea, peripheral edema, palpitation, pre-syncope, syncope, device discharge.  Exercise tolerance is stable.  The patient is attempting to exercise regularly and following a sodium restricted, calorically appropriate diet.  Medications are reviewed and the patient is taking medications as prescribed.  The patient is generally sleeping well.      He has some transient shortness of breath multiple times per day, lasting seconds, and unrelated to eating or exertion.    Constitutional: weight loss, fever, chills, night sweats  HEENT: without visual changes, swallow difficulties  Pulmonary: without shortness of breath, cough, wheeze, hemoptysis  Cardiac: without chest pain, RYAN, PND, orthopnea, edema, palpitation, pre-syncope, syncope,  GI: without diarrhea, constipation, jaundice, melena, GERD, hematemesis  : without frequency, urgency, dysuria, hematuria  Skin: rash, bruise, open lesions  Neuro: without TIA, focal neurologic complaints, seizure, trauma  Ortho: without pain, swelling,  mobility impairment  Endocrine: diabetes, thyroid, heat/cold intolerance, polyuria, polyphagia, change bowel habits.  Sleep:no NADINE, periodic breathing, fatigue  Other:         Current Outpatient Medications   Medication     acetaminophen (TYLENOL) 325 MG tablet     aspirin 81 MG tablet     atorvastatin (LIPITOR) 40 MG tablet     Cholecalciferol (VITAMIN D) 1000 UNITS capsule     clopidogrel (PLAVIX) 75 MG tablet     diazepam (VALIUM) 5 MG tablet     fish oil-omega-3 fatty acids (FISH OIL) 1000 MG capsule     lisinopril (PRINIVIL/ZESTRIL) 5 MG tablet     metoprolol succinate ER (TOPROL-XL) 50 MG 24 hr tablet     Multiple Vitamins-Minerals (MULTIVITAL PO)     nitroGLYcerin (NITROSTAT) 0.4 MG sublingual tablet     omeprazole (PRILOSEC) 20 MG CR capsule     spironolactone (ALDACTONE) 25 MG tablet     temazepam (RESTORIL) 15 MG capsule      No current facility-administered medications for this visit.             Name: CARINE DOSHI  MRN: [id number]  : 1935  Study Date: 2019 11:53 AM  Age: 83 yrs  Gender: Male  Patient Location: [facility]  Reason For Study: ASHD (arteriosclerotic heart disease), Dyspnea on exertion  Ordering Physician: CASTILLO MIKE  Referring Physician: CASTILLO MIKE  Performed By: Sherley Young     BSA: 1.6 m2  Height: 63 in  Weight: 129 lb  HR: 72  BP: 125/72 mmHg  _____________________________________________________________________________  __     Procedure  Complete Echo Adult. Contrast Optison.     _____________________________________________________________________________  __     Interpretation Summary     Left ventricular systolic function is mildly reduced.  The visual ejection fraction is estimated at 45-50%.  There is mild septal hypokinesis.  There is moderate apical wall hypokinesis.  Mild mid to distal anterior wall hypokinesis.  The right ventricle is normal in structure, function and size.  There is moderate trileaflet aortic sclerosis.  There is mild to  moderate (1-2+) aortic regurgitation.  There is mild (1+) pulmonic valvular regurgitation.  Borderline aortic root dilatation.  Moderate atherosclerotic plaque(s) in the ascending aorta.  Compared to the prior study dated 5/12/2019, there have been no changes. The  study was technically limited. Optison contrast was used without apparent  complications.  _____________________________________________________________________________  __        Left Ventricle  The left ventricle is normal in size. There is normal left ventricular wall  thickness. Left ventricular systolic function is mildly reduced. The visual  ejection fraction is estimated at 45-50%. Left ventricular diastolic function  is normal. Diastolic Doppler findings (E/E' ratio and/or other parameters)  suggest left ventricular filling pressures are indeterminate. There is mild  septal hypokinesis. There is moderate apical wall hypokinesis. Mild mid to  distal anterior wall hypokinesis. There is no thrombus seen in the left  ventricle.     Right Ventricle  The right ventricle is normal in structure, function and size.     Atria  Normal left atrial size. Right atrial size is normal. There is no color  Doppler evidence of an atrial shunt.     Mitral Valve  There is mild mitral annular calcification. The mitral valve leaflets are  mildly thickened. There is trace to mild mitral regurgitation. There is no  mitral valve stenosis.     Tricuspid Valve  Normal tricuspid valve. No tricuspid regurgitation.        Aortic Valve  There is moderate trileaflet aortic sclerosis. There is mild to moderate (1-  2+) aortic regurgitation. No hemodynamically significant valvular aortic  stenosis.     Pulmonic Valve  The pulmonic valve is not well visualized. There is mild (1+) pulmonic  valvular regurgitation. Normal pulmonic valve velocity.     Vessels  Borderline aortic root dilatation. Normal size ascending aorta. Moderate  atherosclerotic plaque(s) in the ascending aorta. The  IVC is normal in size  and reactivity with respiration, suggesting normal central venous pressure.     Pericardium  There is no pericardial effusion.     Rhythm  Sinus rhythm was noted.     _____________________________________________________________________________  __  MMode/2D Measurements & Calculations  IVSd: 1.2 cm  LVIDd: 4.6 cm  LVIDs: 3.0 cm  LVPWd: 0.94 cm  FS: 35.5 %  LV mass(C)d: 174.4 grams  LV mass(C)dI: 108.6 grams/m2  Ao root diam: 3.6 cm  LA dimension: 4.8 cm     asc Aorta Diam: 3.4 cm  LA/Ao: 1.3  LA Volume Index (BP): 29.9 ml/m2  RWT: 0.41        Doppler Measurements & Calculations  MV E max earle: 59.6 cm/sec  MV A max earle: 77.8 cm/sec  MV E/A: 0.77  MV dec slope: 462.2 cm/sec2  MV dec time: 0.13 sec  Ao V2 max: 100.8 cm/sec  Ao max P.1 mmHg  AI P1/2t: 479.6 msec     PA acc time: 0.10 sec  E/E': 12.2  Peak E' Earle: 4.9 cm/sec        Wt Readings from Last 24 Encounters:   20 57.8 kg (127 lb 8 oz)   19 57.6 kg (127 lb)   19 56.2 kg (124 lb)   19 56.2 kg (123 lb 14.4 oz)   19 59.1 kg (130 lb 4.8 oz)   19 57.2 kg (126 lb)   19 58.7 kg (129 lb 6.4 oz)   10/15/18 61.7 kg (136 lb 1.6 oz)   10/02/18 62.8 kg (138 lb 6.4 oz)   18 63.2 kg (139 lb 4.8 oz)   18 63.8 kg (140 lb 9.6 oz)   18 64.4 kg (141 lb 14.4 oz)   17 63 kg (139 lb)   03/10/17 60.8 kg (134 lb)   17 62.6 kg (138 lb)   16 66.3 kg (146 lb 3.2 oz)   06/02/15 66.9 kg (147 lb 6.4 oz)   14 65.3 kg (144 lb)   14 65.6 kg (144 lb 9.6 oz)   10/07/13 66.4 kg (146 lb 6.4 oz)   12 66.1 kg (145 lb 11.2 oz)          Current Outpatient Medications   Medication     acetaminophen (TYLENOL) 325 MG tablet     aspirin 81 MG tablet     atorvastatin (LIPITOR) 40 MG tablet     Cholecalciferol (VITAMIN D) 1000 UNITS capsule     clopidogrel (PLAVIX) 75 MG tablet     diazepam (VALIUM) 5 MG tablet     fish oil-omega-3 fatty acids (FISH OIL) 1000 MG capsule     lisinopril  "(PRINIVIL/ZESTRIL) 5 MG tablet     metoprolol succinate ER (TOPROL-XL) 50 MG 24 hr tablet     Multiple Vitamins-Minerals (MULTIVITAL PO)     nitroGLYcerin (NITROSTAT) 0.4 MG sublingual tablet     omeprazole (PRILOSEC) 20 MG CR capsule     spironolactone (ALDACTONE) 25 MG tablet     temazepam (RESTORIL) 15 MG capsule      No current facility-administered medications for this visit.         Most Recent Value  3/20/2017 - Today   /65  9/17/2019   Temp 97  F (36.1  C)  3/21/2019   Pulse 67  9/17/2019   Resp 18  4/23/2019   SpO2 98 %  5/14/2019   Height 1.6 m (5' 3\")  9/17/2019   BMI (Calculated) 22.5  9/17/2019   Weight (lbs) 127 lb  9/17/2019         GATED MYOCARDIAL PERFUSION SCINTIGRAPHY WITH INTRAVENOUS PHARMACOLOGIC  VASODILATATION LEXISCAN -ONE DAY STUDY      4/23/2019 12:55 PM  CARINE DOSHI  83 years  Male  1935.     Indication/Clinical History: Jaw pain, CAD     Impression  1.  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   2. Gated images demonstrated apical wall hypokinesia.  The left  ventricular systolic function is reduced with LVEF of 43% with stress.  3. No prior study for comparison.     Procedure  Pharmacologic stress testing was performed with Lexiscan at a rate of  0.08 mg/ml rapid bolus injection, for 15 seconds, 0.4 mg/5ml  intravenously. Low-level exercise was not performed along with the  vasodilator infusion.  The heart rate was 63 at baseline and amadeo to  96 beats per minute during the Lexiscan infusion. The rest blood  pressure was 127/62 mmHg and was 121/50 mm Hg during Lexiscan  infusion. The patient experienced shortness of breath  during the  test.     Myocardial perfusion imaging was performed at rest, approximately 45  minutes after the injection intravenously of 8.9 mCi of Tc-99m  Myoview. At peak " "pharmacologic effect, 10-20 seconds after Lexiscan,   the patient was injected intravenously with 24.3 mCi of  Tc-99m  Myoview. The post-stress tomographic imaging was performed  approximately 60 minutes after stress.     EKG Findings  The resting EKG demonstrated sinus rhythm, diffuse ST depression. The  stress EKG demonstrated no significant ST-T changes compared to  baseline. Stress EKG nondiagnostic due to resting EKG of normalities.     Tomographic Findings  Overall, the study quality is adequate . On both stress and rest  images there is a severe intensity apical, mild-to-moderate intensity  distal anteroseptal wall photopenic defect. These defects appear worse  on some stress images in distal anteroseptal region then rest images.  Gated images demonstrated apical wall hypokinesia. The left  ventricular ejection fraction was calculated to be 43% with stress.               Oscar Chaudhary is a 85 year old male who is being evaluated via a billable video visit.      The patient has been notified of following:     \"This video visit will be conducted via a call between you and your physician/provider. We have found that certain health care needs can be provided without the need for an in-person physical exam.  This service lets us provide the care you need with a video conversation.  If a prescription is necessary we can send it directly to your pharmacy.  If lab work is needed we can place an order for that and you can then stop by our lab to have the test done at a later time.    Video visits are billed at different rates depending on your insurance coverage.  Please reach out to your insurance provider with any questions.    If during the course of the call the physician/provider feels a video visit is not appropriate, you will not be charged for this service.\"    Patient has given verbal consent for Video visit? Yes  How would you like to obtain your AVS? MyChart        Please do not hesitate to contact me " if you have any questions/concerns.     Sincerely,     Jerrod Rangel MD

## 2020-12-30 NOTE — TELEPHONE ENCOUNTER
I received phone call from Dr. Rangel to schedule a follow up appointment to check on shortness of breath     Can we please schedule Oscar Chaudhary for an in office visit?    Tristian Jeffrey MD, MD

## 2020-12-30 NOTE — PROGRESS NOTES
Progress Notes    Plan:  1. See Tristian Jeffrey next week: examination, CXR, CBC, BNP, CMP, walk with oximeter to determine pulse and O2 saturations with exertion  2. 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  He weighs himself daily and weight is stable.  He exertion is limited by severe, degenerative spine disease.  He still plays the piano.  He reads extensively    He does not feel he is worse but he has obvious shortness of breath with talking that I do not remember his having in the past.    Constitutional: weight loss, fever, chills, night sweats  HEENT: without visual changes, swallow difficulties  Pulmonary: with intermittent  shortness of breath, cough, wheeze, hemoptysis  Cardiac: without chest pain, but some RYAN, without PND, orthopnea, edema, palpitation, pre-syncope, syncope,  GI: without diarrhea, constipation, jaundice, melena, GERD, hematemesis  : without frequency, urgency, dysuria, hematuria but nocturia  Skin: rash, bruise, open lesions  Neuro: without TIA, focal neurologic complaints, seizure, trauma  Ortho: without pain, swelling but, mobility impairment secondary to back  Endocrine: diabetes, thyroid, heat/cold intolerance, polyuria, polyphagia, change bowel habits.  Sleep:no NADINE, periodic breathing, fatigue  :         Jerrod Rangel MD (Physician)     Cardiology     1. 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 Sequential, old records not in EMR)  2. History of prostate cancer treated with radiation  3. Exertional dyspnea  4. AAA 3.9  5. Hyperlipidemia  6. ASPVD with stenting               Left iliac stent              Right iliac stent with elevated velocities  7. Abdominal aortic aneurysm                      Infrarenal abdominal aortic  aneurysm measuring 3.8 cm AP x 3.9 cm                       transverse, x 4.6 cm in length. Suspect previous measurements were                                                 slight over estimation. Recommend continued annual surveillance. 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  8. History of jaw infection   9. History if previous difficult intubation with jaw and neck infection  10. Recent cardiac stenting/ramus     The patient returns for follow-up o ASHD and heart failure.  There is no interim history of chest pain, tightness, paroxysmal nocturnal dyspnea, orthopnea, peripheral edema, palpitation, pre-syncope, syncope, device discharge.  Exercise tolerance is stable.  The patient is attempting to exercise regularly and following a sodium restricted, calorically appropriate diet.  Medications are reviewed and the patient is taking medications as prescribed.  The patient is generally sleeping well.      He has some transient shortness of breath multiple times per day, lasting seconds, and unrelated to eating or exertion.    Constitutional: weight loss, fever, chills, night sweats  HEENT: without visual changes, swallow difficulties  Pulmonary: without shortness of breath, cough, wheeze, hemoptysis  Cardiac: without chest pain, RYAN, PND, orthopnea, edema, palpitation, pre-syncope, syncope,  GI: without diarrhea, constipation, jaundice, melena, GERD, hematemesis  : without frequency, urgency, dysuria, hematuria  Skin: rash, bruise, open lesions  Neuro: without TIA, focal neurologic complaints, seizure, trauma  Ortho: without pain, swelling, mobility impairment  Endocrine: diabetes, thyroid, heat/cold intolerance, polyuria, polyphagia, change bowel habits.  Sleep:no NADINE, periodic breathing, fatigue  Other:         Current Outpatient Medications   Medication     acetaminophen (TYLENOL) 325 MG tablet     aspirin 81 MG tablet     atorvastatin (LIPITOR) 40 MG tablet     Cholecalciferol  (VITAMIN D) 1000 UNITS capsule     clopidogrel (PLAVIX) 75 MG tablet     diazepam (VALIUM) 5 MG tablet     fish oil-omega-3 fatty acids (FISH OIL) 1000 MG capsule     lisinopril (PRINIVIL/ZESTRIL) 5 MG tablet     metoprolol succinate ER (TOPROL-XL) 50 MG 24 hr tablet     Multiple Vitamins-Minerals (MULTIVITAL PO)     nitroGLYcerin (NITROSTAT) 0.4 MG sublingual tablet     omeprazole (PRILOSEC) 20 MG CR capsule     spironolactone (ALDACTONE) 25 MG tablet     temazepam (RESTORIL) 15 MG capsule      No current facility-administered medications for this visit.             Name: CARINE DOSHI  MRN: [id number]  : 1935  Study Date: 2019 11:53 AM  Age: 83 yrs  Gender: Male  Patient Location: [facility]  Reason For Study: ASHD (arteriosclerotic heart disease), Dyspnea on exertion  Ordering Physician: CASTILLO MIKE  Referring Physician: CASTILLO MIKE  Performed By: Sherley Young     BSA: 1.6 m2  Height: 63 in  Weight: 129 lb  HR: 72  BP: 125/72 mmHg  _____________________________________________________________________________  __     Procedure  Complete Echo Adult. Contrast Optison.     _____________________________________________________________________________  __     Interpretation Summary     Left ventricular systolic function is mildly reduced.  The visual ejection fraction is estimated at 45-50%.  There is mild septal hypokinesis.  There is moderate apical wall hypokinesis.  Mild mid to distal anterior wall hypokinesis.  The right ventricle is normal in structure, function and size.  There is moderate trileaflet aortic sclerosis.  There is mild to moderate (1-2+) aortic regurgitation.  There is mild (1+) pulmonic valvular regurgitation.  Borderline aortic root dilatation.  Moderate atherosclerotic plaque(s) in the ascending aorta.  Compared to the prior study dated 2019, there have been no changes. The  study was technically limited. Optison contrast was used without  apparent  complications.  _____________________________________________________________________________  __        Left Ventricle  The left ventricle is normal in size. There is normal left ventricular wall  thickness. Left ventricular systolic function is mildly reduced. The visual  ejection fraction is estimated at 45-50%. Left ventricular diastolic function  is normal. Diastolic Doppler findings (E/E' ratio and/or other parameters)  suggest left ventricular filling pressures are indeterminate. There is mild  septal hypokinesis. There is moderate apical wall hypokinesis. Mild mid to  distal anterior wall hypokinesis. There is no thrombus seen in the left  ventricle.     Right Ventricle  The right ventricle is normal in structure, function and size.     Atria  Normal left atrial size. Right atrial size is normal. There is no color  Doppler evidence of an atrial shunt.     Mitral Valve  There is mild mitral annular calcification. The mitral valve leaflets are  mildly thickened. There is trace to mild mitral regurgitation. There is no  mitral valve stenosis.     Tricuspid Valve  Normal tricuspid valve. No tricuspid regurgitation.        Aortic Valve  There is moderate trileaflet aortic sclerosis. There is mild to moderate (1-  2+) aortic regurgitation. No hemodynamically significant valvular aortic  stenosis.     Pulmonic Valve  The pulmonic valve is not well visualized. There is mild (1+) pulmonic  valvular regurgitation. Normal pulmonic valve velocity.     Vessels  Borderline aortic root dilatation. Normal size ascending aorta. Moderate  atherosclerotic plaque(s) in the ascending aorta. The IVC is normal in size  and reactivity with respiration, suggesting normal central venous pressure.     Pericardium  There is no pericardial effusion.     Rhythm  Sinus rhythm was noted.     _____________________________________________________________________________  __  MMode/2D Measurements & Calculations  IVSd: 1.2  cm  LVIDd: 4.6 cm  LVIDs: 3.0 cm  LVPWd: 0.94 cm  FS: 35.5 %  LV mass(C)d: 174.4 grams  LV mass(C)dI: 108.6 grams/m2  Ao root diam: 3.6 cm  LA dimension: 4.8 cm     asc Aorta Diam: 3.4 cm  LA/Ao: 1.3  LA Volume Index (BP): 29.9 ml/m2  RWT: 0.41        Doppler Measurements & Calculations  MV E max earle: 59.6 cm/sec  MV A max earle: 77.8 cm/sec  MV E/A: 0.77  MV dec slope: 462.2 cm/sec2  MV dec time: 0.13 sec  Ao V2 max: 100.8 cm/sec  Ao max P.1 mmHg  AI P1/2t: 479.6 msec     PA acc time: 0.10 sec  E/E': 12.2  Peak E' Earle: 4.9 cm/sec        Wt Readings from Last 24 Encounters:   20 57.8 kg (127 lb 8 oz)   19 57.6 kg (127 lb)   19 56.2 kg (124 lb)   19 56.2 kg (123 lb 14.4 oz)   19 59.1 kg (130 lb 4.8 oz)   19 57.2 kg (126 lb)   19 58.7 kg (129 lb 6.4 oz)   10/15/18 61.7 kg (136 lb 1.6 oz)   10/02/18 62.8 kg (138 lb 6.4 oz)   18 63.2 kg (139 lb 4.8 oz)   18 63.8 kg (140 lb 9.6 oz)   18 64.4 kg (141 lb 14.4 oz)   17 63 kg (139 lb)   03/10/17 60.8 kg (134 lb)   17 62.6 kg (138 lb)   16 66.3 kg (146 lb 3.2 oz)   06/02/15 66.9 kg (147 lb 6.4 oz)   14 65.3 kg (144 lb)   14 65.6 kg (144 lb 9.6 oz)   10/07/13 66.4 kg (146 lb 6.4 oz)   12 66.1 kg (145 lb 11.2 oz)          Current Outpatient Medications   Medication     acetaminophen (TYLENOL) 325 MG tablet     aspirin 81 MG tablet     atorvastatin (LIPITOR) 40 MG tablet     Cholecalciferol (VITAMIN D) 1000 UNITS capsule     clopidogrel (PLAVIX) 75 MG tablet     diazepam (VALIUM) 5 MG tablet     fish oil-omega-3 fatty acids (FISH OIL) 1000 MG capsule     lisinopril (PRINIVIL/ZESTRIL) 5 MG tablet     metoprolol succinate ER (TOPROL-XL) 50 MG 24 hr tablet     Multiple Vitamins-Minerals (MULTIVITAL PO)     nitroGLYcerin (NITROSTAT) 0.4 MG sublingual tablet     omeprazole (PRILOSEC) 20 MG CR capsule     spironolactone (ALDACTONE) 25 MG tablet     temazepam (RESTORIL) 15 MG capsule  "     No current facility-administered medications for this visit.         Most Recent Value  3/20/2017 - Today   /65  9/17/2019   Temp 97  F (36.1  C)  3/21/2019   Pulse 67  9/17/2019   Resp 18  4/23/2019   SpO2 98 %  5/14/2019   Height 1.6 m (5' 3\")  9/17/2019   BMI (Calculated) 22.5  9/17/2019   Weight (lbs) 127 lb  9/17/2019         GATED MYOCARDIAL PERFUSION SCINTIGRAPHY WITH INTRAVENOUS PHARMACOLOGIC  VASODILATATION LEXISCAN -ONE DAY STUDY      4/23/2019 12:55 PM  CARINE DOSHI  83 years  Male  1935.     Indication/Clinical History: Jaw pain, CAD     Impression  1.  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   2. Gated images demonstrated apical wall hypokinesia.  The left  ventricular systolic function is reduced with LVEF of 43% with stress.  3. No prior study for comparison.     Procedure  Pharmacologic stress testing was performed with Lexiscan at a rate of  0.08 mg/ml rapid bolus injection, for 15 seconds, 0.4 mg/5ml  intravenously. Low-level exercise was not performed along with the  vasodilator infusion.  The heart rate was 63 at baseline and amadeo to  96 beats per minute during the Lexiscan infusion. The rest blood  pressure was 127/62 mmHg and was 121/50 mm Hg during Lexiscan  infusion. The patient experienced shortness of breath  during the  test.     Myocardial perfusion imaging was performed at rest, approximately 45  minutes after the injection intravenously of 8.9 mCi of Tc-99m  Myoview. At peak pharmacologic effect, 10-20 seconds after Lexiscan,   the patient was injected intravenously with 24.3 mCi of  Tc-99m  Myoview. The post-stress tomographic imaging was performed  approximately 60 minutes after stress.     EKG Findings  The resting EKG demonstrated sinus rhythm, diffuse ST depression. The  stress EKG " demonstrated no significant ST-T changes compared to  baseline. Stress EKG nondiagnostic due to resting EKG of normalities.     Tomographic Findings  Overall, the study quality is adequate . On both stress and rest  images there is a severe intensity apical, mild-to-moderate intensity  distal anteroseptal wall photopenic defect. These defects appear worse  on some stress images in distal anteroseptal region then rest images.  Gated images demonstrated apical wall hypokinesia. The left  ventricular ejection fraction was calculated to be 43% with stress.

## 2021-01-01 ENCOUNTER — HEALTH MAINTENANCE LETTER (OUTPATIENT)
Age: 86
End: 2021-01-01

## 2021-01-01 ENCOUNTER — VIRTUAL VISIT (OUTPATIENT)
Dept: CARDIOLOGY | Facility: CLINIC | Age: 86
End: 2021-01-01
Attending: INTERNAL MEDICINE
Payer: COMMERCIAL

## 2021-01-01 ENCOUNTER — TELEPHONE (OUTPATIENT)
Dept: CARDIOLOGY | Facility: CLINIC | Age: 86
End: 2021-01-01

## 2021-01-01 ENCOUNTER — MYC MEDICAL ADVICE (OUTPATIENT)
Dept: CARDIOLOGY | Facility: CLINIC | Age: 86
End: 2021-01-01

## 2021-01-01 DIAGNOSIS — Z98.61 STATUS POST PERCUTANEOUS TRANSLUMINAL CORONARY ANGIOPLASTY: ICD-10-CM

## 2021-01-01 DIAGNOSIS — G47.00 INSOMNIA: ICD-10-CM

## 2021-01-01 DIAGNOSIS — I25.10 ASHD (ARTERIOSCLEROTIC HEART DISEASE): ICD-10-CM

## 2021-01-01 DIAGNOSIS — I25.10 CORONARY ARTERY DISEASE INVOLVING NATIVE CORONARY ARTERY OF NATIVE HEART WITHOUT ANGINA PECTORIS: ICD-10-CM

## 2021-01-01 DIAGNOSIS — Z95.1 S/P CABG (CORONARY ARTERY BYPASS GRAFT): ICD-10-CM

## 2021-01-01 DIAGNOSIS — I50.32 CHRONIC DIASTOLIC CONGESTIVE HEART FAILURE (H): ICD-10-CM

## 2021-01-01 DIAGNOSIS — F41.9 ANXIETY: ICD-10-CM

## 2021-01-01 DIAGNOSIS — E78.5 HYPERLIPIDEMIA: ICD-10-CM

## 2021-01-01 DIAGNOSIS — I51.9 LV DYSFUNCTION: Primary | ICD-10-CM

## 2021-01-01 DIAGNOSIS — I51.9 LV DYSFUNCTION: ICD-10-CM

## 2021-01-01 DIAGNOSIS — I10 BENIGN ESSENTIAL HYPERTENSION: Primary | ICD-10-CM

## 2021-01-01 DIAGNOSIS — G47.00 INSOMNIA: Primary | ICD-10-CM

## 2021-01-01 DIAGNOSIS — R06.09 DYSPNEA ON EXERTION: ICD-10-CM

## 2021-01-01 PROCEDURE — 99214 OFFICE O/P EST MOD 30 MIN: CPT | Mod: 95 | Performed by: INTERNAL MEDICINE

## 2021-01-01 RX ORDER — DIAZEPAM 5 MG
5 TABLET ORAL
Qty: 30 TABLET | Refills: 3 | Status: SHIPPED | OUTPATIENT
Start: 2021-01-01

## 2021-01-01 RX ORDER — METOPROLOL SUCCINATE 50 MG/1
50 TABLET, EXTENDED RELEASE ORAL DAILY
Qty: 90 TABLET | Refills: 0 | Status: SHIPPED | OUTPATIENT
Start: 2021-01-01 | End: 2021-01-01

## 2021-01-01 RX ORDER — CLOPIDOGREL BISULFATE 75 MG/1
75 TABLET ORAL DAILY
Qty: 90 TABLET | Refills: 3 | Status: SHIPPED | OUTPATIENT
Start: 2021-01-01 | End: 2021-01-01

## 2021-01-01 RX ORDER — NITROGLYCERIN 0.4 MG/1
TABLET SUBLINGUAL
Qty: 30 TABLET | Refills: 1 | Status: SHIPPED | OUTPATIENT
Start: 2021-01-01 | End: 2022-03-08

## 2021-01-01 RX ORDER — SPIRONOLACTONE 25 MG/1
TABLET ORAL
Qty: 45 TABLET | Refills: 0 | Status: SHIPPED | OUTPATIENT
Start: 2021-01-01 | End: 2021-01-01

## 2021-01-01 RX ORDER — METOPROLOL SUCCINATE 50 MG/1
50 TABLET, EXTENDED RELEASE ORAL DAILY
Qty: 90 TABLET | Refills: 0 | Status: SHIPPED | OUTPATIENT
Start: 2021-01-01 | End: 2022-01-01

## 2021-01-01 RX ORDER — CHLORTHALIDONE 25 MG/1
TABLET ORAL
Qty: 60 TABLET | Refills: 1 | Status: SHIPPED | OUTPATIENT
Start: 2021-01-01 | End: 2022-01-01

## 2021-01-01 RX ORDER — TEMAZEPAM 15 MG/1
15 CAPSULE ORAL
Qty: 30 CAPSULE | Refills: 3 | Status: SHIPPED | OUTPATIENT
Start: 2021-01-01

## 2021-01-01 RX ORDER — LISINOPRIL 5 MG/1
5 TABLET ORAL 2 TIMES DAILY
Qty: 180 TABLET | Refills: 3 | Status: ON HOLD | OUTPATIENT
Start: 2021-01-01 | End: 2022-01-01

## 2021-01-01 RX ORDER — ATORVASTATIN CALCIUM 40 MG/1
40 TABLET, FILM COATED ORAL DAILY
Qty: 90 TABLET | Refills: 3 | Status: SHIPPED | OUTPATIENT
Start: 2021-01-01

## 2021-01-01 RX ORDER — SPIRONOLACTONE 25 MG/1
TABLET ORAL
Qty: 45 TABLET | Refills: 3 | Status: SHIPPED | OUTPATIENT
Start: 2021-01-01 | End: 2021-01-01

## 2021-01-01 RX ORDER — LISINOPRIL 5 MG/1
5 TABLET ORAL 2 TIMES DAILY
Qty: 180 TABLET | Refills: 3 | Status: SHIPPED | OUTPATIENT
Start: 2021-01-01 | End: 2021-01-01

## 2021-01-01 RX ORDER — ATORVASTATIN CALCIUM 40 MG/1
40 TABLET, FILM COATED ORAL DAILY
Qty: 90 TABLET | Refills: 3 | Status: SHIPPED | OUTPATIENT
Start: 2021-01-01 | End: 2021-01-01

## 2021-01-01 RX ORDER — CLOPIDOGREL BISULFATE 75 MG/1
75 TABLET ORAL DAILY
Qty: 90 TABLET | Refills: 3 | Status: ON HOLD | OUTPATIENT
Start: 2021-01-01 | End: 2022-01-01

## 2021-01-01 RX ORDER — TEMAZEPAM 15 MG/1
15 CAPSULE ORAL
Qty: 30 CAPSULE | Refills: 3 | Status: SHIPPED | OUTPATIENT
Start: 2021-01-01 | End: 2021-01-01

## 2021-01-01 RX ORDER — SPIRONOLACTONE 25 MG/1
TABLET ORAL
Qty: 45 TABLET | Refills: 3 | Status: ON HOLD | OUTPATIENT
Start: 2021-01-01 | End: 2022-01-01

## 2021-01-01 ASSESSMENT — ENCOUNTER SYMPTOMS
HEMOPTYSIS: 0
WHEEZING: 1
SPUTUM PRODUCTION: 1
COUGH: 0
SHORTNESS OF BREATH: 1
DYSPNEA ON EXERTION: 1
COUGH DISTURBING SLEEP: 0
SNORES LOUDLY: 0
POSTURAL DYSPNEA: 0

## 2021-01-25 ENCOUNTER — HOSPITAL ENCOUNTER (OUTPATIENT)
Dept: GENERAL RADIOLOGY | Facility: CLINIC | Age: 86
End: 2021-01-25
Attending: INTERNAL MEDICINE
Payer: COMMERCIAL

## 2021-01-25 ENCOUNTER — HOSPITAL ENCOUNTER (OUTPATIENT)
Dept: LAB | Facility: CLINIC | Age: 86
End: 2021-01-25
Attending: INTERNAL MEDICINE
Payer: COMMERCIAL

## 2021-01-25 ENCOUNTER — HOSPITAL ENCOUNTER (OUTPATIENT)
Dept: CARDIOLOGY | Facility: CLINIC | Age: 86
End: 2021-01-25
Attending: INTERNAL MEDICINE
Payer: COMMERCIAL

## 2021-01-25 DIAGNOSIS — I25.10 ASHD (ARTERIOSCLEROTIC HEART DISEASE): ICD-10-CM

## 2021-01-25 DIAGNOSIS — R06.09 DYSPNEA ON EXERTION: ICD-10-CM

## 2021-01-25 PROCEDURE — 93005 ELECTROCARDIOGRAM TRACING: CPT

## 2021-01-25 PROCEDURE — 93010 ELECTROCARDIOGRAM REPORT: CPT | Performed by: INTERNAL MEDICINE

## 2021-01-25 PROCEDURE — 71046 X-RAY EXAM CHEST 2 VIEWS: CPT

## 2021-01-26 ENCOUNTER — TELEPHONE (OUTPATIENT)
Dept: CARDIOLOGY | Facility: CLINIC | Age: 86
End: 2021-01-26

## 2021-01-26 ENCOUNTER — OFFICE VISIT (OUTPATIENT)
Dept: FAMILY MEDICINE | Facility: CLINIC | Age: 86
End: 2021-01-26
Payer: COMMERCIAL

## 2021-01-26 VITALS
DIASTOLIC BLOOD PRESSURE: 69 MMHG | TEMPERATURE: 97.1 F | BODY MASS INDEX: 21.97 KG/M2 | HEART RATE: 70 BPM | WEIGHT: 124 LBS | SYSTOLIC BLOOD PRESSURE: 131 MMHG | OXYGEN SATURATION: 96 % | HEIGHT: 63 IN

## 2021-01-26 DIAGNOSIS — I25.10 ASHD (ARTERIOSCLEROTIC HEART DISEASE): Primary | ICD-10-CM

## 2021-01-26 DIAGNOSIS — R06.2 WHEEZING: ICD-10-CM

## 2021-01-26 DIAGNOSIS — R06.09 DYSPNEA ON EXERTION: ICD-10-CM

## 2021-01-26 DIAGNOSIS — Z95.1 S/P CABG (CORONARY ARTERY BYPASS GRAFT): ICD-10-CM

## 2021-01-26 LAB
ERYTHROCYTE [DISTWIDTH] IN BLOOD BY AUTOMATED COUNT: 15.9 % (ref 10–15)
HCT VFR BLD AUTO: 36.3 % (ref 40–53)
HGB BLD-MCNC: 12.9 G/DL (ref 13.3–17.7)
MCH RBC QN AUTO: 32.3 PG (ref 26.5–33)
MCHC RBC AUTO-ENTMCNC: 35.5 G/DL (ref 31.5–36.5)
MCV RBC AUTO: 91 FL (ref 78–100)
NT-PROBNP SERPL-MCNC: 1269 PG/ML (ref 0–450)
PLATELET # BLD AUTO: 307 10E9/L (ref 150–450)
RBC # BLD AUTO: 4 10E12/L (ref 4.4–5.9)
TROPONIN I SERPL-MCNC: <0.015 UG/L (ref 0–0.04)
WBC # BLD AUTO: 8.4 10E9/L (ref 4–11)

## 2021-01-26 PROCEDURE — 85027 COMPLETE CBC AUTOMATED: CPT | Performed by: INTERNAL MEDICINE

## 2021-01-26 PROCEDURE — 99214 OFFICE O/P EST MOD 30 MIN: CPT | Performed by: INTERNAL MEDICINE

## 2021-01-26 PROCEDURE — 84484 ASSAY OF TROPONIN QUANT: CPT | Performed by: INTERNAL MEDICINE

## 2021-01-26 PROCEDURE — 36415 COLL VENOUS BLD VENIPUNCTURE: CPT | Performed by: INTERNAL MEDICINE

## 2021-01-26 PROCEDURE — 83880 ASSAY OF NATRIURETIC PEPTIDE: CPT | Performed by: INTERNAL MEDICINE

## 2021-01-26 PROCEDURE — 80053 COMPREHEN METABOLIC PANEL: CPT | Performed by: INTERNAL MEDICINE

## 2021-01-26 RX ORDER — ALBUTEROL SULFATE 90 UG/1
2 AEROSOL, METERED RESPIRATORY (INHALATION) EVERY 4 HOURS PRN
Qty: 1 INHALER | Refills: 3 | Status: SHIPPED | OUTPATIENT
Start: 2021-01-26 | End: 2022-01-01

## 2021-01-26 RX ORDER — GUAIFENESIN 600 MG/1
600 TABLET, EXTENDED RELEASE ORAL 2 TIMES DAILY
Qty: 60 TABLET | Refills: 3 | Status: SHIPPED | OUTPATIENT
Start: 2021-01-26

## 2021-01-26 ASSESSMENT — MIFFLIN-ST. JEOR: SCORE: 1152.59

## 2021-01-26 NOTE — PATIENT INSTRUCTIONS
(I25.10) ASHD (arteriosclerotic heart disease)  (primary encounter diagnosis)  Comment: We will plan for 6 minute walking test - [facility] - [phone]  - labs signed previously  Plan:     (Z95.1) S/P CABG (coronary artery bypass graft); 6/29/01  Comment: Consider follow up stress test - for now  Plan: \    (R06.2) Wheezing  Comment: Recommend trial of mucinex and trial of albuterol as needed   Plan: albuterol (PROAIR HFA/PROVENTIL HFA/VENTOLIN         HFA) 108 (90 Base) MCG/ACT inhaler

## 2021-01-26 NOTE — TELEPHONE ENCOUNTER
LM with patient regarding follow up VV after recent testing. Asked patient to call me back and confirm tomorrow works for him. Farheen Andrade RN on 1/26/2021 at 1:59 PM

## 2021-01-26 NOTE — PROGRESS NOTES
"Daisy Lala is a 85 year old who presents to clinic today for the following health issues     HPI     Shortness of breath   Oscar A Lockman has been experiencing off and on shortness of breath.  Noted that beta blocker may have triggered by beta blocker and seemed to improve with recent adjustment to Toprol.  Still having some occasional intermittent shortness of breath that does not seem to be exertional.  Has not needed to take nitroglycerin as had no chest pain associated.  He is believed to have some wheezing, but not appreciating any difficulty exhaling.  He does have an apple watch that has shown heart rate to be resting 68, oxygen saturation has been 97% resting.        Chief Complaint   Patient presents with     Follow Up     SOB, chronic health cares         Review of Systems   Constitutional, HEENT, cardiovascular, pulmonary, GI, , musculoskeletal, neuro, skin, endocrine and psych systems are negative, except as otherwise noted.      Objective    /69 (BP Location: Left arm, Cuff Size: Adult Small)   Pulse 70   Temp 97.1  F (36.2  C) (Temporal)   Ht 1.6 m (5' 3\")   Wt 56.2 kg (124 lb)   SpO2 96%   BMI 21.97 kg/m    Body mass index is 21.97 kg/m .  Physical Exam   GENERAL: healthy, alert and no distress  EYES: Eyes grossly normal to inspection,   NECK: no adenopathy, no asymmetry, masses, or scars and thyroid normal to palpation  RESP: Slight wheezing bilateral upper posterior lung fields  CV: regular rate and rhythm, no murmur, click or rub, no peripheral edema and peripheral pulses strong  ABDOMEN: soft, nontender, no hepatosplenomegaly, no masses and bowel sounds normal  MS: no gross musculoskeletal defects noted, no edema  SKIN: no suspicious lesions or rashes  NEURO: Normal strength and tone, mentation intact and speech normal  PSYCH: mentation appears normal, affect normal/bright    Ambulatory oximetry revealed stable oxygen at 98 to 99%    Results for orders placed or " performed in visit on 01/26/21   Comprehensive metabolic panel     Status: None   Result Value Ref Range    Sodium 134 133 - 144 mmol/L    Potassium 4.6 3.4 - 5.3 mmol/L    Chloride 103 94 - 109 mmol/L    Carbon Dioxide 27 20 - 32 mmol/L    Anion Gap 4 3 - 14 mmol/L    Glucose 87 70 - 99 mg/dL    Urea Nitrogen 24 7 - 30 mg/dL    Creatinine 0.85 0.66 - 1.25 mg/dL    GFR Estimate 81 >60 mL/min/[1.73_m2]    GFR Estimate If Black >90 >60 mL/min/[1.73_m2]    Calcium 9.4 8.5 - 10.1 mg/dL    Bilirubin Total 0.4 0.2 - 1.3 mg/dL    Albumin 3.7 3.4 - 5.0 g/dL    Protein Total 7.0 6.8 - 8.8 g/dL    Alkaline Phosphatase 70 40 - 150 U/L    ALT 25 0 - 70 U/L    AST 18 0 - 45 U/L   N terminal pro BNP outpatient     Status: Abnormal   Result Value Ref Range    N-Terminal Pro Bnp 1,269 (H) 0 - 450 pg/mL   CBC with platelets     Status: Abnormal   Result Value Ref Range    WBC 8.4 4.0 - 11.0 10e9/L    RBC Count 4.00 (L) 4.4 - 5.9 10e12/L    Hemoglobin 12.9 (L) 13.3 - 17.7 g/dL    Hematocrit 36.3 (L) 40.0 - 53.0 %    MCV 91 78 - 100 fl    MCH 32.3 26.5 - 33.0 pg    MCHC 35.5 31.5 - 36.5 g/dL    RDW 15.9 (H) 10.0 - 15.0 %    Platelet Count 307 150 - 450 10e9/L   Troponin I     Status: None   Result Value Ref Range    Troponin I ES <0.015 0.000 - 0.045 ug/L       CHEST TWO VIEWS  1/25/2021 11:09 AM      HISTORY: ASHD (arteriosclerotic heart disease). Dyspnea on exertion.     COMPARISON: February 27, 2017                                                                       IMPRESSION: No significant fibrosis. There is a sternotomy and  vascular clips consistent with CABG. There are no acute infiltrates.  The cardiac silhouette is not enlarged. Pulmonary vasculature is  unremarkable. Midthoracic spine compression deformity with near  complete loss of height noted which is age indeterminate.       Patient Instructions   (I25.10) ASHD (arteriosclerotic heart disease)  (primary encounter diagnosis)  Comment: We will plan for 6 minute  walking test - [facility] - [phone]  - labs signed previously  Plan:     (Z95.1) S/P CABG (coronary artery bypass graft); 6/29/01  Comment: Consider follow up stress test - for now  Plan: \    (R06.2) Wheezing  Comment: Recommend trial of mucinex and trial of albuterol as needed   Plan: albuterol (PROAIR HFA/PROVENTIL HFA/VENTOLIN         HFA) 108 (90 Base) MCG/ACT inhaler

## 2021-01-27 ENCOUNTER — VIRTUAL VISIT (OUTPATIENT)
Dept: CARDIOLOGY | Facility: CLINIC | Age: 86
End: 2021-01-27
Attending: INTERNAL MEDICINE
Payer: COMMERCIAL

## 2021-01-27 DIAGNOSIS — Z12.5 ENCOUNTER FOR SCREENING FOR MALIGNANT NEOPLASM OF PROSTATE: ICD-10-CM

## 2021-01-27 DIAGNOSIS — Z95.1 S/P CABG (CORONARY ARTERY BYPASS GRAFT): Primary | ICD-10-CM

## 2021-01-27 DIAGNOSIS — I25.10 CORONARY ARTERY DISEASE INVOLVING NATIVE CORONARY ARTERY OF NATIVE HEART WITHOUT ANGINA PECTORIS: ICD-10-CM

## 2021-01-27 DIAGNOSIS — R06.09 DYSPNEA ON EXERTION: ICD-10-CM

## 2021-01-27 LAB
ALBUMIN SERPL-MCNC: 3.7 G/DL (ref 3.4–5)
ALP SERPL-CCNC: 70 U/L (ref 40–150)
ALT SERPL W P-5'-P-CCNC: 25 U/L (ref 0–70)
ANION GAP SERPL CALCULATED.3IONS-SCNC: 4 MMOL/L (ref 3–14)
AST SERPL W P-5'-P-CCNC: 18 U/L (ref 0–45)
BILIRUB SERPL-MCNC: 0.4 MG/DL (ref 0.2–1.3)
BUN SERPL-MCNC: 24 MG/DL (ref 7–30)
CALCIUM SERPL-MCNC: 9.4 MG/DL (ref 8.5–10.1)
CHLORIDE SERPL-SCNC: 103 MMOL/L (ref 94–109)
CO2 SERPL-SCNC: 27 MMOL/L (ref 20–32)
CREAT SERPL-MCNC: 0.85 MG/DL (ref 0.66–1.25)
GFR SERPL CREATININE-BSD FRML MDRD: 81 ML/MIN/{1.73_M2}
GLUCOSE SERPL-MCNC: 87 MG/DL (ref 70–99)
INTERPRETATION ECG - MUSE: NORMAL
POTASSIUM SERPL-SCNC: 4.6 MMOL/L (ref 3.4–5.3)
PROT SERPL-MCNC: 7 G/DL (ref 6.8–8.8)
SODIUM SERPL-SCNC: 134 MMOL/L (ref 133–144)

## 2021-01-27 PROCEDURE — 99214 OFFICE O/P EST MOD 30 MIN: CPT | Mod: 95 | Performed by: INTERNAL MEDICINE

## 2021-01-27 NOTE — PATIENT INSTRUCTIONS
Medication Changes:  - No medication changes at this time. Continue current regiment    Patient Instructions:  1. Continue staying active and eat a heart healthy diet.    2. Please keep current list of medications with you at all times.    3. Remember to weigh yourself daily after voiding and before you consume any food or beverages and log the numbers.  If you have gained 2 pounds overnight or 5 pounds in a week contact us immediately for medication adjustments or further instructions.    4. **Please call us immediately if you have any syncope (fainting or passing out), chest pain, edema (swelling or weight gain), or decline in your functional status (general decline in how you are feeling).    Follow up Appointment Information:  - 3 month follow up with Dr. Rangel, labs prior      We are located [facility] the third [facility] on the [facility].  Our address is     [address] [place] [address] on [address] [place] [address]    Thank you for allowing us to be a part of your care here at the [facility]    If you have questions or concerns please contact us at:    Salomón Andrade RN, BSN   Shelli Cortes (Schedule,Prior Auth)  Nurse Coordinator     Clinic   Pulmonary Hypertension   Pulmonary Hypertension  [facility]  [facility]  (Phone)[phone]    (Phone) [phone]        (Fax) [phone]    ** Please note that you will NOT receive a reminder call regarding your scheduled testing, reminder calls are for provider appointments only.  If you are scheduled for testing within the [place] system you may receive a call regarding pre-registration for billing purposes only.**     Remember to weigh yourself daily after voiding and before you consume any food or beverages and log the numbers.  If you have gained/lost 2 pounds overnight or 5 pounds in a week contact us  immediately for medication adjustments or further instructions.   **Please call us immediately if you have any syncope, chest pain, edema, or decline in your functional status.    Support Group:  Pulmonary Hypertension Association  Https://www.phassociation.org/  **Look at the Events Tab** They even have Support Groups that you can call into    [facility] Support Group  Second Saturday of the Month from 1-3 PM   Location: [address] [place] [address]  Leader: Carola Caicedo  Phone: [phone] or [phone]  Email: [email or web address]

## 2021-01-27 NOTE — LETTER
1/27/2021      RE: Oscar Chaudhary  [address] [facility] [address]       Dear Colleague,    Thank you for the opportunity to participate in the care of your patient, Oscar Chaudhary, at the [facility] HEART [facility] at [facility]. Please see a copy of my visit note below.      Facetime with permission x 30 minutes 9:00-9:30         1. 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 Sequential, old records not in EMR)  2. History of prostate cancer treated with radiation  3. Exertional dyspnea  4. AAA 3.9  5. Hyperlipidemia  6. ASPVD with stenting               Left iliac stent              Right iliac stent with elevated velocities  7. Abdominal aortic aneurysm                      Infrarenal abdominal aortic aneurysm measuring 3.8 cm AP x 3.9 cm                       transverse, x 4.6 cm in length. Suspect previous measurements were                                                 slight over estimation. Recommend continued annual surveillance. 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  8. History of jaw infection   9. History if previous difficult intubation with jaw and neck infection  10. Recent cardiac stenting/ramus  11. Compression fracture with thoracic kyphosis       Plan:  1. RTC 3 months, sooner if not doing well.      The patient returns for follow-up of heart failure.  There is no interim history of chest pain, tightness, paroxysmal nocturnal dyspnea, orthopnea, peripheral edema, palpitation, pre-syncope, syncope, .  Exercise tolerance is stable.  The patient is attempting to exercise regularly and following a sodium restricted, calorically appropriate diet.  Medications are reviewed and the patient is taking medications as prescribed.  The patient is generally sleeping well. He is somewhat short of breath when awakening in am.  He uses CPAP.  He has no chest pain, tightness, heaviness, pressure with exertion.  Shortness  of breath not related to exertion  Tristian suggested he dry albuterol inhaler as was previously wheezing somewhat. CXR reviewed with direct images and appears to have significant T5. Compression fracture and resultant kyphosis.        Current Outpatient Medications   Medication     acetaminophen (TYLENOL) 325 MG tablet     albuterol (PROAIR HFA/PROVENTIL HFA/VENTOLIN HFA) 108 (90 Base) MCG/ACT inhaler     aspirin 81 MG tablet     atorvastatin (LIPITOR) 40 MG tablet     Cholecalciferol (VITAMIN D) 1000 UNITS capsule     clopidogrel (PLAVIX) 75 MG tablet     diazepam (VALIUM) 5 MG tablet     fish oil-omega-3 fatty acids (FISH OIL) 1000 MG capsule     guaiFENesin (MUCINEX) 600 MG 12 hr tablet     lisinopril (ZESTRIL) 5 MG tablet     metoprolol succinate ER (TOPROL-XL) 50 MG 24 hr tablet     Multiple Vitamins-Minerals (MULTIVITAL PO)     nitroGLYcerin (NITROSTAT) 0.4 MG sublingual tablet     omeprazole (PRILOSEC) 20 MG CR capsule     spironolactone (ALDACTONE) 25 MG tablet     temazepam (RESTORIL) 15 MG capsule     No current facility-administered medications for this visit.      Constitutional: weight loss, fever, chills, night sweats  HEENT: without visual changes, swallow difficulties  Pulmonary: without shortness of breath, cough, wheeze, hemoptysis  Cardiac: without chest pain, RYAN, PND, orthopnea, edema, palpitation, pre-syncope, syncope,  GI: without diarrhea, constipation, jaundice, melena, GERD, hematemesis  : without frequency, urgency, dysuria, hematuria  Skin: rash, bruise, open lesions  Neuro: without TIA, focal neurologic complaints, seizure, trauma  Ortho: without pain, swelling, mobility impairment  Endocrine: diabetes, thyroid, heat/cold intolerance, polyuria, polyphagia, change bowel habits.  Sleep:no NADINE, periodic breathing, fatigue      Results for JOSH DOSHI (MRN [id number]) as of 1/27/2021 09:36   Ref. Range 2/20/2020 13:05 1/25/2021 10:45 1/25/2021 11:09 1/26/2021 12:25   Sodium Latest Ref  Range: 133 - 144 mmol/L 135      Potassium Latest Ref Range: 3.4 - 5.3 mmol/L 4.3      Chloride Latest Ref Range: 94 - 109 mmol/L 102      Carbon Dioxide Latest Ref Range: 20 - 32 mmol/L 24      Urea Nitrogen Latest Ref Range: 7 - 30 mg/dL 15      Creatinine Latest Ref Range: 0.66 - 1.25 mg/dL 0.88      GFR Estimate Latest Ref Range: >60 mL/min/1.73_m2 80      GFR Estimate If Black Latest Ref Range: >60 mL/min/1.73_m2 >90      Calcium Latest Ref Range: 8.5 - 10.1 mg/dL 8.9      Anion Gap Latest Ref Range: 3 - 14 mmol/L 9      N-Terminal Pro Bnp Latest Ref Range: 0 - 450 pg/mL 1,379 (H)   1,269 (H)   PSA Latest Ref Range: 0 - 4 ug/L 0.04      Troponin I ES Latest Ref Range: 0.000 - 0.045 ug/L    <0.015   Glucose Latest Ref Range: 70 - 99 mg/dL 106 (H)      WBC Latest Ref Range: 4.0 - 11.0 10e9/L 9.9   8.4   Hemoglobin Latest Ref Range: 13.3 - 17.7 g/dL 12.5 (L)   12.9 (L)   Hematocrit Latest Ref Range: 40.0 - 53.0 % 35.7 (L)   36.3 (L)   Platelet Count Latest Ref Range: 150 - 450 10e9/L 335   307   RBC Count Latest Ref Range: 4.4 - 5.9 10e12/L 3.90 (L)   4.00 (L)   MCV Latest Ref Range: 78 - 100 fl 92   91   MCH Latest Ref Range: 26.5 - 33.0 pg 32.1   32.3   MCHC Latest Ref Range: 31.5 - 36.5 g/dL 35.0   35.5   RDW Latest Ref Range: 10.0 - 15.0 % 16.1 (H)   15.9 (H)       Spike is a 85 year old who is being evaluated via a billable video visit.        Please do not hesitate to contact me if you have any questions/concerns.     Sincerely,     Jerrod Rangel MD

## 2021-01-27 NOTE — PROGRESS NOTES
Facetime with permission x 30 minutes 9:00-9:30         1. 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 Sequential, old records not in EMR)  2. History of prostate cancer treated with radiation  3. Exertional dyspnea  4. AAA 3.9  5. Hyperlipidemia  6. ASPVD with stenting               Left iliac stent              Right iliac stent with elevated velocities  7. Abdominal aortic aneurysm                      Infrarenal abdominal aortic aneurysm measuring 3.8 cm AP x 3.9 cm                       transverse, x 4.6 cm in length. Suspect previous measurements were                                                 slight over estimation. Recommend continued annual surveillance. 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  8. History of jaw infection   9. History if previous difficult intubation with jaw and neck infection  10. Recent cardiac stenting/ramus  11. Compression fracture with thoracic kyphosis       Plan:  1. RTC 3 months, sooner if not doing well.      The patient returns for follow-up of heart failure.  There is no interim history of chest pain, tightness, paroxysmal nocturnal dyspnea, orthopnea, peripheral edema, palpitation, pre-syncope, syncope, .  Exercise tolerance is stable.  The patient is attempting to exercise regularly and following a sodium restricted, calorically appropriate diet.  Medications are reviewed and the patient is taking medications as prescribed.  The patient is generally sleeping well. He is somewhat short of breath when awakening in am.  He uses CPAP.  He has no chest pain, tightness, heaviness, pressure with exertion.  Shortness of breath not related to exertion  Tristian suggested he dry albuterol inhaler as was previously wheezing somewhat. CXR reviewed with direct images and appears to have significant T5. Compression fracture and resultant kyphosis.        Current Outpatient Medications   Medication     acetaminophen (TYLENOL) 325 MG tablet     albuterol (PROAIR  HFA/PROVENTIL HFA/VENTOLIN HFA) 108 (90 Base) MCG/ACT inhaler     aspirin 81 MG tablet     atorvastatin (LIPITOR) 40 MG tablet     Cholecalciferol (VITAMIN D) 1000 UNITS capsule     clopidogrel (PLAVIX) 75 MG tablet     diazepam (VALIUM) 5 MG tablet     fish oil-omega-3 fatty acids (FISH OIL) 1000 MG capsule     guaiFENesin (MUCINEX) 600 MG 12 hr tablet     lisinopril (ZESTRIL) 5 MG tablet     metoprolol succinate ER (TOPROL-XL) 50 MG 24 hr tablet     Multiple Vitamins-Minerals (MULTIVITAL PO)     nitroGLYcerin (NITROSTAT) 0.4 MG sublingual tablet     omeprazole (PRILOSEC) 20 MG CR capsule     spironolactone (ALDACTONE) 25 MG tablet     temazepam (RESTORIL) 15 MG capsule     No current facility-administered medications for this visit.      Constitutional: weight loss, fever, chills, night sweats  HEENT: without visual changes, swallow difficulties  Pulmonary: without shortness of breath, cough, wheeze, hemoptysis  Cardiac: without chest pain, RYAN, PND, orthopnea, edema, palpitation, pre-syncope, syncope,  GI: without diarrhea, constipation, jaundice, melena, GERD, hematemesis  : without frequency, urgency, dysuria, hematuria  Skin: rash, bruise, open lesions  Neuro: without TIA, focal neurologic complaints, seizure, trauma  Ortho: without pain, swelling, mobility impairment  Endocrine: diabetes, thyroid, heat/cold intolerance, polyuria, polyphagia, change bowel habits.  Sleep:no NADINE, periodic breathing, fatigue      Results for JOSH DOSHI (MRN [id number]) as of 1/27/2021 09:36   Ref. Range 2/20/2020 13:05 1/25/2021 10:45 1/25/2021 11:09 1/26/2021 12:25   Sodium Latest Ref Range: 133 - 144 mmol/L 135      Potassium Latest Ref Range: 3.4 - 5.3 mmol/L 4.3      Chloride Latest Ref Range: 94 - 109 mmol/L 102      Carbon Dioxide Latest Ref Range: 20 - 32 mmol/L 24      Urea Nitrogen Latest Ref Range: 7 - 30 mg/dL 15      Creatinine Latest Ref Range: 0.66 - 1.25 mg/dL 0.88      GFR Estimate Latest Ref Range: >60  mL/min/1.73_m2 80      GFR Estimate If Black Latest Ref Range: >60 mL/min/1.73_m2 >90      Calcium Latest Ref Range: 8.5 - 10.1 mg/dL 8.9      Anion Gap Latest Ref Range: 3 - 14 mmol/L 9      N-Terminal Pro Bnp Latest Ref Range: 0 - 450 pg/mL 1,379 (H)   1,269 (H)   PSA Latest Ref Range: 0 - 4 ug/L 0.04      Troponin I ES Latest Ref Range: 0.000 - 0.045 ug/L    <0.015   Glucose Latest Ref Range: 70 - 99 mg/dL 106 (H)      WBC Latest Ref Range: 4.0 - 11.0 10e9/L 9.9   8.4   Hemoglobin Latest Ref Range: 13.3 - 17.7 g/dL 12.5 (L)   12.9 (L)   Hematocrit Latest Ref Range: 40.0 - 53.0 % 35.7 (L)   36.3 (L)   Platelet Count Latest Ref Range: 150 - 450 10e9/L 335   307   RBC Count Latest Ref Range: 4.4 - 5.9 10e12/L 3.90 (L)   4.00 (L)   MCV Latest Ref Range: 78 - 100 fl 92   91   MCH Latest Ref Range: 26.5 - 33.0 pg 32.1   32.3   MCHC Latest Ref Range: 31.5 - 36.5 g/dL 35.0   35.5   RDW Latest Ref Range: 10.0 - 15.0 % 16.1 (H)   15.9 (H)

## 2021-01-27 NOTE — NURSING NOTE
"Orders placed and patient marked \"ready for checkout.\" Farheen Andrade RN on 1/27/2021 at 11:03 AM    "

## 2021-01-28 ENCOUNTER — IMMUNIZATION (OUTPATIENT)
Dept: NURSING | Facility: CLINIC | Age: 86
End: 2021-01-28
Payer: COMMERCIAL

## 2021-01-28 PROCEDURE — 0001A PR COVID VAC PFIZER DIL RECON 30 MCG/0.3 ML IM: CPT

## 2021-01-28 PROCEDURE — 91300 PR COVID VAC PFIZER DIL RECON 30 MCG/0.3 ML IM: CPT

## 2021-01-29 ENCOUNTER — TELEPHONE (OUTPATIENT)
Dept: CARDIOLOGY | Facility: CLINIC | Age: 86
End: 2021-01-29

## 2021-01-29 NOTE — TELEPHONE ENCOUNTER
LM with patient asking him to call back when able. Farheen Andrade RN on 1/29/2021 at 1:33 PM    Returned patient's call and MARTHA, advising Dr. Jeffrey's office did not draw a PSA and to call me back with questions.Farheen Andrade RN on 1/29/2021 at 2:58 PM    ----- Message from Reshma Arita sent at 1/29/2021 12:37 PM CST -----  Regarding: Results  Hi,    I spoke to this pt about scheduling his follow up. He could not at this time but asked for a nurse to reach out for him regarding a PSA? Could you contact him this afternoon?    Thank you!

## 2021-02-18 ENCOUNTER — IMMUNIZATION (OUTPATIENT)
Dept: NURSING | Facility: CLINIC | Age: 86
End: 2021-02-18
Attending: INTERNAL MEDICINE
Payer: COMMERCIAL

## 2021-02-18 PROCEDURE — 91300 PR COVID VAC PFIZER DIL RECON 30 MCG/0.3 ML IM: CPT

## 2021-02-18 PROCEDURE — 0002A PR COVID VAC PFIZER DIL RECON 30 MCG/0.3 ML IM: CPT

## 2021-04-04 NOTE — PROGRESS NOTES
1. 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 Sequential, old records not in EMR)  2. History of prostate cancer treated with radiation  3. Exertional dyspnea  4. AAA 3.9  5. Hyperlipidemia  6. ASPVD with stenting               Left iliac stent              Right iliac stent with elevated velocities  7. Abdominal aortic aneurysm                      Infrarenal abdominal aortic aneurysm measuring 3.8 cm AP x 3.9 cm                       transverse, x 4.6 cm in length. Suspect previous measurements were                                                 slight over estimation. Recommend continued annual surveillance. 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  8. History of jaw infection   9. History if previous difficult intubation with jaw and neck infection  10. Recent cardiac stenting/ramus  11. Compression fracture with thoracic kyphosis    The patient had a [employer] video visit with his permission 10:10:30    Plan:  1. Use albuterol and report on if it helps.  2. If not, then start chlorthalidone 12.5mgs every Sunday and Wednesday, monitoring weight and looking for orthostatic symptoms.  3. BMP, BNP in 10 days.  4. Video via [employer] in one  Month.    He now notices non-positional wheezing when talking but without PND, orthopnea, weight change or ankle edema. He is eating regularly.  He has no chest pain, tightness, heaviness, palpitation, pre-syncope or syncope.  His weight is stable.  He has no previous history of asthma.  He has no cough nor symptoms of URI.  He has been given albuterol in the past by Dr. Jeffrey but has not utilized it.    Constitutional: weight loss, fever, chills, night sweats  HEENT: without visual changes, swallow difficulties  Pulmonary: without shortness of breath, cough, wheeze, hemoptysis  Cardiac: without chest pain, RYAN, PND, orthopnea, edema, palpitation, pre-syncope, syncope,  GI: without diarrhea, constipation, jaundice, melena, GERD, hematemesis  :  without frequency, urgency, dysuria, hematuria  Skin: rash, bruise, open lesions  Neuro: without TIA, focal neurologic complaints, seizure, trauma  Ortho: without pain, swelling, mobility impairment  Endocrine: diabetes, thyroid, heat/cold intolerance, polyuria, polyphagia, change bowel habits.  Sleep:no NADINE, periodic breathing, fatigue    Current Outpatient Medications   Medication     acetaminophen (TYLENOL) 325 MG tablet     albuterol (PROAIR HFA/PROVENTIL HFA/VENTOLIN HFA) 108 (90 Base) MCG/ACT inhaler     aspirin 81 MG tablet     atorvastatin (LIPITOR) 40 MG tablet     Cholecalciferol (VITAMIN D) 1000 UNITS capsule     clopidogrel (PLAVIX) 75 MG tablet     diazepam (VALIUM) 5 MG tablet     fish oil-omega-3 fatty acids (FISH OIL) 1000 MG capsule     guaiFENesin (MUCINEX) 600 MG 12 hr tablet     lisinopril (ZESTRIL) 5 MG tablet     metoprolol succinate ER (TOPROL-XL) 50 MG 24 hr tablet     Multiple Vitamins-Minerals (MULTIVITAL PO)     nitroGLYcerin (NITROSTAT) 0.4 MG sublingual tablet     omeprazole (PRILOSEC) 20 MG CR capsule     spironolactone (ALDACTONE) 25 MG tablet     temazepam (RESTORIL) 15 MG capsule     No current facility-administered medications for this visit.              Answers for HPI/ROS submitted by the patient on 4/6/2021   General Symptoms: No  Skin Symptoms: No  HENT Symptoms: No  EYE SYMPTOMS: No  HEART SYMPTOMS: No  LUNG SYMPTOMS: Yes  INTESTINAL SYMPTOMS: No  URINARY SYMPTOMS: No  REPRODUCTIVE SYMPTOMS: No  SKELETAL SYMPTOMS: No  BLOOD SYMPTOMS: No  NERVOUS SYSTEM SYMPTOMS: No  MENTAL HEALTH SYMPTOMS: No  Cough: No  Sputum or phlegm: Yes  Coughing up blood: No  Difficulty breating or shortness of breath: Yes  Snoring: No  Wheezing: Yes  Difficulty breathing on exertion: Yes  Nighttime Cough: No  Difficulty breathing when lying flat: No

## 2021-04-07 NOTE — LETTER
4/7/2021      RE: Oscar Chaudhary  [address] [facility] [address]       Dear Colleague,    Thank you for the opportunity to participate in the care of your patient, Oscar Chaudhary, at the [facility] HEART CLINIC [place] [facility]. Please see a copy of my visit note below.    1. 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 Sequential, old records not in EMR)  2. History of prostate cancer treated with radiation  3. Exertional dyspnea  4. AAA 3.9  5. Hyperlipidemia  6. ASPVD with stenting               Left iliac stent              Right iliac stent with elevated velocities  7. Abdominal aortic aneurysm                      Infrarenal abdominal aortic aneurysm measuring 3.8 cm AP x 3.9 cm                       transverse, x 4.6 cm in length. Suspect previous measurements were                                                 slight over estimation. Recommend continued annual surveillance. 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  8. History of jaw infection   9. History if previous difficult intubation with jaw and neck infection  10. Recent cardiac stenting/ramus  11. Compression fracture with thoracic kyphosis    The patient had a [employer] video visit with his permission 10:10:30    Plan:  1. Use albuterol and report on if it helps.  2. If not, then start chlorthalidone 12.5mgs every Sunday and Wednesday, monitoring weight and looking for orthostatic symptoms.  3. BMP, BNP in 10 days.  4. Video via [employer] in one  Month.    He now notices non-positional wheezing when talking but without PND, orthopnea, weight change or ankle edema. He is eating regularly.  He has no chest pain, tightness, heaviness, palpitation, pre-syncope or syncope.  His weight is stable.  He has no previous history of asthma.  He has no cough nor symptoms of URI.  He has been given albuterol in the past by Dr. Jeffrey but has not utilized  it.    Constitutional: weight loss, fever, chills, night sweats  HEENT: without visual changes, swallow difficulties  Pulmonary: without shortness of breath, cough, wheeze, hemoptysis  Cardiac: without chest pain, RYAN, PND, orthopnea, edema, palpitation, pre-syncope, syncope,  GI: without diarrhea, constipation, jaundice, melena, GERD, hematemesis  : without frequency, urgency, dysuria, hematuria  Skin: rash, bruise, open lesions  Neuro: without TIA, focal neurologic complaints, seizure, trauma  Ortho: without pain, swelling, mobility impairment  Endocrine: diabetes, thyroid, heat/cold intolerance, polyuria, polyphagia, change bowel habits.  Sleep:no NADINE, periodic breathing, fatigue    Current Outpatient Medications   Medication     acetaminophen (TYLENOL) 325 MG tablet     albuterol (PROAIR HFA/PROVENTIL HFA/VENTOLIN HFA) 108 (90 Base) MCG/ACT inhaler     aspirin 81 MG tablet     atorvastatin (LIPITOR) 40 MG tablet     Cholecalciferol (VITAMIN D) 1000 UNITS capsule     clopidogrel (PLAVIX) 75 MG tablet     diazepam (VALIUM) 5 MG tablet     fish oil-omega-3 fatty acids (FISH OIL) 1000 MG capsule     guaiFENesin (MUCINEX) 600 MG 12 hr tablet     lisinopril (ZESTRIL) 5 MG tablet     metoprolol succinate ER (TOPROL-XL) 50 MG 24 hr tablet     Multiple Vitamins-Minerals (MULTIVITAL PO)     nitroGLYcerin (NITROSTAT) 0.4 MG sublingual tablet     omeprazole (PRILOSEC) 20 MG CR capsule     spironolactone (ALDACTONE) 25 MG tablet     temazepam (RESTORIL) 15 MG capsule     No current facility-administered medications for this visit.              Answers for HPI/ROS submitted by the patient on 4/6/2021   General Symptoms: No  Skin Symptoms: No  HENT Symptoms: No  EYE SYMPTOMS: No  HEART SYMPTOMS: No  LUNG SYMPTOMS: Yes  INTESTINAL SYMPTOMS: No  URINARY SYMPTOMS: No  REPRODUCTIVE SYMPTOMS: No  SKELETAL SYMPTOMS: No  BLOOD SYMPTOMS: No  NERVOUS SYSTEM SYMPTOMS: No  MENTAL HEALTH SYMPTOMS: No  Cough: No  Sputum or phlegm:  Yes  Coughing up blood: No  Difficulty breating or shortness of breath: Yes  Snoring: No  Wheezing: Yes  Difficulty breathing on exertion: Yes  Nighttime Cough: No  Difficulty breathing when lying flat: No      Spike is a 85 year old who is being evaluated via a billable video visit.        10:07 AM        Please do not hesitate to contact me if you have any questions/concerns.     Sincerely,     Jerrod Rangel MD

## 2021-04-14 NOTE — TELEPHONE ENCOUNTER
Facetime video visit for follow-up  Patients permission at home, 4:00-4:30    Impression/Plan  1. Improved with albuterol  2. Elevated BNP suggestive of volume overload and symptoms improved by albuterol suggesting potential volume overload.  3. Add chlorthalidone 12.5 mgs Sunday and Wednesday  4. Repeat laboratories 7-10 days    The patient is visit for follow-up regarding his recent wheezing and shortness of breath.  He is improved with albuterol.  He has no PND, weight is stable, BNP previous elevated.  He is without chest pain, tightness, exercise intolerance within boundaries for chronic, mechanical back pain.  He has no significant edema.    Current Outpatient Medications   Medication     chlorthalidone (HYGROTON) 25 MG tablet     acetaminophen (TYLENOL) 325 MG tablet     albuterol (PROAIR HFA/PROVENTIL HFA/VENTOLIN HFA) 108 (90 Base) MCG/ACT inhaler     aspirin 81 MG tablet     atorvastatin (LIPITOR) 40 MG tablet     Cholecalciferol (VITAMIN D) 1000 UNITS capsule     clopidogrel (PLAVIX) 75 MG tablet     diazepam (VALIUM) 5 MG tablet     fish oil-omega-3 fatty acids (FISH OIL) 1000 MG capsule     guaiFENesin (MUCINEX) 600 MG 12 hr tablet     lisinopril (ZESTRIL) 5 MG tablet     metoprolol succinate ER (TOPROL-XL) 50 MG 24 hr tablet     Multiple Vitamins-Minerals (MULTIVITAL PO)     nitroGLYcerin (NITROSTAT) 0.4 MG sublingual tablet     omeprazole (PRILOSEC) 20 MG CR capsule     spironolactone (ALDACTONE) 25 MG tablet     temazepam (RESTORIL) 15 MG capsule     No current facility-administered medications for this visit.        Results for TICO JOSH A (MRN [id number]) as of 4/14/2021 09:13   Ref. Range 1/25/2021 11:09 1/26/2021 12:25   Sodium Latest Ref Range: 133 - 144 mmol/L  134   Potassium Latest Ref Range: 3.4 - 5.3 mmol/L  4.6   Chloride Latest Ref Range: 94 - 109 mmol/L  103   Carbon Dioxide Latest Ref Range: 20 - 32 mmol/L  27   Urea Nitrogen Latest Ref Range: 7 - 30 mg/dL  24   Creatinine Latest  Ref Range: 0.66 - 1.25 mg/dL  0.85   GFR Estimate Latest Ref Range: >60 mL/min/1.73_m2  81   GFR Estimate If Black Latest Ref Range: >60 mL/min/1.73_m2  >90   Calcium Latest Ref Range: 8.5 - 10.1 mg/dL  9.4   Anion Gap Latest Ref Range: 3 - 14 mmol/L  4   Albumin Latest Ref Range: 3.4 - 5.0 g/dL  3.7   Protein Total Latest Ref Range: 6.8 - 8.8 g/dL  7.0   Bilirubin Total Latest Ref Range: 0.2 - 1.3 mg/dL  0.4   Alkaline Phosphatase Latest Ref Range: 40 - 150 U/L  70   ALT Latest Ref Range: 0 - 70 U/L  25   AST Latest Ref Range: 0 - 45 U/L  18   N-Terminal Pro Bnp Latest Ref Range: 0 - 450 pg/mL  1,269 (H)   Troponin I ES Latest Ref Range: 0.000 - 0.045 ug/L  <0.015   Glucose Latest Ref Range: 70 - 99 mg/dL  87   WBC Latest Ref Range: 4.0 - 11.0 10e9/L  8.4   Hemoglobin Latest Ref Range: 13.3 - 17.7 g/dL  12.9 (L)   Hematocrit Latest Ref Range: 40.0 - 53.0 %  36.3 (L)   Platelet Count Latest Ref Range: 150 - 450 10e9/L  307   RBC Count Latest Ref Range: 4.4 - 5.9 10e12/L  4.00 (L)   MCV Latest Ref Range: 78 - 100 fl  91   MCH Latest Ref Range: 26.5 - 33.0 pg  32.3   MCHC Latest Ref Range: 31.5 - 36.5 g/dL  35.5   RDW Latest Ref Range: 10.0 - 15.0 %  15.9 (H)   XR CHEST 2 VW Unknown Rpt

## 2021-04-19 NOTE — PATIENT INSTRUCTIONS
Medication Changes:  - No medication changes at this time. Please continue current medication regiment.    Patient Instructions:  1. Continue staying active and eat a heart healthy diet.    2. Please keep current list of medications with you at all times.    3. Remember to weigh yourself daily after voiding and before you consume any food or beverages and log the numbers.  If you have gained 2 pounds overnight or 5 pounds in a week contact us immediately for medication adjustments or further instructions.    4. **Please call us immediately if you have any syncope (fainting or passing out), chest pain, edema (swelling or weight gain), or decline in your functional status (general decline in how you are feeling).    Follow up Appointment Information:  - Follow up with Dr. Rangel in May (virtual visit) with labs prior    We are located on the third floor of the [facility] ([facility] on the [facility].  Our address is     [address] [place] [address] on [address] [place] [address]    Thank you for allowing us to be a part of your care here at the [facility]    If you have questions or concerns please contact us at:    Salomón Andrade RN, BSN   Shelli Cortes (Schedule,Prior Auth)  Nurse Coordinator     Clinic   Pulmonary Hypertension   Pulmonary Hypertension  [facility]  [facility]  (Phone)[phone]    (Phone) [phone]        (Fax) [phone]    ** Please note that you will NOT receive a reminder call regarding your scheduled testing, reminder calls are for provider appointments only.  If you are scheduled for testing within the [facility] system you may receive a call regarding pre-registration for billing purposes only.**     Remember to weigh yourself daily after voiding and before you consume any food or beverages and log the numbers.  If you have gained/lost 2 pounds  overnight or 5 pounds in a week contact us immediately for medication adjustments or further instructions.   **Please call us immediately if you have any syncope, chest pain, edema, or decline in your functional status.    Support Group:  Pulmonary Hypertension Association  Https://www.phassociation.org/  **Look at the Events Tab** They even have Support Groups that you can call into    [facility] Support Group  Second Saturday of the Month from 1-3 PM   Location: [address] [place] [address]  Leader: Carola Awan and Dalila Caicedo  Phone: [phone] or [phone]  Email: [email or web address]

## 2021-04-19 NOTE — NURSING NOTE
"Orders placed and patient marked \"ready for checkout.\" Farheen Andrade RN on 4/19/2021 at 11:17 AM    "

## 2021-04-30 NOTE — TELEPHONE ENCOUNTER
spironolactone (ALDACTONE) 25 MG tablet   TAKE 1/2 TABLET BY MOUTH DAILY     Last Written Prescription Date:  5/20/20  Last Fill Quantity: 45,   # refills: 3  Last Office Visit : 4/7/21   No medication changes at this time. Please continue current medication regiment.  Follow up with Dr. Rangel in May (virtual visit) with labs prior  Future Office visit:  none    Refill to pharmacy.     Scheduling has been notified to contact the pt for appointment.

## 2021-05-10 NOTE — TELEPHONE ENCOUNTER
PA denied via EPA- waiting for fax from insurance with denial reasoning.    Denied  5/10/2021  1:16 PM  Case ID: [id number] Appeal supported: No   Note from payer: CaseId:[id number];Status:Denied;Review Type:Prior Auth;Appeal Information: Attention:COMPLAINTS, APPEALS, GRIEVANCES [facility] [address],[place], [place],[address] Phone:[phone]; Important - Please read the below note on eAppeals: Please reference the denial letter for information on the rights for an appeal, rationale for the denial, and how to submit an appeal including if any information is needed to support the appeal. Note about urgent situations - Generally, an urgent situation is one which, in the opinion of the provider, the health of the patient may be in serious jeopardy or may experience pain that cannot be adequately controlled while waiting for a decision on the appeal.;   Payer:  [employer]    [phone]

## 2021-05-11 NOTE — TELEPHONE ENCOUNTER
Central Prior Authorization Team   Phone: [phone]      Denial letter not received - INS will refax.

## 2021-05-12 NOTE — TELEPHONE ENCOUNTER
Central Prior Authorization Team   Phone: [phone]      PRIOR AUTHORIZATION DENIED    Medication: temazepam (RESTORIL) 15 MG capsule - DENIED    Denial Date: 5/10/2021    Denial Rational:       Appeal Information:

## 2021-05-21 NOTE — TELEPHONE ENCOUNTER
Medication Appeal Initiation    We have initiated an appeal for the requested medication:  Medication: temazepam (RESTORIL) 15 MG capsule - DENIED  Appeal Start Date:  5/21/2021  Insurance Company[facility] - Phone [phone] Fax [phone]  Comments:  Completed letter of medical necessity and faxed to [facility] for urgent review.

## 2021-06-24 NOTE — TELEPHONE ENCOUNTER
MEDICATION APPEAL APPROVED    Medication: temazepam (RESTORIL) 15 MG capsule - Approved  Authorization Effective Date: 4/21/2021  Authorization Expiration Date: 5/21/2022  Approved Dose/Quantity: 30 tablets/30 days  Reference #: [id number]/A   Insurance Company: [employer] - Phone [phone] Fax [phone]  Which Pharmacy is filling the prescription (Not needed for infusion/clinic administered): [facility] [id number] - [place], [place] - [address]  ----------------------------  Insurance: [employer]  BIN: N/A  PCN: N/A  ID#: [id number]  GRP#: N/A

## 2022-01-01 ENCOUNTER — LAB (OUTPATIENT)
Dept: LAB | Facility: CLINIC | Age: 87
End: 2022-01-01
Payer: COMMERCIAL

## 2022-01-01 ENCOUNTER — ANCILLARY PROCEDURE (OUTPATIENT)
Dept: CT IMAGING | Facility: CLINIC | Age: 87
End: 2022-01-01
Attending: INTERNAL MEDICINE
Payer: COMMERCIAL

## 2022-01-01 ENCOUNTER — OFFICE VISIT (OUTPATIENT)
Dept: CARDIOLOGY | Facility: CLINIC | Age: 87
DRG: 291 | End: 2022-01-01
Attending: INTERNAL MEDICINE
Payer: COMMERCIAL

## 2022-01-01 ENCOUNTER — TELEPHONE (OUTPATIENT)
Dept: CARDIOLOGY | Facility: CLINIC | Age: 87
End: 2022-01-01

## 2022-01-01 ENCOUNTER — APPOINTMENT (OUTPATIENT)
Dept: PHYSICAL THERAPY | Facility: CLINIC | Age: 87
DRG: 291 | End: 2022-01-01
Attending: INTERNAL MEDICINE
Payer: COMMERCIAL

## 2022-01-01 ENCOUNTER — APPOINTMENT (OUTPATIENT)
Dept: OCCUPATIONAL THERAPY | Facility: CLINIC | Age: 87
DRG: 291 | End: 2022-01-01
Attending: INTERNAL MEDICINE
Payer: COMMERCIAL

## 2022-01-01 ENCOUNTER — OFFICE VISIT (OUTPATIENT)
Dept: FAMILY MEDICINE | Facility: CLINIC | Age: 87
End: 2022-01-01
Payer: COMMERCIAL

## 2022-01-01 ENCOUNTER — TELEPHONE (OUTPATIENT)
Dept: FAMILY MEDICINE | Facility: CLINIC | Age: 87
End: 2022-01-01
Payer: COMMERCIAL

## 2022-01-01 ENCOUNTER — DOCUMENTATION ONLY (OUTPATIENT)
Dept: CARDIOLOGY | Facility: CLINIC | Age: 87
End: 2022-01-01
Payer: COMMERCIAL

## 2022-01-01 ENCOUNTER — APPOINTMENT (OUTPATIENT)
Dept: GENERAL RADIOLOGY | Facility: CLINIC | Age: 87
DRG: 291 | End: 2022-01-01
Attending: NURSE PRACTITIONER
Payer: COMMERCIAL

## 2022-01-01 ENCOUNTER — TELEPHONE (OUTPATIENT)
Dept: CARDIOLOGY | Facility: CLINIC | Age: 87
End: 2022-01-01
Payer: COMMERCIAL

## 2022-01-01 ENCOUNTER — PATIENT OUTREACH (OUTPATIENT)
Dept: FAMILY MEDICINE | Facility: CLINIC | Age: 87
End: 2022-01-01
Payer: COMMERCIAL

## 2022-01-01 ENCOUNTER — OFFICE VISIT (OUTPATIENT)
Dept: CARDIOLOGY | Facility: CLINIC | Age: 87
End: 2022-01-01
Payer: COMMERCIAL

## 2022-01-01 ENCOUNTER — TELEPHONE (OUTPATIENT)
Dept: FAMILY MEDICINE | Facility: CLINIC | Age: 87
End: 2022-01-01

## 2022-01-01 ENCOUNTER — APPOINTMENT (OUTPATIENT)
Dept: PHYSICAL THERAPY | Facility: CLINIC | Age: 87
DRG: 291 | End: 2022-01-01
Attending: NURSE PRACTITIONER
Payer: COMMERCIAL

## 2022-01-01 ENCOUNTER — CARE COORDINATION (OUTPATIENT)
Dept: SLEEP MEDICINE | Facility: CLINIC | Age: 87
End: 2022-01-01
Payer: COMMERCIAL

## 2022-01-01 ENCOUNTER — APPOINTMENT (OUTPATIENT)
Dept: MRI IMAGING | Facility: CLINIC | Age: 87
DRG: 291 | End: 2022-01-01
Attending: NURSE PRACTITIONER
Payer: COMMERCIAL

## 2022-01-01 ENCOUNTER — HOSPITAL ENCOUNTER (OUTPATIENT)
Dept: CT IMAGING | Facility: CLINIC | Age: 87
Discharge: HOME | End: 2022-03-03
Attending: INTERNAL MEDICINE | Admitting: INTERNAL MEDICINE
Payer: COMMERCIAL

## 2022-01-01 ENCOUNTER — MYC MEDICAL ADVICE (OUTPATIENT)
Dept: CARDIOLOGY | Facility: CLINIC | Age: 87
End: 2022-01-01

## 2022-01-01 ENCOUNTER — TELEPHONE (OUTPATIENT)
Dept: SLEEP MEDICINE | Facility: CLINIC | Age: 87
End: 2022-01-01

## 2022-01-01 ENCOUNTER — HOSPITAL ENCOUNTER (INPATIENT)
Facility: CLINIC | Age: 87
LOS: 6 days | Discharge: HOME | DRG: 291 | End: 2022-01-25
Attending: INTERNAL MEDICINE | Admitting: STUDENT IN AN ORGANIZED HEALTH CARE EDUCATION/TRAINING PROGRAM
Payer: COMMERCIAL

## 2022-01-01 ENCOUNTER — LAB (OUTPATIENT)
Dept: LAB | Facility: CLINIC | Age: 87
End: 2022-01-01
Attending: INTERNAL MEDICINE
Payer: COMMERCIAL

## 2022-01-01 ENCOUNTER — MYC MEDICAL ADVICE (OUTPATIENT)
Dept: CARDIOLOGY | Facility: CLINIC | Age: 87
End: 2022-01-01
Payer: COMMERCIAL

## 2022-01-01 ENCOUNTER — APPOINTMENT (OUTPATIENT)
Dept: OCCUPATIONAL THERAPY | Facility: CLINIC | Age: 87
DRG: 291 | End: 2022-01-01
Attending: NURSE PRACTITIONER
Payer: COMMERCIAL

## 2022-01-01 VITALS
DIASTOLIC BLOOD PRESSURE: 79 MMHG | TEMPERATURE: 96.8 F | OXYGEN SATURATION: 90 % | HEIGHT: 63 IN | WEIGHT: 121 LBS | HEART RATE: 103 BPM | RESPIRATION RATE: 16 BRPM | SYSTOLIC BLOOD PRESSURE: 135 MMHG | BODY MASS INDEX: 21.44 KG/M2

## 2022-01-01 VITALS
DIASTOLIC BLOOD PRESSURE: 76 MMHG | HEART RATE: 97 BPM | HEIGHT: 63 IN | BODY MASS INDEX: 20.45 KG/M2 | WEIGHT: 115.4 LBS | OXYGEN SATURATION: 97 % | SYSTOLIC BLOOD PRESSURE: 121 MMHG

## 2022-01-01 VITALS
BODY MASS INDEX: 19.98 KG/M2 | HEART RATE: 94 BPM | OXYGEN SATURATION: 98 % | TEMPERATURE: 97.6 F | WEIGHT: 112.8 LBS | RESPIRATION RATE: 16 BRPM | SYSTOLIC BLOOD PRESSURE: 120 MMHG | DIASTOLIC BLOOD PRESSURE: 76 MMHG

## 2022-01-01 VITALS
SYSTOLIC BLOOD PRESSURE: 153 MMHG | OXYGEN SATURATION: 94 % | HEIGHT: 63 IN | DIASTOLIC BLOOD PRESSURE: 83 MMHG | HEART RATE: 105 BPM | WEIGHT: 124.9 LBS | BODY MASS INDEX: 22.13 KG/M2

## 2022-01-01 VITALS
DIASTOLIC BLOOD PRESSURE: 70 MMHG | WEIGHT: 117.5 LBS | HEIGHT: 63 IN | BODY MASS INDEX: 20.82 KG/M2 | SYSTOLIC BLOOD PRESSURE: 137 MMHG | HEART RATE: 92 BPM | OXYGEN SATURATION: 98 %

## 2022-01-01 VITALS
OXYGEN SATURATION: 98 % | WEIGHT: 122.2 LBS | HEART RATE: 92 BPM | DIASTOLIC BLOOD PRESSURE: 54 MMHG | SYSTOLIC BLOOD PRESSURE: 117 MMHG | HEIGHT: 63 IN | BODY MASS INDEX: 21.65 KG/M2

## 2022-01-01 DIAGNOSIS — Z95.1 S/P CABG (CORONARY ARTERY BYPASS GRAFT): ICD-10-CM

## 2022-01-01 DIAGNOSIS — R53.81 PHYSICAL DECONDITIONING: ICD-10-CM

## 2022-01-01 DIAGNOSIS — I10 BENIGN ESSENTIAL HYPERTENSION: ICD-10-CM

## 2022-01-01 DIAGNOSIS — R06.02 SOB (SHORTNESS OF BREATH): ICD-10-CM

## 2022-01-01 DIAGNOSIS — I50.23 ACUTE ON CHRONIC SYSTOLIC CONGESTIVE HEART FAILURE (H): Primary | ICD-10-CM

## 2022-01-01 DIAGNOSIS — I25.10 CORONARY ARTERY DISEASE INVOLVING NATIVE CORONARY ARTERY OF NATIVE HEART WITHOUT ANGINA PECTORIS: ICD-10-CM

## 2022-01-01 DIAGNOSIS — I50.23 ACUTE ON CHRONIC SYSTOLIC CONGESTIVE HEART FAILURE (H): ICD-10-CM

## 2022-01-01 DIAGNOSIS — I25.10 CORONARY ARTERY DISEASE INVOLVING NATIVE CORONARY ARTERY OF NATIVE HEART WITHOUT ANGINA PECTORIS: Primary | ICD-10-CM

## 2022-01-01 DIAGNOSIS — I25.10 ASHD (ARTERIOSCLEROTIC HEART DISEASE): ICD-10-CM

## 2022-01-01 DIAGNOSIS — M48.56XA COMPRESSION FRACTURE OF LUMBAR SPINE, NON-TRAUMATIC (H): ICD-10-CM

## 2022-01-01 DIAGNOSIS — I50.21 ACUTE SYSTOLIC HEART FAILURE (H): Primary | ICD-10-CM

## 2022-01-01 DIAGNOSIS — R06.2 WHEEZING: ICD-10-CM

## 2022-01-01 DIAGNOSIS — E87.1 HYPONATREMIA: ICD-10-CM

## 2022-01-01 DIAGNOSIS — E78.5 HYPERLIPIDEMIA: ICD-10-CM

## 2022-01-01 DIAGNOSIS — I50.9 HEART FAILURE (H): ICD-10-CM

## 2022-01-01 DIAGNOSIS — I25.5 ISCHEMIC CARDIOMYOPATHY: ICD-10-CM

## 2022-01-01 DIAGNOSIS — Z53.9 DIAGNOSIS NOT YET DEFINED: Primary | ICD-10-CM

## 2022-01-01 DIAGNOSIS — I50.33 ACUTE ON CHRONIC DIASTOLIC CONGESTIVE HEART FAILURE (H): ICD-10-CM

## 2022-01-01 DIAGNOSIS — I50.22 CHRONIC SYSTOLIC HEART FAILURE (H): Primary | ICD-10-CM

## 2022-01-01 DIAGNOSIS — Z11.59 ENCOUNTER FOR SCREENING FOR OTHER VIRAL DISEASES: ICD-10-CM

## 2022-01-01 DIAGNOSIS — I51.9 LV DYSFUNCTION: ICD-10-CM

## 2022-01-01 DIAGNOSIS — R06.09 DYSPNEA ON EXERTION: ICD-10-CM

## 2022-01-01 DIAGNOSIS — Z12.5 ENCOUNTER FOR SCREENING FOR MALIGNANT NEOPLASM OF PROSTATE: ICD-10-CM

## 2022-01-01 DIAGNOSIS — J18.9 PNEUMONIA: ICD-10-CM

## 2022-01-01 DIAGNOSIS — G47.33 OSA (OBSTRUCTIVE SLEEP APNEA): Primary | ICD-10-CM

## 2022-01-01 DIAGNOSIS — I50.22 CHRONIC SYSTOLIC HEART FAILURE (H): ICD-10-CM

## 2022-01-01 DIAGNOSIS — Z98.61 STATUS POST PERCUTANEOUS TRANSLUMINAL CORONARY ANGIOPLASTY: ICD-10-CM

## 2022-01-01 DIAGNOSIS — M62.81 GENERALIZED MUSCLE WEAKNESS: ICD-10-CM

## 2022-01-01 DIAGNOSIS — R79.9 ABNORMAL FINDING OF BLOOD CHEMISTRY, UNSPECIFIED: ICD-10-CM

## 2022-01-01 DIAGNOSIS — S22.070S COMPRESSION FRACTURE OF T9 VERTEBRA, SEQUELA: ICD-10-CM

## 2022-01-01 DIAGNOSIS — Z53.9 ERRONEOUS ENCOUNTER--DISREGARD: Primary | ICD-10-CM

## 2022-01-01 DIAGNOSIS — M48.56XA: ICD-10-CM

## 2022-01-01 DIAGNOSIS — I50.21 ACUTE SYSTOLIC HEART FAILURE (H): ICD-10-CM

## 2022-01-01 DIAGNOSIS — R91.8 PULMONARY NODULES: ICD-10-CM

## 2022-01-01 LAB
ALBUMIN SERPL-MCNC: 3.1 G/DL (ref 3.4–5)
ALBUMIN SERPL-MCNC: 3.2 G/DL (ref 3.4–5)
ALBUMIN SERPL-MCNC: 3.5 G/DL (ref 3.4–5)
ALBUMIN SERPL-MCNC: 3.5 G/DL (ref 3.4–5)
ALP SERPL-CCNC: 69 U/L (ref 40–150)
ALP SERPL-CCNC: 71 U/L (ref 40–150)
ALP SERPL-CCNC: 79 U/L (ref 40–150)
ALT SERPL W P-5'-P-CCNC: 26 U/L (ref 0–70)
ALT SERPL W P-5'-P-CCNC: 28 U/L (ref 0–70)
ALT SERPL W P-5'-P-CCNC: 28 U/L (ref 0–70)
ANION GAP SERPL CALCULATED.3IONS-SCNC: 10 MMOL/L (ref 3–14)
ANION GAP SERPL CALCULATED.3IONS-SCNC: 11 MMOL/L (ref 3–14)
ANION GAP SERPL CALCULATED.3IONS-SCNC: 12 MMOL/L (ref 3–14)
ANION GAP SERPL CALCULATED.3IONS-SCNC: 6 MMOL/L (ref 3–14)
ANION GAP SERPL CALCULATED.3IONS-SCNC: 6 MMOL/L (ref 3–14)
ANION GAP SERPL CALCULATED.3IONS-SCNC: 8 MMOL/L (ref 3–14)
ANION GAP SERPL CALCULATED.3IONS-SCNC: 9 MMOL/L (ref 3–14)
AST SERPL W P-5'-P-CCNC: 27 U/L (ref 0–45)
AST SERPL W P-5'-P-CCNC: 28 U/L (ref 0–45)
AST SERPL W P-5'-P-CCNC: 31 U/L (ref 0–45)
ATRIAL RATE - MUSE: 74 BPM
ATRIAL RATE - MUSE: 75 BPM
ATRIAL RATE - MUSE: 76 BPM
BILIRUB SERPL-MCNC: 0.5 MG/DL (ref 0.2–1.3)
BILIRUB SERPL-MCNC: 0.7 MG/DL (ref 0.2–1.3)
BILIRUB SERPL-MCNC: 1 MG/DL (ref 0.2–1.3)
BUN SERPL-MCNC: 10 MG/DL (ref 7–30)
BUN SERPL-MCNC: 10 MG/DL (ref 7–30)
BUN SERPL-MCNC: 11 MG/DL (ref 7–30)
BUN SERPL-MCNC: 12 MG/DL (ref 7–30)
BUN SERPL-MCNC: 14 MG/DL (ref 7–30)
BUN SERPL-MCNC: 14 MG/DL (ref 7–30)
BUN SERPL-MCNC: 15 MG/DL (ref 7–30)
BUN SERPL-MCNC: 15 MG/DL (ref 7–30)
BUN SERPL-MCNC: 16 MG/DL (ref 7–30)
BUN SERPL-MCNC: 17 MG/DL (ref 7–30)
BUN SERPL-MCNC: 19 MG/DL (ref 7–30)
BUN SERPL-MCNC: 20 MG/DL (ref 7–30)
BUN SERPL-MCNC: 20 MG/DL (ref 7–30)
BUN SERPL-MCNC: 24 MG/DL (ref 7–30)
BUN SERPL-MCNC: 25 MG/DL (ref 7–30)
BUN SERPL-MCNC: 30 MG/DL (ref 7–30)
BUN SERPL-MCNC: 31 MG/DL (ref 7–30)
CALCIUM SERPL-MCNC: 8 MG/DL (ref 8.5–10.1)
CALCIUM SERPL-MCNC: 8.1 MG/DL (ref 8.5–10.1)
CALCIUM SERPL-MCNC: 8.2 MG/DL (ref 8.5–10.1)
CALCIUM SERPL-MCNC: 8.3 MG/DL (ref 8.5–10.1)
CALCIUM SERPL-MCNC: 8.3 MG/DL (ref 8.5–10.1)
CALCIUM SERPL-MCNC: 8.5 MG/DL (ref 8.5–10.1)
CALCIUM SERPL-MCNC: 8.5 MG/DL (ref 8.5–10.1)
CALCIUM SERPL-MCNC: 8.6 MG/DL (ref 8.5–10.1)
CALCIUM SERPL-MCNC: 8.8 MG/DL (ref 8.5–10.1)
CALCIUM SERPL-MCNC: 8.9 MG/DL (ref 8.5–10.1)
CALCIUM SERPL-MCNC: 9.3 MG/DL (ref 8.5–10.1)
CALCIUM SERPL-MCNC: 9.3 MG/DL (ref 8.5–10.1)
CALCIUM SERPL-MCNC: 9.4 MG/DL (ref 8.5–10.1)
CHLORIDE BLD-SCNC: 80 MMOL/L (ref 94–109)
CHLORIDE BLD-SCNC: 81 MMOL/L (ref 94–109)
CHLORIDE BLD-SCNC: 81 MMOL/L (ref 94–109)
CHLORIDE BLD-SCNC: 82 MMOL/L (ref 94–109)
CHLORIDE BLD-SCNC: 83 MMOL/L (ref 94–109)
CHLORIDE BLD-SCNC: 83 MMOL/L (ref 94–109)
CHLORIDE BLD-SCNC: 84 MMOL/L (ref 94–109)
CHLORIDE BLD-SCNC: 84 MMOL/L (ref 94–109)
CHLORIDE BLD-SCNC: 85 MMOL/L (ref 94–109)
CHLORIDE BLD-SCNC: 86 MMOL/L (ref 94–109)
CHLORIDE BLD-SCNC: 86 MMOL/L (ref 94–109)
CHLORIDE BLD-SCNC: 87 MMOL/L (ref 94–109)
CHLORIDE BLD-SCNC: 88 MMOL/L (ref 94–109)
CHLORIDE BLD-SCNC: 90 MMOL/L (ref 94–109)
CHLORIDE BLD-SCNC: 92 MMOL/L (ref 94–109)
CHLORIDE BLD-SCNC: 95 MMOL/L (ref 94–109)
CHLORIDE BLD-SCNC: 99 MMOL/L (ref 94–109)
CHOLEST SERPL-MCNC: 124 MG/DL
CO2 SERPL-SCNC: 24 MMOL/L (ref 20–32)
CO2 SERPL-SCNC: 25 MMOL/L (ref 20–32)
CO2 SERPL-SCNC: 25 MMOL/L (ref 20–32)
CO2 SERPL-SCNC: 26 MMOL/L (ref 20–32)
CO2 SERPL-SCNC: 26 MMOL/L (ref 20–32)
CO2 SERPL-SCNC: 27 MMOL/L (ref 20–32)
CO2 SERPL-SCNC: 27 MMOL/L (ref 20–32)
CO2 SERPL-SCNC: 28 MMOL/L (ref 20–32)
CO2 SERPL-SCNC: 28 MMOL/L (ref 20–32)
CO2 SERPL-SCNC: 29 MMOL/L (ref 20–32)
CO2 SERPL-SCNC: 30 MMOL/L (ref 20–32)
CO2 SERPL-SCNC: 31 MMOL/L (ref 20–32)
CREAT SERPL-MCNC: 0.58 MG/DL (ref 0.66–1.25)
CREAT SERPL-MCNC: 0.6 MG/DL (ref 0.66–1.25)
CREAT SERPL-MCNC: 0.63 MG/DL (ref 0.66–1.25)
CREAT SERPL-MCNC: 0.65 MG/DL (ref 0.66–1.25)
CREAT SERPL-MCNC: 0.66 MG/DL (ref 0.66–1.25)
CREAT SERPL-MCNC: 0.71 MG/DL (ref 0.66–1.25)
CREAT SERPL-MCNC: 0.71 MG/DL (ref 0.66–1.25)
CREAT SERPL-MCNC: 0.74 MG/DL (ref 0.66–1.25)
CREAT SERPL-MCNC: 0.75 MG/DL (ref 0.66–1.25)
CREAT SERPL-MCNC: 0.75 MG/DL (ref 0.66–1.25)
CREAT SERPL-MCNC: 0.76 MG/DL (ref 0.66–1.25)
CREAT SERPL-MCNC: 0.77 MG/DL (ref 0.66–1.25)
CREAT SERPL-MCNC: 0.77 MG/DL (ref 0.66–1.25)
CREAT SERPL-MCNC: 0.8 MG/DL (ref 0.66–1.25)
CREAT SERPL-MCNC: 0.8 MG/DL (ref 0.66–1.25)
CREAT SERPL-MCNC: 0.81 MG/DL (ref 0.66–1.25)
CREAT SERPL-MCNC: 0.9 MG/DL (ref 0.66–1.25)
CRP SERPL-MCNC: <2.9 MG/L (ref 0–8)
DIASTOLIC BLOOD PRESSURE - MUSE: NORMAL MMHG
ERYTHROCYTE [DISTWIDTH] IN BLOOD BY AUTOMATED COUNT: 14.9 % (ref 10–15)
ERYTHROCYTE [DISTWIDTH] IN BLOOD BY AUTOMATED COUNT: 14.9 % (ref 10–15)
ERYTHROCYTE [DISTWIDTH] IN BLOOD BY AUTOMATED COUNT: 15.1 % (ref 10–15)
ERYTHROCYTE [DISTWIDTH] IN BLOOD BY AUTOMATED COUNT: 15.2 % (ref 10–15)
GFR SERPL CREATININE-BSD FRML MDRD: 84 ML/MIN/1.73M2
GFR SERPL CREATININE-BSD FRML MDRD: 87 ML/MIN/1.73M2
GFR SERPL CREATININE-BSD FRML MDRD: 88 ML/MIN/1.73M2
GFR SERPL CREATININE-BSD FRML MDRD: 88 ML/MIN/1.73M2
GFR SERPL CREATININE-BSD FRML MDRD: 89 ML/MIN/1.73M2
GFR SERPL CREATININE-BSD FRML MDRD: 90 ML/MIN/1.73M2
GFR SERPL CREATININE-BSD FRML MDRD: 90 ML/MIN/1.73M2
GFR SERPL CREATININE-BSD FRML MDRD: >90 ML/MIN/1.73M2
GLUCOSE BLD-MCNC: 100 MG/DL (ref 70–99)
GLUCOSE BLD-MCNC: 101 MG/DL (ref 70–99)
GLUCOSE BLD-MCNC: 102 MG/DL (ref 70–99)
GLUCOSE BLD-MCNC: 103 MG/DL (ref 70–99)
GLUCOSE BLD-MCNC: 106 MG/DL (ref 70–99)
GLUCOSE BLD-MCNC: 110 MG/DL (ref 70–99)
GLUCOSE BLD-MCNC: 114 MG/DL (ref 70–99)
GLUCOSE BLD-MCNC: 123 MG/DL (ref 70–99)
GLUCOSE BLD-MCNC: 129 MG/DL (ref 70–99)
GLUCOSE BLD-MCNC: 134 MG/DL (ref 70–99)
GLUCOSE BLD-MCNC: 149 MG/DL (ref 70–99)
GLUCOSE BLD-MCNC: 158 MG/DL (ref 70–99)
GLUCOSE BLD-MCNC: 169 MG/DL (ref 70–99)
GLUCOSE BLD-MCNC: 90 MG/DL (ref 70–99)
GLUCOSE BLD-MCNC: 92 MG/DL (ref 70–99)
GLUCOSE BLD-MCNC: 93 MG/DL (ref 70–99)
GLUCOSE BLD-MCNC: 99 MG/DL (ref 70–99)
HBA1C MFR BLD: 5.1 % (ref 0–5.6)
HCT VFR BLD AUTO: 33.1 % (ref 40–53)
HCT VFR BLD AUTO: 34.7 % (ref 40–53)
HCT VFR BLD AUTO: 35.7 % (ref 40–53)
HCT VFR BLD AUTO: 36.6 % (ref 40–53)
HDLC SERPL-MCNC: 69 MG/DL
HGB BLD-MCNC: 12.1 G/DL (ref 13.3–17.7)
HGB BLD-MCNC: 12.6 G/DL (ref 13.3–17.7)
HGB BLD-MCNC: 12.9 G/DL (ref 13.3–17.7)
HGB BLD-MCNC: 13.3 G/DL (ref 13.3–17.7)
HOLD SPECIMEN: NORMAL
INTERPRETATION ECG - MUSE: NORMAL
LACTATE SERPL-SCNC: 0.8 MMOL/L (ref 0.7–2)
LDLC SERPL CALC-MCNC: 42 MG/DL
LVEF ECHO: NORMAL
MAGNESIUM SERPL-MCNC: 1.6 MG/DL (ref 1.6–2.3)
MAGNESIUM SERPL-MCNC: 1.7 MG/DL (ref 1.6–2.3)
MAGNESIUM SERPL-MCNC: 1.8 MG/DL (ref 1.6–2.3)
MAGNESIUM SERPL-MCNC: 1.9 MG/DL (ref 1.6–2.3)
MAGNESIUM SERPL-MCNC: 1.9 MG/DL (ref 1.6–2.3)
MCH RBC QN AUTO: 31.8 PG (ref 26.5–33)
MCH RBC QN AUTO: 31.9 PG (ref 26.5–33)
MCH RBC QN AUTO: 32.1 PG (ref 26.5–33)
MCH RBC QN AUTO: 32.4 PG (ref 26.5–33)
MCHC RBC AUTO-ENTMCNC: 36.1 G/DL (ref 31.5–36.5)
MCHC RBC AUTO-ENTMCNC: 36.3 G/DL (ref 31.5–36.5)
MCHC RBC AUTO-ENTMCNC: 36.3 G/DL (ref 31.5–36.5)
MCHC RBC AUTO-ENTMCNC: 36.6 G/DL (ref 31.5–36.5)
MCV RBC AUTO: 87 FL (ref 78–100)
MCV RBC AUTO: 88 FL (ref 78–100)
MCV RBC AUTO: 89 FL (ref 78–100)
MCV RBC AUTO: 89 FL (ref 78–100)
NONHDLC SERPL-MCNC: 55 MG/DL
NT-PROBNP SERPL-MCNC: 1724 PG/ML (ref 0–450)
NT-PROBNP SERPL-MCNC: 2088 PG/ML (ref 0–450)
NT-PROBNP SERPL-MCNC: 6201 PG/ML (ref 0–450)
OSMOLALITY SERPL: 248 MMOL/KG (ref 280–301)
OSMOLALITY UR: 290 MMOL/KG (ref 100–1200)
P AXIS - MUSE: 68 DEGREES
P AXIS - MUSE: 73 DEGREES
P AXIS - MUSE: 77 DEGREES
PHOSPHATE SERPL-MCNC: 3.6 MG/DL (ref 2.5–4.5)
PLATELET # BLD AUTO: 316 10E3/UL (ref 150–450)
PLATELET # BLD AUTO: 326 10E3/UL (ref 150–450)
PLATELET # BLD AUTO: 334 10E3/UL (ref 150–450)
PLATELET # BLD AUTO: 344 10E3/UL (ref 150–450)
PLATELET # BLD AUTO: 356 10E3/UL (ref 150–450)
POTASSIUM BLD-SCNC: 3.5 MMOL/L (ref 3.4–5.3)
POTASSIUM BLD-SCNC: 3.5 MMOL/L (ref 3.4–5.3)
POTASSIUM BLD-SCNC: 3.6 MMOL/L (ref 3.4–5.3)
POTASSIUM BLD-SCNC: 3.7 MMOL/L (ref 3.4–5.3)
POTASSIUM BLD-SCNC: 3.7 MMOL/L (ref 3.4–5.3)
POTASSIUM BLD-SCNC: 3.8 MMOL/L (ref 3.4–5.3)
POTASSIUM BLD-SCNC: 3.8 MMOL/L (ref 3.4–5.3)
POTASSIUM BLD-SCNC: 4 MMOL/L (ref 3.4–5.3)
POTASSIUM BLD-SCNC: 4.2 MMOL/L (ref 3.4–5.3)
POTASSIUM BLD-SCNC: 4.3 MMOL/L (ref 3.4–5.3)
POTASSIUM BLD-SCNC: 4.3 MMOL/L (ref 3.4–5.3)
POTASSIUM BLD-SCNC: 4.5 MMOL/L (ref 3.4–5.3)
POTASSIUM BLD-SCNC: 4.6 MMOL/L (ref 3.4–5.3)
PR INTERVAL - MUSE: 190 MS
PR INTERVAL - MUSE: 198 MS
PR INTERVAL - MUSE: 202 MS
PROCALCITONIN SERPL-MCNC: <0.05 NG/ML
PROT SERPL-MCNC: 6.1 G/DL (ref 6.8–8.8)
PROT SERPL-MCNC: 6.4 G/DL (ref 6.8–8.8)
PROT SERPL-MCNC: 6.7 G/DL (ref 6.8–8.8)
PSA SERPL-MCNC: 0.07 UG/L (ref 0–4)
QRS DURATION - MUSE: 110 MS
QRS DURATION - MUSE: 114 MS
QRS DURATION - MUSE: 122 MS
QT - MUSE: 400 MS
QT - MUSE: 410 MS
QT - MUSE: 432 MS
QTC - MUSE: 450 MS
QTC - MUSE: 455 MS
QTC - MUSE: 482 MS
R AXIS - MUSE: -16 DEGREES
R AXIS - MUSE: -23 DEGREES
R AXIS - MUSE: 7 DEGREES
RBC # BLD AUTO: 3.81 10E6/UL (ref 4.4–5.9)
RBC # BLD AUTO: 3.92 10E6/UL (ref 4.4–5.9)
RBC # BLD AUTO: 4.05 10E6/UL (ref 4.4–5.9)
RBC # BLD AUTO: 4.11 10E6/UL (ref 4.4–5.9)
SARS-COV-2 RNA RESP QL NAA+PROBE: NEGATIVE
SODIUM SERPL-SCNC: 118 MMOL/L (ref 133–144)
SODIUM SERPL-SCNC: 119 MMOL/L (ref 133–144)
SODIUM SERPL-SCNC: 120 MMOL/L (ref 133–144)
SODIUM SERPL-SCNC: 121 MMOL/L (ref 133–144)
SODIUM SERPL-SCNC: 122 MMOL/L (ref 133–144)
SODIUM SERPL-SCNC: 123 MMOL/L (ref 133–144)
SODIUM SERPL-SCNC: 124 MMOL/L (ref 133–144)
SODIUM SERPL-SCNC: 125 MMOL/L (ref 133–144)
SODIUM SERPL-SCNC: 126 MMOL/L (ref 133–144)
SODIUM SERPL-SCNC: 128 MMOL/L (ref 133–144)
SODIUM SERPL-SCNC: 129 MMOL/L (ref 133–144)
SODIUM SERPL-SCNC: 131 MMOL/L (ref 133–144)
SODIUM UR-SCNC: 62 MMOL/L
SYSTOLIC BLOOD PRESSURE - MUSE: NORMAL MMHG
T AXIS - MUSE: 121 DEGREES
T AXIS - MUSE: 122 DEGREES
T AXIS - MUSE: 134 DEGREES
TRIGL SERPL-MCNC: 63 MG/DL
TROPONIN I SERPL HS-MCNC: 32 NG/L
VENTRICULAR RATE- MUSE: 74 BPM
VENTRICULAR RATE- MUSE: 75 BPM
VENTRICULAR RATE- MUSE: 76 BPM
WBC # BLD AUTO: 10.6 10E3/UL (ref 4–11)
WBC # BLD AUTO: 9.3 10E3/UL (ref 4–11)
WBC # BLD AUTO: 9.7 10E3/UL (ref 4–11)
WBC # BLD AUTO: 9.8 10E3/UL (ref 4–11)

## 2022-01-01 PROCEDURE — 36415 COLL VENOUS BLD VENIPUNCTURE: CPT

## 2022-01-01 PROCEDURE — 250N000013 HC RX MED GY IP 250 OP 250 PS 637: Performed by: STUDENT IN AN ORGANIZED HEALTH CARE EDUCATION/TRAINING PROGRAM

## 2022-01-01 PROCEDURE — 250N000013 HC RX MED GY IP 250 OP 250 PS 637: Performed by: INTERNAL MEDICINE

## 2022-01-01 PROCEDURE — 80053 COMPREHEN METABOLIC PANEL: CPT | Performed by: PATHOLOGY

## 2022-01-01 PROCEDURE — A9585 GADOBUTROL INJECTION: HCPCS | Performed by: INTERNAL MEDICINE

## 2022-01-01 PROCEDURE — 71045 X-RAY EXAM CHEST 1 VIEW: CPT

## 2022-01-01 PROCEDURE — G0463 HOSPITAL OUTPT CLINIC VISIT: HCPCS

## 2022-01-01 PROCEDURE — 80048 BASIC METABOLIC PNL TOTAL CA: CPT | Performed by: NURSE PRACTITIONER

## 2022-01-01 PROCEDURE — 255N000002 HC RX 255 OP 636: Performed by: INTERNAL MEDICINE

## 2022-01-01 PROCEDURE — 71250 CT THORAX DX C-: CPT | Performed by: RADIOLOGY

## 2022-01-01 PROCEDURE — 83735 ASSAY OF MAGNESIUM: CPT | Performed by: STUDENT IN AN ORGANIZED HEALTH CARE EDUCATION/TRAINING PROGRAM

## 2022-01-01 PROCEDURE — 99223 1ST HOSP IP/OBS HIGH 75: CPT | Mod: 25 | Performed by: INTERNAL MEDICINE

## 2022-01-01 PROCEDURE — 97116 GAIT TRAINING THERAPY: CPT | Mod: GP

## 2022-01-01 PROCEDURE — 36415 COLL VENOUS BLD VENIPUNCTURE: CPT | Performed by: STUDENT IN AN ORGANIZED HEALTH CARE EDUCATION/TRAINING PROGRAM

## 2022-01-01 PROCEDURE — 250N000011 HC RX IP 250 OP 636: Performed by: STUDENT IN AN ORGANIZED HEALTH CARE EDUCATION/TRAINING PROGRAM

## 2022-01-01 PROCEDURE — 36415 COLL VENOUS BLD VENIPUNCTURE: CPT | Performed by: NURSE PRACTITIONER

## 2022-01-01 PROCEDURE — 84145 PROCALCITONIN (PCT): CPT | Mod: 90 | Performed by: PATHOLOGY

## 2022-01-01 PROCEDURE — G0103 PSA SCREENING: HCPCS | Performed by: PATHOLOGY

## 2022-01-01 PROCEDURE — 214N000001 HC R&B CCU UMMC

## 2022-01-01 PROCEDURE — 250N000013 HC RX MED GY IP 250 OP 250 PS 637: Performed by: NURSE PRACTITIONER

## 2022-01-01 PROCEDURE — 99214 OFFICE O/P EST MOD 30 MIN: CPT | Performed by: NURSE PRACTITIONER

## 2022-01-01 PROCEDURE — 83930 ASSAY OF BLOOD OSMOLALITY: CPT | Performed by: INTERNAL MEDICINE

## 2022-01-01 PROCEDURE — 250N000011 HC RX IP 250 OP 636: Performed by: INTERNAL MEDICINE

## 2022-01-01 PROCEDURE — 99232 SBSQ HOSP IP/OBS MODERATE 35: CPT | Mod: FS | Performed by: INTERNAL MEDICINE

## 2022-01-01 PROCEDURE — 83735 ASSAY OF MAGNESIUM: CPT | Performed by: PATHOLOGY

## 2022-01-01 PROCEDURE — 85014 HEMATOCRIT: CPT | Performed by: STUDENT IN AN ORGANIZED HEALTH CARE EDUCATION/TRAINING PROGRAM

## 2022-01-01 PROCEDURE — 71045 X-RAY EXAM CHEST 1 VIEW: CPT | Mod: 26 | Performed by: RADIOLOGY

## 2022-01-01 PROCEDURE — 93017 CV STRESS TEST TRACING ONLY: CPT

## 2022-01-01 PROCEDURE — 250N000013 HC RX MED GY IP 250 OP 250 PS 637: Performed by: PHYSICIAN ASSISTANT

## 2022-01-01 PROCEDURE — 84295 ASSAY OF SERUM SODIUM: CPT | Performed by: NURSE PRACTITIONER

## 2022-01-01 PROCEDURE — 84295 ASSAY OF SERUM SODIUM: CPT | Performed by: INTERNAL MEDICINE

## 2022-01-01 PROCEDURE — 99222 1ST HOSP IP/OBS MODERATE 55: CPT | Performed by: INTERNAL MEDICINE

## 2022-01-01 PROCEDURE — 97165 OT EVAL LOW COMPLEX 30 MIN: CPT | Mod: GO

## 2022-01-01 PROCEDURE — 250N000011 HC RX IP 250 OP 636: Performed by: NURSE PRACTITIONER

## 2022-01-01 PROCEDURE — 82374 ASSAY BLOOD CARBON DIOXIDE: CPT | Performed by: NURSE PRACTITIONER

## 2022-01-01 PROCEDURE — 36415 COLL VENOUS BLD VENIPUNCTURE: CPT | Performed by: INTERNAL MEDICINE

## 2022-01-01 PROCEDURE — 99213 OFFICE O/P EST LOW 20 MIN: CPT | Performed by: INTERNAL MEDICINE

## 2022-01-01 PROCEDURE — 99207 PR MD CERTIFICATION HHA PATIENT: CPT | Performed by: INTERNAL MEDICINE

## 2022-01-01 PROCEDURE — 250N000009 HC RX 250: Performed by: NURSE PRACTITIONER

## 2022-01-01 PROCEDURE — 93005 ELECTROCARDIOGRAM TRACING: CPT

## 2022-01-01 PROCEDURE — 97530 THERAPEUTIC ACTIVITIES: CPT | Mod: GO

## 2022-01-01 PROCEDURE — 93010 ELECTROCARDIOGRAM REPORT: CPT | Mod: 59 | Performed by: INTERNAL MEDICINE

## 2022-01-01 PROCEDURE — 84450 TRANSFERASE (AST) (SGOT): CPT | Performed by: INTERNAL MEDICINE

## 2022-01-01 PROCEDURE — 83880 ASSAY OF NATRIURETIC PEPTIDE: CPT

## 2022-01-01 PROCEDURE — 999N000208 ECHOCARDIOGRAM COMPLETE

## 2022-01-01 PROCEDURE — 97530 THERAPEUTIC ACTIVITIES: CPT | Mod: GP

## 2022-01-01 PROCEDURE — 999N000128 HC STATISTIC PERIPHERAL IV START W/O US GUIDANCE

## 2022-01-01 PROCEDURE — 97161 PT EVAL LOW COMPLEX 20 MIN: CPT | Mod: GP

## 2022-01-01 PROCEDURE — 83735 ASSAY OF MAGNESIUM: CPT | Performed by: INTERNAL MEDICINE

## 2022-01-01 PROCEDURE — 71250 CT THORAX DX C-: CPT

## 2022-01-01 PROCEDURE — 99214 OFFICE O/P EST MOD 30 MIN: CPT | Performed by: INTERNAL MEDICINE

## 2022-01-01 PROCEDURE — 84100 ASSAY OF PHOSPHORUS: CPT | Performed by: STUDENT IN AN ORGANIZED HEALTH CARE EDUCATION/TRAINING PROGRAM

## 2022-01-01 PROCEDURE — 93306 TTE W/DOPPLER COMPLETE: CPT | Mod: 26 | Performed by: STUDENT IN AN ORGANIZED HEALTH CARE EDUCATION/TRAINING PROGRAM

## 2022-01-01 PROCEDURE — 85027 COMPLETE CBC AUTOMATED: CPT | Performed by: PATHOLOGY

## 2022-01-01 PROCEDURE — 99239 HOSP IP/OBS DSCHRG MGMT >30: CPT | Performed by: NURSE PRACTITIONER

## 2022-01-01 PROCEDURE — 83605 ASSAY OF LACTIC ACID: CPT | Performed by: INTERNAL MEDICINE

## 2022-01-01 PROCEDURE — 75563 CARD MRI W/STRESS IMG & DYE: CPT | Mod: 26 | Performed by: INTERNAL MEDICINE

## 2022-01-01 PROCEDURE — 85049 AUTOMATED PLATELET COUNT: CPT | Performed by: STUDENT IN AN ORGANIZED HEALTH CARE EDUCATION/TRAINING PROGRAM

## 2022-01-01 PROCEDURE — 93010 ELECTROCARDIOGRAM REPORT: CPT | Performed by: INTERNAL MEDICINE

## 2022-01-01 PROCEDURE — 36415 COLL VENOUS BLD VENIPUNCTURE: CPT | Performed by: PATHOLOGY

## 2022-01-01 PROCEDURE — 80048 BASIC METABOLIC PNL TOTAL CA: CPT | Performed by: INTERNAL MEDICINE

## 2022-01-01 PROCEDURE — 84155 ASSAY OF PROTEIN SERUM: CPT | Performed by: INTERNAL MEDICINE

## 2022-01-01 PROCEDURE — 84300 ASSAY OF URINE SODIUM: CPT | Performed by: INTERNAL MEDICINE

## 2022-01-01 PROCEDURE — 99000 SPECIMEN HANDLING OFFICE-LAB: CPT | Performed by: PATHOLOGY

## 2022-01-01 PROCEDURE — 85027 COMPLETE CBC AUTOMATED: CPT | Performed by: STUDENT IN AN ORGANIZED HEALTH CARE EDUCATION/TRAINING PROGRAM

## 2022-01-01 PROCEDURE — 75563 CARD MRI W/STRESS IMG & DYE: CPT

## 2022-01-01 PROCEDURE — U0005 INFEC AGEN DETEC AMPLI PROBE: HCPCS | Performed by: INTERNAL MEDICINE

## 2022-01-01 PROCEDURE — 80048 BASIC METABOLIC PNL TOTAL CA: CPT

## 2022-01-01 PROCEDURE — 86140 C-REACTIVE PROTEIN: CPT | Performed by: PATHOLOGY

## 2022-01-01 PROCEDURE — 93018 CV STRESS TEST I&R ONLY: CPT | Performed by: INTERNAL MEDICINE

## 2022-01-01 PROCEDURE — 99215 OFFICE O/P EST HI 40 MIN: CPT | Performed by: INTERNAL MEDICINE

## 2022-01-01 PROCEDURE — 97535 SELF CARE MNGMENT TRAINING: CPT | Mod: GO

## 2022-01-01 PROCEDURE — 83880 ASSAY OF NATRIURETIC PEPTIDE: CPT | Performed by: PATHOLOGY

## 2022-01-01 PROCEDURE — 83036 HEMOGLOBIN GLYCOSYLATED A1C: CPT

## 2022-01-01 PROCEDURE — 80061 LIPID PANEL: CPT | Performed by: STUDENT IN AN ORGANIZED HEALTH CARE EDUCATION/TRAINING PROGRAM

## 2022-01-01 PROCEDURE — 82310 ASSAY OF CALCIUM: CPT | Performed by: NURSE PRACTITIONER

## 2022-01-01 PROCEDURE — 99233 SBSQ HOSP IP/OBS HIGH 50: CPT | Performed by: STUDENT IN AN ORGANIZED HEALTH CARE EDUCATION/TRAINING PROGRAM

## 2022-01-01 PROCEDURE — 84484 ASSAY OF TROPONIN QUANT: CPT | Performed by: INTERNAL MEDICINE

## 2022-01-01 PROCEDURE — 80051 ELECTROLYTE PANEL: CPT | Performed by: NURSE PRACTITIONER

## 2022-01-01 PROCEDURE — 83935 ASSAY OF URINE OSMOLALITY: CPT | Performed by: INTERNAL MEDICINE

## 2022-01-01 PROCEDURE — 93016 CV STRESS TEST SUPVJ ONLY: CPT | Performed by: INTERNAL MEDICINE

## 2022-01-01 PROCEDURE — 99233 SBSQ HOSP IP/OBS HIGH 50: CPT | Mod: FS | Performed by: INTERNAL MEDICINE

## 2022-01-01 RX ORDER — FUROSEMIDE 20 MG
20 TABLET ORAL DAILY
Status: DISCONTINUED | OUTPATIENT
Start: 2022-01-01 | End: 2022-01-01

## 2022-01-01 RX ORDER — NITROGLYCERIN 0.4 MG/1
0.4 TABLET SUBLINGUAL EVERY 5 MIN PRN
Status: DISCONTINUED | OUTPATIENT
Start: 2022-01-01 | End: 2022-01-01 | Stop reason: HOSPADM

## 2022-01-01 RX ORDER — VITAMIN B COMPLEX
25 TABLET ORAL DAILY
Status: DISCONTINUED | OUTPATIENT
Start: 2022-01-01 | End: 2022-01-01 | Stop reason: HOSPADM

## 2022-01-01 RX ORDER — CLOPIDOGREL BISULFATE 75 MG/1
75 TABLET ORAL EVERY EVENING
Status: DISCONTINUED | OUTPATIENT
Start: 2022-01-01 | End: 2022-01-01

## 2022-01-01 RX ORDER — LIDOCAINE 4 G/G
1 PATCH TOPICAL
Status: DISCONTINUED | OUTPATIENT
Start: 2022-01-01 | End: 2022-01-01

## 2022-01-01 RX ORDER — ALBUTEROL SULFATE 90 UG/1
2 AEROSOL, METERED RESPIRATORY (INHALATION) EVERY 4 HOURS PRN
Qty: 6.7 G | Refills: 0 | Status: SHIPPED | OUTPATIENT
Start: 2022-01-01

## 2022-01-01 RX ORDER — FUROSEMIDE 40 MG
40 TABLET ORAL DAILY
COMMUNITY
Start: 2022-01-01

## 2022-01-01 RX ORDER — FUROSEMIDE 10 MG/ML
40 INJECTION INTRAMUSCULAR; INTRAVENOUS
Status: DISCONTINUED | OUTPATIENT
Start: 2022-01-01 | End: 2022-01-01

## 2022-01-01 RX ORDER — TEMAZEPAM 7.5 MG/1
7.5 CAPSULE ORAL ONCE
Status: DISCONTINUED | OUTPATIENT
Start: 2022-01-01 | End: 2022-01-01 | Stop reason: HOSPADM

## 2022-01-01 RX ORDER — AMINOPHYLLINE 25 MG/ML
100 INJECTION, SOLUTION INTRAVENOUS ONCE
Status: COMPLETED | OUTPATIENT
Start: 2022-01-01 | End: 2022-01-01

## 2022-01-01 RX ORDER — SPIRONOLACTONE 25 MG/1
TABLET ORAL
Qty: 45 TABLET | Refills: 3 | Status: CANCELLED | OUTPATIENT
Start: 2022-01-01

## 2022-01-01 RX ORDER — SACUBITRIL AND VALSARTAN 49; 51 MG/1; MG/1
1 TABLET, FILM COATED ORAL 2 TIMES DAILY
Qty: 60 TABLET | Refills: 4 | Status: SHIPPED | OUTPATIENT
Start: 2022-01-01

## 2022-01-01 RX ORDER — METOPROLOL SUCCINATE 50 MG/1
50 TABLET, EXTENDED RELEASE ORAL DAILY
Qty: 90 TABLET | Refills: 3 | Status: SHIPPED | OUTPATIENT
Start: 2022-01-01 | End: 2022-01-01

## 2022-01-01 RX ORDER — PROCHLORPERAZINE MALEATE 5 MG
5 TABLET ORAL EVERY 6 HOURS PRN
Status: DISCONTINUED | OUTPATIENT
Start: 2022-01-01 | End: 2022-01-01 | Stop reason: HOSPADM

## 2022-01-01 RX ORDER — IPRATROPIUM BROMIDE AND ALBUTEROL SULFATE 2.5; .5 MG/3ML; MG/3ML
3 SOLUTION RESPIRATORY (INHALATION)
Status: DISCONTINUED | OUTPATIENT
Start: 2022-01-01 | End: 2022-01-01

## 2022-01-01 RX ORDER — FUROSEMIDE 10 MG/ML
80 INJECTION INTRAMUSCULAR; INTRAVENOUS
Status: DISCONTINUED | OUTPATIENT
Start: 2022-01-01 | End: 2022-01-01

## 2022-01-01 RX ORDER — POLYETHYLENE GLYCOL 3350 17 G/17G
17 POWDER, FOR SOLUTION ORAL DAILY
Status: DISCONTINUED | OUTPATIENT
Start: 2022-01-01 | End: 2022-01-01 | Stop reason: HOSPADM

## 2022-01-01 RX ORDER — ACETAMINOPHEN 325 MG/1
650 TABLET ORAL EVERY 4 HOURS PRN
Status: DISCONTINUED | OUTPATIENT
Start: 2022-01-01 | End: 2022-01-01 | Stop reason: HOSPADM

## 2022-01-01 RX ORDER — MAGNESIUM HYDROXIDE/ALUMINUM HYDROXICE/SIMETHICONE 120; 1200; 1200 MG/30ML; MG/30ML; MG/30ML
30 SUSPENSION ORAL EVERY 4 HOURS PRN
Status: DISCONTINUED | OUTPATIENT
Start: 2022-01-01 | End: 2022-01-01 | Stop reason: HOSPADM

## 2022-01-01 RX ORDER — BISACODYL 5 MG
10 TABLET, DELAYED RELEASE (ENTERIC COATED) ORAL DAILY PRN
Status: DISCONTINUED | OUTPATIENT
Start: 2022-01-01 | End: 2022-01-01 | Stop reason: HOSPADM

## 2022-01-01 RX ORDER — ASPIRIN 81 MG/1
81 TABLET ORAL DAILY
Status: DISCONTINUED | OUTPATIENT
Start: 2022-01-01 | End: 2022-01-01 | Stop reason: HOSPADM

## 2022-01-01 RX ORDER — LIDOCAINE HYDROCHLORIDE 20 MG/ML
JELLY TOPICAL EVERY 4 HOURS PRN
Status: DISCONTINUED | OUTPATIENT
Start: 2022-01-01 | End: 2022-01-01 | Stop reason: HOSPADM

## 2022-01-01 RX ORDER — REGADENOSON 0.08 MG/ML
0.4 INJECTION, SOLUTION INTRAVENOUS ONCE
Status: COMPLETED | OUTPATIENT
Start: 2022-01-01 | End: 2022-01-01

## 2022-01-01 RX ORDER — ALBUTEROL SULFATE 90 UG/1
2 AEROSOL, METERED RESPIRATORY (INHALATION) EVERY 5 MIN PRN
Status: DISCONTINUED | OUTPATIENT
Start: 2022-01-01 | End: 2022-01-01 | Stop reason: HOSPADM

## 2022-01-01 RX ORDER — PANTOPRAZOLE SODIUM 40 MG/1
40 TABLET, DELAYED RELEASE ORAL DAILY
Status: DISCONTINUED | OUTPATIENT
Start: 2022-01-01 | End: 2022-01-01 | Stop reason: HOSPADM

## 2022-01-01 RX ORDER — BISACODYL 5 MG
15 TABLET, DELAYED RELEASE (ENTERIC COATED) ORAL DAILY PRN
Status: DISCONTINUED | OUTPATIENT
Start: 2022-01-01 | End: 2022-01-01 | Stop reason: HOSPADM

## 2022-01-01 RX ORDER — ACYCLOVIR 200 MG/1
0-1 CAPSULE ORAL
Status: DISCONTINUED | OUTPATIENT
Start: 2022-01-01 | End: 2022-01-01 | Stop reason: HOSPADM

## 2022-01-01 RX ORDER — ACETAMINOPHEN 650 MG/1
650 SUPPOSITORY RECTAL EVERY 4 HOURS PRN
Status: DISCONTINUED | OUTPATIENT
Start: 2022-01-01 | End: 2022-01-01 | Stop reason: HOSPADM

## 2022-01-01 RX ORDER — POTASSIUM CHLORIDE 750 MG/1
10 TABLET, EXTENDED RELEASE ORAL ONCE
Status: COMPLETED | OUTPATIENT
Start: 2022-01-01 | End: 2022-01-01

## 2022-01-01 RX ORDER — ACETAMINOPHEN 325 MG/1
650 TABLET ORAL EVERY 4 HOURS PRN
Status: DISCONTINUED | OUTPATIENT
Start: 2022-01-01 | End: 2022-01-01

## 2022-01-01 RX ORDER — METOPROLOL SUCCINATE 50 MG/1
50 TABLET, EXTENDED RELEASE ORAL DAILY
Status: DISCONTINUED | OUTPATIENT
Start: 2022-01-01 | End: 2022-01-01 | Stop reason: HOSPADM

## 2022-01-01 RX ORDER — ONDANSETRON 2 MG/ML
4 INJECTION INTRAMUSCULAR; INTRAVENOUS
Status: DISCONTINUED | OUTPATIENT
Start: 2022-01-01 | End: 2022-01-01 | Stop reason: HOSPADM

## 2022-01-01 RX ORDER — CAFFEINE CITRATE 20 MG/ML
60 SOLUTION INTRAVENOUS
Status: DISCONTINUED | OUTPATIENT
Start: 2022-01-01 | End: 2022-01-01 | Stop reason: HOSPADM

## 2022-01-01 RX ORDER — LISINOPRIL 5 MG/1
5 TABLET ORAL 2 TIMES DAILY
Status: DISCONTINUED | OUTPATIENT
Start: 2022-01-01 | End: 2022-01-01

## 2022-01-01 RX ORDER — GADOBUTROL 604.72 MG/ML
7.5 INJECTION INTRAVENOUS ONCE
Status: COMPLETED | OUTPATIENT
Start: 2022-01-01 | End: 2022-01-01

## 2022-01-01 RX ORDER — GUAIFENESIN 600 MG/1
600 TABLET, EXTENDED RELEASE ORAL DAILY
Status: DISCONTINUED | OUTPATIENT
Start: 2022-01-01 | End: 2022-01-01 | Stop reason: HOSPADM

## 2022-01-01 RX ORDER — SACUBITRIL AND VALSARTAN 49; 51 MG/1; MG/1
1 TABLET, FILM COATED ORAL 2 TIMES DAILY
Qty: 180 TABLET | Refills: 3 | Status: SHIPPED | OUTPATIENT
Start: 2022-01-01 | End: 2022-01-01

## 2022-01-01 RX ORDER — FUROSEMIDE 40 MG
20 TABLET ORAL 2 TIMES DAILY
Qty: 90 TABLET | Refills: 3 | Status: SHIPPED | OUTPATIENT
Start: 2022-01-01 | End: 2022-01-01

## 2022-01-01 RX ORDER — FUROSEMIDE 40 MG
40 TABLET ORAL
Status: DISCONTINUED | OUTPATIENT
Start: 2022-01-01 | End: 2022-01-01 | Stop reason: HOSPADM

## 2022-01-01 RX ORDER — FUROSEMIDE 10 MG/ML
40 INJECTION INTRAMUSCULAR; INTRAVENOUS ONCE
Status: COMPLETED | OUTPATIENT
Start: 2022-01-01 | End: 2022-01-01

## 2022-01-01 RX ORDER — DIPHENHYDRAMINE HCL 25 MG
25 CAPSULE ORAL
Status: DISCONTINUED | OUTPATIENT
Start: 2022-01-01 | End: 2022-01-01

## 2022-01-01 RX ORDER — LISINOPRIL 10 MG/1
10 TABLET ORAL 2 TIMES DAILY
Status: DISCONTINUED | OUTPATIENT
Start: 2022-01-01 | End: 2022-01-01

## 2022-01-01 RX ORDER — SACUBITRIL AND VALSARTAN 49; 51 MG/1; MG/1
1 TABLET, FILM COATED ORAL EVERY MORNING
Qty: 90 TABLET | Refills: 1 | Status: SHIPPED | OUTPATIENT
Start: 2022-01-01 | End: 2022-01-01 | Stop reason: DRUGHIGH

## 2022-01-01 RX ORDER — IPRATROPIUM BROMIDE AND ALBUTEROL SULFATE 2.5; .5 MG/3ML; MG/3ML
3 SOLUTION RESPIRATORY (INHALATION) EVERY 4 HOURS PRN
Status: DISCONTINUED | OUTPATIENT
Start: 2022-01-01 | End: 2022-01-01 | Stop reason: HOSPADM

## 2022-01-01 RX ORDER — FUROSEMIDE 40 MG
40 TABLET ORAL
Qty: 90 TABLET | Refills: 3 | Status: SHIPPED | OUTPATIENT
Start: 2022-01-01 | End: 2022-01-01

## 2022-01-01 RX ORDER — LIDOCAINE 40 MG/G
CREAM TOPICAL
Status: DISCONTINUED | OUTPATIENT
Start: 2022-01-01 | End: 2022-01-01 | Stop reason: HOSPADM

## 2022-01-01 RX ORDER — DIPHENHYDRAMINE HYDROCHLORIDE 50 MG/ML
25-50 INJECTION INTRAMUSCULAR; INTRAVENOUS
Status: DISCONTINUED | OUTPATIENT
Start: 2022-01-01 | End: 2022-01-01 | Stop reason: CLARIF

## 2022-01-01 RX ORDER — PROCHLORPERAZINE 25 MG
12.5 SUPPOSITORY, RECTAL RECTAL EVERY 12 HOURS PRN
Status: DISCONTINUED | OUTPATIENT
Start: 2022-01-01 | End: 2022-01-01 | Stop reason: HOSPADM

## 2022-01-01 RX ORDER — SPIRONOLACTONE 25 MG
12.5 TABLET ORAL DAILY
Status: DISCONTINUED | OUTPATIENT
Start: 2022-01-01 | End: 2022-01-01

## 2022-01-01 RX ORDER — BISACODYL 5 MG
5 TABLET, DELAYED RELEASE (ENTERIC COATED) ORAL DAILY PRN
Status: DISCONTINUED | OUTPATIENT
Start: 2022-01-01 | End: 2022-01-01 | Stop reason: HOSPADM

## 2022-01-01 RX ORDER — METHYLPREDNISOLONE SODIUM SUCCINATE 125 MG/2ML
125 INJECTION, POWDER, LYOPHILIZED, FOR SOLUTION INTRAMUSCULAR; INTRAVENOUS
Status: DISCONTINUED | OUTPATIENT
Start: 2022-01-01 | End: 2022-01-01 | Stop reason: HOSPADM

## 2022-01-01 RX ORDER — ALBUTEROL SULFATE 90 UG/1
2 AEROSOL, METERED RESPIRATORY (INHALATION) EVERY 4 HOURS PRN
Status: DISCONTINUED | OUTPATIENT
Start: 2022-01-01 | End: 2022-01-01 | Stop reason: HOSPADM

## 2022-01-01 RX ORDER — ATORVASTATIN CALCIUM 40 MG/1
40 TABLET, FILM COATED ORAL EVERY EVENING
Status: DISCONTINUED | OUTPATIENT
Start: 2022-01-01 | End: 2022-01-01 | Stop reason: HOSPADM

## 2022-01-01 RX ORDER — CHLORTHALIDONE 25 MG/1
TABLET ORAL
Qty: 60 TABLET | Refills: 1 | Status: ON HOLD | OUTPATIENT
Start: 2022-01-01 | End: 2022-01-01

## 2022-01-01 RX ORDER — ALBUTEROL SULFATE 90 UG/1
2 AEROSOL, METERED RESPIRATORY (INHALATION) EVERY 4 HOURS PRN
Qty: 6.7 G | Refills: 0 | Status: SHIPPED | OUTPATIENT
Start: 2022-01-01 | End: 2022-01-01

## 2022-01-01 RX ADMIN — METOPROLOL SUCCINATE 50 MG: 50 TABLET, EXTENDED RELEASE ORAL at 08:35

## 2022-01-01 RX ADMIN — METOPROLOL SUCCINATE 50 MG: 50 TABLET, EXTENDED RELEASE ORAL at 07:55

## 2022-01-01 RX ADMIN — POTASSIUM CHLORIDE 10 MEQ: 750 TABLET, EXTENDED RELEASE ORAL at 08:05

## 2022-01-01 RX ADMIN — PANTOPRAZOLE SODIUM 40 MG: 40 TABLET, DELAYED RELEASE ORAL at 08:36

## 2022-01-01 RX ADMIN — Medication 25 MCG: at 08:10

## 2022-01-01 RX ADMIN — FUROSEMIDE 40 MG: 40 TABLET ORAL at 15:48

## 2022-01-01 RX ADMIN — FUROSEMIDE 40 MG: 40 TABLET ORAL at 08:13

## 2022-01-01 RX ADMIN — FUROSEMIDE 40 MG: 10 INJECTION, SOLUTION INTRAVENOUS at 16:03

## 2022-01-01 RX ADMIN — GUAIFENESIN 600 MG: 600 TABLET ORAL at 08:10

## 2022-01-01 RX ADMIN — CLOPIDOGREL BISULFATE 75 MG: 75 TABLET ORAL at 20:40

## 2022-01-01 RX ADMIN — PANTOPRAZOLE SODIUM 40 MG: 40 TABLET, DELAYED RELEASE ORAL at 07:56

## 2022-01-01 RX ADMIN — IPRATROPIUM BROMIDE AND ALBUTEROL SULFATE 3 ML: 2.5; .5 SOLUTION RESPIRATORY (INHALATION) at 10:30

## 2022-01-01 RX ADMIN — POTASSIUM CHLORIDE 10 MEQ: 750 TABLET, EXTENDED RELEASE ORAL at 18:13

## 2022-01-01 RX ADMIN — HUMAN ALBUMIN MICROSPHERES AND PERFLUTREN 6 ML: 10; .22 INJECTION, SOLUTION INTRAVENOUS at 09:20

## 2022-01-01 RX ADMIN — ENOXAPARIN SODIUM 40 MG: 40 INJECTION SUBCUTANEOUS at 19:48

## 2022-01-01 RX ADMIN — LISINOPRIL 5 MG: 5 TABLET ORAL at 20:30

## 2022-01-01 RX ADMIN — ACETAMINOPHEN 650 MG: 325 TABLET, FILM COATED ORAL at 01:54

## 2022-01-01 RX ADMIN — Medication 5 MG: at 21:24

## 2022-01-01 RX ADMIN — SACUBITRIL AND VALSARTAN 1 TABLET: 24; 26 TABLET, FILM COATED ORAL at 20:28

## 2022-01-01 RX ADMIN — LISINOPRIL 10 MG: 10 TABLET ORAL at 20:40

## 2022-01-01 RX ADMIN — ATORVASTATIN CALCIUM 40 MG: 40 TABLET, FILM COATED ORAL at 20:40

## 2022-01-01 RX ADMIN — POTASSIUM CHLORIDE 10 MEQ: 750 TABLET, EXTENDED RELEASE ORAL at 20:30

## 2022-01-01 RX ADMIN — ACETAMINOPHEN 650 MG: 325 TABLET, FILM COATED ORAL at 03:15

## 2022-01-01 RX ADMIN — IPRATROPIUM BROMIDE AND ALBUTEROL SULFATE 3 ML: 2.5; .5 SOLUTION RESPIRATORY (INHALATION) at 16:19

## 2022-01-01 RX ADMIN — PANTOPRAZOLE SODIUM 40 MG: 40 TABLET, DELAYED RELEASE ORAL at 09:02

## 2022-01-01 RX ADMIN — Medication 5 MG: at 20:58

## 2022-01-01 RX ADMIN — POTASSIUM CHLORIDE 10 MEQ: 750 TABLET, EXTENDED RELEASE ORAL at 08:14

## 2022-01-01 RX ADMIN — LISINOPRIL 5 MG: 5 TABLET ORAL at 07:56

## 2022-01-01 RX ADMIN — ACETAMINOPHEN 650 MG: 325 TABLET, FILM COATED ORAL at 01:45

## 2022-01-01 RX ADMIN — FUROSEMIDE 80 MG: 10 INJECTION, SOLUTION INTRAVENOUS at 12:39

## 2022-01-01 RX ADMIN — FUROSEMIDE 80 MG: 10 INJECTION, SOLUTION INTRAVENOUS at 10:22

## 2022-01-01 RX ADMIN — Medication 5 MG: at 02:30

## 2022-01-01 RX ADMIN — CLOPIDOGREL BISULFATE 75 MG: 75 TABLET ORAL at 20:31

## 2022-01-01 RX ADMIN — POTASSIUM CHLORIDE 10 MEQ: 750 TABLET, EXTENDED RELEASE ORAL at 09:03

## 2022-01-01 RX ADMIN — ATORVASTATIN CALCIUM 40 MG: 40 TABLET, FILM COATED ORAL at 20:29

## 2022-01-01 RX ADMIN — ASPIRIN 81 MG: 81 TABLET ORAL at 07:55

## 2022-01-01 RX ADMIN — ATORVASTATIN CALCIUM 40 MG: 40 TABLET, FILM COATED ORAL at 20:31

## 2022-01-01 RX ADMIN — GUAIFENESIN 600 MG: 600 TABLET ORAL at 08:15

## 2022-01-01 RX ADMIN — ACETAMINOPHEN 650 MG: 325 TABLET, FILM COATED ORAL at 06:44

## 2022-01-01 RX ADMIN — AMINOPHYLLINE 100 MG: 25 INJECTION, SOLUTION INTRAVENOUS at 13:55

## 2022-01-01 RX ADMIN — ASPIRIN 81 MG: 81 TABLET ORAL at 08:13

## 2022-01-01 RX ADMIN — ASPIRIN 81 MG: 81 TABLET ORAL at 08:10

## 2022-01-01 RX ADMIN — FUROSEMIDE 80 MG: 10 INJECTION, SOLUTION INTRAVENOUS at 16:20

## 2022-01-01 RX ADMIN — ENOXAPARIN SODIUM 40 MG: 40 INJECTION SUBCUTANEOUS at 20:40

## 2022-01-01 RX ADMIN — Medication 25 MCG: at 09:02

## 2022-01-01 RX ADMIN — LISINOPRIL 10 MG: 10 TABLET ORAL at 08:44

## 2022-01-01 RX ADMIN — FUROSEMIDE 80 MG: 10 INJECTION, SOLUTION INTRAVENOUS at 08:44

## 2022-01-01 RX ADMIN — GUAIFENESIN 600 MG: 600 TABLET ORAL at 07:56

## 2022-01-01 RX ADMIN — Medication 25 MCG: at 07:57

## 2022-01-01 RX ADMIN — GADOBUTROL 7.5 ML: 604.72 INJECTION INTRAVENOUS at 14:38

## 2022-01-01 RX ADMIN — GUAIFENESIN 600 MG: 600 TABLET ORAL at 09:02

## 2022-01-01 RX ADMIN — ATORVASTATIN CALCIUM 40 MG: 40 TABLET, FILM COATED ORAL at 19:48

## 2022-01-01 RX ADMIN — ENOXAPARIN SODIUM 40 MG: 40 INJECTION SUBCUTANEOUS at 20:31

## 2022-01-01 RX ADMIN — Medication 25 MCG: at 08:44

## 2022-01-01 RX ADMIN — ASPIRIN 81 MG: 81 TABLET ORAL at 08:44

## 2022-01-01 RX ADMIN — LISINOPRIL 5 MG: 5 TABLET ORAL at 08:10

## 2022-01-01 RX ADMIN — REGADENOSON 0.4 MG: 0.08 INJECTION, SOLUTION INTRAVENOUS at 13:51

## 2022-01-01 RX ADMIN — FUROSEMIDE 40 MG: 10 INJECTION, SOLUTION INTRAVENOUS at 19:01

## 2022-01-01 RX ADMIN — ASPIRIN 81 MG: 81 TABLET ORAL at 09:01

## 2022-01-01 RX ADMIN — ATORVASTATIN CALCIUM 40 MG: 40 TABLET, FILM COATED ORAL at 19:37

## 2022-01-01 RX ADMIN — Medication 5 MG: at 21:39

## 2022-01-01 RX ADMIN — PANTOPRAZOLE SODIUM 40 MG: 40 TABLET, DELAYED RELEASE ORAL at 08:10

## 2022-01-01 RX ADMIN — ASPIRIN 81 MG: 81 TABLET ORAL at 08:35

## 2022-01-01 RX ADMIN — LISINOPRIL 5 MG: 5 TABLET ORAL at 20:24

## 2022-01-01 RX ADMIN — METOPROLOL SUCCINATE 50 MG: 50 TABLET, EXTENDED RELEASE ORAL at 09:02

## 2022-01-01 RX ADMIN — GUAIFENESIN 600 MG: 600 TABLET ORAL at 08:44

## 2022-01-01 RX ADMIN — POTASSIUM CHLORIDE 10 MEQ: 750 TABLET, EXTENDED RELEASE ORAL at 08:44

## 2022-01-01 RX ADMIN — ENOXAPARIN SODIUM 40 MG: 40 INJECTION SUBCUTANEOUS at 19:37

## 2022-01-01 RX ADMIN — Medication 12.5 MG: at 07:56

## 2022-01-01 RX ADMIN — Medication 25 MCG: at 08:36

## 2022-01-01 RX ADMIN — FUROSEMIDE 40 MG: 10 INJECTION, SOLUTION INTRAVENOUS at 09:37

## 2022-01-01 RX ADMIN — PANTOPRAZOLE SODIUM 40 MG: 40 TABLET, DELAYED RELEASE ORAL at 08:14

## 2022-01-01 RX ADMIN — METOPROLOL SUCCINATE 50 MG: 50 TABLET, EXTENDED RELEASE ORAL at 08:10

## 2022-01-01 RX ADMIN — PANTOPRAZOLE SODIUM 40 MG: 40 TABLET, DELAYED RELEASE ORAL at 08:44

## 2022-01-01 RX ADMIN — Medication 12.5 MG: at 08:10

## 2022-01-01 RX ADMIN — FUROSEMIDE 40 MG: 40 TABLET ORAL at 08:36

## 2022-01-01 RX ADMIN — FUROSEMIDE 40 MG: 10 INJECTION, SOLUTION INTRAVENOUS at 03:08

## 2022-01-01 RX ADMIN — ACETAMINOPHEN 650 MG: 325 TABLET, FILM COATED ORAL at 20:58

## 2022-01-01 RX ADMIN — CLOPIDOGREL BISULFATE 75 MG: 75 TABLET ORAL at 19:48

## 2022-01-01 RX ADMIN — FUROSEMIDE 40 MG: 40 TABLET ORAL at 16:01

## 2022-01-01 RX ADMIN — ENOXAPARIN SODIUM 40 MG: 40 INJECTION SUBCUTANEOUS at 20:29

## 2022-01-01 RX ADMIN — METOPROLOL SUCCINATE 50 MG: 50 TABLET, EXTENDED RELEASE ORAL at 08:14

## 2022-01-01 RX ADMIN — SACUBITRIL AND VALSARTAN 1 TABLET: 24; 26 TABLET, FILM COATED ORAL at 19:37

## 2022-01-01 RX ADMIN — Medication 25 MCG: at 08:15

## 2022-01-01 RX ADMIN — DICLOFENAC SODIUM 2 G: 10 GEL TOPICAL at 21:00

## 2022-01-01 RX ADMIN — SACUBITRIL AND VALSARTAN 1 TABLET: 24; 26 TABLET, FILM COATED ORAL at 09:02

## 2022-01-01 RX ADMIN — SACUBITRIL AND VALSARTAN 1 TABLET: 24; 26 TABLET, FILM COATED ORAL at 08:39

## 2022-01-01 RX ADMIN — GUAIFENESIN 600 MG: 600 TABLET ORAL at 08:36

## 2022-01-01 RX ADMIN — CLOPIDOGREL BISULFATE 75 MG: 75 TABLET ORAL at 20:24

## 2022-01-01 RX ADMIN — FUROSEMIDE 40 MG: 40 TABLET ORAL at 09:02

## 2022-01-01 RX ADMIN — LIDOCAINE 1 PATCH: 246 PATCH TOPICAL at 20:31

## 2022-01-01 RX ADMIN — ATORVASTATIN CALCIUM 40 MG: 40 TABLET, FILM COATED ORAL at 20:24

## 2022-01-01 ASSESSMENT — ACTIVITIES OF DAILY LIVING (ADL)
ADLS_ACUITY_SCORE: 13
ADLS_ACUITY_SCORE: 12
ADLS_ACUITY_SCORE: 14
ADLS_ACUITY_SCORE: 13
ADLS_ACUITY_SCORE: 15
ADLS_ACUITY_SCORE: 8
ADLS_ACUITY_SCORE: 12
ADLS_ACUITY_SCORE: 8
ADLS_ACUITY_SCORE: 8
ADLS_ACUITY_SCORE: 14
ADLS_ACUITY_SCORE: 12
ADLS_ACUITY_SCORE: 14
ADLS_ACUITY_SCORE: 16
ADLS_ACUITY_SCORE: 16
ADLS_ACUITY_SCORE: 12
ADLS_ACUITY_SCORE: 14
ADLS_ACUITY_SCORE: 8
ADLS_ACUITY_SCORE: 14
ADLS_ACUITY_SCORE: 8
ADLS_ACUITY_SCORE: 16
ADLS_ACUITY_SCORE: 14
ADLS_ACUITY_SCORE: 14
ADLS_ACUITY_SCORE: 18
ADLS_ACUITY_SCORE: 14
ADLS_ACUITY_SCORE: 14
ADLS_ACUITY_SCORE: 13
ADLS_ACUITY_SCORE: 14
ADLS_ACUITY_SCORE: 8
ADLS_ACUITY_SCORE: 13
ADLS_ACUITY_SCORE: 8
ADLS_ACUITY_SCORE: 14
ADLS_ACUITY_SCORE: 14
ADLS_ACUITY_SCORE: 13
ADLS_ACUITY_SCORE: 13
ADLS_ACUITY_SCORE: 8
ADLS_ACUITY_SCORE: 8
ADLS_ACUITY_SCORE: 14
ADLS_ACUITY_SCORE: 14
ADLS_ACUITY_SCORE: 13
ADLS_ACUITY_SCORE: 16
ADLS_ACUITY_SCORE: 14
ADLS_ACUITY_SCORE: 16
ADLS_ACUITY_SCORE: 14
ADLS_ACUITY_SCORE: 8
ADLS_ACUITY_SCORE: 16
ADLS_ACUITY_SCORE: 8
ADLS_ACUITY_SCORE: 14
ADLS_ACUITY_SCORE: 16
ADLS_ACUITY_SCORE: 8
ADLS_ACUITY_SCORE: 14
ADLS_ACUITY_SCORE: 13
ADLS_ACUITY_SCORE: 16
ADLS_ACUITY_SCORE: 14
ADLS_ACUITY_SCORE: 8
ADLS_ACUITY_SCORE: 14
ADLS_ACUITY_SCORE: 14
ADLS_ACUITY_SCORE: 8
ADLS_ACUITY_SCORE: 16
ADLS_ACUITY_SCORE: 12
ADLS_ACUITY_SCORE: 14
ADLS_ACUITY_SCORE: 16
ADLS_ACUITY_SCORE: 14
ADLS_ACUITY_SCORE: 8
ADLS_ACUITY_SCORE: 14
ADLS_ACUITY_SCORE: 16
ADLS_ACUITY_SCORE: 13
ADLS_ACUITY_SCORE: 14
ADLS_ACUITY_SCORE: 18
ADLS_ACUITY_SCORE: 14
ADLS_ACUITY_SCORE: 13
ADLS_ACUITY_SCORE: 16
ADLS_ACUITY_SCORE: 14
ADLS_ACUITY_SCORE: 14
ADLS_ACUITY_SCORE: 8
ADLS_ACUITY_SCORE: 16
ADLS_ACUITY_SCORE: 14
ADLS_ACUITY_SCORE: 14
ADLS_ACUITY_SCORE: 16
ADLS_ACUITY_SCORE: 16
ADLS_ACUITY_SCORE: 18
ADLS_ACUITY_SCORE: 14
ADLS_ACUITY_SCORE: 13
ADLS_ACUITY_SCORE: 16
ADLS_ACUITY_SCORE: 14
ADLS_ACUITY_SCORE: 13
ADLS_ACUITY_SCORE: 8
ADLS_ACUITY_SCORE: 8
ADLS_ACUITY_SCORE: 14
ADLS_ACUITY_SCORE: 12
ADLS_ACUITY_SCORE: 12
ADLS_ACUITY_SCORE: 14
ADLS_ACUITY_SCORE: 14
ADLS_ACUITY_SCORE: 16
ADLS_ACUITY_SCORE: 14
ADLS_ACUITY_SCORE: 12
ADLS_ACUITY_SCORE: 13
ADLS_ACUITY_SCORE: 16
ADLS_ACUITY_SCORE: 12
ADLS_ACUITY_SCORE: 8
ADLS_ACUITY_SCORE: 16
ADLS_ACUITY_SCORE: 14
PREVIOUS_RESPONSIBILITIES: MEAL PREP;LAUNDRY;SHOPPING;MEDICATION MANAGEMENT;FINANCES;DRIVING
ADLS_ACUITY_SCORE: 8
ADLS_ACUITY_SCORE: 14
ADLS_ACUITY_SCORE: 8
ADLS_ACUITY_SCORE: 13
ADLS_ACUITY_SCORE: 14
ADLS_ACUITY_SCORE: 14
ADLS_ACUITY_SCORE: 8
ADLS_ACUITY_SCORE: 12
ADLS_ACUITY_SCORE: 14
ADLS_ACUITY_SCORE: 8
ADLS_ACUITY_SCORE: 14
ADLS_ACUITY_SCORE: 8
ADLS_ACUITY_SCORE: 14
ADLS_ACUITY_SCORE: 13
ADLS_ACUITY_SCORE: 16
ADLS_ACUITY_SCORE: 14
ADLS_ACUITY_SCORE: 14
ADLS_ACUITY_SCORE: 8
ADLS_ACUITY_SCORE: 16
ADLS_ACUITY_SCORE: 16
ADLS_ACUITY_SCORE: 13
ADLS_ACUITY_SCORE: 14
ADLS_ACUITY_SCORE: 14

## 2022-01-01 ASSESSMENT — PAIN SCALES - GENERAL
PAINLEVEL: NO PAIN (0)
PAINLEVEL: MILD PAIN (3)

## 2022-01-01 ASSESSMENT — MIFFLIN-ST. JEOR
SCORE: 1118.11
SCORE: 1151.67

## 2022-01-04 NOTE — TELEPHONE ENCOUNTER
Rx sent for Chlorthalidone 25 mg 1/2 tab 2 days per week. Sent #60 tablets due to cost of medication.

## 2022-01-10 NOTE — TELEPHONE ENCOUNTER
Change in pharmacy to mail order    LOV 1- Rachele  No future OV scheduled    1 inhaler pended  [employer] message sent    RT Carlitos (R)

## 2022-01-12 NOTE — TELEPHONE ENCOUNTER
Appointments in Next Year    Jan 21, 2022 11:30 AM  (Arrive by 11:10 AM)  Provider Visit with Tristian Jeffrey MD  [facility] ([facility] ) [phone]        Medication filled 1 time as pt is due for a follow-up in clinic. Patient is already scheduled for upcoming appointment.  Epifanio Huynh RN  [facility]

## 2022-01-19 PROBLEM — R06.02 SHORTNESS OF BREATH: Status: ACTIVE | Noted: 2022-01-01

## 2022-01-19 NOTE — PROGRESS NOTES
Admitted/transferred from: direct admit   2 RN full   skin assessment completed by Yovany Frye RN and Gracia DIEGO RN from .  Skin assessment finding: issues found multiple cuts/lacerations on all fingures. Bruises on right shin R upper inner thigh and small abrasion. Callus on R lateral distal foot.    Interventions/actions: skin interventions moisturizing cream applied. Band aide applied on abrasion site.      Will continue to monitor.

## 2022-01-19 NOTE — PROGRESS NOTES
1. 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 Sequential, old records not in EMR)  2. History of prostate cancer treated with radiation  3. Exertional dyspnea  4. AAA 3.9  5. Hyperlipidemia  6. ASPVD with stenting               Left iliac stent              Right iliac stent with elevated velocities  7. Abdominal aortic aneurysm                      Infrarenal abdominal aortic aneurysm measuring 3.8 cm AP x 3.9 cm                       transverse, x 4.6 cm in length. Suspect previous measurements were                                                 slight over estimation. Recommend continued annual surveillance. 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  8. History of jaw infection   9. History if previous difficult intubation with jaw and neck infection  10. Recent cardiac stenting/ramus  11. Compression fracture with thoracic kyphosis    Plan:  1. CT chest ?pneumonia, heart failure please look at spine also for compression fracture changes  2. CBC, CMP, BNP, CRPinf, pro-calcitonin, magnesium  3. Marked hyponatremia and elevation of BNP, admit to telemetry          Current Outpatient Medications   Medication     acetaminophen (TYLENOL) 325 MG tablet     aspirin 81 MG tablet     atorvastatin (LIPITOR) 40 MG tablet     chlorthalidone (HYGROTON) 25 MG tablet     Cholecalciferol (VITAMIN D) 1000 UNITS capsule     clopidogrel (PLAVIX) 75 MG tablet     fish oil-omega-3 fatty acids (FISH OIL) 1000 MG capsule     guaiFENesin (MUCINEX) 600 MG 12 hr tablet     lisinopril (ZESTRIL) 5 MG tablet     metoprolol succinate ER (TOPROL-XL) 50 MG 24 hr tablet     Multiple Vitamins-Minerals (MULTIVITAL PO)     nitroGLYcerin (NITROSTAT) 0.4 MG sublingual tablet     omeprazole (PRILOSEC) 20 MG CR capsule     spironolactone (ALDACTONE) 25 MG tablet     albuterol (PROAIR HFA/PROVENTIL HFA/VENTOLIN HFA) 108 (90 Base) MCG/ACT inhaler     diazepam (VALIUM) 5 MG tablet     temazepam (RESTORIL) 15 MG capsule     No  current facility-administered medications for this visit.           Wt Readings from Last 24 Encounters:   22 56.7 kg (124 lb 14.4 oz)   21 56.2 kg (124 lb)   20 57.8 kg (127 lb 8 oz)   19 57.6 kg (127 lb)   19 56.2 kg (124 lb)   19 56.2 kg (123 lb 14.4 oz)   19 59.1 kg (130 lb 4.8 oz)   19 57.2 kg (126 lb)   19 58.7 kg (129 lb 6.4 oz)   10/15/18 61.7 kg (136 lb 1.6 oz)   10/02/18 62.8 kg (138 lb 6.4 oz)   18 63.2 kg (139 lb 4.8 oz)   18 63.8 kg (140 lb 9.6 oz)   18 64.4 kg (141 lb 14.4 oz)   17 63 kg (139 lb)   03/10/17 60.8 kg (134 lb)   17 62.6 kg (138 lb)   16 66.3 kg (146 lb 3.2 oz)   06/02/15 66.9 kg (147 lb 6.4 oz)   14 65.3 kg (144 lb)   14 65.6 kg (144 lb 9.6 oz)   10/07/13 66.4 kg (146 lb 6.4 oz)   12 66.1 kg (145 lb 11.2 oz)          Name: CARINE DOSHI  MRN: [id number]  : 1935  Study Date: 2019 11:53 AM  Age: 83 yrs  Gender: Male  Patient Location: [facility]  Reason For Study: ASHD (arteriosclerotic heart disease), Dyspnea on exertion  Ordering Physician: CASTILLO MIKE  Referring Physician: CASTILLO MIKE  Performed By: Sherley Young     BSA: 1.6 m2  Height: 63 in  Weight: 129 lb  HR: 72  BP: 125/72 mmHg  _____________________________________________________________________________  __     Procedure  Complete Echo Adult. Contrast Optison.     _____________________________________________________________________________  __        Interpretation Summary     Left ventricular systolic function is mildly reduced.  The visual ejection fraction is estimated at 45-50%.  There is mild septal hypokinesis.  There is moderate apical wall hypokinesis.  Mild mid to distal anterior wall hypokinesis.  The right ventricle is normal in structure, function and size.  There is moderate trileaflet aortic sclerosis.  There is mild to moderate (1-2+) aortic regurgitation.  There is mild (1+)  pulmonic valvular regurgitation.  Borderline aortic root dilatation.  Moderate atherosclerotic plaque(s) in the ascending aorta.  Compared to the prior study dated 5/12/2019, there have been no changes. The  study was technically limited. Optison contrast was used without apparent  complications.  _____________________________________________________________________________

## 2022-01-19 NOTE — PATIENT INSTRUCTIONS
Medication Changes:  No medication changes at this time. Please continue current medication regiment.    Patient Instructions:  1. Continue staying active and eat a heart healthy diet.    2. Please keep current list of medications with you at all times.    3. Remember to weigh yourself daily after voiding and before you consume any food or beverages and log the numbers.  If you have gained 2 pounds overnight or 5 pounds in a week contact us immediately for medication adjustments or further instructions.    4. **Please call us immediately if you have any syncope (fainting or passing out), chest pain, edema (swelling or weight gain), or decline in your functional status (general decline in how you are feeling).    5. Patients on Remodulin (treprostinil) or Veletri (epoprostenol): Please make sure that you have your backup pump and supplies with you at all times, your mixing instructions, and contact information for your specialty pharmacy.    Follow up Appointment Information:  Chest CT and labs today    Follow up after testing        We are located on the third floor of the [facility] ([facility]) on the [facility].  Our address is     [address] [place] [address] [place] [address] [place] [address]    Thank you for allowing us to be a part of your care here at the [facility]    If you have questions or concerns please contact us at:    Salomón Andrade RN, BSN   Shelli Cortes (Schedule,Prior Auth)  Nurse Coordinator     Clinic   Pulmonary Hypertension   Pulmonary Hypertension  [facility]  [facility]  (Phone)[phone]    (Phone) [phone]        (Fax) [phone]    ** Please note that you will NOT receive a reminder call regarding your scheduled testing, reminder calls are for provider appointments only.  If you are scheduled for testing within the [facility] system you may receive a  call regarding pre-registration for billing purposes only.**     Remember to weigh yourself daily after voiding and before you consume any food or beverages and log the numbers.  If you have gained/lost 2 pounds overnight or 5 pounds in a week contact us immediately for medication adjustments or further instructions.   **Please call us immediately if you have any syncope, chest pain, edema, or decline in your functional status.    Support Group:  Pulmonary Hypertension Association  Https://www.phassociation.org/  **Look at the Events Tab** They even have Support Groups that you can call into    [facility] Support Group  Second Saturday of the Month from 1-3 PM   Location: [address] [place] [address]  Leader: Carola Awan and Dalila Caicedo  Phone: [phone] or [phone]  Email: [email or web address]

## 2022-01-19 NOTE — NURSING NOTE
Chief Complaint   Patient presents with     Follow Up     Pulmonary hypertension      Vitals were taken and medications were reconciled.  Javed Rush, EMT  7:57 AM

## 2022-01-19 NOTE — LETTER
1/19/2022      RE: Oscar Chaudhary  [address] [facility] [address]       Dear Colleague,    Thank you for the opportunity to participate in the care of your patient, Oscar Chaudhary, at the [facility] [place] [facility]. Please see a copy of my visit note below.      1. 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 Sequential, old records not in EMR)  2. History of prostate cancer treated with radiation  3. Exertional dyspnea  4. AAA 3.9  5. Hyperlipidemia  6. ASPVD with stenting               Left iliac stent              Right iliac stent with elevated velocities  7. Abdominal aortic aneurysm                      Infrarenal abdominal aortic aneurysm measuring 3.8 cm AP x 3.9 cm                       transverse, x 4.6 cm in length. Suspect previous measurements were                                                 slight over estimation. Recommend continued annual surveillance. 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  8. History of jaw infection   9. History if previous difficult intubation with jaw and neck infection  10. Recent cardiac stenting/ramus  11. Compression fracture with thoracic kyphosis    Plan:  1. CT chest ?pneumonia, heart failure please look at spine also for compression fracture changes  2. CBC, CMP, BNP, CRPinf, pro-calcitonin, magnesium  3. Marked hyponatremia and elevation of BNP, admit to telemetry          Current Outpatient Medications   Medication     acetaminophen (TYLENOL) 325 MG tablet     aspirin 81 MG tablet     atorvastatin (LIPITOR) 40 MG tablet     chlorthalidone (HYGROTON) 25 MG tablet     Cholecalciferol (VITAMIN D) 1000 UNITS capsule     clopidogrel (PLAVIX) 75 MG tablet     fish oil-omega-3 fatty acids (FISH OIL) 1000 MG capsule     guaiFENesin (MUCINEX) 600 MG 12 hr tablet     lisinopril (ZESTRIL) 5 MG tablet     metoprolol succinate ER (TOPROL-XL) 50 MG 24 hr tablet      Multiple Vitamins-Minerals (MULTIVITAL PO)     nitroGLYcerin (NITROSTAT) 0.4 MG sublingual tablet     omeprazole (PRILOSEC) 20 MG CR capsule     spironolactone (ALDACTONE) 25 MG tablet     albuterol (PROAIR HFA/PROVENTIL HFA/VENTOLIN HFA) 108 (90 Base) MCG/ACT inhaler     diazepam (VALIUM) 5 MG tablet     temazepam (RESTORIL) 15 MG capsule     No current facility-administered medications for this visit.           Wt Readings from Last 24 Encounters:   22 56.7 kg (124 lb 14.4 oz)   21 56.2 kg (124 lb)   20 57.8 kg (127 lb 8 oz)   19 57.6 kg (127 lb)   19 56.2 kg (124 lb)   19 56.2 kg (123 lb 14.4 oz)   19 59.1 kg (130 lb 4.8 oz)   19 57.2 kg (126 lb)   19 58.7 kg (129 lb 6.4 oz)   10/15/18 61.7 kg (136 lb 1.6 oz)   10/02/18 62.8 kg (138 lb 6.4 oz)   18 63.2 kg (139 lb 4.8 oz)   18 63.8 kg (140 lb 9.6 oz)   18 64.4 kg (141 lb 14.4 oz)   17 63 kg (139 lb)   03/10/17 60.8 kg (134 lb)   17 62.6 kg (138 lb)   16 66.3 kg (146 lb 3.2 oz)   06/02/15 66.9 kg (147 lb 6.4 oz)   14 65.3 kg (144 lb)   14 65.6 kg (144 lb 9.6 oz)   10/07/13 66.4 kg (146 lb 6.4 oz)   12 66.1 kg (145 lb 11.2 oz)          Name: CARINE DOSHI  MRN: [id number]  : 1935  Study Date: 2019 11:53 AM  Age: 83 yrs  Gender: Male  Patient [facility]: [facility]  Reason For Study: ASHD (arteriosclerotic heart disease), Dyspnea on exertion  Ordering Physician: CASTILLO MIKE  Referring Physician: CASTILLO MIKE  Performed By: Sherley Young     BSA: 1.6 m2  Height: 63 in  Weight: 129 lb  HR: 72  BP: 125/72 mmHg  _____________________________________________________________________________  __     Procedure  Complete Echo Adult. Contrast Optison.     _____________________________________________________________________________  __        Interpretation Summary     Left ventricular systolic function is mildly reduced.  The visual ejection  fraction is estimated at 45-50%.  There is mild septal hypokinesis.  There is moderate apical wall hypokinesis.  Mild mid to distal anterior wall hypokinesis.  The right ventricle is normal in structure, function and size.  There is moderate trileaflet aortic sclerosis.  There is mild to moderate (1-2+) aortic regurgitation.  There is mild (1+) pulmonic valvular regurgitation.  Borderline aortic root dilatation.  Moderate atherosclerotic plaque(s) in the ascending aorta.  Compared to the prior study dated 5/12/2019, there have been no changes. The  study was technically limited. Optison contrast was used without apparent  complications.  _____________________________________________________________________________      Please do not hesitate to contact me if you have any questions/concerns.     Sincerely,     Jerrod Rangel MD

## 2022-01-19 NOTE — NURSING NOTE
Admission Note:  Reason/Plan for Admission: Decompensated HF, low NA  Testing completed prior to Admission: Labs  Admitting MD/Service: Bettina Durán 2  Unit: 6C  Report Given to: Kai FATIMA  Patient was sent to hospital via: Family Member Car  Pt condition when leaving clinic: stable  Plan review with patient. Patient verbalizes understanding and agrees with plan. Gracia Gates RN on 1/19/2022 at 2:17 PM

## 2022-01-20 NOTE — PHARMACY-ADMISSION MEDICATION HISTORY
Admission Medication History Completed by Pharmacy    See [facility] Admission Navigator for allergy information, preferred outpatient pharmacy, prior to admission medications and immunization status.     Medication History Sources:     Patient    surescripts    Changes made to PTA medication list (reason):    Added: None    Deleted: None    Changed:   o Atorvastatin daily > qpm  o Chlorthalidone 12.5 mg 2x/wk (Sun and Wed) > Sun and Thu  o Clopidogrel daily > qpm  o Guaifenesin 600 mg BID > 600 mg qd    Additional Information:    Pt manages his own meds and is a reliable historian    Pt confirmed NKDA    Pt reports infrequent use of diazepam PRN sleep, and also that he uses temazepam even less    Prior to Admission medications    Medication Sig Last Dose Taking? Auth Provider   acetaminophen (TYLENOL) 325 MG tablet Take 2 tablets (650 mg) by mouth every 4 hours as needed for mild pain Past Month at Unknown time Yes Jaime Tamayo MD   albuterol (PROAIR HFA/PROVENTIL HFA/VENTOLIN HFA) 108 (90 Base) MCG/ACT inhaler Inhale 2 puffs into the lungs every 4 hours as needed for shortness of breath / dyspnea or wheezing Past Month at Unknown time Yes Tristian Jeffrey MD   aspirin 81 MG tablet Take 1 tablet by mouth daily. 1/19/2022 at Unknown time Yes Reported, Patient   atorvastatin (LIPITOR) 40 MG tablet Take 1 tablet (40 mg) by mouth daily  Patient taking differently: Take 40 mg by mouth every evening  1/18/2022 at Unknown time Yes Jerrod Rangel MD   chlorthalidone (HYGROTON) 25 MG tablet Take 1/2 tablet (12.5mgs) every Sunday and Wednesday Please divide tablets in half for patient as he has arthritis  Patient taking differently: Take 1/2 tablet (12.5mgs) every Sunday and Thursday.  Please divide tablets in half for patient as he has arthritis 1/16/2022 Yes Jerrod Rangel MD   Cholecalciferol (VITAMIN D) 1000 UNITS capsule Take 1 capsule by mouth daily. 1/19/2022 at Unknown time Yes  Reported, Patient   clopidogrel (PLAVIX) 75 MG tablet Take 1 tablet (75 mg) by mouth daily  Patient taking differently: Take 75 mg by mouth every evening  1/18/2022 at Unknown time Yes Jerrod Rangel MD   diazepam (VALIUM) 5 MG tablet Take 1 tablet (5 mg) by mouth nightly as needed for sleep Past Month at Unknown time Yes Jerrod Rangel MD   fish oil-omega-3 fatty acids (FISH OIL) 1000 MG capsule Take 1 g by mouth daily. 1/19/2022 at Unknown time Yes Reported, Patient   guaiFENesin (MUCINEX) 600 MG 12 hr tablet Take 1 tablet (600 mg) by mouth 2 times daily  Patient taking differently: Take 600 mg by mouth daily  1/19/2022 at Unknown time Yes Tristian Jeffrey MD   lisinopril (ZESTRIL) 5 MG tablet Take 1 tablet (5 mg) by mouth 2 times daily 1/19/2022 at Unknown time Yes Jerrod Rangel MD   metoprolol succinate ER (TOPROL-XL) 50 MG 24 hr tablet Take 1 tablet (50 mg) by mouth daily 1/19/2022 at Unknown time Yes Jerrod Rangel MD   Multiple Vitamins-Minerals (MULTIVITAL PO) Take 1 tablet by mouth daily. 1/19/2022 at Unknown time Yes Reported, Patient   nitroGLYcerin (NITROSTAT) 0.4 MG sublingual tablet For chest pain place 1 tablet under the tongue every 5 minutes for 3 doses. If symptoms persist 5 minutes after 1st dose call 911. More than a month at Unknown time Yes Jerrod Rangel MD   omeprazole (PRILOSEC) 20 MG CR capsule Take 20 mg by mouth daily 1/19/2022 at Unknown time Yes Reported, Patient   spironolactone (ALDACTONE) 25 MG tablet TAKE HALF TAB BY MOUTH A DAY *CALL CARDIOLOGY #[phone] FOR APPT, DO LABS BEFORE VISIT  Patient taking differently: Take 12.5 mg by mouth daily TAKE HALF TAB BY MOUTH A DAY *CALL CARDIOLOGY #[phone] FOR APPT, DO LABS BEFORE VISIT 1/19/2022 at Unknown time Yes Jerrod Rangel MD   temazepam (RESTORIL) 15 MG capsule Take 1 capsule (15 mg) by mouth nightly as needed More than a month at Unknown time Yes Jerrod Rangel  MD Donald       Date completed: 01/19/22    Medication history completed by:   Domenico Bernal PharmD, BCPS

## 2022-01-20 NOTE — PLAN OF CARE
Shift: [id number]    Diagnosis: admitted 1/19 from clinic for worsening SOB    Vitals: /64 (BP Location: Left arm, Cuff Size: Adult Small)   Pulse 85   Temp 97.8  F (36.6  C) (Oral)   Resp 16   Wt 57.2 kg (126 lb 1.6 oz)   SpO2 97%   BMI 22.34 kg/m        Neuro: A/O x 4  Resp: 2.5L NC sating high 90's throughout the night. Rhonchi and wheezes on ascultation. Coarse voice when speaking.   Cardiac: mid 80's-90's sinus rhythm, stable BP.   GI/: external urinary catheter primafit, voiding appropriately. No BM overnight  Skin: lacerations on hands, bruising on right leg.   Labs: Na+ last check 120 at 0045. K+ 3.8 at 0045.   LDAs: Left PIV saline locked  Pain: right scapula pain,  mg Tylenol given.  Mobility: Up w/ 1, SBA    Significant events/Plan: significant urine output, 40 mg Lasix given @ 0300. NPO @ midnight for possible RHC in AM

## 2022-01-20 NOTE — H&P
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    Acute on chronic HFmrEF (LVEF 45-50%) secondary to ICM  RYAN  Stage C. NYHA Class IV.   Suspect SOB/RYAN is secondary to HF/hypervolemia given BNP 6K, elevated JVP, and bilateral pleural effusions. TTE 3/2019 with LVEF 45-50%, RV normal size/function, mild-mod AR, mild GA, borderline aortic root dilation. Follows with Dr. Rangel. BNP 6200 (up from 1200 one yr ago). Trop wnl. Unknown exacerbation etiology.   - EKG  - TTE ordered for the AM  - NPO at midnight for potential right heart catheterization   - oxygen to keep SpO2 >91%  - telemetry   - ACEi: cont PTA lisinopril 5 mg  - BB:  Cont PTA metoprolol succinate 50 mg dailiy  - Aldosterone antagonist: cont PTA spironolactone 12.5 mg   - SGLT2i: none  - Volume status:  Hypervolemia, intermittent dosing with 40 mg IV lasix BID tonight  - Strict I&O's, daily weights, <2g Na diet   - BMP, Replace lytes per protocol K>4, Mg>2    CAD s/p CABG in 2001 (LIMA to LAD,  Chronic occluded SVG to RCA)  PAD s/p left and right common iliac stents   AAA, infra-renal 3.9 cm  HLD  Last angiogram 3/2019 with RASHAAD x1 to ramus intermedius. Needs yearly f/u for AAA, last seen 7/2019 by vascular and lost to follow-up.  - ASA 81 mg, atorvastatin 40 mg, clopidogrel 75 mg daily   - goal BP <150/90, PRNs available   - check lipids  - doppler LE pulses as not easily palpable on exam    Hyponatremia   120 upon arrival> 118 prior to diuresis and 121 after diuresis. Likely hypotonic hypervolemic hyponatremia in  "setting of acute HF, however, does have some nodules in lungs so ?component SIADH  - diuresis as above  - Na q4 hours to trend  - if worsening, will plan on nephrology consult    Voice hoarseness   Unclear etiology but both pt and sister say this is new, consider ENT consult vs   CT neck for further evaluation once HF exacerbation resolved    R scapular pain  ~3 cm area TTP on exam, suspect MSK. Offered topical therapies and pain relief, pt declines.     L lingula bullae  RLL nodular densities  Increasing size nodular densities, unclear etiology. Infectious/inflammatory. not likely to be etiology of pts SOB given limited involvement  - albuterol PRN, flutter valve given significant rhonchi  - consider f/u imaging in 3 months    CHRONIC PROBLEMS  GERD - PPI 40 mg daily   Osteopenia, T9 compression fracture- cont PTA vit D  Insomnia- pt has both temazepem 15 mg and diazepam 5 mg at bedtime PRN on home med list, will give 7.5 mg temazepam once tonight  Prostate cancer s/p radiation - PSA normal      Diet:   NPO at midnight for potential procedures (Right heart catheterization)  DVT Prophylaxis: Enoxaparin (Lovenox) SQ  Zuniga Catheter: Not present  Code Status:   FULL  Fluids: none  Lines: PVI    Disposition Plan   Expected discharge: 4 - 7 days, recommended to prior living arrangement once fluid volume status optimized on oral medication.    Entered: Octavio Knight MD 01/19/2022, 6:49 PM     The patient's care was discussed with the overnight fellow, Dr. Burris. Patient to be formally staffed in the AM.     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  Anorexia with postprandial fullness x3 days.  He states his shortness of breath actually improves with lying flat.  He has been taking his meds regularly, weight is stable at 124 pounds.  No change in diet.   Drinks 1 scotch per month, no alcohol in the past month.  No tobacco or illicit drug use.    Also notices new right scapular pain.  No trauma.  No rash.    Labs showed an elevated BNP to 6000 and hyponatremia to 120 so he was directly admitted from clinic.  Pro-Manish negative.  Mild stable normocytic anemia.  COVID-negative.    He lives alone with his puppy.  His sister just flew in from [place] to be near him.  He does have a cleaning lady who comes in otherwise manages at home alone.  No falls, does not use walker. Wears CPAP at night.    Review of Systems    The 10 point Review of Systems is negative other than noted in the HPI or here.  Denies fevers, night sweats, nausea, vomiting, abdominal pain, signs of bleeding.    Past Medical History    I have reviewed this patient's medical history and updated it with pertinent information if needed.   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  Past Surgical History:   Procedure Laterality Date     CV CORONARY ANGIOGRAM N/A 3/21/2019    Procedure: Coronary Angiogram;  Surgeon: Milton Paul MD;  Location:  [facility]     CV PCI ANGIOPLASTY N/A  3/21/2019    Procedure: Percutaneous Coronary Intervention;  Surgeon: Milton Paul MD;  Location:  [facility]     INCISION AND DRAINAGE MANDIBLE, COMBINED N/A 2/27/2017    Procedure: COMBINED INCISION AND DRAINAGE MANDIBLE;  Surgeon: Adam Ramos DDS;  Location:  OR     OPEN REDUCTION INTERNAL FIXATION MANDIBLE N/A 2/27/2017    Procedure: OPEN REDUCTION INTERNAL FIXATION MANDIBLE;  Surgeon: Adam Ramos DDS;  Location:  OR       Social History   I have reviewed this patient's social history and updated it with pertinent information if needed.  Social History     Tobacco Use     Smoking status: Former Smoker     Years: 60.00     Types: Cigarettes     Smokeless tobacco: Never Used   Substance Use Topics     Alcohol use: Yes     Comment: 2 drinks per week     Drug use: No     Family History   I have reviewed this patient's family history and updated it with pertinent information if needed.         Prior to Admission Medications   Prior to Admission Medications   Prescriptions Last Dose Informant Patient Reported? Taking?   Cholecalciferol (VITAMIN D) 1000 UNITS capsule  Self Yes No   Sig: Take 1 capsule by mouth daily.   Multiple Vitamins-Minerals (MULTIVITAL PO)  Self Yes No   Sig: Take 1 tablet by mouth daily.   acetaminophen (TYLENOL) 325 MG tablet   No No   Sig: Take 2 tablets (650 mg) by mouth every 4 hours as needed for mild pain   albuterol (PROAIR HFA/PROVENTIL HFA/VENTOLIN HFA) 108 (90 Base) MCG/ACT inhaler   No No   Sig: Inhale 2 puffs into the lungs every 4 hours as needed for shortness of breath / dyspnea or wheezing   Patient not taking: Reported on 1/19/2022   aspirin 81 MG tablet  Self Yes No   Sig: Take 1 tablet by mouth daily.   atorvastatin (LIPITOR) 40 MG tablet   No No   Sig: Take 1 tablet (40 mg) by mouth daily   chlorthalidone (HYGROTON) 25 MG tablet   No No   Sig: Take 1/2 tablet (12.5mgs) every Sunday and Wednesday Please divide tablets in half for patient as he  has arthritis   clopidogrel (PLAVIX) 75 MG tablet   No No   Sig: Take 1 tablet (75 mg) by mouth daily   diazepam (VALIUM) 5 MG tablet   No No   Sig: Take 1 tablet (5 mg) by mouth nightly as needed for sleep   Patient not taking: Reported on 1/19/2022   fish oil-omega-3 fatty acids (FISH OIL) 1000 MG capsule  Self Yes No   Sig: Take 1 g by mouth daily.   guaiFENesin (MUCINEX) 600 MG 12 hr tablet   No No   Sig: Take 1 tablet (600 mg) by mouth 2 times daily   lisinopril (ZESTRIL) 5 MG tablet   No No   Sig: Take 1 tablet (5 mg) by mouth 2 times daily   metoprolol succinate ER (TOPROL-XL) 50 MG 24 hr tablet   No No   Sig: Take 1 tablet (50 mg) by mouth daily   nitroGLYcerin (NITROSTAT) 0.4 MG sublingual tablet   No No   Sig: For chest pain place 1 tablet under the tongue every 5 minutes for 3 doses. If symptoms persist 5 minutes after 1st dose call 911.   omeprazole (PRILOSEC) 20 MG CR capsule   Yes No   Sig: Take 20 mg by mouth daily   spironolactone (ALDACTONE) 25 MG tablet   No No   Sig: TAKE HALF TAB BY MOUTH A DAY *CALL CARDIOLOGY #[phone] FOR APPT, DO LABS BEFORE VISIT   temazepam (RESTORIL) 15 MG capsule   No No   Sig: Take 1 capsule (15 mg) by mouth nightly as needed   Patient not taking: Reported on 1/19/2022      Facility-Administered Medications: None     Allergies   No Known Allergies    Physical Exam   Vital Signs: Temp: 97.6  F (36.4  C)   BP: 118/70 Pulse: 93     SpO2: 98 % O2 Device: Nasal cannula Oxygen Delivery: 2.5 LPM  Weight: 126 lbs 1.6 oz    General Appearance: Alert elderly male in moderate respiratory distress with audible wheezing  Eyes: PERRLA, EOMI  HEENT: MMM, voice hoarseness appreciated  Respiratory: Audible wheezing throughout all lung fields with auscultation, rhonchi diffusely, few rales at bases but very minimal, on 2 L via nasal cannula for comfort saturating 99%  Cardiovascular: RRR, 2/6 systolic murmur heard throughout the precordium, JVD at earlobe at 30 degrees with GVP being  measured at approximately 13 cm H20  GI: Soft, nontender, no distention  Skin: Pulses 2+ in upper extremities, lower extremities cool to touch and unable to palpate DP/PT  Musculoskeletal: 1+ lower extremity edema  Neurologic: AxO x3, moving all extremities spontaneously    Data   Data reviewed today: I reviewed all medications, new labs and imaging results over the last 24 hours.     TTE 3/2019  Left ventricular systolic function is mildly reduced.  The visual ejection fraction is estimated at 45-50%.  There is mild septal hypokinesis.  There is moderate apical wall hypokinesis.  Mild mid to distal anterior wall hypokinesis.  The right ventricle is normal in structure, function and size.  There is moderate trileaflet aortic sclerosis.  There is mild to moderate (1-2+) aortic regurgitation.  There is mild (1+) pulmonic valvular regurgitation.  Borderline aortic root dilatation.  Moderate atherosclerotic plaque(s) in the ascending aorta.  Compared to the prior study dated 5/12/2019, there have been no changes. The  study was technically limited. Optison contrast was used without apparent  complications.

## 2022-01-20 NOTE — PLAN OF CARE
/72   Pulse 89   Temp 98  F (36.7  C) (Oral)   Resp 16   Wt 57.2 kg (126 lb 1.6 oz)   SpO2 95%   BMI 22.34 kg/m      [id number]    Direct admit from clinic today for worsening SOB per pt report. A&Ox4, denied pain. SOB at rest, using accessory and abdominal muscles. Lasix 40 mg iv once at the end of my shift. PIV saline locked. Urinal at bedside. Two RN skin assessment done. Multiple cuts noted on all fingers. On 2LNC sating >92%. Assist of two person for transfer per day nurse report and unsteady. Dedicated visitor at bedside. Will continue to monitor.

## 2022-01-20 NOTE — PROGRESS NOTES
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     Today's plan:   - Continue IV diuresis with lasix 40 mg BID  - Trend Na q4h  - Echocardiogram     # Acute on chronic HFmEF (LVEF 45-50%) secondary to ICM  # Dyspnea   Stage C. NYHA Class IV.   Suspect SOB/RYAN is secondary to HF/hypervolemia given BNP 6K, elevated JVP, and bilateral pleural effusions. TTE 3/2019 with LVEF 45-50%, RV normal size/function, mild-mod AR, mild DC, borderline aortic root dilation. Follows with Dr. Rangel. BNP 6200. Trop wnl. CT with trace bilateral effusions. Unknown exacerbation etiology. No PTA diuretics, on chlorthalidone 2x/week. Remains hypervolemic on exam with elevated JVP and abdominal distention. Decent response to IV lasix.   - Volume status: hypervolemic, continue IV lasix 40 mg BID  - BB: continue Toprol XL 50 mg daily  - ACEi: continue PTA lisinopril 5 mg  - Aldosterone antagonist: continue PTA spironolactone 12.5 mg  - SGLT2i: none  - Strict I&O's  - Daily weights, <2g Na diet   - Replace lytes per protocol   - BID BMP with diuresis   - Repeat echo today      #CAD s/p 3v CABG in 2001   #PAD s/p bilateral common iliac stents   #AAA, infra-renal 3.9 cm  #HLD  Hx of 3v CABG in 2001. Last angiogram 3/2019 with RASHAAD x1 to ramus, widely patent LIMA-LAD with occluded SVA-RCA. They were unable to identify any addition grafts despite utilizing ascending aortography. Routine annual AAA surveillance last measuring 3.8 cm x 3.9 cm. Denies any anginal symptoms. HS troponin negative. EKG with no ST or T wave changes. Will obtain repeat echo, pending results consider further ischemic evaluation. LDL 42.   - Continue ASA 81 mg, atorvastatin 40 mg, clopidogrel  75 mg daily   - Echo today, as above   - Consider ischemic evaluation with stress test pending echo results     #Hyponatremia, improving   Na 118 on admission, now 121 with diuresis, asymptomatic. Hyponatremia likely 2/2 hypervolemia in setting of acute HF. Suspect PTA chlorthalidone was also a contributing factor.   - continue diuresis, as above   - Trend Na q4h  - Stop PTA chlorthalidone      #Voice hoarseness   Unclear etiology but both pt and sister say this is new.   - Would recommend ENT follow-up as OP     # RLL nodular densities  # RUL 4 mm nodule, new   Increased size of RLL nodular densities, unclear etiology. Infectious/inflammatory. Not likely to be etiology of pts SOB given limited involvement. New 4 mm nodule in RUL, could be inflammatory so follow-up may be helpful in 2-3 months.   - albuterol PRN  - Encourage IS and flutter valve    - Consider f/u imaging in 3 months     CHRONIC PROBLEMS  #GERD - PPI 40 mg daily   #Osteopenia, T9 compression fracture- unchanged on CT, cont PTA vit D  #Insomnia-  PTA prn temazepem HS   #Prostate cancer s/p radiation - in remission, PSA normal      Diet: 2G Na diet   Activity: as tolerated   Code Status: Full   Disposition: likely 2-3 days pending volume status     Patient seen and discussed w/ Dr. Cortes.      Cathleen Salter DNP, APRN, CNP  [facility] Cardiology Team  [phone] 7a-5p       Interval History: No acute events overnight. Report improvement in breathing. On 2L with O2 sats > 94%. Decent response with IV lasix.  Net negative 1.2L today, down 13 lbs since admission. Denies CP, dizziness, PND, orthopnea, or edema. Cr 0.53. K 3.6.     Most recent vital signs:  /71 (BP Location: Left arm)   Pulse 75   Temp 97.7  F (36.5  C) (Oral)   Resp 18   Wt 51.6 kg (113 lb 11.2 oz)   SpO2 95%   BMI 20.14 kg/m    Temp:  [97.5  F (36.4  C)-98  F (36.7  C)] 97.7  F (36.5  C)  Pulse:  [75-93] 75  Resp:  [16-18] 18  BP: (117-138)/() 127/71  SpO2:  [95 %-98 %] 95  %  Wt Readings from Last 2 Encounters:   01/20/22 51.6 kg (113 lb 11.2 oz)   01/19/22 56.7 kg (124 lb 14.4 oz)       Intake/Output Summary (Last 24 hours) at 1/20/2022 0922  Last data filed at 1/20/2022 0433  Gross per 24 hour   Intake 480 ml   Output 2305 ml   Net -1825 ml       Physical exam:  General: In bed, in NAD  HEENT: EOMI, PERRLA, no scleral icterus or injection, JVP elevated   CARDIAC: RRR, no m/r/g appreciated. Peripheral pulses 2+  RESP: CTAB, wheezes, rhonchi appreciated.  GI: NABS, NT/ND, no guarding or rebound  EXTREMITIES: NO LE edema, pulses 2+.   SKIN: No acute lesions appreciated  NEURO: Alert and oriented X3, CN II-XII grossly intact, no focal neurological deficits noted    Drains and Tubes: All drain and tube sites are all benign, no signs of infection  Access: All vascular access sites appear benign    Labs (Past three days):  CBC  Recent Labs   Lab 01/20/22  0551 01/19/22  0851   WBC 9.7 9.8   RBC 4.05* 3.81*   HGB 12.9* 12.1*   HCT 35.7* 33.1*   MCV 88 87   MCH 31.9 31.8   MCHC 36.1 36.6*   RDW 15.1* 14.9    334     BMP  Recent Labs   Lab 01/20/22  0551 01/20/22  0049 01/19/22  2100 01/19/22  1757 01/19/22  0851   * 121* 120* 118* 120*   POTASSIUM 3.6 3.8  --  4.3 4.6   CHLORIDE 83* 84*  --  86* 85*   CO2 30 27  --  24 25   ANIONGAP 8 10  --  8 10   GLC 92 101*  --  103* 129*   BUN 10 11  --  12 10   CR 0.63* 0.65*  --  0.60* 0.58*   GFRESTIMATED >90 >90  --  >90 >90   MICKIE 8.9 8.6  --  8.2* 8.8   MAG 1.9  --   --   --  1.7   PHOS  --  3.6  --   --   --      Troponins:   Lab Results   Component Value Date    TROPI <0.015 01/26/2021    TROPI <0.015 04/02/2019        INRNo lab results found in last 7 days.  Liver panel  Recent Labs   Lab 01/20/22  0551 01/20/22  0049 01/19/22  1757 01/19/22  0851   PROTTOTAL 6.7*  --  6.1* 6.4*   ALBUMIN 3.5 3.2* 3.1* 3.5   BILITOTAL 1.0  --  0.5 0.7   ALKPHOS 79  --  69 71   AST 31  --  27 28   ALT 28  --  26 28       Imaging/procedure  results:  EKG 12 Lead: 1/19/22      ECHO: 3/2019  Interpretation Summary     Left ventricular systolic function is mildly reduced.  The visual ejection fraction is estimated at 45-50%.  There is mild septal hypokinesis.  There is moderate apical wall hypokinesis.  Mild mid to distal anterior wall hypokinesis.  The right ventricle is normal in structure, function and size.  There is moderate trileaflet aortic sclerosis.  There is mild to moderate (1-2+) aortic regurgitation.  There is mild (1+) pulmonic valvular regurgitation.  Borderline aortic root dilatation.  Moderate atherosclerotic plaque(s) in the ascending aorta.  Compared to the prior study dated 5/12/2019, there have been no changes. The  study was technically limited. Optison contrast was used without apparent  complications.      Coronary Angiogram: 3/2019  Conclusion    1.  Dyspnea on exertion as likely anginal equivalent and abnormal stress ECG.  2. Three-vessel obstructive coronary artery disease of the proximal LAD (100% occlusion, mid LAD supplied by LIMA), proximal RCA (100% occlusion, SVG to RCA occluded) and proximal ramus intermedius (80% stenosis, iFR 0.87) status-post CABG.  3. Widely patent LIMA-LAD with occluded SVG-RCA. We are unable to identify any additional grafts despite probing with multiple catheters and utilizing ascending aortography.  4. Successful PCI of the ramus intermedius with a 4.0x16mm Synergy drug eluting stent.  5. Left radial arterial sheath removed and hemostasis achieved with a TR Band.        Plan    1. Aspirin 81 mg po daily lifelong.  2. Plavix 75 mg po daily daily for at least 1 year uninterrupted.  3. Bedrest per protocol.  4. Discharge today per protocol.    5. Continued medical management and lifestyle modification for cardiovascular risk factor optimization.

## 2022-01-21 NOTE — PROGRESS NOTES
-------------------CRITICAL LAB VALUE-------------------    Lab Value: blood [id number]  Patient status: non symptomatic  Time of notification: 5:15 PM  MD notified:Dr. Araiza    Temp:  [97.5  F (36.4  C)-98  F (36.7  C)] 97.6  F (36.4  C)  Pulse:  [78-93] 78  Resp:  [18] 18  BP: ()/(50-97) 105/57  SpO2:  [93 %-100 %] 94 %

## 2022-01-21 NOTE — PROGRESS NOTES
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     Today's plan:   - Increase IV lasix to 80 mg BID   - Increase lisinopril to 10 mg daily   - Nephrology consult for hyponatremia   - Stress CMR today for ischemic evaluation     # Acute on chronic HFrEF 2/2 ICM (EF 20-25%)  # Grade II diastolic dysfunction   Stage C. NYHA Class IV.   Follows with Dr. Rangel. Echo this admission shows significantly reduced LV function with EF 20-25% (45-50% in 2019), severe hypokinesis in LAD territory, and grade II diastolic dysfunction. NTpBNP 6K, CT with trace bilateral effusions. Trop wnl. EKG unremarkable. No PTA diuretics, on chlorthalidone 2x/week. Remains hypervolemic on exam with elevated JVP and abdominal distention. Decent response to IV lasix.   - Volume status: hypervolemic, increase IV lasix to 80 mg BID  - BB: continue Toprol XL 50 mg daily  - ACEi: increase lisinopril to 10 mg [PTA 5 mg daily]  - Aldosterone antagonist: continue PTA spironolactone 12.5 mg  - SGLT2i: none  - Strict I&O's  - Daily weights, <2g Na diet   - Replace lytes per protocol   - BID BMP with diuresis   - Stress CMR for ischemic evaluation today      #CAD s/p 3v CABG in 2001   #PAD s/p bilateral common iliac stents   #AAA, infra-renal 3.9 cm  #HLD  Hx of 3v CABG in 2001. Last angiogram 3/2019 with RASHAAD x1 to ramus, widely patent LIMA-LAD with occluded SVA-RCA. They were unable to identify any additional grafts despite utilizing ascending aortography. Routine annual AAA surveillance last measuring 3.8 cm x 3.9 cm. Denies any anginal symptoms. HS troponin negative. EKG with no ST or T wave changes. Repeat echo shows significantly reduced LV function with EF 20-25%  (40-45%), severe hypokinesis in the LAD territory, grade II diastolic dysfunction, and no valvular abnormalities. LDL 42.   - Continue ASA 81 mg, atorvastatin 40 mg, clopidogrel 75 mg daily   - Stress CMR today for ischemic evaluation      #Hyponatremia  Na 118 on admission, asymptomatic. Hyponatremia likely 2/2 hypervolemia in setting of acute HF. Suspect PTA chlorthalidone was also a contributing factor.   - increase diuresis, as above    - Nephrology consult   - Stop PTA chlorthalidone   - BMP BID       #Voice hoarseness, improving    Unclear etiology but both pt and sister say this is new.   - Would recommend ENT follow-up as OP     # RLL nodular densities  # RUL 4 mm nodule, new   # Dyspnea   CT shows Increased size of RLL nodular densities, unclear etiology. Infectious/inflammatory. Not likely to be etiology of pts SOB given limited involvement. New 4 mm nodule in RUL, could be inflammatory so follow-up may be helpful in 2-3 months. CXR unremarkable.   - inhalers/nebs PRN  - Encourage IS and flutter valve    - Consider f/u imaging in 3 months     CHRONIC PROBLEMS  #GERD - PPI 40 mg daily   #Osteopenia, T9 compression fracture- unchanged on CT, cont PTA vit D  #Insomnia-  PTA prn temazepem HS   #Prostate cancer s/p radiation - in remission, PSA normal      Diet: 2G Na diet   Activity: as tolerated   Code Status: Full   Disposition: likely 2-3 days pending volume status     Patient seen and discussed w/ Dr. Cortes.      Cathleen Salter DNP, APRN, CNP  [facility] Cardiology Team  [phone] 7a-5p       Interval History: No acute events overnight. Reports no improvement with breathing. Difficult to take deep breaths. On 2L with O2 sats > 94%. Decent response with IV lasix.  Net negative 1L yesterday, weight stable. Denies CP, dizziness, PND, orthopnea, or edema.     Most recent vital signs:  /84 (BP Location: Left arm, Cuff Size: Adult Regular)   Pulse 80   Temp 97.5  F (36.4  C) (Oral)   Resp 18   Wt 52.2 kg  (115 lb 1.6 oz)   SpO2 100%   BMI 20.39 kg/m    Temp:  [97.3  F (36.3  C)-98.3  F (36.8  C)] 97.5  F (36.4  C)  Pulse:  [74-85] 80  Resp:  [18] 18  BP: ()/(51-97) 119/84  SpO2:  [96 %-100 %] 100 %  Wt Readings from Last 2 Encounters:   01/21/22 52.2 kg (115 lb 1.6 oz)   01/19/22 56.7 kg (124 lb 14.4 oz)       Intake/Output Summary (Last 24 hours) at 1/20/2022 0922  Last data filed at 1/20/2022 0433  Gross per 24 hour   Intake 480 ml   Output 2305 ml   Net -1825 ml       Physical exam:  General: In bed, in NAD  HEENT: EOMI, PERRLA, no scleral icterus or injection, JVP elevated   CARDIAC: RRR, no m/r/g appreciated. Peripheral pulses 2+  RESP: CTAB, wheezes, rhonchi appreciated.  GI: NABS, NT/ND, no guarding or rebound  EXTREMITIES: NO LE edema, pulses 2+.   SKIN: No acute lesions appreciated  NEURO: Alert and oriented X3, CN II-XII grossly intact, no focal neurological deficits noted    Drains and Tubes: All drain and tube sites are all benign, no signs of infection  Access: All vascular access sites appear benign    Labs (Past three days):  CBC  Recent Labs   Lab 01/21/22  0642 01/20/22  0551 01/19/22  0851   WBC 10.6 9.7 9.8   RBC 3.92* 4.05* 3.81*   HGB 12.6* 12.9* 12.1*   HCT 34.7* 35.7* 33.1*   MCV 89 88 87   MCH 32.1 31.9 31.8   MCHC 36.3 36.1 36.6*   RDW 14.9 15.1* 14.9    356 334     BMP  Recent Labs   Lab 01/21/22  0642 01/20/22  1707 01/20/22  0551 01/20/22  0049 01/19/22  1757 01/19/22  0851   * 120* 121* 121*   < > 120*   POTASSIUM 3.8 3.7 3.6 3.8   < > 4.6   CHLORIDE 81* 82* 83* 84*   < > 85*   CO2 29 27 30 27   < > 25   ANIONGAP 9 11 8 10   < > 10   * 102* 92 101*   < > 129*   BUN 14 16 10 11   < > 10   CR 0.71 0.81 0.63* 0.65*   < > 0.58*   GFRESTIMATED 90 87 >90 >90   < > >90   MICKIE 8.2* 8.0* 8.9 8.6   < > 8.8   MAG 1.8 1.7 1.9  --   --  1.7   PHOS  --   --   --  3.6  --   --     < > = values in this interval not displayed.     Troponins:   Lab Results   Component Value Date     TROPI <0.015 01/26/2021    TROPI <0.015 04/02/2019        INRNo lab results found in last 7 days.  Liver panel  Recent Labs   Lab 01/20/22  0551 01/20/22  0049 01/19/22  1757 01/19/22  0851   PROTTOTAL 6.7*  --  6.1* 6.4*   ALBUMIN 3.5 3.2* 3.1* 3.5   BILITOTAL 1.0  --  0.5 0.7   ALKPHOS 79  --  69 71   AST 31  --  27 28   ALT 28  --  26 28       Imaging/procedure results:  EKG 12 Lead: 1/19/22      ECHO: 1/20/22   Interpretation Summary  Left ventricular function is decreased. The ejection fraction is 20-25%  (severely reduced). Severe diffuse hypokinesis is present with a pattern of  akinesis in the territory supplied by the LAD. Mild left ventricular dilation  is present. LVEDD 5.3 cm. Grade II or moderate diastolic dysfunction.  Global right ventricular function is mildly reduced. The right ventricle is  normal size.  There are no significant valvular abnormalities.  The left and right atrial pressures are both elevated.  This study was compared with the study from 03/19/2019. The biventricular  function has declined. The LA and RA pressures are both higher.    3/2019  Interpretation Summary     Left ventricular systolic function is mildly reduced.  The visual ejection fraction is estimated at 45-50%.  There is mild septal hypokinesis.  There is moderate apical wall hypokinesis.  Mild mid to distal anterior wall hypokinesis.  The right ventricle is normal in structure, function and size.  There is moderate trileaflet aortic sclerosis.  There is mild to moderate (1-2+) aortic regurgitation.  There is mild (1+) pulmonic valvular regurgitation.  Borderline aortic root dilatation.  Moderate atherosclerotic plaque(s) in the ascending aorta.  Compared to the prior study dated 5/12/2019, there have been no changes. The  study was technically limited. Optison contrast was used without apparent  complications.      Coronary Angiogram: 3/2019  Conclusion    1.  Dyspnea on exertion as likely anginal equivalent and  abnormal stress ECG.  2. Three-vessel obstructive coronary artery disease of the proximal LAD (100% occlusion, mid LAD supplied by LIMA), proximal RCA (100% occlusion, SVG to RCA occluded) and proximal ramus intermedius (80% stenosis, iFR 0.87) status-post CABG.  3. Widely patent LIMA-LAD with occluded SVG-RCA. We are unable to identify any additional grafts despite probing with multiple catheters and utilizing ascending aortography.  4. Successful PCI of the ramus intermedius with a 4.0x16mm Synergy drug eluting stent.  5. Left radial arterial sheath removed and hemostasis achieved with a TR Band.        Plan    1. Aspirin 81 mg po daily lifelong.  2. Plavix 75 mg po daily daily for at least 1 year uninterrupted.  3. Bedrest per protocol.  4. Discharge today per protocol.    5. Continued medical management and lifestyle modification for cardiovascular risk factor optimization.

## 2022-01-21 NOTE — PLAN OF CARE
Neuro: A&Ox4, [place].   Cardiac: SR BBB. OVSS.   Respiratory: Sating 95% on 2L NC, frequent loose NP cough, RYAN, c/o increased WOB in afternoon, MDs notified, CXR done, DuoNeb given along with scheduled lasix, states improvement  GI/: Adequate urine output via primofit. LBM yesterday   Diet/appetite: Tolerating 2gm Na diet, appetite fair/poor   Activity:  A1 w walker, not OOB this shift, PT/OT ordered   Pain: C/o R scapula pain, improved with massage from family, lidocaine patches ordered.   Skin: Scabbed areas on several fingers, states are PTA. BLE jessika/mottled calves, pulses palpable, denies N/T  LDA's: PIV SL, primofit     Plan: Cardiac MRI tomorrow, checklist sent down to MRI. Continue with POC. Notify primary team with changes.

## 2022-01-22 NOTE — PROGRESS NOTES
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     Today's plan:   - Increase IV lasix to 80 mg TID  - Hold spironolactone, replace K to > 4    - 800 ml fluid restriction    - Nutrition consult for assistance with high protein diet/shakes     # Acute on chronic HFrEF 2/2 ICM (EF 20-25%)  # Grade II diastolic dysfunction   Stage C. NYHA Class IV.   Follows with Dr. Rangel. Echo this admission shows significantly reduced LV function with EF 20-25% (45-50% in 2019), severe hypokinesis in LAD territory, and grade II diastolic dysfunction. NTpBNP 6K, CT with trace bilateral effusions. Trop wnl. EKG unremarkable. No PTA diuretics, on chlorthalidone 2x/week. Remains hypervolemic on exam with elevated JVP and abdominal distention. Decent response to IV lasix. Stress CMR c/w ICM with LVEF 22 %, MIs in LAD and RCA territories, no ischemia.   - Volume status: hypervolemic, increase IV lasix to 80 mg TID  - BB: continue Toprol XL 50 mg daily to allow more BP   - ACEi: continue lisinopril to 10 mg [PTA 5 mg daily]  - Aldosterone antagonist: hold PTA spironolactone 12.5 mg  - SGLT2i: none, consider adding prior to discharge   - Strict I&O's  - Daily weights   - Replace lytes per protocol, keep K > 4  - BID BMP with diuresis      #CAD s/p 3v CABG in 2001   #PAD s/p bilateral common iliac stents   #AAA, infra-renal 3.9 cm  #HLD  Hx of 3v CABG in 2001. Last angiogram 3/2019 with RASHAAD x1 to ramus, widely patent LIMA-LAD with occluded SVA-RCA. They were unable to identify any additional grafts despite utilizing ascending aortography. Routine annual AAA surveillance last measuring 3.8 cm x 3.9 cm. Denies any anginal symptoms. HS troponin negative. EKG  with no ST or T wave changes. Repeat echo shows significantly reduced LV function with EF 20-25% (40-45%), severe hypokinesis in the LAD territory, grade II diastolic dysfunction, and no valvular abnormalities. Stress CMR this admission c/w ICM, MIs in LAD/RCA territories, no ischemia. LDL 42.   - Continue ASA 81 mg, atorvastatin 40 mg, clopidogrel 75 mg daily   - Consider stopping plavix, > 1 year since PCI      #Hypervolemic hyponatremia  Na 118 on admission, asymptomatic. Hyponatremia 2/2 free water retention in the setting of decompensated heart failure. Suspect PTA chlorthalidone was also a contributing factor. Nephrology following for assistance.   - Nephrology following, appreciate assistance  - Continue diuresis, as above     - 800 ml fluid restriction   - Hold spironolactone, replace potassium to > 4  - Stop PTA chlorthalidone, will NOT resume at discharge   - BMP BID    - Per nephrology, recommend high protein diet/protein shakes for increased solute intake  - Nutrition consult      #Voice hoarseness, improving    Unclear etiology but both pt and sister say this is new.   - Would recommend ENT follow-up as OP problem persists     # RLL nodular densities  # RUL 4 mm nodule, new   # Dyspnea, improving   CT shows Increased size of RLL nodular densities, unclear etiology. Infectious/inflammatory. Not likely to be etiology of pts SOB given limited involvement. New 4 mm nodule in RUL, could be inflammatory so follow-up may be helpful in 2-3 months. CXR unremarkable.   - inhalers/nebs PRN  - Encourage IS and flutter valve    - Consider f/u imaging in 3 months     CHRONIC PROBLEMS  #GERD - PPI 40 mg daily   #Osteopenia, T9 compression fracture- unchanged on CT, cont PTA vit D  #Insomnia-  PTA prn temazepem HS   #Prostate cancer s/p radiation - in remission, PSA normal      Diet: 2G Na diet   Activity: as tolerated   Code Status: Full   Disposition: likely 2-3 days pending volume status     Patient seen and  "discussed w/ Dr. Cortes.      Cathleen Salter DNP, APRN, CNP  [facility] Cardiology Team  [phone] 7a-5p       Interval History: No acute events overnight. Reports feeling \"great\" today. Remains on RA. Denies CP, SOB, dizziness, or palpitations. Na 121. Cr 0.77. K 3.6.     Most recent vital signs:  /63 (BP Location: Left arm)   Pulse 98   Temp 97.6  F (36.4  C) (Axillary)   Resp 14   Wt 51 kg (112 lb 8 oz)   SpO2 94%   BMI 19.93 kg/m    Temp:  [97.5  F (36.4  C)-98  F (36.7  C)] 97.6  F (36.4  C)  Pulse:  [75-93] 82  Resp:  [14-18] 14  BP: ()/(50-84) 130/84  SpO2:  [93 %-97 %] 94 %  Wt Readings from Last 2 Encounters:   01/22/22 51 kg (112 lb 8 oz)   01/19/22 56.7 kg (124 lb 14.4 oz)       Intake/Output Summary (Last 24 hours) at 1/20/2022 0922  Last data filed at 1/20/2022 0433  Gross per 24 hour   Intake 480 ml   Output 2305 ml   Net -1825 ml       Physical exam:  General: In bed, in NAD  HEENT: EOMI, PERRLA, no scleral icterus or injection, JVP elevated   CARDIAC: RRR, no m/r/g appreciated. Peripheral pulses 2+  RESP: CTAB, wheezes, rhonchi appreciated.  GI: NABS, NT/ND, no guarding or rebound  EXTREMITIES: NO LE edema, pulses 2+.   SKIN: No acute lesions appreciated  NEURO: Alert and oriented X3, CN II-XII grossly intact, no focal neurological deficits noted    Drains and Tubes: All drain and tube sites are all benign, no signs of infection  Access: All vascular access sites appear benign    Labs (Past three days):  CBC  Recent Labs   Lab 01/22/22  0559 01/21/22  0642 01/20/22  0551 01/19/22  0851   WBC 9.3 10.6 9.7 9.8   RBC 4.11* 3.92* 4.05* 3.81*   HGB 13.3 12.6* 12.9* 12.1*   HCT 36.6* 34.7* 35.7* 33.1*   MCV 89 89 88 87   MCH 32.4 32.1 31.9 31.8   MCHC 36.3 36.3 36.1 36.6*   RDW 15.2* 14.9 15.1* 14.9    344 356 334     BMP  Recent Labs   Lab 01/22/22  0559 01/21/22  1647 01/21/22  1538 01/21/22  0642 01/20/22  1707 01/20/22  0551 01/20/22  0049   *  --  120* 119* 120*   < > 121* "   POTASSIUM 3.6  --  3.5 3.8 3.7   < > 3.8   CHLORIDE 81*  --  80* 81* 82*   < > 84*   CO2 30  --  31 29 27   < > 27   ANIONGAP 10  --  9 9 11   < > 10   GLC 90  --  114* 100* 102*   < > 101*   BUN 15  --  14 14 16   < > 11   CR 0.77  --  0.75 0.71 0.81   < > 0.65*   GFRESTIMATED 88  --  89 90 87   < > >90   MICKIE 8.6  --  8.1* 8.2* 8.0*   < > 8.6   MAG 1.9 1.6  --  1.8 1.7   < >  --    PHOS  --   --   --   --   --   --  3.6    < > = values in this interval not displayed.     Troponins:   Lab Results   Component Value Date    TROPI <0.015 01/26/2021    TROPI <0.015 04/02/2019        INRNo lab results found in last 7 days.  Liver panel  Recent Labs   Lab 01/20/22  0551 01/20/22  0049 01/19/22  1757 01/19/22  0851   PROTTOTAL 6.7*  --  6.1* 6.4*   ALBUMIN 3.5 3.2* 3.1* 3.5   BILITOTAL 1.0  --  0.5 0.7   ALKPHOS 79  --  69 71   AST 31  --  27 28   ALT 28  --  26 28       Imaging/procedure results:  EKG 12 Lead: 1/19/22      ECHO: 1/20/22   Interpretation Summary  Left ventricular function is decreased. The ejection fraction is 20-25%  (severely reduced). Severe diffuse hypokinesis is present with a pattern of  akinesis in the territory supplied by the LAD. Mild left ventricular dilation  is present. LVEDD 5.3 cm. Grade II or moderate diastolic dysfunction.  Global right ventricular function is mildly reduced. The right ventricle is  normal size.  There are no significant valvular abnormalities.  The left and right atrial pressures are both elevated.  This study was compared with the study from 03/19/2019. The biventricular  function has declined. The LA and RA pressures are both higher.    Stress CMR: 1/21/22  1. The LV is mild-to-moderately dilated. The wall thickness is normal except for the apical anterior  segment, which is thinned. The global systolic function is severely reduced. The LVEF is 22%. There is  diffuse hypokinesis with akinesis of the mid and distal LAD and the entire RCA territories.      2. The RV is  normal in cavity size. The global systolic function is normal. The RVEF is 55%.      3. The left atrium is mildly enlarged. The right atrium is normal in size.      4. There is mild aortic insufficiency.      5. Late gadolinium enhancement imaging shows subendocardial hyperenhancement in all anterior, septal, and  inferior segments, consistent with MIs in the LAD and RCA territories. The transmural extent of the  hyperenhancement is 50-75% in most segments. There is approximately 40% viability in both the LAD and RCA  territories, and 100% viability in the LCx territory.     6. Regadenoson stress perfusion imaging shows perfusion defects matching the above-mentioned MIs without  any ischemia.     7. There is no pericardial effusion or thickening.     8. There is no intracardiac thrombus.     9. There is a small right pleural effusion.     CONCLUSIONS: Ischemic cardiomyopathy with LVEF of 22%, MIs in the LAD and RCA territories, and no ischemia.    Coronary Angiogram: 3/2019  Conclusion    1.  Dyspnea on exertion as likely anginal equivalent and abnormal stress ECG.  2. Three-vessel obstructive coronary artery disease of the proximal LAD (100% occlusion, mid LAD supplied by LIMA), proximal RCA (100% occlusion, SVG to RCA occluded) and proximal ramus intermedius (80% stenosis, iFR 0.87) status-post CABG.  3. Widely patent LIMA-LAD with occluded SVG-RCA. We are unable to identify any additional grafts despite probing with multiple catheters and utilizing ascending aortography.  4. Successful PCI of the ramus intermedius with a 4.0x16mm Synergy drug eluting stent.  5. Left radial arterial sheath removed and hemostasis achieved with a TR Band.        Plan    1. Aspirin 81 mg po daily lifelong.  2. Plavix 75 mg po daily daily for at least 1 year uninterrupted.  3. Bedrest per protocol.  4. Discharge today per protocol.    5. Continued medical management and lifestyle modification for cardiovascular risk factor  optimization.

## 2022-01-22 NOTE — PROGRESS NOTES
CLINICAL NUTRITION SERVICES - ASSESSMENT NOTE     Nutrition Prescription    RECOMMENDATIONS FOR MDs/PROVIDERS TO ORDER:  Ensures ordered with meals, not to be counted with fluid restriction    Malnutrition Status:    Severe malnutrition in the context of chronic illness    Recommendations already ordered by Registered Dietitian (RD):  Supplements ordered: Ensure with meals    Future/Additional Recommendations:  Continue current diet order  Monitor labs, intakes, and weight trends.  Monitor elecrtrolyes  Monitor ONS tolerance     REASON FOR ASSESSMENT  Oscar Chaudhary is a/an 86 year old male assessed by the dietitian for Provider Order - Recommending high protein diet with protein shakes and increase solute/protein intake - protein shakes?    NUTRITION/MEDICAL HISTORY  History of ICM with HFpEF (LVEF 45-50%), CAD s/p CABG in 2001, AAA, HLD, PAD s/p left and right iliac stents, osteopenia with compression fractures who was directly admitted from clinic for acute on chronic HF exacerbation and hyponatremia.    Per pt, has had decreased appetite and intakes over last 5-6 months, down to 1-2 meal a day. Previously at least 2 meals for a while previously. Has also not been cooking for himself often and will eat pre-prepared meals I.e. Stouffers. Sees that he has been losing weight, muscle and fat stores. Usually enjoys nutritious meals and ice cream in moderation. Discussed importance of adequate protein and calorie intakes. Offered supplements to provide extra protein and other solutes per MD order. Patient agreeable to try vanilla ensure. Ordered TID with meals. Will continue to monitor intakes during admission and provide education as needed.     CURRENT NUTRITION ORDERS  Diet: 800 mL Fluid Restriction and High Kcal/High Protein  Intake/Tolerance: 50-75% of meals    LABS  Labs reviewed: Na 1118-121 (L) during admission, Cl 81 (L), Creatinine 0.65 on 1/20 -> 0.77 on 1/22, K 3.6, Phos 3.6 Mag 1.6-1.9 during  "admission    MEDICATIONS  Medications reviewed    ANTHROPOMETRICS  Height: 160 cm (5' 3\")  Most Recent Weight: 51 kg (112 lb 8 oz)    IBW: 56.4 kg  BMI: Normal BMI  Weight History: Limited weight history, however with ~ 10% loss in 1 yr (not significant)  Wt Readings from Last Encounters:   01/22/22 51 kg (112 lb 8 oz)   01/19/22 56.7 kg (124 lb 14.4 oz)   01/26/21 56.2 kg (124 lb)   01/14/20 57.8 kg (127 lb 8 oz)   09/17/19 57.6 kg (127 lb)   05/14/19 56.2 kg (124 lb)   04/18/19 56.2 kg (123 lb 14.4 oz)   04/02/19 59.1 kg (130 lb 4.8 oz)   03/21/19 57.2 kg (126 lb)   03/19/19 58.7 kg (129 lb 6.4 oz)     Dosing Weight: 51 kg    ASSESSED NUTRITION NEEDS  Estimated Energy Needs: 1093-8790 kcals/day (25 - 30 kcals/kg)  Justification: Maintenance  Estimated Protein Needs: 66-92 grams protein/day (1.3 - 1.8 grams of pro/kg)  Justification: Increased needs  Estimated Fluid Needs: 800 mL/day  Justification: On a fluid restriction    PHYSICAL FINDINGS  See malnutrition section below.  Bruised/cracked skin    MALNUTRITION  % Intake: </=75% for >/= 1 month (severe)  % Weight Loss: Weight loss does not meet criteria  Subcutaneous Fat Loss: None observed  Muscle Loss: Temporal:  Mild, Scapular bone:  Moderate, Thoracic region (clavicle, acromium bone, deltoid, trapezius, pectoral):  Moderate-severe, Upper arm (bicep, tricep):  Moderate, Dorsal hand:  Mild, Patellar region:  Severe and Posterior calf:  Severe  Fluid Accumulation/Edema: None noted  Malnutrition Diagnosis: Severe malnutrition in the context of chronic illness    NUTRITION DIAGNOSIS  Inadequate protein-energy intake related to decreased appetite as evidenced by patient report and evident muscle wasting.       INTERVENTIONS  Implementation  Nutrition Education: Will be provided if education needs arise   Medical food supplement therapy - Ensure Enlive TID with meals    Goals  Patient to consume % of nutritionally adequate meal trays TID, or the equivalent with " supplements/snacks.     Monitoring/Evaluation  Progress toward goals will be monitored and evaluated per protocol.    Baylee Amaya MS, RDN, LDN  Weekend Pager: [phone]

## 2022-01-22 NOTE — CONSULTS
Nephrology Initial Consult  January 21, 2022      Oscar Chaudhary MRN:[id number] YOB: 1935  Date of Admission:1/19/2022  Primary care provider: Tristian Jeffrey  Requesting physician: Noe Cortes, *    ASSESSMENT AND RECOMMENDATIONS:     # Hypervolemic hyponatremia    - Check urine sodium, urine osmolality and serum osmolality (ordered for you)  - Hyponatremia, secondary to free water retention. Elevated urine osmolality suggests presence of ADH. Given hypotension post diuresis this response is appropriate. He is currently on Lisinopril and Metoprolol, which are also contributing to low blood pressures. To decrease stimulus for ADH release recommend decreasing metoprolol dose or stopping if possible  - Other reason for free water retention is decreased solute intake. Presence of increased solute intake will aid in free water excretion and elevation of serum sodium. Recommend high protein diet/protein shake with diet per nutritionist.  - Free water restriction to 800 ml daily  - Stop spironolactone and replace potassium to > 4  - Agree with ongoing diuresis for volume optimization  - Please do not resume chlorthalidone on discharge.     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 Prior to admission he was not on loop diuretics. He was taking chlorthalidone twice weekly and spironolactone daily. On admission he was noted to have sodium level of 120. He was started on diuresis with lasix at 40 mg IV BID to  which he responded well. He has been net negative close to 2 L in past 2 days, however despite diuresis his serum sodium levels have continued to remain low around 119 for which Nephrology team has been consulted.     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    Part of periodontal abscess     h/o myocardial infarction 1976 5/31/2015     History of prostate cancer 2001 10/7/2013     S/P CABG (coronary artery bypass graft) 6/21/2001     S/P CABG (coronary artery bypass graft); 6/29/01 5/31/2015       Past Surgical History:   Procedure Laterality Date     CV CORONARY ANGIOGRAM N/A 3/21/2019    Procedure: Coronary Angiogram;  Surgeon: Milton Paul MD;  Location:  [facility]     CV PCI ANGIOPLASTY N/A 3/21/2019    Procedure: Percutaneous Coronary Intervention;  Surgeon: Milton Paul MD;  Location:  [facility]     INCISION AND DRAINAGE MANDIBLE, COMBINED N/A 2/27/2017    Procedure: COMBINED INCISION AND DRAINAGE MANDIBLE;  Surgeon: Adam Ramos DDS;  Location:  OR     OPEN REDUCTION INTERNAL FIXATION MANDIBLE N/A 2/27/2017    Procedure: OPEN REDUCTION INTERNAL FIXATION MANDIBLE;  Surgeon: Adam Ramos DDS;  Location:  OR        MEDICATIONS:  PTA Meds  Prior to Admission medications    Medication Sig Last Dose Taking? Auth Provider   acetaminophen (TYLENOL) 325 MG tablet Take 2 tablets (650 mg) by mouth every 4 hours as needed for mild pain Past Month at Unknown time Yes Jaime Tamayo MD   albuterol (PROAIR HFA/PROVENTIL HFA/VENTOLIN HFA) 108 (90 Base) MCG/ACT inhaler Inhale 2 puffs into the lungs every 4 hours as needed for shortness of breath / dyspnea or wheezing Past Month at Unknown time Yes Tristian Jeffrey MD   aspirin 81 MG tablet Take 1 tablet by mouth  daily. 1/19/2022 at Unknown time Yes Reported, Patient   atorvastatin (LIPITOR) 40 MG tablet Take 1 tablet (40 mg) by mouth daily  Patient taking differently: Take 40 mg by mouth every evening  1/18/2022 at Unknown time Yes Jerrod Rangel MD   chlorthalidone (HYGROTON) 25 MG tablet Take 1/2 tablet (12.5mgs) every Sunday and Wednesday Please divide tablets in half for patient as he has arthritis  Patient taking differently: Take 1/2 tablet (12.5mgs) every Sunday and Thursday.  Please divide tablets in half for patient as he has arthritis 1/16/2022 Yes Jerrod Rangel MD   Cholecalciferol (VITAMIN D) 1000 UNITS capsule Take 1 capsule by mouth daily. 1/19/2022 at Unknown time Yes Reported, Patient   clopidogrel (PLAVIX) 75 MG tablet Take 1 tablet (75 mg) by mouth daily  Patient taking differently: Take 75 mg by mouth every evening  1/18/2022 at Unknown time Yes Jerrod Rangel MD   diazepam (VALIUM) 5 MG tablet Take 1 tablet (5 mg) by mouth nightly as needed for sleep Past Month at Unknown time Yes Jerrod Rangel MD   fish oil-omega-3 fatty acids (FISH OIL) 1000 MG capsule Take 1 g by mouth daily. 1/19/2022 at Unknown time Yes Reported, Patient   guaiFENesin (MUCINEX) 600 MG 12 hr tablet Take 1 tablet (600 mg) by mouth 2 times daily  Patient taking differently: Take 600 mg by mouth daily  1/19/2022 at Unknown time Yes Tristian Jeffrey MD   lisinopril (ZESTRIL) 5 MG tablet Take 1 tablet (5 mg) by mouth 2 times daily 1/19/2022 at Unknown time Yes Jerrod Rangel MD   metoprolol succinate ER (TOPROL-XL) 50 MG 24 hr tablet Take 1 tablet (50 mg) by mouth daily 1/19/2022 at Unknown time Yes Jerrod Rangel MD   Multiple Vitamins-Minerals (MULTIVITAL PO) Take 1 tablet by mouth daily. 1/19/2022 at Unknown time Yes Reported, Patient   nitroGLYcerin (NITROSTAT) 0.4 MG sublingual tablet For chest pain place 1 tablet under the tongue every 5 minutes for 3 doses. If symptoms  persist 5 minutes after 1st dose call 911. More than a month at Unknown time Yes Jerrod Rangel MD   omeprazole (PRILOSEC) 20 MG CR capsule Take 20 mg by mouth daily 1/19/2022 at Unknown time Yes Reported, Patient   spironolactone (ALDACTONE) 25 MG tablet TAKE HALF TAB BY MOUTH A DAY *CALL CARDIOLOGY #[phone] FOR APPT, DO LABS BEFORE VISIT  Patient taking differently: Take 12.5 mg by mouth daily TAKE HALF TAB BY MOUTH A DAY *CALL CARDIOLOGY #[phone] FOR APPT, DO LABS BEFORE VISIT 1/19/2022 at Unknown time Yes Jerrod Rangel MD   temazepam (RESTORIL) 15 MG capsule Take 1 capsule (15 mg) by mouth nightly as needed More than a month at Unknown time Yes Jerrod Rangel MD      Current Meds    aspirin  81 mg Oral Daily     atorvastatin  40 mg Oral QPM     clopidogrel  75 mg Oral QPM     enoxaparin ANTICOAGULANT  40 mg Subcutaneous Q24H     furosemide  80 mg Intravenous BID     guaiFENesin  600 mg Oral Daily     lidocaine  1 patch Transdermal Q24h     lidocaine   Transdermal Q8H     lisinopril  10 mg Oral BID     metoprolol succinate ER  50 mg Oral Daily     pantoprazole  40 mg Oral Daily     polyethylene glycol  17 g Oral Daily     sodium chloride (PF)  3 mL Intracatheter Q8H     spironolactone  12.5 mg Oral Daily     temazepam  7.5 mg Oral Once     Vitamin D3  25 mcg Oral Daily     Infusion Meds      ALLERGIES:    No Known Allergies    REVIEW OF SYSTEMS:  A comprehensive of systems was negative except as noted above.    SOCIAL HISTORY:   Social History     Socioeconomic History     Marital status: Patient Declined     Spouse name: Not on file     Number of children: Not on file     Years of education: Not on file     Highest education level: Not on file   Occupational History     Not on file   Tobacco Use     Smoking status: Former Smoker     Years: 60.00     Types: Cigarettes     Smokeless tobacco: Never Used   Substance and Sexual Activity     Alcohol use: Yes     Comment: 2 drinks  per week     Drug use: No     Sexual activity: Not Currently   Other Topics Concern     Parent/sibling w/ CABG, MI or angioplasty before 65F 55M? Not Asked   Social History Narrative     Not on file     Social Determinants of Health     Financial Resource Strain: Not on file   Food Insecurity: Not on file   Transportation Needs: Not on file   Physical Activity: Not on file   Stress: Not on file   Social Connections: Not on file   Intimate Partner Violence: Not on file   Housing Stability: Not on file     Reviewed with patient     FAMILY MEDICAL HISTORY:   No family history of kidney disease.     PHYSICAL EXAM:   Temp  Av.8  F (36.6  C)  Min: 97.3  F (36.3  C)  Max: 98.3  F (36.8  C)      Pulse  Av.9  Min: 74  Max: 105 Resp  Av.7  Min: 16  Max: 18  SpO2  Av.3 %  Min: 93 %  Max: 100 %       /57 (BP Location: Left arm)   Pulse 78   Temp 97.6  F (36.4  C) (Oral)   Resp 18   Wt 52.2 kg (115 lb 1.6 oz)   SpO2 94%   BMI 20.39 kg/m     Date 22 07 - 22 0659   Shift 0916-5677 8611-4304 5227-4027 24 Hour Total   INTAKE   Shift Total(mL/kg)       OUTPUT   Urine 900   900   Shift Total(mL/kg) 900(17.24)   900(17.24)   Weight (kg) 52.21 52.21 52.21 52.21      Admit Weight: 57.2 kg (126 lb 1.6 oz)     GENERAL APPEARANCE: alert, not in acute distress,  awake  EYES: no scleral icterus, pupils equal  Endo: no goiter, no moon facies  Lymphatics: no cervical or supraclavicular LAD  Pulmonary: lungs clear to auscultation with equal breath sounds bilaterally, no clubbing  CV: regular rhythm, normal rate, no rub   - JVD distended   - Edema absent  GI: soft, nontender, normal bowel sounds  MS: no evidence of inflammation in joints, no muscle tenderness  : no umaña  SKIN: no rash, warm, dry, no cyanosis  NEURO: face symmetric, no asterixis     LABS:   CMP  Recent Labs   Lab 22  1647 22  1538 22  0642 22  1707 22  0551 22  0049 22  2100 22  1757  01/19/22  0851   NA  --  120* 119* 120* 121* 121*   < > 118* 120*   POTASSIUM  --  3.5 3.8 3.7 3.6 3.8  --  4.3 4.6   CHLORIDE  --  80* 81* 82* 83* 84*  --  86* 85*   CO2  --  31 29 27 30 27  --  24 25   ANIONGAP  --  9 9 11 8 10  --  8 10   GLC  --  114* 100* 102* 92 101*  --  103* 129*   BUN  --  14 14 16 10 11  --  12 10   CR  --  0.75 0.71 0.81 0.63* 0.65*  --  0.60* 0.58*   GFRESTIMATED  --  89 90 87 >90 >90  --  >90 >90   MICKIE  --  8.1* 8.2* 8.0* 8.9 8.6  --  8.2* 8.8   MAG 1.6  --  1.8 1.7 1.9  --   --   --  1.7   PHOS  --   --   --   --   --  3.6  --   --   --    PROTTOTAL  --   --   --   --  6.7*  --   --  6.1* 6.4*   ALBUMIN  --   --   --   --  3.5 3.2*  --  3.1* 3.5   BILITOTAL  --   --   --   --  1.0  --   --  0.5 0.7   ALKPHOS  --   --   --   --  79  --   --  69 71   AST  --   --   --   --  31  --   --  27 28   ALT  --   --   --   --  28  --   --  26 28    < > = values in this interval not displayed.     CBC  Recent Labs   Lab 01/21/22  0642 01/20/22  0551 01/19/22  0851   HGB 12.6* 12.9* 12.1*   WBC 10.6 9.7 9.8   RBC 3.92* 4.05* 3.81*   HCT 34.7* 35.7* 33.1*   MCV 89 88 87   MCH 32.1 31.9 31.8   MCHC 36.3 36.1 36.6*   RDW 14.9 15.1* 14.9    356 334     INRNo lab results found in last 7 days.  ABGNo lab results found in last 7 days.   URINE STUDIES  No lab results found.  No lab results found.  PTH  No lab results found.  IRON STUDIES  Recent Labs   Lab Test 03/19/19  1057   IRON 102      IRONSAT 38       IMAGING:  All imaging studies reviewed by me.     Denys Araiza MD

## 2022-01-22 NOTE — PROGRESS NOTES
01/22/22 0900   Quick Adds   Type of Visit Initial Occupational Therapy Evaluation       Present no   Language English    Living Environment   People in home alone  (1 dog)   Current Living Arrangements house   Home Accessibility stairs within home;other (see comments)  (uses chair lift from garage<>main level<>bedroom upstairs)   Number of Stairs, Within Home, Primary other (see comments)  (chair lift )   Transportation Anticipated car, drives self;family or friend will provide   Living Environment Comments Pt lives alone in home, uses chair lift to ascend/descend throughout house, has 1 step from living room <>dining. Uses walk-in shower, IND at baseline w/mobility    Self-Care   Usual Activity Tolerance moderate   Current Activity Tolerance poor   Regular Exercise Yes   Activity/Exercise Type walking  (Walks around [facility] daily )   Exercise Amount/Frequency daily   Equipment Currently Used at Home lift device;other (see comments)  (uses electric scooter for outdoor/walking the dog )   Activity/Exercise/Self-Care Comment IND at baseline   Instrumental Activities of Daily Living (IADL)   Previous Responsibilities meal prep;laundry;shopping;medication management;finances;driving   IADL Comments Cleaning services 2x/month, laundry on main level of house    Disability/Function   Hearing Difficulty or Deaf yes   Patient's preferred means of communication English speaker with hearing loss, no speech problems.   Describe hearing loss bilateral hearing loss   Use of hearing assistive devices bilateral hearing aids   Were auxiliary aids offered?   (donned throughout session )   Wear Glasses or Blind yes   Vision Management glasses/bifocals    Concentrating, Remembering or Making Decisions Difficulty yes   Concentration Management See MoCA score for details    Difficulty Communicating no   Difficulty Eating/Swallowing no   Walking or Climbing Stairs Difficulty yes   Walking or Climbing Stairs  "ambulation difficulty, requires equipment   Mobility Management FWW   Dressing/Bathing Difficulty no   Toileting issues other (see comments)  (catheter in place during OT session )   Doing Errands Independently Difficulty (such as shopping) no   Fall history within last six months no   Change in Functional Status Since Onset of Current Illness/Injury yes   General Information   Onset of Illness/Injury or Date of Surgery 01/19/22   Referring Physician Cathleen Salter, CNP   Patient/Family Therapy Goal Statement (OT) \"To go home and be with my dog\"   Additional Occupational Profile Info/Pertinent History of Current Problem Dr. Oscar Chaudhary is a 86 year old male with ICM with HFpEF (LVEF 45-50%), CAD s/p CABG in 2001, AAA, HLD, PAD s/p left and right iliac stents, osteopenia with compression fractures who was directly admitted from clinic for acute on chronic HF exacerbation and hyponatremia   Performance Patterns (Routines, Roles, Habits) Enjoys playing piano/violin   Existing Precautions/Restrictions fall   Left Upper Extremity (Weight-bearing Status) full weight-bearing (FWB)   Right Upper Extremity (Weight-bearing Status) full weight-bearing (FWB)   Left Lower Extremity (Weight-bearing Status) full weight-bearing (FWB)   Right Lower Extremity (Weight-bearing Status) full weight-bearing (FWB)   General Observations and Info Activity: ambulate    Cognitive Status Examination   Orientation Status orientation to person, place and time   Cognitive Status Comments Scored 25/30 on MoCA, indicates mild cognitive deficits    Visual Perception   Visual Impairment/Limitations WFL  (glasses donned during OT session )   Sensory   Sensory Quick Adds No deficits were identified   Pain Assessment   Patient Currently in Pain No   Integumentary/Edema   Integumentary/Edema no deficits were identifed   Posture   Posture forward head position;kyphosis   Range of Motion Comprehensive   Comment, General Range of Motion BUE " "grossly WFL   Strength Comprehensive (MMT)   Comment, General Manual Muscle Testing (MMT) Assessment ashleigh hand  4/5    Bed Mobility   Comment (Bed Mobility) SBA w/increased time and effort    Balance   Balance Comments mild unsteadiness during ambulation, utilized FWW w/Lisbeth    Clinical Impression   Criteria for Skilled Therapeutic Interventions Met (OT) yes   OT Diagnosis decreased IND w/ADLs and IADLs   OT Problem List-Impairments impacting ADL problems related to;activity tolerance impaired;cognition;strength   Assessment of Occupational Performance 1-3 Performance Deficits   Identified Performance Deficits showering, home mgmt, leisure    Planned Therapy Interventions (OT) ADL retraining;IADL retraining;cognition;progressive activity/exercise;risk factor education;home program guidelines   Clinical Decision Making Complexity (OT) low complexity   Therapy Frequency (OT) 6x/week   Predicted Duration of Therapy 1 week    Anticipated Equipment Needs Upon Discharge (OT)   (TBD)   Risk & Benefits of therapy have been explained evaluation/treatment results reviewed;care plan/treatment goals reviewed;risks/benefits reviewed;patient;sibling  (sister present throughout OT session )   Comment-Clinical Impression Pt to benefit from skilled OT during IP stay to address above deficits to maximize IND in ADLs/IADLs   OT Discharge Planning    OT Discharge Recommendation (DC Rec) Home with assist;home with home care occupational therapy;home with outpatient cardiac rehab   OT Rationale for DC Rec Pt below baseline, would benefit from  OT for home evaluation and to progress functional endurance/strength for optimal ADL/IADL performance as pt lives alone. Pt contemplating how long pt can live alone, open to \"hired help\" and family available to help as needed but not 24/7.    OT Brief overview of current status  MoCA 25/30, Lisbeth hallway amb w/FWW    Total Evaluation Time (Minutes)   Total Evaluation Time (Minutes) 5     "

## 2022-01-22 NOTE — PLAN OF CARE
Afebrile, vss. Patient denies pain. Intermittent confusion but patient can answer orientation questions correctly most of the time. PRN valium given x 1 for sleep. Patient woke up from sleep very confused and was walking in hallway - see additional note. Room air continues, patient tolerating. Rhythm is sinus. Primofit in place and adequate urine output. Minimal po intake overnight. Bilateral cracks noted on patients fingers, ruddiness on shins. Sister at bedside at start of shift and updated on plan of care throughout the night. Will continue to monitor and assess.

## 2022-01-22 NOTE — PLAN OF CARE
PProblem: Adult Inpatient Plan of Care  Goal: Optimal Comfort and Wellbeing  Outcome: Improving     Problem: Adjustment to Illness (Heart Failure)  Goal: Optimal Coping  Outcome: Improving   Pt Aaox4, SR, RA wheezing noted this am, neb given, c/o sob with ambulation. MRI completed results pending, Nephrology consult for low NA. 80mg lasix bid given. No s/s of distress, denied any pain. CB and phone in reach. Will continue to monitor.  ..Nena Georges RN

## 2022-01-22 NOTE — PROGRESS NOTES
Occupational Therapy Cognitive Assessment     Pt scored 25/30 (26+normative range) on MoCA (Version 8.1). This score indicates mild cognitive deficits, demo'ing difficulty in attention, language, and memory. Therapist currently recommending supervision for higher IADL tasks, such as medication management to ensure safety as pt lives alone. See note for more details.        01/22/22 0900   Cognitive Assessments   Cognitive Assessments Completed MoCA  (version 8.1)   Mir Cognitive Assessment (MoCA) Total Score out of 30 25/30  (MIS 11/15)   Norms Score of 26 or above considered normal   Domains assessed: attention, executive functions, memory, language, visuoconstructional skills, conceptual thinking, calculations, orientation

## 2022-01-22 NOTE — PROVIDER NOTIFICATION
"Notified provider that patient was found walking hallways unclothed by fellow RN. Patient stated that he \"woke up in the wrong place and needed to get somewhere\". Got patient back into bed, assessed patient, took vitals and reoriented. Both RN and patient reported that he did not fall with no evidence of injury noted.    Plan was: provider to come to bedside to assess patient, set bed alarm on and continue to monitor and notify team of any changes.   "

## 2022-01-22 NOTE — CONSULTS
Physician Attestation   I, Eliel Tanner, saw and evaluated Oscar BABS Eveliagermaine with Resident/Fellow. I have reviewed and discussed with the Resident/Fellow their history, physical and plan.    I personally reviewed the vital signs, medications, labs, and imaging.    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  He had history of HFpEF, prior EF 40-45%, CAD s/p CABG who presented with 10-day history of progressive dyspnea on exertion and and swelling and was admitted on 1/19/2022 for heart failure exacerbation.  He is also noted to have serum sodium of 120 upon presentation.  The patient has baseline creatinine of 0.8 back in 2021 but on presentation his creatinine was only 0.58.  The patient has been on spironolactone 12.5 mg daily, lisinopril 5 mg twice a day and metoprolol succinate 50 mg daily.  I will update his for heart failure.  He has no history of hypertension.  On presentation, he was noted to be at 57.2 kg.  He was given Lasix 40 mg IV twice daily twice yesterday and EP 2.6 L net -1 L.  This morning his weight is down to 52.2 kg.  Cardiology team has increased the dose of Lasix to 80 twice daily today.  Initial echocardiogram showed EF 20 to 25% which is significantly dropped from prior echo back in 2019. Nephrology team is consulted for ongoing hyponatremia.    Upon interview, the patient feels much better.  He told us that this morning he can barely talk but now he is able to talk in somewhat full sentence.  He denies any chest pain.  Short of breath is improved.  Leg swelling is also improved.  He told us that he only eat protein in moderation generally.  He denies any feeling of nausea she is not vomiting at this time.    # Moderate hyponatremia; hypervolemic(improved)  # Likely component of low solute intake and prior thiazide use as well  # HFrEF with recent drop in EF concerning for ICM  I think the reason that he has hyponatremia is likely  a combination of HF which activate ADH. However, with the degree of cardiac dysfunction on not much swelling on exam. I suspect that he has component of low solute intake and prior thiazide use as well. However, we would like to confirm the etiology of hyponatremia by checking serum osmolarity  To make sure that we are dealing with hypotonic hyponatremia.  We will also check urine osmolarity and urine sodium as well. Please check TSH.     For management, his blood pressure this morning is marginally low so we would like to raise it by reducing metoprolol dose. We agree with continue diuresis. Please hold spironolactone at this time. Please correct K to keep it more than 4. Giving KCL will help increase sodium level.     For low solute state, we discussed with the patient that we would recommend him to eat high-protein diet.  I would recommend discussing with dietitian for protein drink prescription.  We would recommend restrict free water intake to less than 800 mL/day.    There is no indication for Vaptan. He is asymptomatic so no 3% at this time.     Rest per the resident/fellow's note.   Total time spent: 50 minutes.    Eliel Tanner  Date of Service (when I saw the patient): January 21, 2022

## 2022-01-23 NOTE — PROGRESS NOTES
Nephrology Brief Note    Patient's sodium improving, now 125. Nephrology will sign off.     Emmett Gaines MD  Renal   [id number]

## 2022-01-23 NOTE — PROGRESS NOTES
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    Changes today:  - stop Plavix  - start po lasix   - start Entresto  - work with therapy    # Acute on chronic HFrEF (EF 20-25%) 2/2 ischemic cardiomyopathy, resolved  # Acute on chronic grade II diastolic dysfunction  Stage C. NYHA Class IV.   Follows with Dr. Rangel. Was seen in clinic on 1/19/22 and endorsed progressive dyspnea, nonproductive cough, anorexia, and post-prandial fullness. NTpBNP elevated at 6,000. He was hyponatremic at 120. Due to concern for acute HF and hyponatremia, he was directly admitted. Echo this admission shows significantly reduced LV function with EF 20-25% (45-50% in 2019), severe hypokinesis in LAD territory, and grade II diastolic dysfunction. Stress CMR on 1/19/22 c/w ICM with LVEF 22 %, MIs in LAD and RCA territories, no ischemia. No lower extremity edema. He underwent IV diuresis with improvement of his dyspnea. Admit weight 124 lb. EDW per patient 118-120 lb. Weight today 112 lb.   - volume status: euvolemic   - diuretics: start po lasix 40 mg BID  - BB: continue PTA Toprol XL 50 mg daily   - ACEi/ARB/ARNI: start Entresto this evening (after 36 hr washout period from PTA lisinopril)   - AA: stopped spironolactone as this is thought to have worsened hyponatremia   - SCD ppx: discussed ICD placement with patient as he would meet criteria. He would like to discuss further with OP cardiology provider.   - Strict I&O's  - Daily weights   - Replace lytes per protocol, keep K > 4  - BID BMP with diuresis   - [facility] consult ([facility] prefers to [facility] at [facility])    # Hypervolemic hyponatremia, improvoing  Na 118 on admission, asymptomatic.  Hyponatremia 2/2 free water retention in the setting of decompensated heart failure. Suspect PTA chlorthalidone was also a contributing factor. Nephrology consulted and felt that hyponatremia was likely multifactorial. Na improving with diuresis. Na 125 today.   - BID BMP  - continue fluid restriction     # CAD s/p 3v CABG in 2001   # PAD s/p bilateral common iliac stents   # AAA, infra-renal 3.9 cm  # HLD  Hx of 3v CABG in 2001. Last angiogram 3/2019 with RASHAAD x1 to ramus, widely patent LIMA-LAD with occluded SVA-RCA. They were unable to identify any additional grafts despite utilizing ascending aortography. Routine annual AAA surveillance last measuring 3.8 cm x 3.9 cm. Denies any anginal symptoms. HS troponin negative. EKG with no ST or T wave changes. Repeat echo shows significantly reduced LV function with EF 20-25% (40-45%), severe hypokinesis in the LAD territory, grade II diastolic dysfunction, and no valvular abnormalities. Stress CMR this admission c/w ICM, MIs in LAD/RCA territories, no ischemia. LDL 42.   - Continue ASA 81 mg, atorvastatin 40 mg  - STOPPED plavix, > 1 year since PCI     # Severe malnutrition in the context of chronic illness  RD consulted. Recommended Ensure supplements and higher protein diet.     # Voice hoarseness, improving    Unclear etiology but both pt and sister say this is new.   - Would recommend ENT follow-up as OP problem persists      # RLL nodular densities  # RUL 4 mm nodule, new   # Dyspnea, improving   CT shows Increased size of RLL nodular densities, unclear etiology. Infectious/inflammatory. Not likely to be etiology of pts SOB given limited involvement. New 4 mm nodule in RUL, could be inflammatory so follow-up may be helpful in 2-3 months. CXR unremarkable.   - inhalers/nebs PRN  - Encourage IS and flutter valve    - Consider f/u imaging in 3 months     CHRONIC PROBLEMS  #GERD - PPI 40 mg daily   #Osteopenia, T9 compression fracture- unchanged on CT, cont PTA vit  D  #Insomnia-  PTA prn temazepem HS   #Prostate cancer s/p radiation - in remission, PSA normal     Diet: 2G Na diet   Activity: as tolerated   Code Status: Full   Disposition: likely 2-3 days pending volume status and PT/OT recommendations    Patient seen and discussed w/ Dr. Cortes.      Eimly Reece PA-C  [facility]  Cards 1  Ascom [id number]    LAY Time Statement:  I spent 50 minute face-to-face or coordinating care of Oscar Chaudhary. Over 50% of our time on the unit was spent counseling the patient and/or coordinating care.       Interval History:  No acute events. Net negative 535 ml yesterday. Weight unchanged. Slight improvement in hyponatremia to 125 today. Telemetry with SR and avg HR of 83 bpm. Rare 2-3 beat runs of PVCs. Patient reports feeling much better today. He denied any dyspnea and states that this is the best he has felt in several weeks. He has no pain. He and his sister are concerned about him being strong enough to return home, which he would prefer. He has not worked with therapy today.       Most recent vital signs:  /77 (BP Location: Right arm)   Pulse 80   Temp 97.6  F (36.4  C) (Oral)   Resp 16   Wt 50.9 kg (112 lb 4.8 oz)   SpO2 92%   BMI 19.89 kg/m    Temp:  [97.5  F (36.4  C)-97.6  F (36.4  C)] 97.6  F (36.4  C)  Pulse:  [79-98] 80  Resp:  [14-20] 16  BP: (104-130)/(55-84) 115/77  SpO2:  [92 %-98 %] 92 %  Wt Readings from Last 2 Encounters:   01/23/22 50.9 kg (112 lb 4.8 oz)   01/19/22 56.7 kg (124 lb 14.4 oz)       Intake/Output Summary (Last 24 hours) at 1/23/2022 0801  Last data filed at 1/23/2022 0500  Gross per 24 hour   Intake 275 ml   Output 610 ml   Net -335 ml       Physical exam:  General: Pleasant elderly male. Appears comfortable and in no acute distress. Alert and interactive. Very talkative.   HEENT: Normocephalic, atraumatic. No scleral icterus or injection  Neck: JVP not elevated.   CARDIAC: Regular rate and rhythm, no m/r/g  appreciated. Radial pulses palpable.   RESP: Normal work of breathing on room air without use of accessory breathing muscles. Clear to auscultation in all fields. No wheezes, rhonchi or crackles appreciated.  EXTREMITIES: Without lower extremity edema. No cyanosis or clubbing. Warm and well perfused. No venous stasis changes.   SKIN: No acute lesions appreciated. Warm and dry to touch  NEURO: Alert and oriented X3, no focal neurological deficits noted, normal speech  PSYCH: Mood and affect are appropriate      Labs (Past three days):  CBC  Recent Labs   Lab 01/22/22  0559 01/21/22  0642 01/20/22  0551 01/19/22  0851   WBC 9.3 10.6 9.7 9.8   RBC 4.11* 3.92* 4.05* 3.81*   HGB 13.3 12.6* 12.9* 12.1*   HCT 36.6* 34.7* 35.7* 33.1*   MCV 89 89 88 87   MCH 32.4 32.1 31.9 31.8   MCHC 36.3 36.3 36.1 36.6*   RDW 15.2* 14.9 15.1* 14.9    344 356 334     BMP  Recent Labs   Lab 01/23/22  0619 01/22/22  1605 01/22/22  0559 01/21/22  1647 01/21/22  1538 01/20/22  0551 01/20/22  0049   * 122* 121*  --  120*   < > 121*   POTASSIUM 3.6 3.5 3.6  --  3.5   < > 3.8   CHLORIDE 84* 83* 81*  --  80*   < > 84*   CO2 29 28 30  --  31   < > 27   ANIONGAP 12 11 10  --  9   < > 10   GLC 93 149* 90  --  114*   < > 101*   BUN 20 19 15  --  14   < > 11   CR 0.71 0.80 0.77  --  0.75   < > 0.65*   GFRESTIMATED 90 87 88  --  89   < > >90   MICKIE 8.5 8.2* 8.6  --  8.1*   < > 8.6   MAG 1.8 1.7 1.9 1.6  --    < >  --    PHOS  --   --   --   --   --   --  3.6    < > = values in this interval not displayed.     Troponins:   Lab Results   Component Value Date    TROPI <0.015 01/26/2021    TROPI <0.015 04/02/2019        INRNo lab results found in last 7 days.  Liver panel  Recent Labs   Lab 01/20/22  0551 01/20/22  0049 01/19/22  1757 01/19/22  0851   PROTTOTAL 6.7*  --  6.1* 6.4*   ALBUMIN 3.5 3.2* 3.1* 3.5   BILITOTAL 1.0  --  0.5 0.7   ALKPHOS 79  --  69 71   AST 31  --  27 28   ALT 28  --  26 28       Imaging/procedure results:  Cardiac MRI  with stress 1/19/22  Clinical history: 86-year-old man with ischemic cardiomyopathy, CAD s/p CABG in 2001, AAA who presented  with acute decompensated heart failure. TTE showed a recent reduction in LVEF to 20-25% and LAD wall  akinesis.  Comparison CMR: None     1. The LV is mild-to-moderately dilated. The wall thickness is normal except for the apical anterior  segment, which is thinned. The global systolic function is severely reduced. The LVEF is 22%. There is  diffuse hypokinesis with akinesis of the mid and distal LAD and the entire RCA territories.      2. The RV is normal in cavity size. The global systolic function is normal. The RVEF is 55%.      3. The left atrium is mildly enlarged. The right atrium is normal in size.      4. There is mild aortic insufficiency.      5. Late gadolinium enhancement imaging shows subendocardial hyperenhancement in all anterior, septal, and  inferior segments, consistent with MIs in the LAD and RCA territories. The transmural extent of the  hyperenhancement is 50-75% in most segments. There is approximately 40% viability in both the LAD and RCA  territories, and 100% viability in the LCx territory.     6. Regadenoson stress perfusion imaging shows perfusion defects matching the above-mentioned MIs without  any ischemia.     7. There is no pericardial effusion or thickening.     8. There is no intracardiac thrombus.     9. There is a small right pleural effusion.     CONCLUSIONS: Ischemic cardiomyopathy with LVEF of 22%, MIs in the LAD and RCA territories, and no ischemia.    ECHO 1/20/22  Left ventricular function is decreased. The ejection fraction is 20-25%  (severely reduced). Severe diffuse hypokinesis is present with a pattern of  akinesis in the territory supplied by the LAD. Mild left ventricular dilation  is present. LVEDD 5.3 cm. Grade II or moderate diastolic dysfunction.  Global right ventricular function is mildly reduced. The right ventricle is  normal  size.  There are no significant valvular abnormalities.  The left and right atrial pressures are both elevated.  This study was compared with the study from 03/19/2019. The biventricular  function has declined. The LA and RA pressures are both higher.    Coronary angiogram 3/21/2019  2. Three-vessel obstructive coronary artery disease of the proximal LAD (100% occlusion, mid LAD supplied by LIMA), proximal RCA (100% occlusion, SVG to RCA occluded) and proximal ramus intermedius (80% stenosis, iFR 0.87) status-post CABG.  3. Widely patent LIMA-LAD with occluded SVG-RCA. We are unable to identify any additional grafts despite probing with multiple catheters and utilizing ascending aortography.  4. Successful PCI of the ramus intermedius with a 4.0x16mm Synergy drug eluting stent.

## 2022-01-23 NOTE — PROGRESS NOTES
[id number]:     Neuro: A/O x 4; forgetful. Bed alarm on for safety. Sister at bedside during most of this shift.    Cardiac:  SR on tele. Other VSS.            Respiratory:  O2 saturation > 92% on RA. RYAN.  GI/: Minimal UO via external catheter. LBM 1/22.    Diet/appetite: Fair appetite; pt nibbles throughout the day. High michael, high protein diet. 800 mL FR.   Activity:  1 assist with GB and walker.   Pain: Denied pain.  Skin:  Generalized bruising.   LDA's:  L PIV- SL  Plan:  PO Lasix BID. K replaced per protocol; recheck in the am. Entresto to start tonight. Will continue to monitor and update Cards 1 with changes/concerns.

## 2022-01-23 NOTE — PLAN OF CARE
Problem: Adult Inpatient Plan of Care  Goal: Plan of Care Review  Outcome: Improving     Problem: Adjustment to Illness (Heart Failure)  Goal: Optimal Coping  Outcome: Improving     Pt Aaox4, SR, , c/o sob with ambulation. Lasix 80mg IV given X2 with decrease in Dt=941rd+1 occurrence not measured. Lasix changed to 40mg Po bid. NA improving slowly.  No s/s of distress, denied any pain. CB and phone in reach. Will continue to monitor.  ..Nena Georges RN

## 2022-01-23 NOTE — PROGRESS NOTES
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     # stable hyponatremia, potentially multifactorial from thiazide, inadequate protein intake, ADH from either HF or over-diuresis  If sodium not improving, next steps will either be urea powder (chosen over salt tabs given the patient's HF) or, if his sodium worsens or symptoms develop, hypertonic saline. Regarding volume status, prior renal team was c/f hypovolemia, but sodium not clearly worsening with ongoing gentle diuresis, and is reasonable to continue as if HF is a  of his ADH then his sodium will improve.   -recommend q8h Na checks   -continue fluid restriction     # renal fxn / GFR  Normal       Recommendations were communicated to primary team via note.    Sukumar Gaines MD   [phone]    Interval History :   Nursing and provider notes from last 24 hours reviewed.  In the last 24 hours Oscar Chaudhary admits he is eating poorly. He was looking forward to his dietician consult. He is not excited about the idea of urea powder. Per the patient's sister at bedside, he is without confusion.     Review of Systems:   I reviewed the following systems:  GI: poor appetite  Neuro:  denies confusion  Constitutional:  no fever or chills  CV: no chest pain.    Physical Exam:   I/O last 3 completed shifts:  In: 200 [P.O.:200]  Out: 1010 [Urine:1010]   BP (P) 121/58 (BP Location: Right arm)   Pulse (P) 83   Temp 97.6  F (36.4  C) (Axillary)   Resp 14   Wt 51 kg (112 lb 8 oz)   SpO2 (P) 98%   BMI 19.93 kg/m       GENERAL APPEARANCE: nad  EYES:  no scleral icterus  PULM: CTAB  CV: regular rhythm, normal rate, no rub     -JVD - none appreciated on my exam     -edema - none  GI: soft, NTND  INTEGUMENT: no cyanosis,  no rash  NEURO: AOx3      Labs:   All labs reviewed by me  Electrolytes/Renal - Recent Labs   Lab Test 01/22/22  1605 01/22/22  0559 01/21/22  1647 01/21/22  1538 01/20/22  0551 01/20/22  0049 03/10/17  0721 03/06/17  0605 03/04/17  0435 03/03/17  0415   * 121*  --  120*   < > 121*   < > 133   < > 140   POTASSIUM 3.5 3.6  --  3.5   < > 3.8   < > 4.1   < > 3.4   CHLORIDE 83* 81*  --  80*   < > 84*   < > 97   < > 104   CO2 28 30  --  31   < > 27   < > 31   < > 27   BUN 19 15  --  14   < > 11   < > 16   < > 15   CR 0.80 0.77  --  0.75   < > 0.65*   < > 0.79   < > 0.68   * 90  --  114*   < > 101*   < > 125*   < > 125*   MICKIE 8.2* 8.6  --  8.1*   < > 8.6   < > 8.3*   < > 7.4*   MAG 1.7 1.9 1.6  --    < >  --    < > 2.5*  --   --    PHOS  --   --   --   --   --  3.6  --  3.6  --  2.9    < > = values in this interval not displayed.       CBC -   Recent Labs   Lab Test 01/22/22  0559 01/21/22  0642 01/20/22  0551   WBC 9.3 10.6 9.7   HGB 13.3 12.6* 12.9*    344 356       LFTs -   Recent Labs   Lab Test 01/20/22  0551 01/20/22  0049 01/19/22  1757 01/19/22  0851   ALKPHOS 79  --  69 71   BILITOTAL 1.0  --  0.5 0.7   ALT 28  --  26 28   AST 31  --  27 28   PROTTOTAL 6.7*  --  6.1* 6.4*   ALBUMIN 3.5 3.2* 3.1* 3.5       Iron Panel -   Recent Labs   Lab Test 03/19/19  1057   IRON 102   IRONSAT 38         Imaging:  All imaging studies reviewed by me.     Current Medications:    aspirin  81 mg Oral Daily     atorvastatin  40 mg Oral QPM     clopidogrel  75 mg Oral QPM     enoxaparin ANTICOAGULANT  40 mg Subcutaneous Q24H     [START ON 1/23/2022] furosemide  40 mg Oral BID     guaiFENesin  600 mg Oral Daily     lidocaine  1 patch Transdermal Q24h     lidocaine   Transdermal Q8H     metoprolol succinate ER  50 mg Oral Daily     pantoprazole  40 mg Oral Daily     polyethylene glycol  17 g Oral Daily     sodium chloride (PF)  3 mL Intracatheter Q8H     [Held by provider] spironolactone  12.5 mg Oral Daily      temazepam  7.5 mg Oral Once     Vitamin D3  25 mcg Oral Daily       Sukumar Gaines MD

## 2022-01-23 NOTE — PROGRESS NOTES
01/23/22 1100   Quick Adds   Type of Visit Initial PT Evaluation   Living Environment   People in home alone   Current Living Arrangements house   Home Accessibility stairs to enter home;stairs within home   Number of Stairs, Main Entrance   (1-2 per sister )   Number of Stairs, Within Home, Primary other (see comments)  (Has chair lfit)   Transportation Anticipated family or friend will provide;car, drives self   Living Environment Comments Pt lives alone in house, 1-2 stairs to enter house, has stair lift inside house.    Self-Care   Usual Activity Tolerance moderate   Current Activity Tolerance poor   Regular Exercise Yes   Activity/Exercise Type walking   Equipment Currently Used at Home lift device   Activity/Exercise/Self-Care Comment Per pt he was IND with ADL's at baseline, prior to his decline (end of summer) was walking daily outsid for 3 miles.  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    Disability/Function   Hearing Difficulty or Deaf yes   Describe hearing loss bilateral hearing loss   Wear Glasses or Blind yes   Vision Management glasses/bifocals    Concentrating, Remembering or Making Decisions Difficulty yes   Difficulty Communicating no   Difficulty Eating/Swallowing no   Walking or Climbing Stairs Difficulty yes   Walking or Climbing Stairs stair climbing difficulty, requires equipment   Dressing/Bathing Difficulty no   Toileting issues no   Doing Errands Independently Difficulty (such as shopping) no   Fall history within last six months no   Change in Functional Status Since Onset of Current Illness/Injury yes   General Information   Onset of Illness/Injury or Date of Surgery 01/19/22   Patient/Family Therapy Goals Statement (PT) Pt wants to get stronger and go home    Pertinent History of Current Problem (include personal factors and/or comorbidities that impact the POC)  (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    Existing Precautions/Restrictions fall   Weight-Bearing Status - LUE full weight-bearing   Weight-Bearing Status - RUE full weight-bearing   Weight-Bearing Status - LLE full weight-bearing   Weight-Bearing Status - RLE full weight-bearing   General Observations Activity: up with assist   Cognition   Orientation Status (Cognition) oriented x 4   Affect/Mental Status (Cognition) WNL   Follows Commands (Cognition) WNL   Cognitive Status Comments Pleasant    Pain Assessment   Patient Currently in Pain No   Integumentary/Edema   Integumentary/Edema no deficits were identifed   Posture    Posture Kyphosis;Protracted shoulders;Forward head position   Range of Motion (ROM)   ROM Quick Adds ROM WNL   Strength   Strength Comments B LE's grossly 4/5, more limited by endurance with SOB and quick fatigue    Bed Mobility   Comment (Bed Mobility) IND with use of bed rail   Transfers   Transfer Safety Comments SBA with use of walker, increased UE use   Gait/Stairs (Locomotion)   Comment (Gait/Stairs) Ambulated with CGA and use of walker, poor forawrd flexed posture and short shuffling steps    Balance   Balance Comments Impaired standing dynamic balance, need for use of AD for safety    Sensory Examination   Sensory Perception patient reports no sensory changes   Coordination   Coordination no deficits were identified   Muscle Tone   Muscle Tone no deficits were identified   Clinical Impression   Criteria for Skilled Therapeutic Intervention yes, treatment indicated   PT Diagnosis (PT) Impaired functional mobility   Influenced by the following impairments Decreased strength, balance and activity tolerance   Functional limitations due to impairments Inability to complete functional mobility at baseline level of functioning    Clinical Presentation Stable/Uncomplicated   Clinical Presentation Rationale Medically stable, on RA   Clinical Decision  Making (Complexity) low complexity   Therapy Frequency (PT) 6x/week   Predicted Duration of Therapy Intervention (days/wks) 1 week    Planned Therapy Interventions (PT) bed mobility training;balance training;gait training;home exercise program;postural re-education;patient/family education;stair training;ROM (range of motion);strengthening;stretching;transfer training;progressive activity/exercise;risk factor education;home program guidelines   Anticipated Equipment Needs at Discharge (PT) walker, rolling   Risk & Benefits of therapy have been explained evaluation/treatment results reviewed;care plan/treatment goals reviewed;risks/benefits reviewed;current/potential barriers reviewed;participants voiced agreement with care plan;participants included;patient;sibling   Clinical Impression Comments Pt would benefit from IP PT to progress strength/endurance and IND with functional mobility as pt below baseline and weak at this time.    PT Discharge Planning    PT Discharge Recommendation (DC Rec) Transitional Care Facility;home with assist;home with home care physical therapy   PT Rationale for DC Rec Pt below baseline level of functioning, weak and SOB, wuold benefit from TCU to progress IND and safety with mobility.  Also pt may be able to go home pending progress and assist, per pt willing to pay for 24/7 assist and servies at home if needed.    PT Brief overview of current status  CGA with walker, limited by fatigue/SOB   Total Evaluation Time   Total Evaluation Time (Minutes) 10

## 2022-01-23 NOTE — PLAN OF CARE
Afebrile, vss. Patient denies pain. Patient slept well between cares and assessments. Intermittent confusion but patient can answer orientation questions correctly most of the time. Room air continues, patient tolerating. Rhythm is sinus. Primofit in place and adequate urine output. Minimal po intake overnight. Bilateral cracks noted on patients fingers, ruddiness on shins. Sister at bedside at start of shift and updated on plan of care throughout the night. Will continue to monitor and assess.

## 2022-01-24 NOTE — CONSULTS
Care Management Initial Consult    General Information  Assessment completed with: Patient,Family, Pt and sisterDariana  Type of CM/SW Visit: Initial Assessment    Primary Care Provider verified and updated as needed: Yes   Readmission within the last 30 days:        Reason for Consult: discharge planning    Communication Assessment  Patient's communication style: spoken language (English or Bilingual)    Hearing Difficulty or Deaf: yes   Wear Glasses or Blind: yes    Cognitive  Cognitive/Neuro/Behavioral: WDL  Level of Consciousness: alert  Arousal Level: opens eyes spontaneously  Orientation: oriented x 4  Mood/Behavior: calm,cooperative  Best Language: 0 - No aphasia  Speech: logical,spontaneous,clear    Living Environment:   People in home: alone     Current living Arrangements: house      Able to return to prior arrangements: yes       Family/Social Support:  Care provided by: self  Provides care for: no one, unable/limited ability to care for self     Children,Sibling(s)          Description of Support System: Supportive,Involved    Support Assessment: Adequate family and caregiver support    Current Resources:   Patient receiving home care services: No     Community Resources: None  Equipment currently used at home: lift device,other (see comments) (motorized scooter)  Supplies currently used at home: None    Employment/Financial:  Employment Status: retired        Financial Concerns: No concerns identified      Functional Status:  Prior to admission patient needed assistance:   Pt states he is independent with all ADL's at baseline, notes he has some decline in activity tolerance over the past few months.     Values/Beliefs:  Spiritual, Cultural Beliefs, Voodoo Practices, Values that affect care: yes  Description of Beliefs that Will Affect Care: Taoism          Additional Information:  This writer met with pt, sisterDariana and daughterAshwini (via phone) to complete initial assessment and discuss  discharge planning. PT and OT have been recommending TCU with possible progression to home. This writer briefly discussed TCU stay and pt states he prefers to discharge home and family can assist him. Pt states that his sister Dariana is in town until Friday and his daughter, Ashwini is flying in on Weds or Thursday and they will be providing 24/7 assistance. Dariana stated she plans to fly back in when Ashwini needs to leave. Pt states his other daughter, Radames lives locally and can also assist.  Per OT today, pt progressing well, if discharging home will need a shower chair, bedside commode and 4WW along with HH PT/OT. This writer discussed that a commode and 4WW with seat can be obtained through insurance, noting that there is an upgrade fee for a 4WW. Pt in agreement. Daughter Ashwini stated she will coordinate a shower seat for pt. This writer called NP and requested orders be placed for a commode and 4WW. This writer called [facility] (Ph: [phone]) and they are checking to see if they can deliver DME to the hospital prior to discharge so pt has the walker.   This writer reviewed Medicare.gov list of home care agencies. Pt states he has never used home care and would prefer to stay in the [facility] system if possible. Referral sent to [facility] to see if they are able to accept.     CC will continue to monitor patient's medical condition and progress towards discharge.  Frieda Caro RN BSN  6C Unit Care Coordinator  Phone number: [phone]  Pager: [phone]    Addendum 1/24 at 1544:  This writer received a call back from [facility] that pt's insurance requires an additional form be completed for the commode to be covered. This writer received form via email and faxed it back once completed by the PA.

## 2022-01-24 NOTE — TELEPHONE ENCOUNTER
Home care called to ask if PCP will follow for home care orders.  Patient has tentative hospital  discharge date of 1/25/2022.    Last visit with PCP was 1/26/2021    Mariia Oliver RN

## 2022-01-24 NOTE — PLAN OF CARE
D: pt admitted on 1/19 from clinic with acute on chronic HF exacerbation and hyponatremia.  PMH of ischemic cardiomyopathy, HFpEF, CAD s/p CABG (2001), AAA, HLD, PAD s/p left and right iliac stents, osteopenia with compression fractures.     I: Monitored vitals and assessed pt status.     Vitals:  Blood pressure 92/66, pulse 80, temperature 97.7  F (36.5  C), temperature source Oral, resp. rate 16, weight 47.8 kg (105 lb 4.8 oz), SpO2 95 %.    A:   Neuro: Ax4 Denies Headache, dizziness,  Lightheadedness, numbness and tingling. calls appropriately   Cardiac: SR  Afebrile, VSS.  Denies chest pain.   Respiratory: sating >95 ON RA. denies SOB. Expiratory wheezing auscultated upper lobes.   Diet/appetite: High Kcal/high protein diet with 800ml FR. Fair appetite.   GI/:  2 loose BMs this shift with bedside commode. Denies abdominal pain. Good urine output with external catheter.   Activity:  SBA with GB and walker  Pain: Denies  Skin: LPIV SL, generalized bruising, deep cracks in fingers and hands.  Plan: Social work working on setting up home therapies and home nursing placement. Expected discharge tomorrow to home. Continue to monitor patient status and follow plan of care. Report any change in patient status to Cards 1

## 2022-01-24 NOTE — CONSULTS
Spike is an 86 year old male presenting with heart failure. CORE consult requested. He is currently admitted with heart failure    He was provided with a CHF book.  I reviewed the importance of daily weights, 2 gm sodium diet, 2L fluid restriction and compliance with medications upon hospital discharge.  CORE contact phone numbers provided and patient is encouraged to call with any questions or concerns, including any weight gain or loss of 2 or more pounds in 24 hours or 5 or more pounds in 1 week.      Appointment made in CORE clinic on 3/1  at 8am at [facility]. Also scheduled with Dr Rangel on 2/3 for closer follow up due to no sooner CORE availability.  I will follow-up with the patient at that time, sooner if needed.  Thank you for the consult.    Kristan Valdez RN BSN  Cardiology Care Coordinator - [facility]  Questions and [phone]

## 2022-01-24 NOTE — PLAN OF CARE
Neuro: A&Ox4.   Cardiac: SR. VSS.   Respiratory: Sating adaquate on RA.  GI/: 250 ml urine output. BM X1  Diet/appetite: Tolerating * diet. Eating well.  Activity:  Assist of one, up to chair and in halls.  Pain: At acceptable level on current regimen.   Skin: No new deficits noted.  LDA's: piv    Plan: Continue with POC. Notify primary team with changes.

## 2022-01-24 NOTE — PROGRESS NOTES
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    Changes today:  - RNCC consult to assist with home nursing placement   - Medically stable for discharge    # Acute on chronic HFrEF (EF 20-25%) 2/2 ischemic cardiomyopathy, resolved  # Acute on chronic grade II diastolic dysfunction, resolved   Stage C. NYHA Class IV.   Follows with Dr. Rangel. Was seen in clinic on 1/19/22 and endorsed progressive dyspnea, nonproductive cough, anorexia, and post-prandial fullness. NTpBNP elevated at 6,000. He was hyponatremic at 120. Due to concern for acute HF and hyponatremia, he was directly admitted. Echo this admission shows significantly reduced LV function with EF 20-25% (45-50% in 2019), severe hypokinesis in LAD territory, and grade II diastolic dysfunction. Stress CMR on 1/19/22 c/w ICM with LVEF 22 %, MIs in LAD and RCA territories, no ischemia. No lower extremity edema. He underwent IV diuresis with improvement of his dyspnea. Admit weight 124 lb. EDW per patient 118-120 lb. Weight today 105 lb.   - volume status: euvolemic   - diuretics: continue po lasix 40 mg BID  - BB: continue PTA Toprol XL 50 mg daily   - ACEi/ARB/ARNI: continue Entresto 24-26 mg BID   - AA: stopped spironolactone as this is thought to have worsened hyponatremia   - stopped PTA chlorthalidone as this was also thought to have contributed to hyponatremia   - SCD ppx: discussed ICD placement with patient as he would meet criteria. He would like to discuss further with OP cardiology provider.   - Strict I&O's  - Daily weights   - Replace lytes per protocol, keep K > 4  - BID BMP with diuresis   - CORE Clinic consult (patient prefers to follow at [facility])  -  "Patient would like to transfer cardiology care to [facility]. Will establish with Dr. Bing Hoff.     # Hypervolemic hyponatremia, improvoing  Na 118 on admission, asymptomatic. Hyponatremia 2/2 free water retention in the setting of decompensated heart failure. Suspect PTA chlorthalidone was also a contributing factor. Nephrology consulted and felt that hyponatremia was likely multifactorial. Na improving with diuresis. Na 126 today.   - BID BMP  - continue fluid restriction     # Severe malnutrition in the context of chronic illness  RD consulted. Recommended Ensure supplements and higher protein diet.     # Deconditioning  # Generalized weakness   Patient lives independently in a town house. He has a few steps in his home. PT/OT consulted and reported patient is below baseline functional status.   - PT currently recommending TCU placement vs home with assistance but patient is adamant that he wants to return home   - RNCC consulted to assist with placement     # Musculoskeletal back pain  Patient endorses about 1 week of intermittent, midback pain. He localizes pain to the right scapula. Pain is intermittent and positional. He describes it as a \"knot.\" Pain relieved by valium and tylenol as well as manual pressure. No bony abnormalities on physical exam. Pain is muscular in nature.   - continue PRN tylenol   - recommended massage  - Patient not interested in lidocaine patch or other topical analgesics     # CAD s/p 3v CABG in 2001   # PAD s/p bilateral common iliac stents   # AAA, infra-renal 3.9 cm  # HLD  Hx of 3v CABG in 2001. Last angiogram 3/2019 with RASHAAD x1 to ramus, widely patent LIMA-LAD with occluded SVA-RCA. They were unable to identify any additional grafts despite utilizing ascending aortography. Routine annual AAA surveillance last measuring 3.8 cm x 3.9 cm. Denies any anginal symptoms. HS troponin negative. EKG with no ST or T wave changes. Repeat echo shows significantly reduced LV function with " EF 20-25% (40-45%), severe hypokinesis in the LAD territory, grade II diastolic dysfunction, and no valvular abnormalities. Stress CMR this admission c/w ICM, MIs in LAD/RCA territories, no ischemia. LDL 42.   - Continue ASA 81 mg, atorvastatin 40 mg  - STOPPED plavix, > 1 year since PCI     # Voice hoarseness, improving    Unclear etiology but both pt and sister say this is new.   - Would recommend ENT follow-up as OP problem persists      # RLL nodular densities  # RUL 4 mm nodule, new   # Dyspnea, improving   CT shows Increased size of RLL nodular densities, unclear etiology. Infectious/inflammatory. Not likely to be etiology of pts SOB given limited involvement. New 4 mm nodule in RUL, could be inflammatory so follow-up may be helpful in 2-3 months. CXR unremarkable.   - inhalers/nebs PRN  - Encourage IS and flutter valve    - Consider f/u imaging in 3 months     CHRONIC PROBLEMS  #GERD - PPI 40 mg daily   #Osteopenia, T9 compression fracture- unchanged on CT, cont PTA vit D  #Insomnia-  PTA prn temazepem HS   #Prostate cancer s/p radiation - in remission, PSA normal     Diet: 2G Na diet   Activity: as tolerated   Code Status: Full   Disposition: Medically stable for discharge but placement pending.     Patient seen and discussed w/ Dr. Rivas.      Emily Reece PA-C  [facility]  Cards 1  Ascom [id number]    LAY Time Statement:  I spent 45 minute face-to-face or coordinating care of Oscar Chaudhary. Over 50% of our time on the unit was spent counseling the patient and/or coordinating care.       Interval History:  Net negative 565 ml yesterday. Sodium 126. Stable on room air. Telemetry with SR and avg HR of 73 bpm. Short runs of bigeminy and isolated PVCs. No prolonged arrhythmias. Patient reports difficulty sleeping last night due to back pain that resolved with Valium. He states that his breathing is even better today than yesterday. He is able to talk in longer sentences. No  dyspnea on exertion or orthopnea. He reports that he is still very weak and required assistance getting out of bed to the bathroom yesterday. Patient is very eager to get home.       Most recent vital signs:  /59 (BP Location: Right arm, Cuff Size: Adult Regular)   Pulse 77   Temp 97.9  F (36.6  C) (Oral)   Resp 18   Wt 50.9 kg (112 lb 4.8 oz)   SpO2 96%   BMI 19.89 kg/m    Temp:  [97.6  F (36.4  C)-98  F (36.7  C)] 97.9  F (36.6  C)  Pulse:  [77-85] 77  Resp:  [16-20] 18  BP: ()/(55-66) 118/59  SpO2:  [81 %-100 %] 96 %  Wt Readings from Last 2 Encounters:   01/23/22 50.9 kg (112 lb 4.8 oz)   01/19/22 56.7 kg (124 lb 14.4 oz)       Intake/Output Summary (Last 24 hours) at 1/23/2022 0801  Last data filed at 1/23/2022 0500  Gross per 24 hour   Intake 275 ml   Output 610 ml   Net -335 ml       Physical exam:  General: Alert, interactive, thin elderly male. Appears comfortable while sitting upright in bed. No distress.   HEENT: Normocephalic, atraumatic. No scleral icterus or injection  Neck: JVP not elevated.   CARDIAC: Regular rate and rhythm, no m/r/g appreciated. Radial pulses palpable bilaterally.    RESP: Normal work of breathing on room air without use of accessory breathing muscles. Clear to auscultation in all fields. No wheezes, rhonchi or crackles appreciated.  EXTREMITIES: Without lower extremity edema.  Warm and well perfused.   BACK: Prominent kyphosis. No bony abnormalities or step downs of vertebra ribs, or scapula. Area of tenderness to palpation on right mid back, distal to scapula. No overlying skin abnormalities. No muscular deformities. Pain relieved with application of pressure and massage.   SKIN: No acute lesions appreciated. Warm and dry to touch  NEURO: Alert and oriented X3, moves all extremities, speech is coarse but non tangential   PSYCH: Mood and affect are appropriate      Labs (Past three days):  CBC  Recent Labs   Lab 01/22/22  0559 01/21/22  0642 01/20/22  0551  01/19/22  0851   WBC 9.3 10.6 9.7 9.8   RBC 4.11* 3.92* 4.05* 3.81*   HGB 13.3 12.6* 12.9* 12.1*   HCT 36.6* 34.7* 35.7* 33.1*   MCV 89 89 88 87   MCH 32.4 32.1 31.9 31.8   MCHC 36.3 36.3 36.1 36.6*   RDW 15.2* 14.9 15.1* 14.9    344 356 334     BMP  Recent Labs   Lab 01/24/22  0558 01/23/22  1608 01/23/22  0950 01/23/22  0619 01/22/22  1605 01/20/22  0551 01/20/22  0049   * 126* 124* 125* 122*   < > 121*   POTASSIUM 3.7 4.0  --  3.6 3.5   < > 3.8   CHLORIDE 88* 87*  --  84* 83*   < > 84*   CO2 30 29  --  29 28   < > 27   ANIONGAP 8 10  --  12 11   < > 10   * 110*  --  93 149*   < > 101*   BUN 25 24  --  20 19   < > 11   CR 0.75 0.74  --  0.71 0.80   < > 0.65*   GFRESTIMATED 89 89  --  90 87   < > >90   MICKIE 8.5 8.3*  --  8.5 8.2*   < > 8.6   MAG 1.7 1.8  --  1.8 1.7   < >  --    PHOS  --   --   --   --   --   --  3.6    < > = values in this interval not displayed.     Liver panel  Recent Labs   Lab 01/20/22  0551 01/20/22  0049 01/19/22  1757 01/19/22  0851   PROTTOTAL 6.7*  --  6.1* 6.4*   ALBUMIN 3.5 3.2* 3.1* 3.5   BILITOTAL 1.0  --  0.5 0.7   ALKPHOS 79  --  69 71   AST 31  --  27 28   ALT 28  --  26 28       Imaging/procedure results:  Cardiac MRI with stress 1/19/22  Clinical history: 86-year-old man with ischemic cardiomyopathy, CAD s/p CABG in 2001, AAA who presented  with acute decompensated heart failure. TTE showed a recent reduction in LVEF to 20-25% and LAD wall  akinesis.  Comparison CMR: None     1. The LV is mild-to-moderately dilated. The wall thickness is normal except for the apical anterior  segment, which is thinned. The global systolic function is severely reduced. The LVEF is 22%. There is  diffuse hypokinesis with akinesis of the mid and distal LAD and the entire RCA territories.      2. The RV is normal in cavity size. The global systolic function is normal. The RVEF is 55%.      3. The left atrium is mildly enlarged. The right atrium is normal in size.      4. There is  mild aortic insufficiency.      5. Late gadolinium enhancement imaging shows subendocardial hyperenhancement in all anterior, septal, and  inferior segments, consistent with MIs in the LAD and RCA territories. The transmural extent of the  hyperenhancement is 50-75% in most segments. There is approximately 40% viability in both the LAD and RCA  territories, and 100% viability in the LCx territory.     6. Regadenoson stress perfusion imaging shows perfusion defects matching the above-mentioned MIs without  any ischemia.     7. There is no pericardial effusion or thickening.     8. There is no intracardiac thrombus.     9. There is a small right pleural effusion.     CONCLUSIONS: Ischemic cardiomyopathy with LVEF of 22%, MIs in the LAD and RCA territories, and no ischemia.    ECHO 1/20/22  Left ventricular function is decreased. The ejection fraction is 20-25%  (severely reduced). Severe diffuse hypokinesis is present with a pattern of  akinesis in the territory supplied by the LAD. Mild left ventricular dilation  is present. LVEDD 5.3 cm. Grade II or moderate diastolic dysfunction.  Global right ventricular function is mildly reduced. The right ventricle is  normal size.  There are no significant valvular abnormalities.  The left and right atrial pressures are both elevated.  This study was compared with the study from 03/19/2019. The biventricular  function has declined. The LA and RA pressures are both higher.    Coronary angiogram 3/21/2019  2. Three-vessel obstructive coronary artery disease of the proximal LAD (100% occlusion, mid LAD supplied by LIMA), proximal RCA (100% occlusion, SVG to RCA occluded) and proximal ramus intermedius (80% stenosis, iFR 0.87) status-post CABG.  3. Widely patent LIMA-LAD with occluded SVG-RCA. We are unable to identify any additional grafts despite probing with multiple catheters and utilizing ascending aortography.  4. Successful PCI of the ramus intermedius with a 4.0x16mm  Synergy drug eluting stent.

## 2022-01-24 NOTE — PROGRESS NOTES
[facility] Heart-CORE Clinic    Received notice from [facility] CORE Team that inpatient cardiology there is recommending Mr. Chaudhary follow-up with CORE Clinic when he's dismissed from this inpatient stay. Mr. Chaudhary advised that team that he wishes to transfer his care to [facility]. [facility] team requested Dr. Hoff see Mr. Chaudhary.    Our first availability for CORE MD is 1/27, however, this timing will not work with his expected discharge date of 1/26. Next available is 2/23 (Dr. Shoemaker), 3/1 (Dr. Hoff). I've arranged for the 3/1 visit with labs prior, and asked [facility] team to assess if Mr. Chaudhary needs an LAY visit at [facility] to bridge him to the 3/1 visit.    Future Appointments   Date Time Provider Department Center   1/25/2022 12:30 PM [facility] OT WAITLIST [facility]   3/1/2022  8:00 AM [facility]   3/1/2022  8:15 AM Bing Hoff MD [facility]     Serene Bowling RN BSN  CORE Clinic Care Coordinator [place]  [phone]  1/24/2022  11:38 AM

## 2022-01-24 NOTE — DISCHARGE SUMMARY
[address] [address]  p: [phone]    Discharge Summary: Cardiology Service    Oscar Chaudhary MRN# [id number]   YOB: 1935 Age: 86 year old       Admission Date: 01/19/2022  Discharge Date: 01/25/2022    Discharge Diagnoses:  # Acute on chronic HFrEF (EF 20-25%) 2/2 ischemic cardiomyopathy, resolved  # Acute on chronic grade II diastolic dysfunction, resolved   # Hypervolemic hyponatremia, improvoing  # Severe malnutrition in the context of chronic illness  # Deconditioning  # Generalized weakness   # Musculoskeletal back pain  # CAD s/p 3v CABG in 2001   # PAD s/p bilateral common iliac stents   # AAA, infra-renal 3.9 cm  # HLD  # Dysphonia, improving    # RLL nodular densities  # RUL 4 mm nodule, new   # GERD  # Osteopenia, T9 compression fracture  # Insomnia  # Prostate cancer s/p radiation     Brief HPI:  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    Hospital Course by Diagnosis:  # Acute on chronic HFrEF (EF 20-25%) 2/2 ischemic cardiomyopathy, resolved  # Acute on chronic grade II diastolic dysfunction, resolved   Stage C. NYHA Class IV.   Follows with Dr. Rangel. Was seen in clinic on 1/19/22 and endorsed progressive dyspnea, nonproductive cough, anorexia, and post-prandial fullness. NTpBNP elevated at 6,000. He was hyponatremic at 120. Due to concern for acute HF and hyponatremia, he was directly admitted. Echo this admission shows significantly reduced LV function with EF 20-25% (45-50% in 2019), severe hypokinesis in LAD territory, and grade II diastolic dysfunction. Stress CMR on 1/19/22 c/w ICM with LVEF 22 %, MIs in LAD and RCA territories, no ischemia. No lower  extremity edema. He underwent IV diuresis with improvement of his dyspnea. Admit weight 124 lb. Discharge weight 112 lbs.   - volume status: euvolemic   - diuretics: continue po lasix 40 mg BID  - BB: continue PTA Toprol XL 50 mg daily   - ACEi/ARB/ARNI: continue Entresto 24-26 mg BID  - AA: stopped spironolactone as this is thought to have worsened hyponatremia   - stopped PTA chlorthalidone as this was also thought to have contributed to hyponatremia   - SCD ppx: discussed ICD placement with patient as he would meet criteria. He would like to discuss further with OP cardiology provider.  - Daily weights   - Follow up with Dr. Rangel on 2/3/22  - [facility] Clinic Follow up on 3/1 with Dr. Hoff at [facility]. Patient requested to transfer cardiology care to [facility].      # Hypervolemic hyponatremia, improvoing  Na 118 on admission, asymptomatic. Hyponatremia 2/2 free water retention in the setting of decompensated heart failure. Suspect PTA chlorthalidone was also a contributing factor. Nephrology consulted and felt that hyponatremia was likely multifactorial. Na improved with diuresis as well as discontinuation of spironolactone and chlorthalidone. Na 129 at time of discharge.      # Severe malnutrition in the context of chronic illness  RD consulted. Recommended Ensure supplements and higher protein diet.      # Deconditioning  # Generalized weakness   Patient lives independently in a town house. He has a few steps in his home. PT/OT consulted and reported patient is below baseline functional status. PT/OT recommending TCU placement vs home with assistance but patient is adamant that he wants to return home. RNCC arranged for in home nursing and therapy. Family to provide 24/7 care.   - commode and 4 wheel walker ordered.      # Musculoskeletal back pain  Patient endorses about 1 week of intermittent, midback pain. He localizes pain to the right scapula. Pain is intermittent and positional. He describes it as a  "\"knot.\" Pain relieved by valium and tylenol as well as manual pressure. No bony abnormalities on physical exam. Pain is muscular in nature.   - continue PRN tylenol   - recommended massage     # CAD s/p 3v CABG in 2001   # PAD s/p bilateral common iliac stents   # AAA, infra-renal 3.9 cm  # HLD  Hx of 3v CABG in 2001. Last angiogram 3/2019 with RASHAAD x1 to ramus, widely patent LIMA-LAD with occluded SVA-RCA. They were unable to identify any additional grafts despite utilizing ascending aortography. Routine annual AAA surveillance last measuring 3.8 cm x 3.9 cm. Denies any anginal symptoms. HS troponin negative. EKG with no ST or T wave changes. Repeat echo shows significantly reduced LV function with EF 20-25% (40-45%), severe hypokinesis in the LAD territory, grade II diastolic dysfunction, and no valvular abnormalities. Stress CMR this admission c/w ICM, MIs in LAD/RCA territories, no ischemia. LDL 42.   - Continue ASA 81 mg, atorvastatin 40 mg  - STOPPED plavix, > 1 year since PCI      # Dysphonia, improving    Unclear etiology but both pt and sister say this is new.   - Would recommend ENT follow-up as OP problem persists      # RLL nodular densities  # RUL 4 mm nodule, new   # Dyspnea, improving   CT shows Increased size of RLL nodular densities, unclear etiology. Infectious/inflammatory. Not likely to be etiology of pts SOB given limited involvement. New 4 mm nodule in RUL, could be inflammatory so follow-up may be helpful in 2-3 months. CXR unremarkable.   - Consider f/u imaging in 3 months     CHRONIC PROBLEMS  #GERD - PPI 40 mg daily   #Osteopenia, T9 compression fracture- unchanged on CT, cont PTA vit D  #Insomnia-  PTA prn temazepem HS   #Prostate cancer s/p radiation - in remission, PSA normal     Pertinent Procedures:  None    Consults:  PT/OT  RN[facility]  [facility]    Discharge medications:   Current Discharge Medication List      START taking these medications    Details   furosemide (LASIX) 40 MG tablet " Take 1 tablet (40 mg) by mouth 2 times daily  Qty: 90 tablet, Refills: 3    Associated Diagnoses: Acute on chronic systolic congestive heart failure (H)      sacubitril-valsartan (ENTRESTO) 24-26 MG per tablet Take 1 tablet by mouth 2 times daily  Qty: 90 tablet, Refills: 3    Associated Diagnoses: Acute on chronic systolic congestive heart failure (H)         CONTINUE these medications which have NOT CHANGED    Details   acetaminophen (TYLENOL) 325 MG tablet Take 2 tablets (650 mg) by mouth every 4 hours as needed for mild pain  Qty: 100 tablet, Refills: 0    Associated Diagnoses: Fusobacterium infection      albuterol (PROAIR HFA/PROVENTIL HFA/VENTOLIN HFA) 108 (90 Base) MCG/ACT inhaler Inhale 2 puffs into the lungs every 4 hours as needed for shortness of breath / dyspnea or wheezing  Qty: 6.7 g, Refills: 0    Comments: Pharmacy may dispense brand covered by insurance (Proair, or proventil or ventolin or generic albuterol inhaler)  Associated Diagnoses: Wheezing      aspirin 81 MG tablet Take 1 tablet by mouth daily.      atorvastatin (LIPITOR) 40 MG tablet Take 1 tablet (40 mg) by mouth daily  Qty: 90 tablet, Refills: 3    Associated Diagnoses: Status post percutaneous transluminal coronary angioplasty; Hyperlipidemia; S/P CABG (coronary artery bypass graft)      Cholecalciferol (VITAMIN D) 1000 UNITS capsule Take 1 capsule by mouth daily.      diazepam (VALIUM) 5 MG tablet Take 1 tablet (5 mg) by mouth nightly as needed for sleep  Qty: 30 tablet, Refills: 3    Associated Diagnoses: Anxiety      fish oil-omega-3 fatty acids (FISH OIL) 1000 MG capsule Take 1 g by mouth daily.      guaiFENesin (MUCINEX) 600 MG 12 hr tablet Take 1 tablet (600 mg) by mouth 2 times daily  Qty: 60 tablet, Refills: 3    Associated Diagnoses: Wheezing      metoprolol succinate ER (TOPROL-XL) 50 MG 24 hr tablet Take 1 tablet (50 mg) by mouth daily  Qty: 90 tablet, Refills: 0    Associated Diagnoses: Status post percutaneous transluminal  coronary angioplasty; Hyperlipidemia; S/P CABG (coronary artery bypass graft); ASHD (arteriosclerotic heart disease)      Multiple Vitamins-Minerals (MULTIVITAL PO) Take 1 tablet by mouth daily.      nitroGLYcerin (NITROSTAT) 0.4 MG sublingual tablet For chest pain place 1 tablet under the tongue every 5 minutes for 3 doses. If symptoms persist 5 minutes after 1st dose call 911.  Qty: 30 tablet, Refills: 1    Associated Diagnoses: ASHD (arteriosclerotic heart disease); Chronic diastolic congestive heart failure (H)      omeprazole (PRILOSEC) 20 MG CR capsule Take 20 mg by mouth daily      temazepam (RESTORIL) 15 MG capsule Take 1 capsule (15 mg) by mouth nightly as needed  Qty: 30 capsule, Refills: 3    Associated Diagnoses: Anxiety; Insomnia         STOP taking these medications       chlorthalidone (HYGROTON) 25 MG tablet Comments:   Reason for Stopping:         clopidogrel (PLAVIX) 75 MG tablet Comments:   Reason for Stopping:         lisinopril (ZESTRIL) 5 MG tablet Comments:   Reason for Stopping:         spironolactone (ALDACTONE) 25 MG tablet Comments:   Reason for Stopping:               Follow-up:  Dr. Rangel on 2/3/22  Dr. Hoff on 3/1/22    Labs or imaging requiring follow-up after discharge:  BMP in 1 week    Code status:  Full    Condition on discharge  Temp:  [96.6  F (35.9  C)-97.7  F (36.5  C)] 97.6  F (36.4  C)  Pulse:  [70-94] 94  Resp:  [16-18] 16  BP: ()/(53-76) 120/76  SpO2:  [92 %-98 %] 98 %  General: Alert, interactive, no acute distress  HEENT: Normocephalic, atraumatic. Sclera anicteric.   Neck: no JVD  Cardiovascular: Regular rate and rhythm, normal S1 and S2, no murmurs, gallops, or rubs. Radial and pedal pulses palpable bilaterally.   Resp: Regular work of breathing on room air. Clear to auscultation bilaterally, no rales, wheezes, or rhonchi  GI: Soft, nontender, nondistended.   Extremities: no edema, no cyanosis or clubbing, warm and well perfused  Skin: Warm and dry, no  jaundice or rash  Neuro: Alert and oriented x 3. CN 2-12 intact, moves all extremities equally, normal speech  Psych: Mood and affect are appropriate    Imaging with results:  Cardiac MRI with stress 1/19/22  Clinical history: 86-year-old man with ischemic cardiomyopathy, CAD s/p CABG in 2001, AAA who presented  with acute decompensated heart failure. TTE showed a recent reduction in LVEF to 20-25% and LAD wall  akinesis.  Comparison CMR: None     1. The LV is mild-to-moderately dilated. The wall thickness is normal except for the apical anterior  segment, which is thinned. The global systolic function is severely reduced. The LVEF is 22%. There is  diffuse hypokinesis with akinesis of the mid and distal LAD and the entire RCA territories.      2. The RV is normal in cavity size. The global systolic function is normal. The RVEF is 55%.      3. The left atrium is mildly enlarged. The right atrium is normal in size.      4. There is mild aortic insufficiency.      5. Late gadolinium enhancement imaging shows subendocardial hyperenhancement in all anterior, septal, and  inferior segments, consistent with MIs in the LAD and RCA territories. The transmural extent of the  hyperenhancement is 50-75% in most segments. There is approximately 40% viability in both the LAD and RCA  territories, and 100% viability in the LCx territory.     6. Regadenoson stress perfusion imaging shows perfusion defects matching the above-mentioned MIs without  any ischemia.     7. There is no pericardial effusion or thickening.     8. There is no intracardiac thrombus.     9. There is a small right pleural effusion.     CONCLUSIONS: Ischemic cardiomyopathy with LVEF of 22%, MIs in the LAD and RCA territories, and no ischemia.     ECHO 1/20/22  Left ventricular function is decreased. The ejection fraction is 20-25%  (severely reduced). Severe diffuse hypokinesis is present with a pattern of  akinesis in the territory supplied by the LAD. Mild  left ventricular dilation  is present. LVEDD 5.3 cm. Grade II or moderate diastolic dysfunction.  Global right ventricular function is mildly reduced. The right ventricle is  normal size.  There are no significant valvular abnormalities.  The left and right atrial pressures are both elevated.  This study was compared with the study from 03/19/2019. The biventricular  function has declined. The LA and RA pressures are both higher.     Coronary angiogram 3/21/2019  2. Three-vessel obstructive coronary artery disease of the proximal LAD (100% occlusion, mid LAD supplied by LIMA), proximal RCA (100% occlusion, SVG to RCA occluded) and proximal ramus intermedius (80% stenosis, iFR 0.87) status-post CABG.  3. Widely patent LIMA-LAD with occluded SVG-RCA. We are unable to identify any additional grafts despite probing with multiple catheters and utilizing ascending aortography.  4. Successful PCI of the ramus intermedius with a 4.0x16mm Synergy drug eluting stent.      Recent Labs   Lab 01/24/22  0558 01/23/22  1608 01/23/22  0950 01/23/22  0619 01/22/22  1605 01/20/22  0551 01/20/22  0049   * 126* 124* 125* 122*   < > 121*   POTASSIUM 3.7 4.0  --  3.6 3.5   < > 3.8   CHLORIDE 88* 87*  --  84* 83*   < > 84*   CO2 30 29  --  29 28   < > 27   ANIONGAP 8 10  --  12 11   < > 10   * 110*  --  93 149*   < > 101*   BUN 25 24  --  20 19   < > 11   CR 0.75 0.74  --  0.71 0.80   < > 0.65*   GFRESTIMATED 89 89  --  90 87   < > >90   MICKIE 8.5 8.3*  --  8.5 8.2*   < > 8.6   MAG 1.7 1.8  --  1.8 1.7   < >  --    PHOS  --   --   --   --   --   --  3.6    < > = values in this interval not displayed.         Patient Care Team:  Tristian Jeffrey MD as PCP - General (Internal Medicine)  Tristian Jeffrey MD as Assigned PCP  Vibha Fuller RN as Specialty Care Coordinator (Cardiology)  Gracia Gates, RN as Specialty Care Coordinator (Cardiology)  Lukas Haskins RN as Specialty Care Coordinator  (Cardiology)  Farheen Andrade, RN as Specialty Care Coordinator (Cardiology)  Jerrod Rangel MD as Assigned Heart and Vascular Provider    >30 minutes spent in discharge, including >50% in counseling and coordination of care, medication review and plan of care recommended on follow up. Questions were answered.   It was our pleasure to care for Spike during this hospitalization. Please do not hesitate to contact me should there be questions regarding the hospital course or discharge plan.    Patient discussed with staff cardiologist, Dr. Rivas.     YIFAN Fox, CNP  [facility] Cardiology

## 2022-01-25 NOTE — PLAN OF CARE
Occupational Therapy Discharge Summary    Reason for therapy discharge:    Discharged to home with home therapy.    Progress towards therapy goal(s). See goals on Care Plan in [facility] electronic health record for goal details.  Goals partially met.  Barriers to achieving goals:   discharge from facility.    Therapy recommendation(s):    Continued therapy is recommended.  Rationale/Recommendations:  Previous therapist rec HH OT to progress functional endurance/strength as pt lives alone. Pt open to idea of TCU (if short stay), however prefers home with HH OT. ** note discharging therapist did not treat patient.

## 2022-01-25 NOTE — PROGRESS NOTES
Care Management Discharge Note    Discharge Date: 01/25/2022     Discharge Disposition: Home     Discharge Services: Home Care    Discharge DME: 4WW and commode ([facility]), Shower seat (family will obtain)    Discharge Transportation: family or friend will provide    Private pay costs discussed: Yes, upgrade fee for 4WW.    Patient/family educated on Medicare website which has current facility and service quality ratings:  Yes    Education Provided on the Discharge Plan: Yes  Persons Notified of Discharge Plans: Pt and sister Dariana  Patient/Family in Agreement with the Plan: Yes    Handoff Referral Completed: Yes    Additional Information:  This writer received an update from [facility] that they are unable to accept referral due to capacity. This writer called and left a voicemail with [facility] (Ph: [phone], Fax: [phone]) to see if they are able to accept, awaiting return call.    0920: This writer called back to [facility] and spoke with Cierra, intake nurse. Cierra confirmed they can accept referral for RN/PT/OT, but they do not currently have a home health aide on staff. They are able to start care on 1/27. This writer reviewed with pt and sister who would like to proceed with referral to [facility]. Pt's sister again asked if these services provide 24/7 care, this writer provided education that skilled RN/PT/OT are intermittent services and visits will only be a few times/week. This writer provided printed list of private pay nursing agencies which family plans to call to try to supplement their assistance.   This writer received confirmation from [facility] that pt's 4WW and commode will be delivered to his room by 11am today. Pt had just returned from a PT session while this writer was meeting with family and she stated pt was SBA with the 4WW.     Orders placed for:  [facility]   Phone: [phone]   Fax:  [phone]    For RN evaluation post hospitalization. Assess vital signs, respiratory and cardiac status, activity tolerance, hydration, nutritional status, med setup and management.   PT/OT eval and treat.     1020: Discharge orders faxed.    CC will continue to monitor patient's medical condition and progress towards discharge.  Frieda Caro RN BSN  6C Unit Care Coordinator  Phone number: [phone]  Pager: [phone]

## 2022-01-25 NOTE — PLAN OF CARE
Physical Therapy Discharge Summary    Reason for therapy discharge:    Discharged to home with home therapy.    Progress towards therapy goal(s). See goals on Care Plan in [facility] electronic health record for goal details.  Goals partially met.  Barriers to achieving goals:   discharge from facility.    Therapy recommendation(s):    Continued therapy is recommended.  Rationale/Recommendations:  Reccomend home health physical therapy in order to increase safety/independence in functional mobility.

## 2022-01-25 NOTE — DISCHARGE SUMMARY
DISCHARGE   Discharged to: Home  Via: Automobile  Accompanied by: Family  Discharge Instructions: principal diagnosis, major findings/procedures, diet, activity, medications, follow up appointments, when to call the MD, and what to watchout for (i.e. s/s of infection, increasing SOB, palpitations, Angina). Pt states understanding of all discharge instructions.   Prescriptions: To be filled by discharge pharmacy.  Follow Up Appointments: Reviewed with patient.  Belongings: All sent with pt. Pt verifies that they are taking all belongings with them.   IV: discontinued.   Telemetry: discontinued.   Pt exhibits understanding of above discharge instructions; all questions answered.  Discharge Paperwork: faxed to discharge planner.

## 2022-01-26 NOTE — TELEPHONE ENCOUNTER
Called Charisse, HC nurse, that Dr Jeffrey will follow HC orders for patient.      Becka RUCKER, RN      January 26, 2022  10:00 AM

## 2022-01-26 NOTE — TELEPHONE ENCOUNTER
If you can not get a hold of Spike please call his sister Dariana at [phone].patient is questioning why he is scheduled w Claire if Claire is leaving the group. Please call to homee.    [facility] Call Center    Phone Message    May a detailed message be left on voicemail: yes     Reason for Call: Appointment Intake    Referring Provider Name: Claire  Diagnosis and/or Symptoms: pulmonary hypertension    Action Taken: Message routed to:  Other: [place]    Travel Screening: Not Applicable

## 2022-01-26 NOTE — TELEPHONE ENCOUNTER
Patient asks if procedure could be moved up sooner. Is there any way to accommodate this? What type of discharge? Inpatient  Risk of Hospital admission or ED visit: 33%  Is a TCM episode required? Yes  When should the patient follow up with PCP? 14 days of discharge.    Isidoro Murillo RN  [facility]

## 2022-01-27 NOTE — TELEPHONE ENCOUNTER
Returned call to brianda and his sisters. They are understanding that appointment with dr steel next week is for close hospital follow up and then after that he will transition to [facility] team as he does not want to ever have to go back to the [facility] hospital. He is in agreement with this and has no issue with this plan. States he will be there next week for appointment.

## 2022-01-31 NOTE — TELEPHONE ENCOUNTER
"Hospital/TCU/ED for chronic condition Discharge Protocol    \"Hi, my name is Keiry St RN, a registered nurse, and I am calling from [facility].  I am calling to follow up and see how things are going for you after your recent emergency visit/hospital/TCU stay.\"    Tell me how you are doing now that you are home?\" Doing better. I have appointment with specialist.       Discharge Instructions    \"Let's review your discharge instructions.  What is/are the follow-up recommendations?  Pt. Response: None    \"Has an appointment with your primary care provider been scheduled?\"   Yes. (confirm)    \"When you see the provider, I would recommend that you bring your medications with you.\"    Medications    \"Tell me what changed about your medicines when you discharged?\"    Changes to chronic meds?    0-1    \"What questions do you have about your medications?\"    None     New diagnoses of heart failure, COPD, diabetes, or MI?    No              Post Discharge Medication Reconciliation Status: discharge medications reconciled, continue medications without change.    Was MTM referral placed (*Make sure to put transitions as reason for referral)?   No    Call Summary    \"What questions or concerns do you have about your recent visit and your follow-up care?\"     none    \"If you have questions or things don't continue to improve, we encourage you contact us through the main clinic number (give number).  Even if the clinic is not open, triage nurses are available 24/7 to help you.     We would like you to know that our clinic has extended hours (provide information).  We also have urgent care (provide details on closest location and hours/contact info)\"      \"Thank you for your time and take care!\"    Keiry St RN  [facility]            "

## 2022-02-03 NOTE — NURSING NOTE
[facility] - [facility]ORE [facility]    -Met with Spike and daughter Hetal. Introduced self and role with CORE clinic.  Discussed importance of daily weights, limiting sodium intake to <2000 mg daily and calling the CORE number if weight jumps more than 2# in a day for 5# in a week.  Spike states he does not have the ability to check BP at home nor pulse ox.  He has [facility] in the home 4 hours a day. Dr. Rangel's nurse Gracia to arrange labs to be drawn at home care for 2/7/22.  Spike will see Danish on 2/16 for his CORE enrollment.     Future Appointments   Date Time Provider Department Center   2/16/2022 10:00 AM Zoë Johnson APRN CNP [facility]   3/3/2022  2:00 PM Jerrod Rangel MD [facility]   4/7/2022  2:00 PM Jerrod Rangel MD [facility]   5/5/2022  2:00 PM Jerrod Rangel MD [facility]     Nancy Chang RN on 2/3/2022 at 10:11 AM

## 2022-02-03 NOTE — LETTER
2/3/2022    Tristian Jeffrey MD, MD  [address]  [place] [address]    RE: Oscar Chaudhary       Dear Colleague,     I had the pleasure of seeing sOcar BRICE Neena in the [facility].  Jerrod Rangel M.D.  Cardiovascular Medicine    I personally saw and examined this patient, discussed care with reviewed each current laboratories and realtime imaging studies, and conveyed impression and diagnostic/therapeutic plan to patient.  I personally made contact to see [facility] and establish care this visit  Problem List  # Acute on chronic HFrEF (EF 20-25%) 2/2 ischemic cardiomyopathy, resolved  # Acute on chronic grade II diastolic dysfunction, resolved   # Hypervolemic hyponatremia, improvoing  # Severe malnutrition in the context of chronic illness  # Deconditioning  # Generalized weakness   # Musculoskeletal back pain  # CAD s/p 3v CABG in 2001   # PAD s/p bilateral common iliac stents   # AAA, infra-renal 3.9 cm  # HLD  # Dysphonia, improving    # RLL nodular densities  # RUL 4 mm nodule, new   # GERD  # Osteopenia, T9 compression fracture  # Insomnia  # Prostate cancer s/p radiation     DNR status    Plan:  1. Adequate nutritional intake  2. Daily weights  3. Laboratories next Monday BMP, BNP, [id number]  4. See Dr. Jeffrey monthly  5. 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  Patient has significant hyponatremia, nutritional insufficiency.    The patient returns for follow-up of heart failure.  There is no interim history of chest pain, tightness, paroxysmal nocturnal dyspnea, orthopnea, peripheral edema, palpitation, pre-syncope, syncope, .  Exercise tolerance is stable.  The patient is attempting to exercise regularly and following a sodium restricted, calorically appropriate diet.  Medications are reviewed and the patient is taking medications as  "prescribed.  The patient is generally sleeping well.     Objective  Ht 1.6 m (5' 3\")   Wt 53.3 kg (117 lb 8 oz)   BMI 20.81 kg/m    Alert, oriented JVP 8, trace chronic venous insufficiency edema, S1S2 no murmur  Wt Readings from Last 5 Encounters:   02/03/22 53.3 kg (117 lb 8 oz)   01/25/22 51.2 kg (112 lb 12.8 oz)   01/19/22 56.7 kg (124 lb 14.4 oz)   01/26/21 56.2 kg (124 lb)   01/14/20 57.8 kg (127 lb 8 oz)       Meds  Current Outpatient Medications   Medication     acetaminophen (TYLENOL) 325 MG tablet     albuterol (PROAIR HFA/PROVENTIL HFA/VENTOLIN HFA) 108 (90 Base) MCG/ACT inhaler     aspirin 81 MG tablet     atorvastatin (LIPITOR) 40 MG tablet     Cholecalciferol (VITAMIN D) 1000 UNITS capsule     diazepam (VALIUM) 5 MG tablet     fish oil-omega-3 fatty acids (FISH OIL) 1000 MG capsule     furosemide (LASIX) 40 MG tablet     guaiFENesin (MUCINEX) 600 MG 12 hr tablet     metoprolol succinate ER (TOPROL-XL) 50 MG 24 hr tablet     Multiple Vitamins-Minerals (MULTIVITAL PO)     nitroGLYcerin (NITROSTAT) 0.4 MG sublingual tablet     omeprazole (PRILOSEC) 20 MG CR capsule     sacubitril-valsartan (ENTRESTO) 24-26 MG per tablet     temazepam (RESTORIL) 15 MG capsule     No current facility-administered medications for this visit.         Labs  Results for JOSH DOSHI (MRN [id number]) as of 2/3/2022 08:28   Ref. Range 2/1/2022 11:15   Sodium Latest Ref Range: 133 - 144 mmol/L 131 (L)   Potassium Latest Ref Range: 3.4 - 5.3 mmol/L 3.6   Chloride Latest Ref Range: 94 - 109 mmol/L 99   Carbon Dioxide Latest Ref Range: 20 - 32 mmol/L 26   Urea Nitrogen Latest Ref Range: 7 - 30 mg/dL 20   Creatinine Latest Ref Range: 0.66 - 1.25 mg/dL 0.76   GFR Estimate Latest Ref Range: >60 mL/min/1.73m2 89   Calcium Latest Ref Range: 8.5 - 10.1 mg/dL 9.3   Anion Gap Latest Ref Range: 3 - 14 mmol/L 6   N-Terminal Pro Bnp Latest Ref Range: 0 - 450 pg/mL 1,724 (H)   Glucose Latest Ref Range: 70 - 99 mg/dL 169 (H)     Imaging     " Scan Date:   2022-01-21 13:04:08                                   Electronically signed by Hudson Sahu 2022-Jan-21 22:41:17     SUMMARY   ==========================================================================================================     Clinical history: 86-year-old man with ischemic cardiomyopathy, CAD s/p CABG in 2001, AAA who presented  with acute decompensated heart failure. TTE showed a recent reduction in LVEF to 20-25% and LAD wall  akinesis.  Comparison CMR: None     1. The LV is mild-to-moderately dilated. The wall thickness is normal except for the apical anterior  segment, which is thinned. The global systolic function is severely reduced. The LVEF is 22%. There is  diffuse hypokinesis with akinesis of the mid and distal LAD and the entire RCA territories.      2. The RV is normal in cavity size. The global systolic function is normal. The RVEF is 55%.      3. The left atrium is mildly enlarged. The right atrium is normal in size.      4. There is mild aortic insufficiency.      5. Late gadolinium enhancement imaging shows subendocardial hyperenhancement in all anterior, septal, and  inferior segments, consistent with MIs in the LAD and RCA territories. The transmural extent of the  hyperenhancement is 50-75% in most segments. There is approximately 40% viability in both the LAD and RCA  territories, and 100% viability in the LCx territory.     6. Regadenoson stress perfusion imaging shows perfusion defects matching the above-mentioned MIs without  any ischemia.     7. There is no pericardial effusion or thickening.     8. There is no intracardiac thrombus.     9. There is a small right pleural effusion.     CONCLUSIONS: Ischemic cardiomyopathy with LVEF of 22%, MIs in the LAD and RCA territories, and no ischemia.      IMPRESSION: Extensive coronary artery disease with CABG and stenting  of the native circumflex coronary artery. Diffuse thoracic aortic  atherosclerosis.  Small right and  trace left pleural effusions with associated  atelectasis.  Increased size of right lower lobe nodular densities, now 12 x 5 mm.  New 4 mm right upper lobe nodule. These could be inflammatory and  therefore follow-up within 2-3 months may be helpful. If this is not  clear at that time, PET/CT or other more aggressive considerations may  be helpful.  Severe osteopenia with unchanged T9 near vertebral plana compression  fracture unchanged from plain film 1/25/2021.  Extensive bullous disease throughout the lingula portion of the left  lung.       Thank you for allowing me to participate in the care of your patient.      Sincerely,     Jerrod Rangel MD     [facility]  cc:   No referring provider defined for this encounter.

## 2022-02-03 NOTE — NURSING NOTE
Pt was seen in clinic for follow up after hospital discharge.  Pt is scheduled for monthly follow up appointments with Jerrod Rangel MD at [facility].  Pt is going to get labs on Monday 2/7 and will be drawn by [facility] RN with [facility] (P) [phone], (F) [phone].  The [facility] clinic will follow up with the pt and his labs once they receive the results.  Gracia Gates RN on 2/3/2022 at 10:41 AM

## 2022-02-03 NOTE — PATIENT INSTRUCTIONS
Medication Changes:  No medication changes at this time. Please continue current medication regiment.    Patient Instructions:  1. Continue staying active and eat a heart healthy diet.    2. Please keep current list of medications with you at all times.    3. Remember to weigh yourself daily after voiding and before you consume any food or beverages and log the numbers.  If you have gained 2 pounds overnight or 5 pounds in a week contact us immediately for medication adjustments or further instructions.    4. **Please call us immediately if you have any syncope (fainting or passing out), chest pain, edema (swelling or weight gain), or decline in your functional status (general decline in how you are feeling).    5. Patients on Remodulin (treprostinil) or Veletri (epoprostenol): Please make sure that you have your backup pump and supplies with you at all times, your mixing instructions, and contact information for your specialty pharmacy.    Follow up Appointment Information:  Labs next week Monday with Home Health Nurse    1 Month follow up    We are located on the third floor of the [facility] ([facility]) on the [facility].  Our address is     [address] [place] [address] [place] [address] [place] [address]    Thank you for allowing us to be a part of your care here at the [facility]    If you have questions or concerns please contact us at:    Salomón Andrade RN, BSN   Shelli Cortes (Schedule,Prior Auth)  Nurse Coordinator     Clinic   Pulmonary Hypertension   Pulmonary Hypertension  [facility]  [facility]  (Phone)[phone]    (Phone) [phone]        (Fax) [phone]    ** Please note that you will NOT receive a reminder call regarding your scheduled testing, reminder calls are for provider appointments only.  If you are scheduled for testing within the [place] system you may  receive a call regarding pre-registration for billing purposes only.**     Remember to weigh yourself daily after voiding and before you consume any food or beverages and log the numbers.  If you have gained/lost 2 pounds overnight or 5 pounds in a week contact us immediately for medication adjustments or further instructions.   **Please call us immediately if you have any syncope, chest pain, edema, or decline in your functional status.    Support Group:  Pulmonary Hypertension Association  Https://www.phassociation.org/  **Look at the Events Tab** They even have Support Groups that you can call into    [facility] Support Group  Second Saturday of the Month from 1-3 PM   Location: [address] [place] [id number]  Leader: Carola Awan and Dalila Caicedo  Phone: [phone] or [phone]  Email: [email or web address]

## 2022-02-03 NOTE — PROGRESS NOTES
"Jerrod Rangel M.D.  Cardiovascular Medicine    I personally saw and examined this patient, discussed care with reviewed each current laboratories and realtime imaging studies, and conveyed impression and diagnostic/therapeutic plan to patient.  I personally made contact to see [facility] and establish care this visit  Problem List  # Acute on chronic HFrEF (EF 20-25%) 2/2 ischemic cardiomyopathy, resolved  # Acute on chronic grade II diastolic dysfunction, resolved   # Hypervolemic hyponatremia, improvoing  # Severe malnutrition in the context of chronic illness  # Deconditioning  # Generalized weakness   # Musculoskeletal back pain  # CAD s/p 3v CABG in 2001   # PAD s/p bilateral common iliac stents   # AAA, infra-renal 3.9 cm  # HLD  # Dysphonia, improving    # RLL nodular densities  # RUL 4 mm nodule, new   # GERD  # Osteopenia, T9 compression fracture  # Insomnia  # Prostate cancer s/p radiation     DNR status    Plan:  1. Adequate nutritional intake  2. Daily weights  3. Laboratories next Monday BMP, BNP, [id number]  4. See Dr. Jeffrey monthly  5. 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  Patient has significant hyponatremia, nutritional insufficiency.    The patient returns for follow-up of heart failure.  There is no interim history of chest pain, tightness, paroxysmal nocturnal dyspnea, orthopnea, peripheral edema, palpitation, pre-syncope, syncope, .  Exercise tolerance is stable.  The patient is attempting to exercise regularly and following a sodium restricted, calorically appropriate diet.  Medications are reviewed and the patient is taking medications as prescribed.  The patient is generally sleeping well.     Objective  Ht 1.6 m (5' 3\")   Wt 53.3 kg (117 lb 8 oz)   BMI 20.81 kg/m    Alert, oriented JVP 8, trace chronic venous insufficiency edema, S1S2 no murmur  Wt Readings from " Last 5 Encounters:   02/03/22 53.3 kg (117 lb 8 oz)   01/25/22 51.2 kg (112 lb 12.8 oz)   01/19/22 56.7 kg (124 lb 14.4 oz)   01/26/21 56.2 kg (124 lb)   01/14/20 57.8 kg (127 lb 8 oz)       Meds  Current Outpatient Medications   Medication     acetaminophen (TYLENOL) 325 MG tablet     albuterol (PROAIR HFA/PROVENTIL HFA/VENTOLIN HFA) 108 (90 Base) MCG/ACT inhaler     aspirin 81 MG tablet     atorvastatin (LIPITOR) 40 MG tablet     Cholecalciferol (VITAMIN D) 1000 UNITS capsule     diazepam (VALIUM) 5 MG tablet     fish oil-omega-3 fatty acids (FISH OIL) 1000 MG capsule     furosemide (LASIX) 40 MG tablet     guaiFENesin (MUCINEX) 600 MG 12 hr tablet     metoprolol succinate ER (TOPROL-XL) 50 MG 24 hr tablet     Multiple Vitamins-Minerals (MULTIVITAL PO)     nitroGLYcerin (NITROSTAT) 0.4 MG sublingual tablet     omeprazole (PRILOSEC) 20 MG CR capsule     sacubitril-valsartan (ENTRESTO) 24-26 MG per tablet     temazepam (RESTORIL) 15 MG capsule     No current facility-administered medications for this visit.         Labs  Results for JOSH ODSHI (MRN [id number]) as of 2/3/2022 08:28   Ref. Range 2/1/2022 11:15   Sodium Latest Ref Range: 133 - 144 mmol/L 131 (L)   Potassium Latest Ref Range: 3.4 - 5.3 mmol/L 3.6   Chloride Latest Ref Range: 94 - 109 mmol/L 99   Carbon Dioxide Latest Ref Range: 20 - 32 mmol/L 26   Urea Nitrogen Latest Ref Range: 7 - 30 mg/dL 20   Creatinine Latest Ref Range: 0.66 - 1.25 mg/dL 0.76   GFR Estimate Latest Ref Range: >60 mL/min/1.73m2 89   Calcium Latest Ref Range: 8.5 - 10.1 mg/dL 9.3   Anion Gap Latest Ref Range: 3 - 14 mmol/L 6   N-Terminal Pro Bnp Latest Ref Range: 0 - 450 pg/mL 1,724 (H)   Glucose Latest Ref Range: 70 - 99 mg/dL 169 (H)     Imaging     Scan Date:   2022-01-21 13:04:08                                   Electronically signed by Hudson Sahu 2022-Jan-21 22:41:17     SUMMARY    ==========================================================================================================     Clinical history: 86-year-old man with ischemic cardiomyopathy, CAD s/p CABG in 2001, AAA who presented  with acute decompensated heart failure. TTE showed a recent reduction in LVEF to 20-25% and LAD wall  akinesis.  Comparison CMR: None     1. The LV is mild-to-moderately dilated. The wall thickness is normal except for the apical anterior  segment, which is thinned. The global systolic function is severely reduced. The LVEF is 22%. There is  diffuse hypokinesis with akinesis of the mid and distal LAD and the entire RCA territories.      2. The RV is normal in cavity size. The global systolic function is normal. The RVEF is 55%.      3. The left atrium is mildly enlarged. The right atrium is normal in size.      4. There is mild aortic insufficiency.      5. Late gadolinium enhancement imaging shows subendocardial hyperenhancement in all anterior, septal, and  inferior segments, consistent with MIs in the LAD and RCA territories. The transmural extent of the  hyperenhancement is 50-75% in most segments. There is approximately 40% viability in both the LAD and RCA  territories, and 100% viability in the LCx territory.     6. Regadenoson stress perfusion imaging shows perfusion defects matching the above-mentioned MIs without  any ischemia.     7. There is no pericardial effusion or thickening.     8. There is no intracardiac thrombus.     9. There is a small right pleural effusion.     CONCLUSIONS: Ischemic cardiomyopathy with LVEF of 22%, MIs in the LAD and RCA territories, and no ischemia.      IMPRESSION: Extensive coronary artery disease with CABG and stenting  of the native circumflex coronary artery. Diffuse thoracic aortic  atherosclerosis.  Small right and trace left pleural effusions with associated  atelectasis.  Increased size of right lower lobe nodular densities, now 12 x 5 mm.  New 4 mm right  upper lobe nodule. These could be inflammatory and  therefore follow-up within 2-3 months may be helpful. If this is not  clear at that time, PET/CT or other more aggressive considerations may  be helpful.  Severe osteopenia with unchanged T9 near vertebral plana compression  fracture unchanged from plain film 1/25/2021.  Extensive bullous disease throughout the lingula portion of the left  lung.

## 2022-02-07 NOTE — TELEPHONE ENCOUNTER
"112 lbs, oxygen 97%. Patient asking about sodium diet; would like to discuss with [facility] and/or nutritionist regarding his intake. States he keeps a food diary and will bring it with him to his next appt. Wants his diet \"taylored\" to him more; believes 2000 mg Na restriction is too high. Farheen Andrade RN on 2/7/2022 at 12:51 PM        Health Call Center    Phone Message    May a detailed message be left on voicemail: yes     Reason for Call: Other: Patient called and spoke with writer, he states he would like to speak with  nurse.Please call patient back to discuss further      Action Taken: Message routed to:  [facility] ([facility] Cardio     Travel Screening: Not Applicable                                                                      "

## 2022-02-08 NOTE — TELEPHONE ENCOUNTER
Medication Refill double check:    Last office visit was on 2/3/22 with .    Follow up was recommended for 1 month.    Any additional encounters with changes to requested med? no    Authorizing provider is: Dr. Rangel    Refill was approved.     Additional orders/notes:       Vibha Fuller RN on 2/8/2022 at 10:08 AM

## 2022-02-16 NOTE — LETTER
2022    Tristian Jeffrey MD, MD  [address]  [place] [address]    RE: Oscar Chaudhary       Dear Colleague,     I had the pleasure of seeing Oscar Chaudhary in the [facility].  CARDIOLOGY CLINIC / [facility].R.E[facility] CLINIC VISIT  (Heart Failure Specialty)  DOS: 22    Oscar Chaudhary  : 1935  MRN: [id number]    PRIMARY CARDIOLOGIST:  Dr. Rangel     REASON FOR VISIT: [facility] Enrollment       HISTORY OF PRESENT ILLNESS:   Spike was referred to [facility]. He is a patient of Dr. Rangel.   86-year-old man with ischemic cardiomyopathy, CAD s/p CABG in , AAA who presented 2022 to the hospital with acute decompensated heart failure. TTE showed a recent reduction in LVEF to 20-25% and LAD wall akinesis.  He has a complex medical history with the following:   # chronic HFrEF (EF 20-25%) 2/2 ischemic cardiomyopathy  # chronic grade II diastolic dysfunction  # Hypervolemic hyponatremia  # Severe malnutrition in the context of chronic illness  # Deconditioning  # Generalized weakness   # Musculoskeletal back pain  # CAD s/p 3v CABG in    # PAD s/p bilateral common iliac stents   # AAA, infra-renal 3.9 cm  # HLD  # Dysphonia, improving    # RLL nodular densities  # RUL 4 mm nodule, new   # GERD  # Osteopenia, T9 compression fracture  # Insomnia  # Prostate cancer s/p radiation  #sleep apnea      He was seen in clinic on 22 and endorsed progressive dyspnea, nonproductive cough, anorexia, and post-prandial fullness. NTpBNP elevated at 6,000. He was hyponatremic at 120. Due to concern for acute HF and hyponatremia, he was directly admitted.   Echo this admission showed significantly reduced LV function with EF 20-25% (45-50% in 2019), severe hypokinesis in LAD territory, and grade II diastolic dysfunction.  Stress CMR on 22 c/w ICM with LVEF 22 %, MIs in LAD and RCA territories, no ischemia. No lower extremity edema. He underwent IV diuresis with  improvement of his dyspnea. Admit weight 124 lb. Discharge weight 112 lbs.   He was discharged on Lasix 40 mg twice daily.  Entresto was started.  Spironolactone and chlorthalidone were stopped thought to be contributing to hyponatremia.     2/16/22 today I am meeting Spike for the first time.  He was referred to me by Dr. Rangel.    He tells me today that he was not on Lasix prior to the his recent admission.  He is currently on Lasix 40 mg twice daily.  He tells me that he does not have shortness of breath lying down but when he stands up he becomes short of breath.  At times it dissipates throughout the day and improving by the afternoon.  He denies PND or orthopnea. He does use his inhaler, at times it helps and at other times it does not.  He does have sleep apnea and has not been using that much since discharge from the hospital.  He does see Dr. Dennys Healy, who has suggested to repeat the sleep study.  His last basic metabolic panel was February 7, sodium 128, potassium 4.3, chloride 95, BUN 15, creatinine 0.77, NT proBNP 2088.  Spike's home weight today is 115 pounds.  O2 sats at home have been 96 to 99%.  He does have a blood pressure cuff now at home and its about 120/70s.    He told me about his typical diet:  Breakfast: 1 egg and salsa, piece of toast and fruit  Lunch: carrots and dip  Dinner: salad with some type of protein, examples hamburger keyla, salmon, cod, tilapia.        PREVIOUS STUDIES (personally reviewed):  BMP RESULTS: 2/7/2022: Sodium 128, potassium 4.3, chloride 95, BUN 15, creatinine 0.77, NT proBNP 2000 up from 1724, of note on admission it was 6000.         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     # chronic  HFrEF (EF 20-25%) 2/2 ischemic cardiomyopathy  # chronic grade II diastolic dysfunction, resolved   # Hypervolemic hyponatremia, improved  # Severe malnutrition in the context of chronic illness  # Deconditioning  # Generalized weakness   # Musculoskeletal back pain  # CAD s/p 3v CABG in 2001   # PAD s/p bilateral common iliac stents   # AAA, infra-renal 3.9 cm  # HLD  # Dysphonia, improving    # RLL nodular densities  # RUL 4 mm nodule, new   # GERD  # Osteopenia, T9 compression fracture  # Insomnia  # Prostate cancer s/p radiation   # Sleep apnea      1. Chronic HFrEF (EF 20-25%) 2/2 ischemic cardiomyopathy/ chronic grade II diastolic dysfunction, resolved   Stage C. NYHA Class IV.   Follows with Dr. Rangel.     He was seen in clinic on 1/19/22 and endorsed progressive dyspnea, nonproductive cough, anorexia, and post-prandial fullness. NTpBNP elevated at 6,000. He was hyponatremic at 120. Due to concern for acute HF and hyponatremia, he was directly admitted.   Echo this admission shows significantly reduced LV function with EF 20-25% (45-50% in 2019), severe hypokinesis in LAD territory, and grade II diastolic dysfunction.  Stress CMR on 1/19/22 c/w ICM with LVEF 22 %, MIs in LAD and RCA territories, no ischemia. No lower extremity edema. He underwent IV diuresis with improvement of his dyspnea. Admit weight 124 lb. Discharge weight 112 lbs.   Spironolactone and chlorthalidone was stopped and after diuresis he was started on Entresto 24/26 mg twice daily.  His last basic metabolic panel was February 7 which was stable.  ICD placement and criteria were discussed in the hospital.  We will try to optimize Entresto and repeat echocardiogram in the road.    I encouraged him to continue with daily weights and blood pressure.  We talked about what his symptoms were prior to the hospital.  His weight did not really go up but he developed more shortness of breath and chest pain.  So what happened with him is that his  weight stayed stable but he was losing his own weight.  His own weight had dropped about about 14 pounds.  His overall shortness of breath has  improved.  Inhalers help at times when he is wheezing.  He is on a high dose of Lasix at 40 mg twice a day which is new for him.  He is on Entresto 24/26 milligrams twice daily.  I have instructed him to decrease Lasix to 20 mg twice a day and try to increase Entresto to 2 tablets in the a.m. and 1 tablet in the p.m.  He will see Dr. Jeffrey in about a week with lab work.  We had a nice discussion about his diet.  He is very interested in his diet.  We talked about increasing his protein and fat and carbohydrates still under a reasonable low-sodium diet.  - instructed him to call if he has sx of more shortness of breath.     2. hyponatremia   improved    3. Severe malnutrition in the context of chronic illness  RD was consulted in the hospital.  Ensure supplements and higher protein diet was recommended.      4. Deconditioning  -Is trying to do physical therapy exercises  Generalized weakness   Patient lives independently in a town house. He has a few steps in his home. PT/OT consulted and reported patient is below baseline functional status.      5. Musculoskeletal back pain  Patient endorses midback pain.   - continue PRN tylenol   -Massage  6. Sleep apnea   -He follows with Dr. Dennys Healy.  Suggested that he follow-up with Dr. Healy.  He does not use his CPAP much and it may be contributing to his CHF.  He has agreed to do so.  7. CAD s/p 3v CABG in 2001 Last angiogram 3/2019 with RASHAAD x1 to ramus, widely patent LIMA-LAD with occluded SVA-RCA. They were unable to identify any additional grafts despite utilizing ascending aortography.  8. PAD s/p bilateral common iliac stents   9. AAA, infra-renal 3.9 cm  10.HLD LDL 42, on atorvastatin 40mg daily       NYHA:III  Stage: C  Fluid status:euvolemic   ARNI: yes  Beta Blocker:   yes  MCRA (mineralocorticoid receptor  antagonist) no, hyponatremia   SGLT2i: no  GLP-1 receptor:no  SCD prophylaxis ICD: consider after Entresto is optimized.     Recommendations:  1. Increase Entresto to 2 tablet in am and 1 tablet in pm  2. Decrease lasix to 20mg bid   3. Follow up with Dr. Jeffrey  4. Follow up with Dr. Rangel   5. Increase protein, fat and carbohydrates, consider a can of ensure/day   6. Weight daily and check BP if dizzy  7.Call the [facility] RN if questions or concerns.   8. See Dr. Healy.       Follow-up:   1. With Dr. Rangel in March.     Thank you for the opportunity to be involved in this very pleasant patient's care please feel to contact me with any questions.    Total time: 120 minutes was spent today reviewing the chart, visiting the patient, and documenting the visit.    Zoë SAHA, CNP   Nurse Practitioner  [facility]        CURRENT MEDICATIONS:  Current Outpatient Medications   Medication Sig Dispense Refill     acetaminophen (TYLENOL) 325 MG tablet Take 2 tablets (650 mg) by mouth every 4 hours as needed for mild pain 100 tablet 0     albuterol (PROAIR HFA/PROVENTIL HFA/VENTOLIN HFA) 108 (90 Base) MCG/ACT inhaler Inhale 2 puffs into the lungs every 4 hours as needed for shortness of breath / dyspnea or wheezing 6.7 g 0     aspirin 81 MG tablet Take 1 tablet by mouth daily.       atorvastatin (LIPITOR) 40 MG tablet Take 1 tablet (40 mg) by mouth daily (Patient taking differently: Take 40 mg by mouth every evening ) 90 tablet 3     Cholecalciferol (VITAMIN D) 1000 UNITS capsule Take 1 capsule by mouth daily.       diazepam (VALIUM) 5 MG tablet Take 1 tablet (5 mg) by mouth nightly as needed for sleep 30 tablet 3     fish oil-omega-3 fatty acids (FISH OIL) 1000 MG capsule Take 1 g by mouth daily.       furosemide (LASIX) 40 MG tablet Take 1 tablet (40 mg) by mouth 2 times daily 90 tablet 3     guaiFENesin (MUCINEX) 600 MG 12 hr tablet Take 1 tablet (600 mg) by mouth 2 times daily (Patient  taking differently: Take 600 mg by mouth daily ) 60 tablet 3     Multiple Vitamins-Minerals (MULTIVITAL PO) Take 1 tablet by mouth daily.       nitroGLYcerin (NITROSTAT) 0.4 MG sublingual tablet For chest pain place 1 tablet under the tongue every 5 minutes for 3 doses. If symptoms persist 5 minutes after 1st dose call 911. 30 tablet 1     omeprazole (PRILOSEC) 20 MG CR capsule Take 20 mg by mouth daily       sacubitril-valsartan (ENTRESTO) 24-26 MG per tablet Take 1 tablet by mouth 2 times daily 90 tablet 3     temazepam (RESTORIL) 15 MG capsule Take 1 capsule (15 mg) by mouth nightly as needed 30 capsule 3     metoprolol succinate ER (TOPROL-XL) 50 MG 24 hr tablet Take 1 tablet (50 mg) by mouth daily 90 tablet 3       ALLERGIES  No Known Allergies    PAST MEDICAL, SURGICAL, FAMILY HISTORY:  History was reviewed and updated as needed, see medical record.    ROS:  12-pt ROS is negative except for as noted above.     PHYSICAL EXAMINATION:  Vitals: /76, HR 97 bpm, weight 115 pounds.   Constitutional:  Patient is pleasant, alert, cooperative, and in NAD.   HEENT:  NCAT. PERRLA. EOM's intact.   Neck:   No JVD  Pulmonary: clear  Cardiac: Regular rate and rhythm, no m/r/g appreciated.   Abdomen:  Soft, non-tender abdomen, no hepatosplenomegaly appreciated.   Extremities:  No edema  Neurological:  No gross motor or sensory deficits.   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    Part of periodontal abscess     h/o myocardial infarction 1976 5/31/2015     History of prostate cancer 2001 10/7/2013     S/P CABG (coronary artery bypass graft) 6/21/2001     S/P CABG (coronary artery bypass graft); 6/29/01 5/31/2015              Past Surgical History:     Past Surgical History:   Procedure Laterality Date     CV CORONARY ANGIOGRAM N/A 3/21/2019    Procedure: Coronary  Angiogram;  Surgeon: Milton Paul MD;  Location:  [facility]     CV PCI ANGIOPLASTY N/A 3/21/2019    Procedure: Percutaneous Coronary Intervention;  Surgeon: Milton Paul MD;  Location:  [facility]ATH LAB     INCISION AND DRAINAGE MANDIBLE, COMBINED N/A 2/27/2017    Procedure: COMBINED INCISION AND DRAINAGE MANDIBLE;  Surgeon: Adam Ramos DDS;  Location:  OR     OPEN REDUCTION INTERNAL FIXATION MANDIBLE N/A 2/27/2017    Procedure: OPEN REDUCTION INTERNAL FIXATION MANDIBLE;  Surgeon: Adam Ramos DDS;  Location:  OR        Recent Lab Results:  LIPID RESULTS:  Lab Results   Component Value Date    CHOL 124 01/20/2022    CHOL 133 07/16/2019    HDL 69 01/20/2022    HDL 60 07/16/2019    LDL 42 01/20/2022    LDL 59 07/16/2019    TRIG 63 01/20/2022    TRIG 70 07/16/2019    CHOLHDLRATIO 2.5 05/11/2015     LIVER ENZYME RESULTS:  Lab Results   Component Value Date    AST 31 01/20/2022    AST 18 01/26/2021    ALT 28 01/20/2022    ALT 25 01/26/2021     CBC RESULTS:  Lab Results   Component Value Date    WBC 9.3 01/22/2022    WBC 8.4 01/26/2021    RBC 4.11 (L) 01/22/2022    RBC 4.00 (L) 01/26/2021    HGB 13.3 01/22/2022    HGB 12.9 (L) 01/26/2021    HCT 36.6 (L) 01/22/2022    HCT 36.3 (L) 01/26/2021    MCV 89 01/22/2022    MCV 91 01/26/2021    MCH 32.4 01/22/2022    MCH 32.3 01/26/2021    MCHC 36.3 01/22/2022    MCHC 35.5 01/26/2021    RDW 15.2 (H) 01/22/2022    RDW 15.9 (H) 01/26/2021     01/25/2022     01/26/2021     BMP RESULTS:  Lab Results   Component Value Date     (L) 02/07/2022     01/26/2021    POTASSIUM 4.3 02/07/2022    POTASSIUM 4.6 01/26/2021    CHLORIDE 95 02/07/2022    CHLORIDE 103 01/26/2021    CO2 25 02/07/2022    CO2 27 01/26/2021    ANIONGAP 8 02/07/2022    ANIONGAP 4 01/26/2021     (H) 02/07/2022    GLC 87 01/26/2021    BUN 15 02/07/2022    BUN 24 01/26/2021    CR 0.77 02/07/2022    CR 0.85 01/26/2021    GFRESTIMATED 88  02/07/2022    GFRESTIMATED 81 01/26/2021    GFRESTBLACK >90 01/26/2021    MICKIE 9.4 02/07/2022    MICKIE 9.4 01/26/2021      A1C RESULTS:  Lab Results   Component Value Date    A1C 5.1 02/07/2022    A1C 5.4 03/08/2018     INR RESULTS:  Lab Results   Component Value Date    INR 0.99 03/21/2019    INR 0.96 03/08/2018       This note was completed in part using Dragon voice recognition software. Although reviewed after completion, some word and grammatical errors may occur.    Thank you for allowing me to participate in the care of your patient.    Sincerely,     YIFAN Trejo [facility]  cc:   No referring provider defined for this encounter.

## 2022-02-16 NOTE — PATIENT INSTRUCTIONS
[facility].R.E[facility] CLINIC (Heart Failure Specialty Clinic)    Call C.O.R.E. nurse for any questions or concerns Mon-Fri 8am-4pm                                         [phone]: Nurse number  Domi                                        After Hours Phone Number:  [phone]    Medication changes:   1. Entresto 2 tablets in am and 1 tablet in pm       Plan from today:   1. Call Dr. Healy.   2. Dr. Rangel in March   3. See Danish Johnson when we can work it out.

## 2022-02-16 NOTE — PROGRESS NOTES
CARDIOLOGY CLINIC / C.O.R.E. CLINIC VISIT  (Heart Failure Specialty)  DOS: 2.16.22    Oscar Chaudhary  : 1935  MRN: [id number]    PRIMARY CARDIOLOGIST:  Dr. Rangel     REASON FOR VISIT: CORE Enrollment       HISTORY OF PRESENT ILLNESS:   Spike was referred to [facility] Clinic. He is a patient of Dr. Rangel.   86-year-old man with ischemic cardiomyopathy, CAD s/p CABG in , AAA who presented 2022 to the hospital with acute decompensated heart failure. TTE showed a recent reduction in LVEF to 20-25% and LAD wall akinesis.  He has a complex medical history with the following:   # chronic HFrEF (EF 20-25%) 2/2 ischemic cardiomyopathy  # chronic grade II diastolic dysfunction  # Hypervolemic hyponatremia  # Severe malnutrition in the context of chronic illness  # Deconditioning  # Generalized weakness   # Musculoskeletal back pain  # CAD s/p 3v CABG in    # PAD s/p bilateral common iliac stents   # AAA, infra-renal 3.9 cm  # HLD  # Dysphonia, improving    # RLL nodular densities  # RUL 4 mm nodule, new   # GERD  # Osteopenia, T9 compression fracture  # Insomnia  # Prostate cancer s/p radiation  #sleep apnea      He was seen in clinic on 22 and endorsed progressive dyspnea, nonproductive cough, anorexia, and post-prandial fullness. NTpBNP elevated at 6,000. He was hyponatremic at 120. Due to concern for acute HF and hyponatremia, he was directly admitted.   Echo this admission showed significantly reduced LV function with EF 20-25% (45-50% in 2019), severe hypokinesis in LAD territory, and grade II diastolic dysfunction.  Stress CMR on 22 c/w ICM with LVEF 22 %, MIs in LAD and RCA territories, no ischemia. No lower extremity edema. He underwent IV diuresis with improvement of his dyspnea. Admit weight 124 lb. Discharge weight 112 lbs.   He was discharged on Lasix 40 mg twice daily.  Entresto was started.  Spironolactone and chlorthalidone were stopped thought to be contributing to  hyponatremia.     2/16/22 today I am meeting Spike for the first time.  He was referred to me by Dr. Rangel.    He tells me today that he was not on Lasix prior to the his recent admission.  He is currently on Lasix 40 mg twice daily.  He tells me that he does not have shortness of breath lying down but when he stands up he becomes short of breath.  At times it dissipates throughout the day and improving by the afternoon.  He denies PND or orthopnea. He does use his inhaler, at times it helps and at other times it does not.  He does have sleep apnea and has not been using that much since discharge from the hospital.  He does see Dr. Dennys Healy, who has suggested to repeat the sleep study.  His last basic metabolic panel was February 7, sodium 128, potassium 4.3, chloride 95, BUN 15, creatinine 0.77, NT proBNP 2088.  Spike's home weight today is 115 pounds.  O2 sats at home have been 96 to 99%.  He does have a blood pressure cuff now at home and its about 120/70s.    He told me about his typical diet:  Breakfast: 1 egg and salsa, piece of toast and fruit  Lunch: carrots and dip  Dinner: salad with some type of protein, examples hamburger keyla, salmon, cod, tilapia.        PREVIOUS STUDIES (personally reviewed):  BMP RESULTS: 2/7/2022: Sodium 128, potassium 4.3, chloride 95, BUN 15, creatinine 0.77, NT proBNP 2000 up from 1724, of note on admission it was 6000.         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     # chronic HFrEF (EF 20-25%) 2/2 ischemic cardiomyopathy  # chronic grade II diastolic dysfunction, resolved   # Hypervolemic hyponatremia, improved  # Severe malnutrition in the context of chronic illness  # Deconditioning  #  Generalized weakness   # Musculoskeletal back pain  # CAD s/p 3v CABG in 2001   # PAD s/p bilateral common iliac stents   # AAA, infra-renal 3.9 cm  # HLD  # Dysphonia, improving    # RLL nodular densities  # RUL 4 mm nodule, new   # GERD  # Osteopenia, T9 compression fracture  # Insomnia  # Prostate cancer s/p radiation   # Sleep apnea      1. Chronic HFrEF (EF 20-25%) 2/2 ischemic cardiomyopathy/ chronic grade II diastolic dysfunction, resolved   Stage C. NYHA Class IV.   Follows with Dr. Rangel.     He was seen in clinic on 1/19/22 and endorsed progressive dyspnea, nonproductive cough, anorexia, and post-prandial fullness. NTpBNP elevated at 6,000. He was hyponatremic at 120. Due to concern for acute HF and hyponatremia, he was directly admitted.   Echo this admission shows significantly reduced LV function with EF 20-25% (45-50% in 2019), severe hypokinesis in LAD territory, and grade II diastolic dysfunction.  Stress CMR on 1/19/22 c/w ICM with LVEF 22 %, MIs in LAD and RCA territories, no ischemia. No lower extremity edema. He underwent IV diuresis with improvement of his dyspnea. Admit weight 124 lb. Discharge weight 112 lbs.   Spironolactone and chlorthalidone was stopped and after diuresis he was started on Entresto 24/26 mg twice daily.  His last basic metabolic panel was February 7 which was stable.  ICD placement and criteria were discussed in the hospital.  We will try to optimize Entresto and repeat echocardiogram in the road.    I encouraged him to continue with daily weights and blood pressure.  We talked about what his symptoms were prior to the hospital.  His weight did not really go up but he developed more shortness of breath and chest pain.  So what happened with him is that his weight stayed stable but he was losing his own weight.  His own weight had dropped about about 14 pounds.  His overall shortness of breath has  improved.  Inhalers help at times when he is wheezing.  He is on a high  dose of Lasix at 40 mg twice a day which is new for him.  He is on Entresto 24/26 milligrams twice daily.  I have instructed him to decrease Lasix to 20 mg twice a day and try to increase Entresto to 2 tablets in the a.m. and 1 tablet in the p.m.  He will see Dr. Jeffrey in about a week with lab work.  We had a nice discussion about his diet.  He is very interested in his diet.  We talked about increasing his protein and fat and carbohydrates still under a reasonable low-sodium diet.  - instructed him to call if he has sx of more shortness of breath.     2. hyponatremia   improved    3. Severe malnutrition in the context of chronic illness  RD was consulted in the hospital.  Ensure supplements and higher protein diet was recommended.      4. Deconditioning  -Is trying to do physical therapy exercises  Generalized weakness   Patient lives independently in a town house. He has a few steps in his home. PT/OT consulted and reported patient is below baseline functional status.      5. Musculoskeletal back pain  Patient endorses midback pain.   - continue PRN tylenol   -Massage  6. Sleep apnea   -He follows with Dr. Dennys Healy.  Suggested that he follow-up with Dr. Healy.  He does not use his CPAP much and it may be contributing to his CHF.  He has agreed to do so.  7. CAD s/p 3v CABG in 2001 Last angiogram 3/2019 with RASHAAD x1 to ramus, widely patent LIMA-LAD with occluded SVA-RCA. They were unable to identify any additional grafts despite utilizing ascending aortography.  8. PAD s/p bilateral common iliac stents   9. AAA, infra-renal 3.9 cm  10.HLD LDL 42, on atorvastatin 40mg daily       NYHA:III  Stage: C  Fluid status:euvolemic   ARNI: yes  Beta Blocker:   yes  MCRA (mineralocorticoid receptor antagonist) no, hyponatremia   SGLT2i: no  GLP-1 receptor:no  SCD prophylaxis ICD: consider after Entresto is optimized.     Recommendations:  1. Increase Entresto to 2 tablet in am and 1 tablet in pm  2. Decrease lasix to  20mg bid   3. Follow up with Dr. Jeffrey  4. Follow up with Dr. Rangel   5. Increase protein, fat and carbohydrates, consider a can of ensure/day   6. Weight daily and check BP if dizzy  7.Call the CORE RN if questions or concerns.   8. See Dr. Healy.       Follow-up:   1. With Dr. Rangel in March.     Thank you for the opportunity to be involved in this very pleasant patient's care please feel to contact me with any questions.    Total time: 120 minutes was spent today reviewing the chart, visiting the patient, and documenting the visit.    Zoë SAHA, CNP   Nurse Practitioner  [facility]        CURRENT MEDICATIONS:  Current Outpatient Medications   Medication Sig Dispense Refill     acetaminophen (TYLENOL) 325 MG tablet Take 2 tablets (650 mg) by mouth every 4 hours as needed for mild pain 100 tablet 0     albuterol (PROAIR HFA/PROVENTIL HFA/VENTOLIN HFA) 108 (90 Base) MCG/ACT inhaler Inhale 2 puffs into the lungs every 4 hours as needed for shortness of breath / dyspnea or wheezing 6.7 g 0     aspirin 81 MG tablet Take 1 tablet by mouth daily.       atorvastatin (LIPITOR) 40 MG tablet Take 1 tablet (40 mg) by mouth daily (Patient taking differently: Take 40 mg by mouth every evening ) 90 tablet 3     Cholecalciferol (VITAMIN D) 1000 UNITS capsule Take 1 capsule by mouth daily.       diazepam (VALIUM) 5 MG tablet Take 1 tablet (5 mg) by mouth nightly as needed for sleep 30 tablet 3     fish oil-omega-3 fatty acids (FISH OIL) 1000 MG capsule Take 1 g by mouth daily.       furosemide (LASIX) 40 MG tablet Take 1 tablet (40 mg) by mouth 2 times daily 90 tablet 3     guaiFENesin (MUCINEX) 600 MG 12 hr tablet Take 1 tablet (600 mg) by mouth 2 times daily (Patient taking differently: Take 600 mg by mouth daily ) 60 tablet 3     Multiple Vitamins-Minerals (MULTIVITAL PO) Take 1 tablet by mouth daily.       nitroGLYcerin (NITROSTAT) 0.4 MG sublingual tablet For chest pain place 1  tablet under the tongue every 5 minutes for 3 doses. If symptoms persist 5 minutes after 1st dose call 911. 30 tablet 1     omeprazole (PRILOSEC) 20 MG CR capsule Take 20 mg by mouth daily       sacubitril-valsartan (ENTRESTO) 24-26 MG per tablet Take 1 tablet by mouth 2 times daily 90 tablet 3     temazepam (RESTORIL) 15 MG capsule Take 1 capsule (15 mg) by mouth nightly as needed 30 capsule 3     metoprolol succinate ER (TOPROL-XL) 50 MG 24 hr tablet Take 1 tablet (50 mg) by mouth daily 90 tablet 3       ALLERGIES  No Known Allergies    PAST MEDICAL, SURGICAL, FAMILY HISTORY:  History was reviewed and updated as needed, see medical record.    ROS:  12-pt ROS is negative except for as noted above.     PHYSICAL EXAMINATION:  Vitals: /76, HR 97 bpm, weight 115 pounds.   Constitutional:  Patient is pleasant, alert, cooperative, and in NAD.   HEENT:  NCAT. PERRLA. EOM's intact.   Neck:   No JVD  Pulmonary: clear  Cardiac: Regular rate and rhythm, no m/r/g appreciated.   Abdomen:  Soft, non-tender abdomen, no hepatosplenomegaly appreciated.   Extremities:  No edema  Neurological:  No gross motor or sensory deficits.   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    Part of periodontal abscess     h/o myocardial infarction 1976 5/31/2015     History of prostate cancer 2001 10/7/2013     S/P CABG (coronary artery bypass graft) 6/21/2001     S/P CABG (coronary artery bypass graft); 6/29/01 5/31/2015              Past Surgical History:     Past Surgical History:   Procedure Laterality Date     CV CORONARY ANGIOGRAM N/A 3/21/2019    Procedure: Coronary Angiogram;  Surgeon: Milton Paul MD;  Location:  HEART CARDIAC CATH LAB     CV PCI ANGIOPLASTY N/A 3/21/2019    Procedure: Percutaneous Coronary Intervention;  Surgeon: Milton Paul MD;  Location:   HEART CARDI[facility] CATH LAB     INCISION AND DRAINAGE MANDIBLE, COMBINED N/A 2/27/2017    Procedure: COMBINED INCISION AND DRAINAGE MANDIBLE;  Surgeon: Adam Ramos DDS;  Location:  OR     OPEN REDUCTION INTERNAL FIXATION MANDIBLE N/A 2/27/2017    Procedure: OPEN REDUCTION INTERNAL FIXATION MANDIBLE;  Surgeon: Adam Ramos DDS;  Location:  OR        Recent Lab Results:  LIPID RESULTS:  Lab Results   Component Value Date    CHOL 124 01/20/2022    CHOL 133 07/16/2019    HDL 69 01/20/2022    HDL 60 07/16/2019    LDL 42 01/20/2022    LDL 59 07/16/2019    TRIG 63 01/20/2022    TRIG 70 07/16/2019    CHOLHDLRATIO 2.5 05/11/2015     LIVER ENZYME RESULTS:  Lab Results   Component Value Date    AST 31 01/20/2022    AST 18 01/26/2021    ALT 28 01/20/2022    ALT 25 01/26/2021     CBC RESULTS:  Lab Results   Component Value Date    WBC 9.3 01/22/2022    WBC 8.4 01/26/2021    RBC 4.11 (L) 01/22/2022    RBC 4.00 (L) 01/26/2021    HGB 13.3 01/22/2022    HGB 12.9 (L) 01/26/2021    HCT 36.6 (L) 01/22/2022    HCT 36.3 (L) 01/26/2021    MCV 89 01/22/2022    MCV 91 01/26/2021    MCH 32.4 01/22/2022    MCH 32.3 01/26/2021    MCHC 36.3 01/22/2022    MCHC 35.5 01/26/2021    RDW 15.2 (H) 01/22/2022    RDW 15.9 (H) 01/26/2021     01/25/2022     01/26/2021     BMP RESULTS:  Lab Results   Component Value Date     (L) 02/07/2022     01/26/2021    POTASSIUM 4.3 02/07/2022    POTASSIUM 4.6 01/26/2021    CHLORIDE 95 02/07/2022    CHLORIDE 103 01/26/2021    CO2 25 02/07/2022    CO2 27 01/26/2021    ANIONGAP 8 02/07/2022    ANIONGAP 4 01/26/2021     (H) 02/07/2022    GLC 87 01/26/2021    BUN 15 02/07/2022    BUN 24 01/26/2021    CR 0.77 02/07/2022    CR 0.85 01/26/2021    GFRESTIMATED 88 02/07/2022    GFRESTIMATED 81 01/26/2021    GFRESTBLACK >90 01/26/2021    MICKIE 9.4 02/07/2022    MICKIE 9.4 01/26/2021      A1C RESULTS:  Lab Results   Component Value Date    A1C 5.1 02/07/2022    A1C 5.4 03/08/2018     INR  RESULTS:  Lab Results   Component Value Date    INR 0.99 03/21/2019    INR 0.96 03/08/2018       This note was completed in part using Dragon voice recognition software. Although reviewed after completion, some word and grammatical errors may occur.

## 2022-02-17 NOTE — TELEPHONE ENCOUNTER
"Chart Reviewed:   -- 2/16/22:  OV w/ Danish Johnson CNP.  Furosemide decreased, Entresto increased to 2 tablets in AM and 1 tablet in PM.     Called Spike to discuss his [employer] message further and he reports Danish Johnson CNP called him after his appt and instructed him to stop his lasix all together, but he is reading his MyChart and see's furosemide is still on his list.     Entresto was denied as they won't approve 3 tablets in a day, but he has enough for another week.      Confirmed with him he currently has Entresto 24-26 mg which he calls \"50's\":   He's currently taking 2 tablets (49-51 mg) in AM and 1 tablet 24-26 mg in [place].  In 1 week he says he's to increase it to 49-51 mg BID.      He is requesting [employer] message back with confirmation of plan.     Future Appointments   Date Time Provider Department Center   3/1/2022  8:00 AM Tristian Jeffrey MD [facility]    3/3/2022  2:00 PM Jerrod Rangel MD [facility] PSA CLIN   4/7/2022  2:00 PM [facility]   5/5/2022  2:00 PM Jerrod Rangel MD [facility] PSA CL[facility]     Fabby Cobos RN BSN   [place], [place]  C.O.R.E. Clinic Care Coordinator  02/17/22, 3:34 PM        "

## 2022-02-17 NOTE — TELEPHONE ENCOUNTER
He should be taking lasix 20mg bid and Entresto 2 tablets in the am and 1 tablet in the pm. Zoë SAHA, CNP

## 2022-02-18 NOTE — TELEPHONE ENCOUNTER
"Called Spike to let him know which dose of Lasix and Entresto he should be on:  Lasix 20 mg twice a day (0.5 tablets twice a day - has 40 mg tablets at home)  Entresto - 2 tablets in the morning an 1 tablet in the evening 49-51 mg in morning and 24-26 mg in evening)   He said he will take one of his \"40 mg tablet in the morning so it can wear off then I can sleep.\"   He asked me to call [facility] to update them on his Entresto and Lasix doses.    Call to [facility]. Spoke to Sheryl who transferred me to [facility]. Updated her with Entresto dose for the morning. She said he does not need a prior authorization as Entresto is covered under his plan and he will need a separate prescription for the higher dose.  She transferred me to pharmacy (was actually the help desk) and spoke to Mya. Mya said Spike does need a prior authorization and transferred me to a another prior authorization person, Paul. He reviewed Spike's plan; no prior auth need. He did let me know to send in a new prescription for the higher dose and prescription will be be mailed to Spike.     New prescription for:  Entresto 49-51 mg once in the morning    Updated instructions for Lasix: 40 mg once a day in morning (equals 20 mg twice a day)  And Entresto 24-26 mg one tablet once in the evening.     Sent Spike a My Chart message with the update.     Future Appointments   Date Time Provider Department Center   3/1/2022  8:00 AM Tristian Jeffrey MD [facility]   3/3/2022  2:00 PM Jerrod Rangel MD [facility] PSA CLIN   4/7/2022  2:00 PM [facility]   5/5/2022  2:00 PM Jerrod Rangel MD [facility] PSA CLIN       Irais June RN 10:11 AM 02/18/22          "

## 2022-02-20 NOTE — TELEPHONE ENCOUNTER
Spike Chaudhary is an 86-year-old male with a history of heart failure who is followed closely in core clinic and with Dr. Rangel.  He called the after-hours phone today in order to inform me that he is on his last day and a half worth of Entresto.  He reached out to the core clinic via [employer] message to inform them that he [facility] pharmacy [facility] and requires refills sent to [facility] given his recently increased dose of Entresto.  On Friday [facility] called him back to notify him that they resent his prescription, but they sent it to [facility] although [facility] pharmacy is [facility].  I emailed the [facility] and Dr. Rangel in order to ensure that this is addressed tomorrow with prescription sent to [facility].  I discussed the plan with Spike and he understands that the [facility] will follow up on this tomorrow.  He is happy with the current plan.    Shahab Burris MD  Fellow Physician PGY-4  Division of Cardiovascular Disease  Pager [phone]

## 2022-02-21 NOTE — TELEPHONE ENCOUNTER
[facility] - C[facility] Clinic    -pt has a question regarding his meds.     Nancy Chang RN on 2/21/2022 at 9:33 AM

## 2022-02-21 NOTE — TELEPHONE ENCOUNTER
Medication Refill double check:    Last office visit was on 2/16/22 with Zoë Maldonado.    Follow up was recommended for March.    Any additional encounters with changes to requested med? Yes, AM dose was increased.    Authorizing provider is: Dr. Rangel    Refill was approved.     Additional orders/notes:   He is on Entresto 24/26 milligrams twice daily.  I have instructed him to decrease Lasix to 20 mg twice a day and try to increase Entresto to 2 tablets in the a.m. and 1 tablet in the p.m.    Vibha Fuller RN on 2/21/2022 at 8:37 AM

## 2022-02-21 NOTE — TELEPHONE ENCOUNTER
"Called Spike back to discuss his current Entresto dose. He was very upset that his prescription was sent to [facility] as he hasn't used them \"in years\". I did remind him I sent prescription there per his request. He does not recall our conversation. We talked about his CORE appointment with Danish on 2/16. He kept repeating he needs to take \"100s in the morning and 50s in the evening.\"   I did remind him that I did call [facility] as no prior authorization was needed but a new prescription for the \"100s\" had to be sent in so he would take one tablet of the \"100s\" (49-51 mg) and one tablet of the \"50s\" (24-26 mg\". Spike could not understand the reasoning of the tablet change.   He states Dr Stoddard's office \"took care of it and you need to send in a prescription for the \"50s\" to my pharmacy called [facility]. It is spelt [facility]. Why have you never heard of it\". When asked him to claify if he really needed \"50s\" as Dr Johnston's office did send in a prescription for the \"50s\" to [facility]. Spike continued to be up set and states he \"does not go to [facility]\".     I did call to [facility]. Pharmacistsaid Spike's insurance will not allow 2 tablets (24-26 mg) in morning and 1 tablet (24-26 mg) in afternoon. The 49-51 mg tablets need to be ordered.    Will send Danish an update and Dr Stoddard's nurse to speak to patient.     Future Appointments   Date Time Provider Department Center   3/1/2022  8:00 AM Tristian Jeffrey MD [facility]    3/3/2022  2:00 PM [facility]   4/7/2022  2:00 PM [facility]   5/5/2022  2:00 PM [facility]     Irais June, RN 11:10 AM 02/21/22       "

## 2022-02-21 NOTE — TELEPHONE ENCOUNTER
[facility] Call Center    Phone Message    May a detailed message be left on voicemail: yes     Reason for Call: Pt would like a call back to discuss multiple things. No other information given    Action Taken: TE sent to clinic    Travel Screening: Not Applicable     Awa Ricci on 2/21/2022 at 4:56 PM

## 2022-02-22 NOTE — TELEPHONE ENCOUNTER
Called Spike to clarify Entresto & filling pharmacy.    Called patient and advised him I wanted to help him clarify the Entresto dosing and pharmacy he is using.  He ran out of the low dose Entresto yesterday and had [facility] fill them while paying out of pocket.     He did agree that Jeannine instructed him to take two Entresto tabs in the morning & one in the alexey, BUT she ALSO said if he tolerated it for 2 weeks, he should then increase both AM & PM doses.  Patient states he has tolerated the double AM dose without any problems, so he is planning on increasing his evening dose tomorrow as well.  patient and I agreed I would send new Erx for increased Entresto dose 48-52 to his pharmacy [facility] which is the only pharmacy he is currently using.  Patient verbalized understanding, agreed with plan and denied any further questions. Vibha Fuller RN on 2/22/2022 at 4:00 PM

## 2022-02-22 NOTE — TELEPHONE ENCOUNTER
[facility] - C.O.R[facility]awnE. Clinic    Received VM on CORE RN line from Spike who is requesting call back to discuss what he should do for his afternoon Entresto tonight since he wasn't able to take his AM dose as he ran out.     Called Spike back and he reports he just talked to someone about Entresto and was given instructions.  Chart reviewed and AKUA Dunne spoke to pt and reviewed plan.       Fabby Cobos RN BSN   [facility] [place], [place]  MIGUEL ANGEL.O.RDOUG. Clinic Care Coordinator  02/22/22, 4:10 PM

## 2022-02-22 NOTE — PROGRESS NOTES
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  (approximately a decade old.  It is a brown and we discussed the brown recall.  Considering the severity of his apnea and the delay in getting a new machine we agreed he will continue to use it until we get him a new one.     As we do not have the results of his PSG (he gave me permission to sign off on his care everywhere document for [facility] but it was not there) we are going to struggle to get him a new device.  Not only with insurance but also because of the supply of PAP devices severely limited because of the recall and COVID era supply chain disruptions.     Because of this I am going to put an order in for him to get a HSAT.  In addition he is going to reach out to [facility] to see if he can get a hold of his sleep study.  If he gets it I will put an order in for CPAP and will explain the critical nature of the treatment in light of his heart disease.

## 2022-02-22 NOTE — TELEPHONE ENCOUNTER
Danish Lala is having some difficult understanding Entresto doses. I sent him a My Chart back with instructions again. It is the insurance issue that is making him confused as they will not cover extra tablets of the 24-26 mg dose to equal the 49-51 mg I clarified for him again as well as which pharmacy.     Irais June RN 9:09 AM 02/22/22

## 2022-02-23 NOTE — TELEPHONE ENCOUNTER
Received fax from [facility] requesting a prior auth for Entresto. Faxed to Vibha and Ronald, RNs for Dr Luque.    Irais June, AKUA 9:25 AM 02/23/22

## 2022-03-01 PROBLEM — S22.070S COMPRESSION FRACTURE OF T9 VERTEBRA, SEQUELA: Status: ACTIVE | Noted: 2022-01-01

## 2022-03-01 PROBLEM — R91.8 PULMONARY NODULES: Status: ACTIVE | Noted: 2022-01-01

## 2022-03-01 PROBLEM — E87.1 HYPONATREMIA: Status: ACTIVE | Noted: 2022-01-01

## 2022-03-01 PROBLEM — I50.22 CHRONIC SYSTOLIC HEART FAILURE (H): Status: ACTIVE | Noted: 2022-01-01

## 2022-03-01 NOTE — PATIENT INSTRUCTIONS
(I25.10) Coronary artery disease involving native coronary artery of native heart without angina pectoris  (primary encounter diagnosis)  Comment: Continue on aspirin, statin and beta-blocker  Plan:     Chronic heart failure reduced ejection fraction  Comment: Recent hospitalization with echocardiogram showing significantly reduced ejection fraction 20 to 25% range with severe hypokinesis in the LAD.  Blood pressure meds notably adjusted to stop both chlorthalidone and spironolactone, and Entresto has been initiated plan for repeat echocardiogram follow-up with cardiology in the next month.  Repeat labs today recheck renal function and potassium.  Recommend resuming metoprolol as per directed     Hyponatremia  Comment: Recheck sodium today      Deconditioning  Comment: Continue to work on improved nutrition    (R91.8) Pulmonary nodules  Comment: We will request a follow up CT chest - [facility] phone #[phone]   Plan: CT Chest w/o Contrast            (S22.070S) Compression fracture of T9 vertebra, sequela  Comment: Noted thoracic pain - continue Tylenol as needed   Plan:

## 2022-03-01 NOTE — PROGRESS NOTES
"  Daisy Lala is a 86 year old who presents for the following health issues     HPI     Follow up for:    # Acute on chronic HFrEF (EF 20-25%) 2/2 ischemic cardiomyopathy, resolved  # Acute on chronic grade II diastolic dysfunction, resolved   # Hypervolemic hyponatremia, improvoing  # Severe malnutrition in the context of chronic illness  # Deconditioning  # Generalized weakness   # Musculoskeletal back pain  # CAD s/p 3v CABG in 2001   # PAD s/p bilateral common iliac stents   # AAA, infra-renal 3.9 cm  # HLD  # Dysphonia, improving    # RLL nodular densities  # RUL 4 mm nodule, new   # GERD  # Osteopenia, T9 compression fracture  # Insomnia  # Prostate cancer s/p radiation     Chronic systolic heart failure (H)   Oscar Chaudhary continues to have dyspnea that is notable when he is up moving around and improves with resting.   He was treated with IV lasix in the hospital.  He is following in the [facility] and having difficulty coordinating diuretics.  Currently treated with Enteresto and furosemide 40 mg.  He is not needing oxygen at home as his oxygenation remains stable.   He does have cough some in the AM.    Hyponatremia   Sodium was low in the hospital and monitoring his free water intake  Physical deconditioning   He notes that he is managing at home.  He has home nursing 5 afternoons per week as well as home cleaning.   Lung Nodules   Noted recent chest CT showing nodular consolidation and recommended that a follow up CT be obtained in 4-5 weeks.        Review of Systems   Constitutional, HEENT, cardiovascular - as above, pulmonary - as above, GI, , musculoskeletal, neuro, skin, endocrine and psych systems are negative, except as otherwise noted.      Objective    /79 (BP Location: Right arm, Patient Position: Sitting, Cuff Size: Adult Regular)   Pulse 103   Temp 96.8  F (36  C) (Temporal)   Resp 16   Ht 1.6 m (5' 3\")   Wt 54.9 kg (121 lb)   SpO2 90%   BMI 21.43 kg/m    Body mass " index is 21.43 kg/m .  Physical Exam   GENERAL: healthy, alert and no distress  EYES: Eyes grossly normal to inspection,    NECK: no adenopathy, no asymmetry, masses, or scars and thyroid normal to palpation  RESP: Crackles on shirt  CV: tachycardia, no murmur, click or rub, no peripheral edema   ABDOMEN: soft, nontender, no hepatosplenomegaly, no masses and bowel sounds normal  MS: no gross musculoskeletal defects noted, no edema  SKIN: no suspicious lesions or rashes  NEURO: Normal strength and tone, mentation intact and speech normal  PSYCH: mentation appears normal, affect normal/bright    Recent Results (from the past 240 hour(s))   Basic metabolic panel  (Ca, Cl, CO2, Creat, Gluc, K, Na, BUN)    Collection Time: 03/01/22  9:21 AM   Result Value Ref Range    Sodium 123 (L) 133 - 144 mmol/L    Potassium 4.5 3.4 - 5.3 mmol/L    Chloride 86 (L) 94 - 109 mmol/L    Carbon Dioxide (CO2) 26 20 - 32 mmol/L    Anion Gap 11 3 - 14 mmol/L    Urea Nitrogen 17 7 - 30 mg/dL    Creatinine 0.80 0.66 - 1.25 mg/dL    Calcium 9.3 8.5 - 10.1 mg/dL    Glucose 158 (H) 70 - 99 mg/dL    GFR Estimate 87 >60 mL/min/1.73m2         Patient Instructions   (I25.10) Coronary artery disease involving native coronary artery of native heart without angina pectoris  (primary encounter diagnosis)  Comment: Continue on aspirin, statin and beta-blocker  Plan:     Chronic heart failure reduced ejection fraction  Comment: Recent hospitalization with echocardiogram showing significantly reduced ejection fraction 20 to 25% range with severe hypokinesis in the LAD.  Blood pressure meds notably adjusted to stop both chlorthalidone and spironolactone, and Entresto has been initiated plan for repeat echocardiogram follow-up with cardiology in the next month.  Repeat labs today recheck renal function and potassium.  Recommend resuming metoprolol as per directed     Hyponatremia  Comment: Recheck sodium today      Deconditioning  Comment: Continue to work on  improved nutrition    (R91.8) Pulmonary nodules  Comment: We will request a follow up CT chest - [facility] phone #[phone]   Plan: CT Chest w/o Contrast            (S22.070S) Compression fracture of T9 vertebra, sequela  Comment: Noted thoracic pain - continue Tylenol as needed   Plan:

## 2022-03-03 NOTE — PROGRESS NOTES
"Jerrod Rangel M.D.  Cardiovascular Medicine      Plan:  1. Continue current medical regimen  2.       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  He has no interim history of chest pain, tightness, heaviness, palpitation, pre-syncope or syncope.  Patient is eating better.  Patient ran out of medications over the weekend because of dosage increase.  He has intermittent non-productive cough.  He has periodic, unexplained dyspnea.  He is not aware of pulse irregularity.  He has excessive daytime fatigue.  He is not hypoxic when he takes oxygen with pulse oximeter.    Alert, oriented, JVP 5-7, no edema, no ascites, regular rhythm, E to A right base. weak      Wt Readings from Last 5 Encounters:   03/03/22 55.4 kg (122 lb 3.2 oz)   03/01/22 54.9 kg (121 lb)   02/16/22 52.3 kg (115 lb 6.4 oz)   02/03/22 53.3 kg (117 lb 8 oz)   01/25/22 51.2 kg (112 lb 12.8 oz)       Meds  Current Outpatient Medications   Medication     acetaminophen (TYLENOL) 325 MG tablet     albuterol (PROAIR HFA/PROVENTIL HFA/VENTOLIN HFA) 108 (90 Base) MCG/ACT inhaler     aspirin 81 MG tablet     atorvastatin (LIPITOR) 40 MG tablet     Cholecalciferol (VITAMIN D) 1000 UNITS capsule     diazepam (VALIUM) 5 MG tablet     fish oil-omega-3 fatty acids (FISH OIL) 1000 MG capsule     furosemide (LASIX) 40 MG tablet     guaiFENesin (MUCINEX) 600 MG 12 hr tablet     Multiple Vitamins-Minerals (MULTIVITAL PO)     nitroGLYcerin (NITROSTAT) 0.4 MG sublingual tablet     omeprazole (PRILOSEC) 20 MG CR capsule     sacubitril-valsartan (ENTRESTO) 49-51 MG per tablet     temazepam (RESTORIL) 15 MG capsule     No current facility-administered medications for this visit. "       Labs  Results for JOSH DOSHI (MRN [id number]) as of 3/3/2022 14:39   Ref. Range 2/7/2022 11:30 3/1/2022 09:21   Sodium Latest Ref Range: 133 - 144 mmol/L 128 (L) 123 (L)   Potassium Latest Ref Range: 3.4 - 5.3 mmol/L 4.3 4.5   Chloride Latest Ref Range: 94 - 109 mmol/L 95 86 (L)   Carbon Dioxide Latest Ref Range: 20 - 32 mmol/L 25 26   Urea Nitrogen Latest Ref Range: 7 - 30 mg/dL 15 17   Creatinine Latest Ref Range: 0.66 - 1.25 mg/dL 0.77 0.80   GFR Estimate Latest Ref Range: >60 mL/min/1.73m2 88 87   Calcium Latest Ref Range: 8.5 - 10.1 mg/dL 9.4 9.3   Anion Gap Latest Ref Range: 3 - 14 mmol/L 8 11   Hemoglobin A1C Latest Ref Range: 0.0 - 5.6 % 5.1    N-Terminal Pro Bnp Latest Ref Range: 0 - 450 pg/mL 2,088 (H)    Glucose Latest Ref Range: 70 - 99 mg/dL 134 (H) 158 (H)     Imaging

## 2022-03-03 NOTE — LETTER
"3/3/2022    Tristian Jeffrey MD, MD  [address]  [place] [address]    RE: Oscar Chaudhary       Dear Colleague,     I had the pleasure of seeing Oscar Chaudhary in the [facility].  Jerrod Rangel M.D.  Cardiovascular Medicine      Plan:  1. Continue current medical regimen  2.       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  He has no interim history of chest pain, tightness, heaviness, palpitation, pre-syncope or syncope.  Patient is eating better.  Patient ran out of medications over the weekend because of dosage increase.  He has intermittent non-productive cough.  He has periodic, unexplained dyspnea.  He is not aware of pulse irregularity.  He has excessive daytime fatigue.  He is not hypoxic when he takes oxygen with pulse oximeter.    Alert, oriented, JVP 5-7, no edema, no ascites, regular rhythm, E to A right base. weak      Wt Readings from Last 5 Encounters:   03/03/22 55.4 kg (122 lb 3.2 oz)   03/01/22 54.9 kg (121 lb)   02/16/22 52.3 kg (115 lb 6.4 oz)   02/03/22 53.3 kg (117 lb 8 oz)   01/25/22 51.2 kg (112 lb 12.8 oz)       Meds  Current Outpatient Medications   Medication     acetaminophen (TYLENOL) 325 MG tablet     albuterol (PROAIR HFA/PROVENTIL HFA/VENTOLIN HFA) 108 (90 Base) MCG/ACT inhaler     aspirin 81 MG tablet     atorvastatin (LIPITOR) 40 MG tablet     Cholecalciferol (VITAMIN D) 1000 UNITS capsule     diazepam (VALIUM) 5 MG tablet     fish oil-omega-3 fatty acids (FISH OIL) 1000 MG capsule     furosemide (LASIX) 40 MG tablet     guaiFENesin (MUCINEX) 600 MG 12 hr tablet     Multiple Vitamins-Minerals (MULTIVITAL PO)     nitroGLYcerin (NITROSTAT) 0.4 MG " sublingual tablet     omeprazole (PRILOSEC) 20 MG CR capsule     sacubitril-valsartan (ENTRESTO) 49-51 MG per tablet     temazepam (RESTORIL) 15 MG capsule     No current facility-administered medications for this visit.       Labs  Results for JOSH DOSHI (MRN [id number]) as of 3/3/2022 14:39   Ref. Range 2/7/2022 11:30 3/1/2022 09:21   Sodium Latest Ref Range: 133 - 144 mmol/L 128 (L) 123 (L)   Potassium Latest Ref Range: 3.4 - 5.3 mmol/L 4.3 4.5   Chloride Latest Ref Range: 94 - 109 mmol/L 95 86 (L)   Carbon Dioxide Latest Ref Range: 20 - 32 mmol/L 25 26   Urea Nitrogen Latest Ref Range: 7 - 30 mg/dL 15 17   Creatinine Latest Ref Range: 0.66 - 1.25 mg/dL 0.77 0.80   GFR Estimate Latest Ref Range: >60 mL/min/1.73m2 88 87   Calcium Latest Ref Range: 8.5 - 10.1 mg/dL 9.4 9.3   Anion Gap Latest Ref Range: 3 - 14 mmol/L 8 11   Hemoglobin A1C Latest Ref Range: 0.0 - 5.6 % 5.1    N-Terminal Pro Bnp Latest Ref Range: 0 - 450 pg/mL 2,088 (H)    Glucose Latest Ref Range: 70 - 99 mg/dL 134 (H) 158 (H)     Imaging     Thank you for allowing me to participate in the care of your patient.      Sincerely,     Jerrod Rangel MD     [facility]  cc:   Jerrod Rangel MD  [address] [address]

## 2022-03-03 NOTE — RESULT ENCOUNTER NOTE
Byron Moore,    I have had the opportunity to review your recent results and an interpretation is as follows:  Your follow-up metabolic panel does show stable kidney function, but your sodium is down to 123.  Per previous cardiology notes it was felt that your sodium improved with diuresis, I would recommend follow-up with [facility] to discuss this further and continue on your Lasix dose 40 mg, most importantly continue to monitor your free water intake as best you can, and we should plan to recheck this again in the next week    Sincerely,  Tristian Jeffrey MD

## 2022-03-03 NOTE — TELEPHONE ENCOUNTER
[facility] because [facility] has not been signed and faxed back,    Form # ending in [id number].    Fax number   [phone] .    Please call [phone] for questions.     Keiry St RN  -[facility]

## 2022-03-03 NOTE — PATIENT INSTRUCTIONS
Medication Changes:  -Entresto dose is now 49-51mg 1 tablet twice daily     Patient Instructions:  -Continue staying active and eat a heart healthy diet.  -Please keep a current list of medications with you at all times.      Follow up Appointment Information:  Appt Tuesday 3/8/22 @ 2:30pm for labs & 3:30pm office visit with Tracy in Heart clinic    Results:  Component      Latest Ref Rng & Units 3/1/2022   Sodium      133 - 144 mmol/L 123 (L)   Potassium      3.4 - 5.3 mmol/L 4.5   Chloride      94 - 109 mmol/L 86 (L)   Carbon Dioxide      20 - 32 mmol/L 26   Anion Gap      3 - 14 mmol/L 11   Urea Nitrogen      7 - 30 mg/dL 17   Creatinine      0.66 - 1.25 mg/dL 0.80   Calcium      8.5 - 10.1 mg/dL 9.3   Glucose      70 - 99 mg/dL 158 (H)   GFR Estimate      >60 mL/min/1.73m2 87     We are located on the third floor of [facility].  Our address is     [address] [place] [address] on [address] [place] [address]    Ronald Tanner RN  [facility]  Cardiology Care Coordinator-Heart Failure    Thank you for allowing us to be a part of your care here at the [facility]    If you have questions or concerns please contact us at:    Appointment scheduling or nurse questions: [phone]    On Call Cardiologist for after hours or on weekends: [phone]    option #4

## 2022-03-03 NOTE — TELEPHONE ENCOUNTER
Dr. Rangel would like to discuss mutual patient with Dr. Healy. Informed staff would send provider urgent message for him to contact Dr. Rangel.     Snow Denise CMA on 3/3/2022 at 4:39 PM

## 2022-03-08 DIAGNOSIS — I50.32 CHRONIC DIASTOLIC CONGESTIVE HEART FAILURE (H): ICD-10-CM

## 2022-03-08 DIAGNOSIS — I25.10 ASHD (ARTERIOSCLEROTIC HEART DISEASE): ICD-10-CM

## 2022-03-08 RX ORDER — NITROGLYCERIN 0.4 MG/1
TABLET SUBLINGUAL
Qty: 30 TABLET | Refills: 1 | Status: SHIPPED | OUTPATIENT
Start: 2022-03-08

## 2022-03-09 ENCOUNTER — MYC MEDICAL ADVICE (OUTPATIENT)
Dept: CARDIOLOGY | Facility: CLINIC | Age: 87
End: 2022-03-09
Payer: COMMERCIAL

## 2022-03-10 NOTE — TELEPHONE ENCOUNTER
Unable to reach patient; LM asking he call the [facility] schedulers to reschedule missed appt with [facility]. Provided their number for callback. Farheen Andrade RN on 3/10/2022 at 9:32 AM

## 2022-03-11 ENCOUNTER — MEDICAL CORRESPONDENCE (OUTPATIENT)
Dept: HEALTH INFORMATION MANAGEMENT | Facility: CLINIC | Age: 87
End: 2022-03-11
Payer: COMMERCIAL

## 2022-04-11 ENCOUNTER — MEDICAL CORRESPONDENCE (OUTPATIENT)
Dept: HEALTH INFORMATION MANAGEMENT | Facility: CLINIC | Age: 87
End: 2022-04-11
Payer: COMMERCIAL

## (undated) DEVICE — INFL DVC KIT W/10CC NITRO IN4530

## (undated) DEVICE — PACK HEAD NECK SEN15HNFSF

## (undated) DEVICE — SU SILK 3-0 SH 30" K832H

## (undated) DEVICE — KIT HAND CONTROL ANGIOTOUCH ACIST 65CM AT-P65

## (undated) DEVICE — BUR ROUND 2MM CARBIDE LONG 5092-224

## (undated) DEVICE — RAD G/W INQWIRE .035X260CM J-TIP EXCHANGE IQ35F260J1O5RS

## (undated) DEVICE — GOWN IMPERVIOUS BREATHABLE SMART LG 89015

## (undated) DEVICE — ESU GROUND PAD UNIVERSAL W/O CORD

## (undated) DEVICE — MANIFOLD KIT ANGIO AUTOMATED 014613

## (undated) DEVICE — BUR OVAL LINVATEC 4X8MM 5091-236

## (undated) DEVICE — SLEEVE TR BAND RADIAL COMPRESSION DEVICE 24CM TRB24-REG

## (undated) DEVICE — CATH ANGIO INFINITI IM 4FRX100CM 538460

## (undated) DEVICE — CATH ANGIO INFINITI PIGTAIL 145 6 SH 6FRX110CM  534-652S

## (undated) DEVICE — CATH ANGIO JUDKINS JL4 6FRX100CM INFINITI 534620T

## (undated) DEVICE — TOTE ANGIO CORP PC15AT SAN32CC83O

## (undated) DEVICE — GLOVE PROTEXIS BLUE W/NEU-THERA 7.0  2D73EB70

## (undated) DEVICE — SOL NACL 0.9% IRRIG 1000ML BOTTLE 07138-09

## (undated) DEVICE — LINEN TOWEL PACK X5 5464

## (undated) DEVICE — INTRO GLIDESHEATH SLENDER 6FR 10X45CM 60-1060

## (undated) DEVICE — DRAIN PENROSE 0.25"X18" LATEX FREE GR201

## (undated) DEVICE — CATH BALLOON NC EMERGE 4.00X8MM H7493926708400

## (undated) DEVICE — GOWN LG DISP 9515

## (undated) DEVICE — GUIDEWIRE VASC 0.014INX180CM RUNTHROUGH 25-1011

## (undated) DEVICE — GOWN IMPERVIOUS SPECIALTY XLG/XLONG 32474

## (undated) DEVICE — GLOVE PROTEXIS W/NEU-THERA 7.0  2D73TE70

## (undated) DEVICE — DEFIB PRO-PADZ LVP LQD GEL ADULT 8900-2105-01

## (undated) DEVICE — SUCTION CANISTER MEDIVAC LINER 3000ML W/LID 65651-530

## (undated) DEVICE — CATH DIAGNOSTIC RADIAL 5FR TIG 4.0

## (undated) DEVICE — CATH ANGIO JUDKINS R4 6FRX100CM INFINITI 534621T

## (undated) DEVICE — GLOVE PROTEXIS W/NEU-THERA 6.5  2D73TE65

## (undated) DEVICE — CATH BALLOON NC EMERGE 3.00X12MM H7493926712300

## (undated) DEVICE — GUIDEWIRE VERRATA PLUS PRESSURE 185CM JTIP 10185JP

## (undated) DEVICE — KIT LG BORE TOUHY ACCESS PLUS MAP152

## (undated) DEVICE — CATH LAUNCHER 6FR LA6EBU375

## (undated) DEVICE — CATH ANGIO LCB 6FRX110CM INFINITI 534672T

## (undated) RX ORDER — CHLORHEXIDINE GLUCONATE ORAL RINSE 1.2 MG/ML
SOLUTION DENTAL
Status: DISPENSED
Start: 2017-02-28

## (undated) RX ORDER — REGADENOSON 0.08 MG/ML
INJECTION, SOLUTION INTRAVENOUS
Status: DISPENSED
Start: 2019-04-23

## (undated) RX ORDER — REGADENOSON 0.08 MG/ML
INJECTION, SOLUTION INTRAVENOUS
Status: DISPENSED
Start: 2022-01-01

## (undated) RX ORDER — PIPERACILLIN SODIUM, TAZOBACTAM SODIUM 3; .375 G/15ML; G/15ML
INJECTION, POWDER, LYOPHILIZED, FOR SOLUTION INTRAVENOUS
Status: DISPENSED
Start: 2017-02-27

## (undated) RX ORDER — PIPERACILLIN SODIUM, TAZOBACTAM SODIUM 3; .375 G/15ML; G/15ML
INJECTION, POWDER, LYOPHILIZED, FOR SOLUTION INTRAVENOUS
Status: DISPENSED
Start: 2017-02-28

## (undated) RX ORDER — LIDOCAINE HYDROCHLORIDE AND EPINEPHRINE BITARTRATE 20; .01 MG/ML; MG/ML
INJECTION, SOLUTION SUBCUTANEOUS
Status: DISPENSED
Start: 2017-02-27

## (undated) RX ORDER — LIDOCAINE HYDROCHLORIDE 10 MG/ML
INJECTION, SOLUTION EPIDURAL; INFILTRATION; INTRACAUDAL; PERINEURAL
Status: DISPENSED
Start: 2019-03-21

## (undated) RX ORDER — PROPOFOL 10 MG/ML
INJECTION, EMULSION INTRAVENOUS
Status: DISPENSED
Start: 2017-02-27

## (undated) RX ORDER — AMINOPHYLLINE 25 MG/ML
INJECTION, SOLUTION INTRAVENOUS
Status: DISPENSED
Start: 2019-04-23

## (undated) RX ORDER — HEPARIN SODIUM 1000 [USP'U]/ML
INJECTION, SOLUTION INTRAVENOUS; SUBCUTANEOUS
Status: DISPENSED
Start: 2019-03-21

## (undated) RX ORDER — FENTANYL CITRATE 50 UG/ML
INJECTION, SOLUTION INTRAMUSCULAR; INTRAVENOUS
Status: DISPENSED
Start: 2017-02-27

## (undated) RX ORDER — PROPOFOL 10 MG/ML
INJECTION, EMULSION INTRAVENOUS
Status: DISPENSED
Start: 2017-02-28

## (undated) RX ORDER — BUPIVACAINE HYDROCHLORIDE AND EPINEPHRINE 5; 5 MG/ML; UG/ML
INJECTION, SOLUTION EPIDURAL; INTRACAUDAL; PERINEURAL
Status: DISPENSED
Start: 2017-02-27

## (undated) RX ORDER — AMINOPHYLLINE 25 MG/ML
INJECTION, SOLUTION INTRAVENOUS
Status: DISPENSED
Start: 2022-01-01

## (undated) RX ORDER — BENZOCAINE/MENTHOL 6 MG-10 MG
LOZENGE MUCOUS MEMBRANE
Status: DISPENSED
Start: 2017-02-27

## (undated) RX ORDER — CHLORHEXIDINE GLUCONATE ORAL RINSE 1.2 MG/ML
SOLUTION DENTAL
Status: DISPENSED
Start: 2017-02-27